# Patient Record
Sex: FEMALE | Race: WHITE | NOT HISPANIC OR LATINO | Employment: UNEMPLOYED | URBAN - METROPOLITAN AREA
[De-identification: names, ages, dates, MRNs, and addresses within clinical notes are randomized per-mention and may not be internally consistent; named-entity substitution may affect disease eponyms.]

---

## 2017-01-25 ENCOUNTER — OFFICE VISIT (OUTPATIENT)
Dept: NEPHROLOGY | Facility: MEDICAL CENTER | Age: 67
End: 2017-01-25
Payer: MEDICARE

## 2017-01-25 ENCOUNTER — HOSPITAL ENCOUNTER (OUTPATIENT)
Dept: LAB | Facility: MEDICAL CENTER | Age: 67
End: 2017-01-25
Attending: INTERNAL MEDICINE
Payer: MEDICARE

## 2017-01-25 VITALS
TEMPERATURE: 98.7 F | BODY MASS INDEX: 36.88 KG/M2 | RESPIRATION RATE: 12 BRPM | DIASTOLIC BLOOD PRESSURE: 78 MMHG | HEART RATE: 97 BPM | HEIGHT: 69 IN | WEIGHT: 249 LBS | SYSTOLIC BLOOD PRESSURE: 130 MMHG

## 2017-01-25 DIAGNOSIS — N18.30 CKD (CHRONIC KIDNEY DISEASE), STAGE III (HCC): ICD-10-CM

## 2017-01-25 DIAGNOSIS — E55.9 VITAMIN D DEFICIENCY DISEASE: ICD-10-CM

## 2017-01-25 DIAGNOSIS — E78.5 HYPERLIPIDEMIA WITH TARGET LDL LESS THAN 100: ICD-10-CM

## 2017-01-25 DIAGNOSIS — I15.1 HYPERTENSION SECONDARY TO OTHER RENAL DISORDERS: ICD-10-CM

## 2017-01-25 LAB
ANION GAP SERPL CALC-SCNC: 11 MMOL/L (ref 0–11.9)
APPEARANCE UR: CLEAR
BACTERIA #/AREA URNS HPF: ABNORMAL /HPF
BILIRUB UR QL STRIP.AUTO: NEGATIVE
BUN SERPL-MCNC: 13 MG/DL (ref 8–22)
CALCIUM SERPL-MCNC: 9.9 MG/DL (ref 8.5–10.5)
CHLORIDE SERPL-SCNC: 104 MMOL/L (ref 96–112)
CO2 SERPL-SCNC: 27 MMOL/L (ref 20–33)
COLOR UR AUTO: YELLOW
CREAT SERPL-MCNC: 1.32 MG/DL (ref 0.5–1.4)
EPITHELIAL CELLS 1715: ABNORMAL /HPF
GLUCOSE SERPL-MCNC: 114 MG/DL (ref 65–99)
GLUCOSE UR STRIP.AUTO-MCNC: NEGATIVE MG/DL
HYALINE CAST   1831: ABNORMAL /LPF
KETONES UR STRIP.AUTO-MCNC: NEGATIVE MG/DL
LEUKOCYTE ESTERASE UR QL STRIP.AUTO: ABNORMAL
MICRO URNS: ABNORMAL
NITRITE UR QL STRIP.AUTO: NEGATIVE
PH UR: 6.5 [PH]
POTASSIUM SERPL-SCNC: 4 MMOL/L (ref 3.6–5.5)
PROT UR QL STRIP: NEGATIVE MG/DL
RBC #/AREA URNS HPF: ABNORMAL /HPF
RBC UR QL AUTO: NEGATIVE
SODIUM SERPL-SCNC: 142 MMOL/L (ref 135–145)
SP GR UR STRIP.AUTO: 1.01
TRANS CELLS URNS QL MICRO: ABNORMAL /HPF
WBC #/AREA URNS HPF: ABNORMAL /HPF

## 2017-01-25 PROCEDURE — 80048 BASIC METABOLIC PNL TOTAL CA: CPT

## 2017-01-25 PROCEDURE — 99204 OFFICE O/P NEW MOD 45 MIN: CPT | Performed by: INTERNAL MEDICINE

## 2017-01-25 PROCEDURE — G8432 DEP SCR NOT DOC, RNG: HCPCS | Performed by: INTERNAL MEDICINE

## 2017-01-25 PROCEDURE — 1101F PT FALLS ASSESS-DOCD LE1/YR: CPT | Performed by: INTERNAL MEDICINE

## 2017-01-25 PROCEDURE — G8419 CALC BMI OUT NRM PARAM NOF/U: HCPCS | Performed by: INTERNAL MEDICINE

## 2017-01-25 PROCEDURE — G8484 FLU IMMUNIZE NO ADMIN: HCPCS | Performed by: INTERNAL MEDICINE

## 2017-01-25 PROCEDURE — 4040F PNEUMOC VAC/ADMIN/RCVD: CPT | Mod: 8P | Performed by: INTERNAL MEDICINE

## 2017-01-25 PROCEDURE — 3014F SCREEN MAMMO DOC REV: CPT | Performed by: INTERNAL MEDICINE

## 2017-01-25 PROCEDURE — 81001 URINALYSIS AUTO W/SCOPE: CPT

## 2017-01-25 PROCEDURE — 1036F TOBACCO NON-USER: CPT | Performed by: INTERNAL MEDICINE

## 2017-01-25 PROCEDURE — 3017F COLORECTAL CA SCREEN DOC REV: CPT | Mod: 1P | Performed by: INTERNAL MEDICINE

## 2017-01-25 PROCEDURE — 36415 COLL VENOUS BLD VENIPUNCTURE: CPT

## 2017-01-25 ASSESSMENT — ENCOUNTER SYMPTOMS
CHILLS: 0
SHORTNESS OF BREATH: 0
DIZZINESS: 0
ORTHOPNEA: 0
ABDOMINAL PAIN: 0
HEMOPTYSIS: 0
FEVER: 0
BACK PAIN: 0
CONSTITUTIONAL NEGATIVE: 1
NECK PAIN: 0
NAUSEA: 0
COUGH: 0
DIARRHEA: 0
MYALGIAS: 0
WEIGHT LOSS: 0
DIAPHORESIS: 0
HEARTBURN: 0
EYES NEGATIVE: 1
WEAKNESS: 0
SENSORY CHANGE: 0
FOCAL WEAKNESS: 0
PALPITATIONS: 0
SPUTUM PRODUCTION: 0
PND: 0
CARDIOVASCULAR NEGATIVE: 1
RESPIRATORY NEGATIVE: 1
WHEEZING: 0
CONSTIPATION: 0
FLANK PAIN: 0
CLAUDICATION: 0
VOMITING: 0

## 2017-01-25 NOTE — PROGRESS NOTES
"Subjective:      Keyur Burt is a 66 y.o. female who presents with New Patient and Chronic Kidney Disease            HPI  Keyur is coming today for initial evaluation  of CKD III  Doing well, no complaints.  No diffictulties to urinate  No dysuria, no hematuria.  HTN: BP well controlled  CKD III: baseline creat level at 1.3's -last one from Sept 2016 at 1.49  Renal US -mild right hydro in 2015 repeated in 2016 - no hydro, (+) for non obstructing kidney stone    Review of Systems   Constitutional: Negative.  Negative for fever, chills, weight loss, malaise/fatigue and diaphoresis.   HENT: Negative.    Eyes: Negative.    Respiratory: Negative.  Negative for cough, hemoptysis, sputum production, shortness of breath and wheezing.    Cardiovascular: Negative.  Negative for chest pain, palpitations, orthopnea, claudication, leg swelling and PND.   Gastrointestinal: Negative for heartburn, nausea, vomiting, abdominal pain, diarrhea and constipation.   Genitourinary: Negative.  Negative for dysuria, urgency, frequency, hematuria and flank pain.   Musculoskeletal: Negative for myalgias, back pain, joint pain and neck pain.   Skin: Negative.  Negative for itching and rash.   Neurological: Negative for dizziness, sensory change, focal weakness and weakness.   All other systems reviewed and are negative.    PMH/FH/SH/allergies and medications reviewed     Objective:     /78 mmHg  Pulse 97  Temp(Src) 37.1 °C (98.7 °F) (Temporal)  Resp 12  Ht 1.753 m (5' 9\")  Wt 112.946 kg (249 lb)  BMI 36.75 kg/m2     Physical Exam   Constitutional: She is oriented to person, place, and time. She appears well-developed and well-nourished. No distress.   HENT:   Head: Normocephalic and atraumatic.   Nose: Nose normal.   Mouth/Throat: Oropharynx is clear and moist.   Eyes: Conjunctivae and EOM are normal. Pupils are equal, round, and reactive to light. No scleral icterus.   Neck: Normal range of motion. Neck supple. "   Cardiovascular: Normal rate, regular rhythm and normal heart sounds.  Exam reveals no gallop and no friction rub.    No murmur heard.  Pulmonary/Chest: Effort normal and breath sounds normal. No respiratory distress. She has no wheezes. She has no rales.   Abdominal: Soft. Bowel sounds are normal. She exhibits no distension.   Musculoskeletal: She exhibits no edema.   Neurological: She is alert and oriented to person, place, and time. No cranial nerve deficit. Coordination normal.   Skin: Skin is warm. No rash noted. No erythema.   Nursing note and vitals reviewed.            Laboratory and US results reviewed: d/w Pt  Lab Results   Component Value Date/Time    CREATININE 1.49* 09/27/2016 11:08 AM    POTASSIUM 4.8 09/27/2016 11:08 AM         Assessment/Plan:     1. CKD (chronic kidney disease), stage III      Creat level slightly worse from baseline -to repeat renal panel today -will f/u results  - BASIC METABOLIC PANEL; Future  - URINALYSIS; Future    2. Hypertension       BP well controlled    3. Hyperlipidemia with target LDL less than 100      4. Vitamin D deficiency disease      To monitor    Recs: low Na diet             Monitor BP             Complete renal; panel today             To drink plenty of water with lemon    F/u in 1 month    Thank you for the consult

## 2017-01-25 NOTE — MR AVS SNAPSHOT
"Keyur Burt   2017 11:30 AM   Office Visit   MRN: [id number]    Department:  [facility]   Dept Phone:  [phone]    Description:  Female : 1950   Provider:  Angelia Arreguin M.D.           Reason for Visit     New Patient           Allergies as of 2017     Allergen Noted Reactions    Iodine 2013       Pcn [Penicillins] 2013       Shellfish Allergy 2013         You were diagnosed with     CKD (chronic kidney disease), stage III   [136666]       Hypertension secondary to other renal disorders   [9198329]       Hyperlipidemia with target LDL less than 100   [806429]       Vitamin D deficiency disease   [497946]         Vital Signs     Blood Pressure Pulse Temperature Respirations Height Weight    130/78 mmHg 97 37.1 °C (98.7 °F) (Temporal) 12 1.753 m (5' 9\") 112.946 kg (249 lb)    Body Mass Index Smoking Status                36.75 kg/m2 Never Smoker           Basic Information     Date Of Birth Sex Race Ethnicity Preferred Language    1950 Female White Non- English      Your appointments     2017  1:20 PM   ONCOLOGY NEW PATIENT 60 MIN with Marcos Hu M.D.   [facility] (--)    [address], [address]  [place] [address] [phone]            2017 11:30 AM   Established Patient with MILO Piña   [facility]    [address]  [place] [address] [phone]           You will be receiving a confirmation call a few days before your appointment from our automated call confirmation system.            Mar 15, 2017 10:30 AM   Follow Up Visit with Angelia Arreguin M.D.   [facility] ([address])    [address] [place] [address], [address]  [place] [address] [phone]           You will be receiving a confirmation call a few days before your appointment from our automated call confirmation system.              Problem List    "          ICD-10-CM Priority Class Noted - Resolved    Fatty liver K76.0   Unknown - Present    Hyperlipidemia with target LDL less than 100 E78.5   Unknown - Present    Hypertension I10   Unknown - Present    Hypothyroidism from resorcinol E03.2   Unknown - Present    Thyroid cancer (CMS-HCC) C73   Unknown - Present    Tension headache G44.209   Unknown - Present    Allergy to pollen J30.1   Unknown - Present    Chronic neck pain M54.2, G89.29   Unknown - Present    Preventative health care Z00.00   3/25/2013 - Present    Impaired fasting glucose R73.01   Unknown - Present    CKD (chronic kidney disease), stage III N18.3   Unknown - Present    Hematuria R31.9   2/20/2015 - Present    Breast cancer (CMS-HCC) C50.919   Unknown - Present    Vitamin D deficiency disease E55.9   1/25/2017 - Present      Health Maintenance        Date Due Completion Dates    IMM DTaP/Tdap/Td Vaccine (1 - Tdap) 7/4/1970 ---    IMM ZOSTER VACCINE 7/4/2011 ---    COLON CANCER SCREENING ANNUAL FIT 1/16/2016 1/16/2015    IMM PNEUMOCOCCAL 65+ (ADULT) LOW/MEDIUM RISK SERIES (1 of 2 - PCV13) 7/4/2016 ---    IMM INFLUENZA (1) 9/1/2016 ---    MAMMOGRAM 8/17/2017 8/17/2016 (Done), 7/1/2015 (Done), 3/4/2014 (Done)    Override on 8/17/2016: Done    Override on 7/1/2015: Done    Override on 3/4/2014: Done    BONE DENSITY 5/20/2021 5/20/2016            Current Immunizations     No immunizations on file.      Below and/or attached are the medications your provider expects you to take. Review all of your home medications and newly ordered medications with your provider and/or pharmacist. Follow medication instructions as directed by your provider and/or pharmacist. Please keep your medication list with you and share with your provider. Update the information when medications are discontinued, doses are changed, or new medications (including over-the-counter products) are added; and carry medication information at all times in the event of emergency  situations     Allergies:  IODINE - (reactions not documented)     PCN - (reactions not documented)     SHELLFISH ALLERGY - (reactions not documented)               Medications  Valid as of: January 25, 2017 - 11:45 AM    Generic Name Brand Name Tablet Size Instructions for use    Anastrozole   Take  by mouth.        Anastrozole (Tab) ARIMIDEX 1 MG Take 1 Tab by mouth every day.        Cholecalciferol (Cap) Vitamin D 2000 UNITS Take  by mouth.        Hydrocodone-Acetaminophen (Tab) NORCO 5-325 MG Take 1 Tab by mouth every 6 hours as needed. Must last 30 days        Levothyroxine Sodium (Tab) SYNTHROID 137 MCG Take 1 Tab by mouth every day.        Lisinopril (Tab) PRINIVIL, ZESTRIL 40 MG Take 1 Tab by mouth every day.        Spironolactone (Tab) ALDACTONE 25 MG Take 1 Tab by mouth every day.        .                 Medicines prescribed today were sent to:     [facility] [address] [place] [address] (N), [place] - [address] [facility]    [address] [facility] (N) [place] [address]    Phone: [phone] Fax: [phone]    Open 24 Hours?: No      Medication refill instructions:       If your prescription bottle indicates you have medication refills left, it is not necessary to call your provider’s office. Please contact your pharmacy and they will refill your medication.    If your prescription bottle indicates you do not have any refills left, you may request refills at any time through one of the following ways: The online [employer] system (except Urgent Care), by calling your provider’s office, or by asking your pharmacy to contact your provider’s office with a refill request. Medication refills are processed only during regular business hours and may not be available until the next business day. Your provider may request additional information or to have a follow-up visit with you prior to refilling your medication.   *Please Note: Medication refills are assigned a new Rx number when refilled electronically. Your pharmacy  may indicate that no refills were authorized even though a new prescription for the same medication is available at the pharmacy. Please request the medicine by name with the pharmacy before contacting your provider for a refill.        Your To Do List     Future Labs/Procedures Complete By Expires    BASIC METABOLIC PANEL  As directed 7/25/2017    URINALYSIS  As directed 7/25/2017         MyChart Status: Patient Declined

## 2017-01-26 ENCOUNTER — TELEPHONE (OUTPATIENT)
Dept: NEPHROLOGY | Facility: MEDICAL CENTER | Age: 67
End: 2017-01-26

## 2017-01-26 NOTE — TELEPHONE ENCOUNTER
PT called wanted to know if you can call her to talk about her labs results she got them done yesterday, labs results are in her chart . Her number is [phone]

## 2017-02-22 ENCOUNTER — OFFICE VISIT (OUTPATIENT)
Dept: HEMATOLOGY ONCOLOGY | Facility: MEDICAL CENTER | Age: 67
End: 2017-02-22
Payer: MEDICARE

## 2017-02-22 VITALS
HEART RATE: 82 BPM | WEIGHT: 248.2 LBS | DIASTOLIC BLOOD PRESSURE: 72 MMHG | SYSTOLIC BLOOD PRESSURE: 118 MMHG | HEIGHT: 69 IN | OXYGEN SATURATION: 95 % | RESPIRATION RATE: 16 BRPM | TEMPERATURE: 98.4 F | BODY MASS INDEX: 36.76 KG/M2

## 2017-02-22 DIAGNOSIS — C50.112 MALIGNANT NEOPLASM OF CENTRAL PORTION OF LEFT FEMALE BREAST (HCC): ICD-10-CM

## 2017-02-22 PROCEDURE — 3014F SCREEN MAMMO DOC REV: CPT | Performed by: INTERNAL MEDICINE

## 2017-02-22 PROCEDURE — G8484 FLU IMMUNIZE NO ADMIN: HCPCS | Performed by: INTERNAL MEDICINE

## 2017-02-22 PROCEDURE — 4040F PNEUMOC VAC/ADMIN/RCVD: CPT | Mod: 8P | Performed by: INTERNAL MEDICINE

## 2017-02-22 PROCEDURE — 99204 OFFICE O/P NEW MOD 45 MIN: CPT | Performed by: INTERNAL MEDICINE

## 2017-02-22 PROCEDURE — 1036F TOBACCO NON-USER: CPT | Performed by: INTERNAL MEDICINE

## 2017-02-22 PROCEDURE — G8419 CALC BMI OUT NRM PARAM NOF/U: HCPCS | Performed by: INTERNAL MEDICINE

## 2017-02-22 PROCEDURE — G8432 DEP SCR NOT DOC, RNG: HCPCS | Performed by: INTERNAL MEDICINE

## 2017-02-22 PROCEDURE — 1101F PT FALLS ASSESS-DOCD LE1/YR: CPT | Performed by: INTERNAL MEDICINE

## 2017-02-22 PROCEDURE — 3017F COLORECTAL CA SCREEN DOC REV: CPT | Performed by: INTERNAL MEDICINE

## 2017-02-22 ASSESSMENT — PAIN SCALES - GENERAL: PAINLEVEL: NO PAIN

## 2017-02-22 NOTE — PROGRESS NOTES
Consult Note: Oncology    Date of consultation: 2/22/2017 1:14 PM    Referring provider: Abby Marc     Reason for consultation: To establish care for a history of breast carcinoma.  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 As you know she  is a 66 y.o. year old postmenopausal woman who was diagnosed with stage II a HER-2/geoffrey positive breast carcinoma, which was found in her screening mammogram back in June 2015. She underwent a wire localization lumpectomy and sentinel lymph node biopsy on 9/15/2015 at [place] to [place]. According to available records, the pathology showed on 0.3 cm, ER positive, UT negative, HER-2/geoffrey positive (by FISH )invasive carcinoma with one out of 3 lymph nodes positive. She was seen by Dr. Camarena and was started on adjuvant TCH chemotherapy. After the first dose, she developed severe adverse effects, and they declined further chemotherapy. She was started on anastrozole and Herceptin was continued for a total of one year. Some delay in rehabilitation for radiation, which was completed in March 2016.    She has developed weight gain and leg swelling. She had multiple echocardiograms done, all of which were reportedly normal. She wanted to switch providers and establish care here. She has been taking Arimidex, which has been given by her PCP. She has been getting mammograms every 6 months. She denies acute complaints except for chronic low back pain    Past Medical History:    Past Medical History   Diagnosis Date   • Chronic neck pain    • Allergic rhinitis    • Tension headache    • Thyroid cancer (CMS-HCC)      treated with total thyroidectomy   • Hypertension    • Hyperlipidemia LDL goal < 100    • Fatty liver      elevated lft's   • Hypothyroidism    • Impaired fasting glucose    • CKD (chronic kidney  disease), stage III    • Hematuria    • Breast cancer (CMS-HCC)        Past surgical history:    Past Surgical History   Procedure Laterality Date   • Abdominal hysterectomy total       uterus only DUB AFTER CHILDBIRTH   • Tonsillectomy and adenoidectomy     • Cholecystectomy     • Rotator cuff repair       right shoulder x 2   • Gastric banding laparoscopic       done 2x   • Hiatal hernia repair         Allergies:  Iodine; Pcn; and Shellfish allergy    Medications:    Current Outpatient Prescriptions   Medication Sig Dispense Refill   • anastrozole (ARIMIDEX) 1 MG Tab Take 1 Tab by mouth every day. 90 Tab 0   • spironolactone (ALDACTONE) 25 MG Tab Take 1 Tab by mouth every day. 90 Tab 0   • hydrocodone-acetaminophen (NORCO) 5-325 MG Tab per tablet Take 1 Tab by mouth every 6 hours as needed. Must last 30 days 65 Tab 0   • ANASTROZOLE PO Take  by mouth.     • lisinopril (PRINIVIL, ZESTRIL) 40 MG tablet Take 1 Tab by mouth every day. 90 Tab 2   • levothyroxine (SYNTHROID) 137 MCG Tab Take 1 Tab by mouth every day. 90 Tab 2   • Cholecalciferol (VITAMIN D) 2000 UNITS Cap Take  by mouth.       No current facility-administered medications for this visit.       Social History:     Social History     Social History   • Marital Status:      Spouse Name: N/A   • Number of Children: N/A   • Years of Education: N/A     Occupational History   • Not on file.     Social History Main Topics   • Smoking status: Never Smoker    • Smokeless tobacco: Never Used   • Alcohol Use: No   • Drug Use: No   • Sexual Activity: Not on file     Other Topics Concern   • Not on file     Social History Narrative       Family History:     Family History   Problem Relation Age of Onset   • Heart Disease Mother    • Cancer Mother    • Diabetes Mother    • Heart Disease Father    • Stroke Father        Review of Systems:  All other review of systems are negative except what was mentioned above in the HPI.    Constitutional: No fever, chills,  "weight loss ,malaise/fatigue.  . She had significant weight gain over the past year  HEENT: No new auditory or visual complaints. No sore throat and neck pain.     Respiratory:No new cough, sputum production, shortness of breath and wheezing.    Cardiovascular: No new chest pain, palpitations, orthopnea and leg swelling.    Gastrointestinal: No heartburn, nausea, vomiting ,abdominal pain, hematochezia or melena     Genitourinary: Negative for dysuria, hematuria    Musculoskeletal: No new arthralgias or myalgias . Chronic back pain  Skin: Negative for rash and itching.    Neurological: Negative for focal weakness or headaches.    Endo/Heme/Allergies: No abnormal bleed/bruise.    Psychiatric/Behavioral: No new depression/anxiety.    Physical Exam:  Vitals:   /72 mmHg  Pulse 82  Temp(Src) 36.9 °C (98.4 °F)  Resp 16  Ht 1.753 m (5' 9.02\")  Wt 112.583 kg (248 lb 3.2 oz)  BMI 36.64 kg/m2  SpO2 95%  Breastfeeding? No    General: Not in acute distress, alert and oriented x 3  HEENT: No pallor, icterus. Oropharynx clear.   Neck: Supple, no palpable masses.  Lymph nodes: No palpable cervical, supraclavicular, axillary or inguinal lymphadenopathy.    CVS: regular rate and rhythm, no rubs or gallops  RESP: Clear to auscultate bilaterally, no wheezing or crackles.   ABD: Soft, non tender, non distended, positive bowel sounds, no palpable organomegaly  EXT: No edema or cyanosis  CNS: Alert and oriented x3, No focal deficits.  Skin- No rash  Breast her left breast with well-healed lumpectomy scar at 12:00 position. She also had some old scar from prior lumpectomies. No axillary adenopathy. Right breast exam unremarkable      Labs:   No results for input(s): RBC, HEMOGLOBIN, HEMATOCRIT, PLATELETCT, PROTHROMBTM, APTT, INR, IRON, FERRITIN, TOTIRONBC in the last 72 hours.  Lab Results   Component Value Date/Time    SODIUM 142 01/25/2017 12:16 PM    POTASSIUM 4.0 01/25/2017 12:16 PM    CHLORIDE 104 01/25/2017 12:16 PM    " CO2 27 01/25/2017 12:16 PM    GLUCOSE 114* 01/25/2017 12:16 PM    BUN 13 01/25/2017 12:16 PM    CREATININE 1.32 01/25/2017 12:16 PM    BUN-CREATININE RATIO 9* 09/27/2016 11:08 AM        Assessment and Plan:  Stage II A left breast, ER positive, CT negative, HER-2/geoffrey positive invasive ductal carcinoma, grade 3  -She received only one dose of adjuvant TCH chemotherapy followed by one year. Herceptin. She is status post adjuvant radiation with some delay and has been on Arimidex. Recent mammograms did not reveal any evidence of relapse. She had problems with fluid overload and obesity for which she is following up with primary care provider. She had a DEXA scan done which was reportedly normal. I will try to access the report of this. I reviewed her prior medical records. I informed her that she does have a higher risk of relapse given the high-grade carcinoma with lymph node involvement. Even though she did not get the planned chemotherapy. Hopefully Herceptin and Arimidex will reduce the risk of relapse. She has enough Arimidex for 2 months and will call us when she needs refills. I informed her that she is at risk of both local relapse and distant relapse and to monitor for any new clinical symptoms. She will return to the clinic in 6 months.    She agreed and verbalized her agreement and understanding with the current plan.  I answered all questions and concerns she has at this time.              Thank you for allowing me to participate in her care.    Please note that this dictation was created using voice recognition software. I have made every reasonable attempt to correct obvious errors, but I expect that there are errors of grammar and possibly content that I did not discover before finalizing the note.      SIGNATURES:  Marcos Hu    CC:  MILO Piña Janice K, A.P.N.

## 2017-02-22 NOTE — MR AVS SNAPSHOT
"Keyur Burt   2017 1:20 PM   Office Visit   MRN: [id number]    Department:  [facility]   Dept Phone:  [phone]    Description:  Female : 1950   Provider:  Marcos Hu M.D.           Reason for Visit     New Patient History of breast cancer. Ref: Abby Marc APRN      Allergies as of 2017     Allergen Noted Reactions    Iodine 2013       Pcn [Penicillins] 2013       Shellfish Allergy 2013         Vital Signs     Blood Pressure Pulse Temperature Respirations Height Weight    118/72 mmHg 82 36.9 °C (98.4 °F) 16 1.753 m (5' 9.02\") 112.583 kg (248 lb 3.2 oz)    Body Mass Index Oxygen Saturation Breastfeeding? Smoking Status          36.64 kg/m2 95% No Never Smoker         Basic Information     Date Of Birth Sex Race Ethnicity Preferred Language    1950 Female White Non- English      Your appointments     2017 11:30 AM   Established Patient with MILO Piña   [facility])    [address]  [place] [address] [phone]           You will be receiving a confirmation call a few days before your appointment from our automated call confirmation system.            Mar 15, 2017 10:30 AM   Follow Up Visit with Angelia Arreguin M.D.   [facility] ([address] [place] [address])    [address], [address]  [place] [address] [phone]           You will be receiving a confirmation call a few days before your appointment from our automated call confirmation system.              Problem List              ICD-10-CM Priority Class Noted - Resolved    Fatty liver K76.0   Unknown - Present    Hyperlipidemia with target LDL less than 100 E78.5   Unknown - Present    Hypertension I10   Unknown - Present    Hypothyroidism from resorcinol E03.2   Unknown - Present    Thyroid cancer (CMS-HCC) C73   Unknown - Present    Tension headache G44.209   Unknown - Present   " Allergy to pollen J30.1   Unknown - Present    Chronic neck pain M54.2, G89.29   Unknown - Present    Preventative health care Z00.00   3/25/2013 - Present    Impaired fasting glucose R73.01   Unknown - Present    CKD (chronic kidney disease), stage III N18.3   Unknown - Present    Hematuria R31.9   2/20/2015 - Present    Breast cancer (CMS-HCC) C50.919   Unknown - Present    Vitamin D deficiency disease E55.9   1/25/2017 - Present      Health Maintenance        Date Due Completion Dates    IMM DTaP/Tdap/Td Vaccine (1 - Tdap) 7/4/1970 ---    IMM ZOSTER VACCINE 7/4/2011 ---    COLON CANCER SCREENING ANNUAL FIT 1/16/2016 1/16/2015    IMM PNEUMOCOCCAL 65+ (ADULT) LOW/MEDIUM RISK SERIES (1 of 2 - PCV13) 7/4/2016 ---    IMM INFLUENZA (1) 9/1/2016 ---    MAMMOGRAM 8/17/2017 8/17/2016 (Done), 7/1/2015 (Done), 3/4/2014 (Done)    Override on 8/17/2016: Done    Override on 7/1/2015: Done    Override on 3/4/2014: Done    BONE DENSITY 5/20/2021 5/20/2016            Current Immunizations     No immunizations on file.      Below and/or attached are the medications your provider expects you to take. Review all of your home medications and newly ordered medications with your provider and/or pharmacist. Follow medication instructions as directed by your provider and/or pharmacist. Please keep your medication list with you and share with your provider. Update the information when medications are discontinued, doses are changed, or new medications (including over-the-counter products) are added; and carry medication information at all times in the event of emergency situations     Allergies:  IODINE - (reactions not documented)     PCN - (reactions not documented)     SHELLFISH ALLERGY - (reactions not documented)               Medications  Valid as of: February 22, 2017 -  2:10 PM    Generic Name Brand Name Tablet Size Instructions for use    Anastrozole   Take  by mouth.        Anastrozole (Tab) ARIMIDEX 1 MG Take 1 Tab by mouth  every day.        Cholecalciferol (Cap) Vitamin D 2000 UNITS Take  by mouth.        Hydrocodone-Acetaminophen (Tab) NORCO 5-325 MG Take 1 Tab by mouth every 6 hours as needed. Must last 30 days        Levothyroxine Sodium (Tab) SYNTHROID 137 MCG Take 1 Tab by mouth every day.        Lisinopril (Tab) PRINIVIL, ZESTRIL 40 MG Take 1 Tab by mouth every day.        Spironolactone (Tab) ALDACTONE 25 MG Take 1 Tab by mouth every day.        .                 Medicines prescribed today were sent to:     [facility] (N), [place] - [address] [facility]    [address] [facility] (N) [place] [address]    Phone: [phone] Fax: [phone]    Open 24 Hours?: No      Medication refill instructions:       If your prescription bottle indicates you have medication refills left, it is not necessary to call your provider’s office. Please contact your pharmacy and they will refill your medication.    If your prescription bottle indicates you do not have any refills left, you may request refills at any time through one of the following ways: The online [employer] system (except Urgent Care), by calling your provider’s office, or by asking your pharmacy to contact your provider’s office with a refill request. Medication refills are processed only during regular business hours and may not be available until the next business day. Your provider may request additional information or to have a follow-up visit with you prior to refilling your medication.   *Please Note: Medication refills are assigned a new Rx number when refilled electronically. Your pharmacy may indicate that no refills were authorized even though a new prescription for the same medication is available at the pharmacy. Please request the medicine by name with the pharmacy before contacting your provider for a refill.           MyChart Status: Patient Declined

## 2017-02-24 ENCOUNTER — TELEPHONE (OUTPATIENT)
Dept: HEMATOLOGY ONCOLOGY | Facility: MEDICAL CENTER | Age: 67
End: 2017-02-24

## 2017-02-24 NOTE — TELEPHONE ENCOUNTER
Nutrition Services: New Consult  Consult received for patient. 66 year old female with history of breast cancer s/p one dose chemotherapy + XRT and Herceptin. I called patient this afternoon to discuss nutrition and try to set up appointment to meet in person however patient states that at this time she is very busy and would prefer if we contacted her at the end of March.  She reports she is relocating and is very busy with doctors appointment at this time.   We will attempt to contact patient in a month to set-up appointment at that time.

## 2017-02-28 ENCOUNTER — APPOINTMENT (OUTPATIENT)
Dept: MEDICAL GROUP | Facility: MEDICAL CENTER | Age: 67
End: 2017-02-28
Payer: MEDICARE

## 2017-03-06 ENCOUNTER — TELEPHONE (OUTPATIENT)
Dept: HEMATOLOGY ONCOLOGY | Facility: MEDICAL CENTER | Age: 67
End: 2017-03-06

## 2017-03-06 NOTE — TELEPHONE ENCOUNTER
Nutrition Services:    Pt returned RD phone call to schedule an in person consultation on the 15th at 11:30 - RD to meet with pt then.

## 2017-03-11 LAB
ALBUMIN SERPL-MCNC: 5.1 G/DL (ref 3.6–4.8)
ALBUMIN/CREAT UR: <9.7 MG/G CREAT (ref 0–30)
ALBUMIN/GLOB SERPL: 2.1 {RATIO} (ref 1.1–2.5)
ALP SERPL-CCNC: 80 IU/L (ref 39–117)
ALT SERPL-CCNC: 33 IU/L (ref 0–32)
AST SERPL-CCNC: 40 IU/L (ref 0–40)
BILIRUB SERPL-MCNC: 0.6 MG/DL (ref 0–1.2)
BUN SERPL-MCNC: 17 MG/DL (ref 8–27)
BUN/CREAT SERPL: 13 (ref 11–26)
CALCIUM SERPL-MCNC: 10.2 MG/DL (ref 8.7–10.3)
CHLORIDE SERPL-SCNC: 101 MMOL/L (ref 96–106)
CHOLEST SERPL-MCNC: 311 MG/DL (ref 100–199)
CO2 SERPL-SCNC: 22 MMOL/L (ref 18–29)
COMMENT 011824: ABNORMAL
CREAT SERPL-MCNC: 1.33 MG/DL (ref 0.57–1)
CREAT UR-MCNC: 30.9 MG/DL
GLOBULIN SER CALC-MCNC: 2.4 G/DL (ref 1.5–4.5)
GLUCOSE SERPL-MCNC: 112 MG/DL (ref 65–99)
HDLC SERPL-MCNC: 59 MG/DL
LDLC SERPL CALC-MCNC: 211 MG/DL (ref 0–99)
MICROALBUMIN UR-MCNC: <3 UG/ML
POTASSIUM SERPL-SCNC: 4.8 MMOL/L (ref 3.5–5.2)
PROT SERPL-MCNC: 7.5 G/DL (ref 6–8.5)
SODIUM SERPL-SCNC: 141 MMOL/L (ref 134–144)
TRIGL SERPL-MCNC: 205 MG/DL (ref 0–149)
VLDLC SERPL CALC-MCNC: 41 MG/DL (ref 5–40)

## 2017-03-15 ENCOUNTER — TELEPHONE (OUTPATIENT)
Dept: HEMATOLOGY ONCOLOGY | Facility: MEDICAL CENTER | Age: 67
End: 2017-03-15

## 2017-03-15 ENCOUNTER — OFFICE VISIT (OUTPATIENT)
Dept: NEPHROLOGY | Facility: MEDICAL CENTER | Age: 67
End: 2017-03-15
Payer: MEDICARE

## 2017-03-15 VITALS
DIASTOLIC BLOOD PRESSURE: 80 MMHG | HEIGHT: 69 IN | WEIGHT: 250 LBS | HEART RATE: 71 BPM | SYSTOLIC BLOOD PRESSURE: 128 MMHG | RESPIRATION RATE: 14 BRPM | BODY MASS INDEX: 37.03 KG/M2 | TEMPERATURE: 98.4 F

## 2017-03-15 DIAGNOSIS — N18.30 CKD (CHRONIC KIDNEY DISEASE), STAGE III (HCC): ICD-10-CM

## 2017-03-15 DIAGNOSIS — E55.9 VITAMIN D DEFICIENCY DISEASE: ICD-10-CM

## 2017-03-15 DIAGNOSIS — I15.1 HYPERTENSION SECONDARY TO OTHER RENAL DISORDERS: ICD-10-CM

## 2017-03-15 PROCEDURE — 3014F SCREEN MAMMO DOC REV: CPT | Performed by: INTERNAL MEDICINE

## 2017-03-15 PROCEDURE — 1101F PT FALLS ASSESS-DOCD LE1/YR: CPT | Performed by: INTERNAL MEDICINE

## 2017-03-15 PROCEDURE — G8419 CALC BMI OUT NRM PARAM NOF/U: HCPCS | Performed by: INTERNAL MEDICINE

## 2017-03-15 PROCEDURE — G8432 DEP SCR NOT DOC, RNG: HCPCS | Performed by: INTERNAL MEDICINE

## 2017-03-15 PROCEDURE — 4040F PNEUMOC VAC/ADMIN/RCVD: CPT | Mod: 8P | Performed by: INTERNAL MEDICINE

## 2017-03-15 PROCEDURE — 3017F COLORECTAL CA SCREEN DOC REV: CPT | Performed by: INTERNAL MEDICINE

## 2017-03-15 PROCEDURE — G8484 FLU IMMUNIZE NO ADMIN: HCPCS | Performed by: INTERNAL MEDICINE

## 2017-03-15 PROCEDURE — 99213 OFFICE O/P EST LOW 20 MIN: CPT | Performed by: INTERNAL MEDICINE

## 2017-03-15 PROCEDURE — 1036F TOBACCO NON-USER: CPT | Performed by: INTERNAL MEDICINE

## 2017-03-15 NOTE — PROGRESS NOTES
"Subjective:      Keyur Burt is a 66 y.o. female who presents with Follow-Up and Chronic Kidney Disease            HPI  Keyur is coming today for f/u  of CKD III  Doing well, no complaints.  No diffictulties to urinate  No dysuria, no hematuria.  HTN: BP well controlled  CKD III: baseline creat level at 1.3's -last one from Sept 2016 at 1.49 -improved to 1.3  Renal US -mild right hydro in 2015 repeated in 2016 - no hydro, (+) for non obstructing kidney stone      Review of Systems   Constitutional: Negative.  Negative for fever, chills, weight loss, malaise/fatigue and diaphoresis.   HENT: Negative.    Eyes: Negative.    Respiratory: Negative.  Negative for cough, hemoptysis, sputum production, shortness of breath and wheezing.    Cardiovascular: Negative.  Negative for chest pain, palpitations, orthopnea, claudication, leg swelling and PND.   Gastrointestinal: Negative for heartburn, nausea, vomiting, abdominal pain, diarrhea and constipation.   Genitourinary: Negative.  Negative for dysuria, urgency, frequency, hematuria and flank pain.   Musculoskeletal: Negative for myalgias, back pain, joint pain and neck pain.   Skin: Negative.  Negative for itching and rash.   Neurological: Negative for dizziness, sensory change, focal weakness and weakness.   All other systems reviewed and are negative.    PMH/FH/SH/allergies and medications reviewed     Objective:     /80 mmHg  Pulse 71  Temp(Src) 36.9 °C (98.4 °F) (Temporal)  Resp 14  Ht 1.753 m (5' 9\")  Wt 113.399 kg (250 lb)  BMI 36.90 kg/m2     Physical Exam   Constitutional: She is oriented to person, place, and time. She appears well-developed and well-nourished. No distress.   HENT:   Head: Normocephalic and atraumatic.   Nose: Nose normal.   Mouth/Throat: Oropharynx is clear and moist.   Eyes: Conjunctivae and EOM are normal. Pupils are equal, round, and reactive to light. No scleral icterus.   Neck: Normal range of motion. Neck supple. "   Cardiovascular: Normal rate, regular rhythm and normal heart sounds.  Exam reveals no gallop and no friction rub.    No murmur heard.  Pulmonary/Chest: Effort normal and breath sounds normal. No respiratory distress. She has no wheezes. She has no rales.   Abdominal: Soft. Bowel sounds are normal. She exhibits no distension.   Musculoskeletal: She exhibits no edema.   Neurological: She is alert and oriented to person, place, and time. No cranial nerve deficit. Coordination normal.   Skin: Skin is warm. No rash noted. No erythema.   Nursing note and vitals reviewed.            Laboratory and US results reviewed: d/w Pt  Lab Results   Component Value Date/Time    CREATININE 1.33* 03/10/2017 12:02 PM    POTASSIUM 4.8 03/10/2017 12:02 PM         Assessment/Plan:     1. CKD (chronic kidney disease), stage III      Creat level stable at baseline      2. Hypertension       BP well controlled    3. Hyperlipidemia with target LDL less than 100      Scheduled nutrition consult today    4. Vitamin D deficiency disease      Well controlled    Recs: low Na low cholesterol diet             Monitor BP               F/u in 6 months with BMP

## 2017-03-22 ENCOUNTER — OFFICE VISIT (OUTPATIENT)
Dept: MEDICAL GROUP | Facility: MEDICAL CENTER | Age: 67
End: 2017-03-22
Payer: MEDICARE

## 2017-03-22 VITALS
DIASTOLIC BLOOD PRESSURE: 96 MMHG | TEMPERATURE: 97.4 F | BODY MASS INDEX: 36.88 KG/M2 | SYSTOLIC BLOOD PRESSURE: 130 MMHG | HEIGHT: 69 IN | OXYGEN SATURATION: 96 % | WEIGHT: 249 LBS | HEART RATE: 90 BPM

## 2017-03-22 DIAGNOSIS — Z79.891 CHRONIC USE OF OPIATE DRUGS THERAPEUTIC PURPOSES: ICD-10-CM

## 2017-03-22 DIAGNOSIS — M54.2 CHRONIC NECK PAIN: ICD-10-CM

## 2017-03-22 DIAGNOSIS — E78.5 HYPERLIPIDEMIA WITH TARGET LDL LESS THAN 100: ICD-10-CM

## 2017-03-22 DIAGNOSIS — R73.01 IMPAIRED FASTING GLUCOSE: ICD-10-CM

## 2017-03-22 DIAGNOSIS — G89.29 CHRONIC NECK PAIN: ICD-10-CM

## 2017-03-22 DIAGNOSIS — G89.29 CHRONIC MIDLINE LOW BACK PAIN WITHOUT SCIATICA: ICD-10-CM

## 2017-03-22 DIAGNOSIS — E66.9 OBESITY (BMI 30-39.9): ICD-10-CM

## 2017-03-22 DIAGNOSIS — Z12.11 SCREEN FOR COLON CANCER: ICD-10-CM

## 2017-03-22 DIAGNOSIS — M54.50 CHRONIC MIDLINE LOW BACK PAIN WITHOUT SCIATICA: ICD-10-CM

## 2017-03-22 DIAGNOSIS — Z12.31 ENCOUNTER FOR SCREENING MAMMOGRAM FOR MALIGNANT NEOPLASM OF BREAST: ICD-10-CM

## 2017-03-22 DIAGNOSIS — I10 ESSENTIAL HYPERTENSION: ICD-10-CM

## 2017-03-22 PROCEDURE — 1101F PT FALLS ASSESS-DOCD LE1/YR: CPT | Performed by: NURSE PRACTITIONER

## 2017-03-22 PROCEDURE — G8419 CALC BMI OUT NRM PARAM NOF/U: HCPCS | Performed by: NURSE PRACTITIONER

## 2017-03-22 PROCEDURE — 4040F PNEUMOC VAC/ADMIN/RCVD: CPT | Mod: 8P | Performed by: NURSE PRACTITIONER

## 2017-03-22 PROCEDURE — 3014F SCREEN MAMMO DOC REV: CPT | Performed by: NURSE PRACTITIONER

## 2017-03-22 PROCEDURE — 1036F TOBACCO NON-USER: CPT | Performed by: NURSE PRACTITIONER

## 2017-03-22 PROCEDURE — G8432 DEP SCR NOT DOC, RNG: HCPCS | Performed by: NURSE PRACTITIONER

## 2017-03-22 PROCEDURE — 99214 OFFICE O/P EST MOD 30 MIN: CPT | Performed by: NURSE PRACTITIONER

## 2017-03-22 PROCEDURE — G8484 FLU IMMUNIZE NO ADMIN: HCPCS | Performed by: NURSE PRACTITIONER

## 2017-03-22 RX ORDER — HYDROCODONE BITARTRATE AND ACETAMINOPHEN 5; 325 MG/1; MG/1
1 TABLET ORAL EVERY 6 HOURS PRN
Qty: 65 TAB | Refills: 0 | Status: SHIPPED | OUTPATIENT
Start: 2017-03-22 | End: 2017-07-20 | Stop reason: SDUPTHER

## 2017-03-22 RX ORDER — ATORVASTATIN CALCIUM 10 MG/1
10 TABLET, FILM COATED ORAL
Qty: 8 TAB | Refills: 3 | Status: SHIPPED | OUTPATIENT
Start: 2017-03-22 | End: 2017-04-10 | Stop reason: SDUPTHER

## 2017-03-22 RX ORDER — HYDROCODONE BITARTRATE AND ACETAMINOPHEN 5; 325 MG/1; MG/1
1 TABLET ORAL EVERY 6 HOURS PRN
Qty: 65 TAB | Refills: 0 | Status: SHIPPED | OUTPATIENT
Start: 2017-03-22 | End: 2017-03-22 | Stop reason: SDUPTHER

## 2017-03-22 NOTE — PROGRESS NOTES
Subjective:      Keyur Burt is a 66 y.o. female who presents with No chief complaint on file.            HPI  Seen in f/u for HTN.  Her DBP is sl elevated today.  Taking meds approp.  Didn't sleep well last nite d/t crick in her neck.    Went down her shoulder rt to arm.  Worse with change in weather.  Reviewed lab withpt.  Her CMP shows stable renal fx.  Just saw nylk and all is ok.  lft's are back to normal.   GFR is stable at 48.  Alb/cr ratio is wnl.  Glucose was elevated on cmp.  Last a1c in 1/16 was wnl  LP shows too high trg at 205.  Working on improving diet.  Her HDL is ok.  LDL is way too high at 205.  Reviewed risks and benefits of lipitor with pt including but not limited to: myalgia, rhabdo, elevated lft.  She is due fit test, mammo.    She needs her norco refilled. Uses for chronic LBP.  controlled      Patient Active Problem List    Diagnosis Date Noted   • Obesity (BMI 30-39.9) 03/22/2017   • Vitamin D deficiency disease 01/25/2017   • Breast cancer (CMS-HCC)    • Hematuria 02/20/2015   • Impaired fasting glucose    • CKD (chronic kidney disease), stage III    • Preventative health care 03/25/2013   • Fatty liver    • Hyperlipidemia with target LDL less than 100    • Hypertension    • Hypothyroidism from resorcinol    • Thyroid cancer (CMS-[facility])    • Tension headache    • Allergy to pollen    • Chronic neck pain      Current Outpatient Prescriptions   Medication Sig Dispense Refill   • anastrozole (ARIMIDEX) 1 MG Tab Take 1 Tab by mouth every day. 90 Tab 0   • spironolactone (ALDACTONE) 25 MG Tab Take 1 Tab by mouth every day. 90 Tab 0   • hydrocodone-acetaminophen (NORCO) 5-325 MG Tab per tablet Take 1 Tab by mouth every 6 hours as needed. Must last 30 days 65 Tab 0   • Cholecalciferol (VITAMIN D) 2000 UNITS Cap Take  by mouth.     • lisinopril (PRINIVIL, ZESTRIL) 40 MG tablet Take 1 Tab by mouth every day. 90 Tab 2   • levothyroxine (SYNTHROID) 137 MCG Tab Take 1 Tab by mouth every day. 90  "Tab 2     No current facility-administered medications for this visit.     Allergies   Allergen Reactions   • Iodine    • Pcn [Penicillins]    • Shellfish Allergy          ROS  Review of Systems   Constitutional: Negative.  Negative for fever, chills, weight loss, malaise/fatigue and diaphoresis.   HENT: Negative.  Negative for hearing loss, ear pain, nosebleeds, congestion, sore throat, neck pain, tinnitus and ear discharge.    Respiratory: Negative.  Negative for cough, hemoptysis, sputum production, shortness of breath, wheezing and stridor.    Cardiovascular: Negative.  Negative for chest pain, palpitations, orthopnea, claudication, leg swelling and PND.   Gastrointestinal: denies nausea, vomiting, diarrhea, constipation, heartburn, melena or hematochezia.  Genitourinary: Denies dysuria, hematuria, urinary incontinence, frequency or urgency.         Objective:     /96 mmHg  Pulse 90  Temp(Src) 36.3 °C (97.4 °F)  Ht 1.753 m (5' 9\")  Wt 112.946 kg (249 lb)  BMI 36.75 kg/m2  SpO2 96%  Breastfeeding? No     Physical Exam  Physical Exam   Vitals reviewed.  Constitutional: oriented to person, place, and time. appears well-developed and well-nourished. No distress.   Cardiovascular: Normal rate, regular rhythm, normal heart sounds and intact distal pulses.  Exam reveals no gallop and no friction rub.  No murmur heard.  No carotid bruits.   Pulmonary/Chest: Effort normal and breath sounds normal. No stridor. No respiratory distress. no wheezes or rales. exhibits no tenderness.   Musculoskeletal: Normal range of motion. exhibits no edema. thony pedal pulses 2+.  Neurological: alert and oriented to person, place, and time. exhibits normal muscle tone. Coordination normal.   Skin: Skin is warm and dry. no diaphoresis.   Psychiatric: normal mood and affect. behavior is normal.               Assessment/Plan:     1. Essential hypertension      controlled   2. Impaired fasting glucose  HEMOGLOBIN A1C    check a1c " with next lab   3. Hyperlipidemia with target LDL less than 100  atorvastatin (LIPITOR) 10 MG Tab    COMP METABOLIC PANEL    LIPID PROFILE    try lipitor 10 mg 2x/wk.  recheck CMP, LP, a1c in 6 weeks.  f/u for lab review   4. Chronic use of opiate drugs therapeutic purposes      refilled norco x 3 months.  f/u 3 months for refill   5. Obesity (BMI 30-39.9)  Patient identified as having weight management issue.  Appropriate orders and counseling given.   6. Chronic midline low back pain without sciatica  hydrocodone-acetaminophen (NORCO) 5-325 MG Tab per tablet    DISCONTINUED: hydrocodone-acetaminophen (NORCO) 5-325 MG Tab per tablet    DISCONTINUED: hydrocodone-acetaminophen (NORCO) 5-325 MG Tab per tablet   7. Chronic neck pain  hydrocodone-acetaminophen (NORCO) 5-325 MG Tab per tablet    DISCONTINUED: hydrocodone-acetaminophen (NORCO) 5-325 MG Tab per tablet    DISCONTINUED: hydrocodone-acetaminophen (NORCO) 5-325 MG Tab per tablet    refill pain meds.   f/u 3 months for med refill and lab review   8. Encounter for screening mammogram for malignant neoplasm of breast  MA-SCREEN MAMMO W/CAD-BILAT   9. Screen for colon cancer  OCCULT BLOOD FECES IMMUNOASSAY

## 2017-03-22 NOTE — MR AVS SNAPSHOT
"Keyur Burt   3/22/2017 1:00 PM   Office Visit   MRN: [id number]    Department:  [facility]   Dept Phone:  [phone]    Description:  Female : 1950   Provider:  MILO Piña           Allergies as of 3/22/2017     Allergen Noted Reactions    Iodine 2013       Pcn [Penicillins] 2013       Shellfish Allergy 2013         You were diagnosed with     Essential hypertension   [1089224]   controlled    Impaired fasting glucose   [790.21.ICD-9-CM]   check a1c with next lab    Hyperlipidemia with target LDL less than 100   [021279]   try lipitor 10 mg 2x/wk.  recheck CMP, LP, a1c in 6 weeks.  f/u for lab review    Chronic use of opiate drugs therapeutic purposes   [3703321]   refilled norco x 3 months.  f/u 3 months for refill    Obesity (BMI 30-39.9)   [888222]       Chronic midline low back pain without sciatica   [8922187]       Chronic neck pain   [331878]   refill pain meds.   f/u 3 months for med refill and lab review    Encounter for screening mammogram for malignant neoplasm of breast   [468114]       Screen for colon cancer   [092397]         Vital Signs     Blood Pressure Pulse Temperature Height Weight Body Mass Index    130/96 mmHg 90 36.3 °C (97.4 °F) 1.753 m (5' 9\") 112.946 kg (249 lb) 36.75 kg/m2    Oxygen Saturation Breastfeeding? Smoking Status             96% No Never Smoker          Basic Information     Date Of Birth Sex Race Ethnicity Preferred Language    1950 Female White Non- English      Your appointments     Aug 28, 2017  1:00 PM   ONCOLOGY EST PATIENT 30 MIN with Marcos Hu M.D.   [facility] (--)    [address] [place] [address], [address]  [facility] [address] [phone]              Problem List              ICD-10-CM Priority Class Noted - Resolved    Fatty liver K76.0   Unknown - Present    Hyperlipidemia with target LDL less than 100 E78.5   Unknown - Present    Hypertension I10   Unknown - Present    Hypothyroidism " from resorcinol E03.2   Unknown - Present    Thyroid cancer (CMS-HCC) C73   Unknown - Present    Tension headache G44.209   Unknown - Present    Allergy to pollen J30.1   Unknown - Present    Chronic neck pain M54.2, G89.29   Unknown - Present    Preventative health care Z00.00   3/25/2013 - Present    Impaired fasting glucose R73.01   Unknown - Present    CKD (chronic kidney disease), stage III N18.3   Unknown - Present    Hematuria R31.9   2/20/2015 - Present    Breast cancer (CMS-HCC) C50.919   Unknown - Present    Vitamin D deficiency disease E55.9   1/25/2017 - Present    Obesity (BMI 30-39.9) E66.9   3/22/2017 - Present      Health Maintenance        Date Due Completion Dates    IMM DTaP/Tdap/Td Vaccine (1 - Tdap) 7/4/1970 ---    IMM ZOSTER VACCINE 7/4/2011 ---    COLON CANCER SCREENING ANNUAL FIT 1/16/2016 1/16/2015    IMM PNEUMOCOCCAL 65+ (ADULT) LOW/MEDIUM RISK SERIES (1 of 2 - PCV13) 7/4/2016 ---    IMM INFLUENZA (1) 9/1/2016 ---    MAMMOGRAM 8/17/2017 8/17/2016 (Done), 7/1/2015 (Done), 3/4/2014 (Done)    Override on 8/17/2016: Done    Override on 7/1/2015: Done    Override on 3/4/2014: Done    BONE DENSITY 5/20/2021 5/20/2016            Current Immunizations     No immunizations on file.      Below and/or attached are the medications your provider expects you to take. Review all of your home medications and newly ordered medications with your provider and/or pharmacist. Follow medication instructions as directed by your provider and/or pharmacist. Please keep your medication list with you and share with your provider. Update the information when medications are discontinued, doses are changed, or new medications (including over-the-counter products) are added; and carry medication information at all times in the event of emergency situations     Allergies:  IODINE - (reactions not documented)     PCN - (reactions not documented)     SHELLFISH ALLERGY - (reactions not documented)               Medications   Valid as of: March 22, 2017 -  1:46 PM    Generic Name Brand Name Tablet Size Instructions for use    Anastrozole (Tab) ARIMIDEX 1 MG Take 1 Tab by mouth every day.        Atorvastatin Calcium (Tab) LIPITOR 10 MG Take 1 Tab by mouth every 72 hours.        Cholecalciferol (Cap) Vitamin D 2000 UNITS Take  by mouth.        Hydrocodone-Acetaminophen (Tab) NORCO 5-325 MG Take 1 Tab by mouth every 6 hours as needed. Must last 30 days        Levothyroxine Sodium (Tab) SYNTHROID 137 MCG Take 1 Tab by mouth every day.        Lisinopril (Tab) PRINIVIL, ZESTRIL 40 MG Take 1 Tab by mouth every day.        Spironolactone (Tab) ALDACTONE 25 MG Take 1 Tab by mouth every day.        .                 Medicines prescribed today were sent to:     [address] ([address]), [place] - [address] [facility]    [address] [facility] ([place]) [place] [address]    Phone: [phone] Fax: [phone]    Open 24 Hours?: No      Medication refill instructions:       If your prescription bottle indicates you have medication refills left, it is not necessary to call your provider’s office. Please contact your pharmacy and they will refill your medication.    If your prescription bottle indicates you do not have any refills left, you may request refills at any time through one of the following ways: The online [employer] system (except Urgent Care), by calling your provider’s office, or by asking your pharmacy to contact your provider’s office with a refill request. Medication refills are processed only during regular business hours and may not be available until the next business day. Your provider may request additional information or to have a follow-up visit with you prior to refilling your medication.   *Please Note: Medication refills are assigned a new Rx number when refilled electronically. Your pharmacy may indicate that no refills were authorized even though a new prescription for the same medication is available at the pharmacy.  Please request the medicine by name with the pharmacy before contacting your provider for a refill.        Your To Do List     Future Labs/Procedures Complete By Expires    COMP METABOLIC PANEL  As directed 3/23/2018    HEMOGLOBIN A1C  As directed 3/23/2018    LIPID PROFILE  As directed 3/23/2018    MA-SCREEN MAMMO W/CAD-BILAT  As directed 4/23/2018    OCCULT BLOOD FECES IMMUNOASSAY  As directed 3/23/2018         MyChart Status: Patient Declined

## 2017-04-10 DIAGNOSIS — E78.5 HYPERLIPIDEMIA WITH TARGET LDL LESS THAN 100: ICD-10-CM

## 2017-04-10 RX ORDER — SPIRONOLACTONE 25 MG/1
25 TABLET ORAL DAILY
Qty: 90 TAB | Refills: 1 | Status: SHIPPED | OUTPATIENT
Start: 2017-04-10 | End: 2017-07-25 | Stop reason: SDUPTHER

## 2017-04-10 RX ORDER — ATORVASTATIN CALCIUM 10 MG/1
10 TABLET, FILM COATED ORAL
Qty: 8 TAB | Refills: 3 | Status: SHIPPED | OUTPATIENT
Start: 2017-04-10 | End: 2017-07-24 | Stop reason: SDUPTHER

## 2017-04-11 ENCOUNTER — TELEPHONE (OUTPATIENT)
Dept: HEMATOLOGY ONCOLOGY | Facility: MEDICAL CENTER | Age: 67
End: 2017-04-11

## 2017-04-12 NOTE — TELEPHONE ENCOUNTER
Nutrition Follow-Up:  RD called patient to review any nutritionally pertinent questions from our initial interview on 3/15/17.  She states that she is doing well with voluntary diet and exercise changes; started Lipitor for high cholesterol.  She is very optimistic that cholesterol and triglycerides are trending back to normal range.  No additional dietary needs at this time, she states.  RD provided contact information prn.  No new weight in chart since initial assessment.  Please re-consult RD prn.

## 2017-04-14 ENCOUNTER — HOSPITAL ENCOUNTER (OUTPATIENT)
Facility: MEDICAL CENTER | Age: 67
End: 2017-04-14
Attending: NURSE PRACTITIONER
Payer: MEDICARE

## 2017-04-18 DIAGNOSIS — Z12.11 SCREEN FOR COLON CANCER: ICD-10-CM

## 2017-04-19 DIAGNOSIS — N63.0 BREAST NODULE: ICD-10-CM

## 2017-04-19 NOTE — PROGRESS NOTES
Tc too pt.  Exp abn mammo in area of previous breast cancer last year.  Will do dx mammo and us.  Will mail her the orders.      Isidra:  Please mail orders to pt

## 2017-04-24 ENCOUNTER — OFFICE VISIT (OUTPATIENT)
Dept: MEDICAL GROUP | Facility: MEDICAL CENTER | Age: 67
End: 2017-04-24
Payer: MEDICARE

## 2017-04-24 VITALS
HEART RATE: 94 BPM | TEMPERATURE: 97.5 F | WEIGHT: 246 LBS | BODY MASS INDEX: 36.43 KG/M2 | OXYGEN SATURATION: 96 % | DIASTOLIC BLOOD PRESSURE: 80 MMHG | HEIGHT: 69 IN | SYSTOLIC BLOOD PRESSURE: 122 MMHG

## 2017-04-24 DIAGNOSIS — E78.5 HYPERLIPIDEMIA WITH TARGET LDL LESS THAN 100: ICD-10-CM

## 2017-04-24 DIAGNOSIS — N18.30 CKD (CHRONIC KIDNEY DISEASE) STAGE 3, GFR 30-59 ML/MIN (HCC): ICD-10-CM

## 2017-04-24 PROCEDURE — G8432 DEP SCR NOT DOC, RNG: HCPCS | Performed by: NURSE PRACTITIONER

## 2017-04-24 PROCEDURE — 1036F TOBACCO NON-USER: CPT | Performed by: NURSE PRACTITIONER

## 2017-04-24 PROCEDURE — 3014F SCREEN MAMMO DOC REV: CPT | Performed by: NURSE PRACTITIONER

## 2017-04-24 PROCEDURE — 4040F PNEUMOC VAC/ADMIN/RCVD: CPT | Mod: 8P | Performed by: NURSE PRACTITIONER

## 2017-04-24 PROCEDURE — 1101F PT FALLS ASSESS-DOCD LE1/YR: CPT | Performed by: NURSE PRACTITIONER

## 2017-04-24 PROCEDURE — G8419 CALC BMI OUT NRM PARAM NOF/U: HCPCS | Performed by: NURSE PRACTITIONER

## 2017-04-24 PROCEDURE — 99214 OFFICE O/P EST MOD 30 MIN: CPT | Performed by: NURSE PRACTITIONER

## 2017-04-24 RX ORDER — ATORVASTATIN CALCIUM 20 MG/1
20 TABLET, FILM COATED ORAL
Qty: 45 TAB | Refills: 0 | Status: SHIPPED | OUTPATIENT
Start: 2017-04-24 | End: 2017-10-24 | Stop reason: SDUPTHER

## 2017-04-24 NOTE — MR AVS SNAPSHOT
"Keyur Burt   2017 1:30 PM   Office Visit   MRN: [id number]    Department:  [facility]   Dept Phone:  [phone]    Description:  Female : 1950   Provider:  MILO Piña           Allergies as of 2017     Allergen Noted Reactions    Iodine 2013       Pcn [Penicillins] 2013       Shellfish Allergy 2013         You were diagnosed with     Hyperlipidemia with target LDL less than 100   [840524]   inc lipitor to 20 mg qod. r echeck lab in 2 months.  f/u for review    CKD (chronic kidney disease) stage 3, GFR 30-59 ml/min   [525989]   drink more water.  recheck CMP 2 months.  f/u for review      Vital Signs     Blood Pressure Pulse Temperature Height Weight Body Mass Index    122/80 mmHg 94 36.4 °C (97.5 °F) 1.753 m (5' 9\") 111.585 kg (246 lb) 36.31 kg/m2    Oxygen Saturation Breastfeeding? Smoking Status             96% No Never Smoker          Basic Information     Date Of Birth Sex Race Ethnicity Preferred Language    1950 Female White Non- English      Your appointments     Aug 28, 2017  1:00 PM   ONCOLOGY EST PATIENT 30 MIN with Marcos Hu M.D.   [facility] (--)    [address] [place] [address], [address]  [facility] [id number] [phone]              Problem List              ICD-10-CM Priority Class Noted - Resolved    Fatty liver K76.0   Unknown - Present    Hyperlipidemia with target LDL less than 100 E78.5   Unknown - Present    Hypertension I10   Unknown - Present    Hypothyroidism from resorcinol E03.2   Unknown - Present    Thyroid cancer (CMS-HCC) C73   Unknown - Present    Tension headache G44.209   Unknown - Present    Allergy to pollen J30.1   Unknown - Present    Chronic neck pain M54.2, G89.29   Unknown - Present    Preventative health care Z00.00   3/25/2013 - Present    Impaired fasting glucose R73.01   Unknown - Present    CKD (chronic kidney disease), stage III N18.3   Unknown - Present    Hematuria R31.9   " 2/20/2015 - Present    Breast cancer (CMS-[facility]) C50.919   Unknown - Present    Vitamin D deficiency disease E55.9   1/25/2017 - Present    Obesity (BMI 30-39.9) E66.9   3/22/2017 - Present      Health Maintenance        Date Due Completion Dates    IMM DTaP/Tdap/Td Vaccine (1 - Tdap) 7/4/1970 ---    IMM ZOSTER VACCINE 7/4/2011 ---    COLON CANCER SCREENING ANNUAL FIT 1/16/2016 1/16/2015    IMM PNEUMOCOCCAL 65+ (ADULT) LOW/MEDIUM RISK SERIES (1 of 2 - PCV13) 7/4/2016 ---    MAMMOGRAM 4/18/2018 4/18/2017    BONE DENSITY 5/20/2021 5/20/2016            Current Immunizations     No immunizations on file.      Below and/or attached are the medications your provider expects you to take. Review all of your home medications and newly ordered medications with your provider and/or pharmacist. Follow medication instructions as directed by your provider and/or pharmacist. Please keep your medication list with you and share with your provider. Update the information when medications are discontinued, doses are changed, or new medications (including over-the-counter products) are added; and carry medication information at all times in the event of emergency situations     Allergies:  IODINE - (reactions not documented)     PCN - (reactions not documented)     SHELLFISH ALLERGY - (reactions not documented)               Medications  Valid as of: April 24, 2017 -  2:23 PM    Generic Name Brand Name Tablet Size Instructions for use    Anastrozole (Tab) ARIMIDEX 1 MG Take 1 Tab by mouth every day.        Atorvastatin Calcium (Tab) LIPITOR 10 MG Take 1 Tab by mouth every 72 hours.        Atorvastatin Calcium (Tab) LIPITOR 20 MG Take 1 Tab by mouth every 48 hours.        Cholecalciferol (Cap) Vitamin D 2000 UNITS Take  by mouth.        Hydrocodone-Acetaminophen (Tab) NORCO 5-325 MG Take 1 Tab by mouth every 6 hours as needed. Must last 30 days        Levothyroxine Sodium (Tab) SYNTHROID 137 MCG Take 1 Tab by mouth every day.         Lisinopril (Tab) PRINIVIL, ZESTRIL 40 MG Take 1 Tab by mouth every day.        Spironolactone (Tab) ALDACTONE 25 MG Take 1 Tab by mouth every day.        .                 Medicines prescribed today were sent to:     [facility] [address] [facility] ([place]), [place] - [address]    [address] [facility] ([place]) [place] [address]    Phone: [phone] Fax: [phone]    Open 24 Hours?: No      Medication refill instructions:       If your prescription bottle indicates you have medication refills left, it is not necessary to call your provider’s office. Please contact your pharmacy and they will refill your medication.    If your prescription bottle indicates you do not have any refills left, you may request refills at any time through one of the following ways: The online [employer] system (except Urgent Care), by calling your provider’s office, or by asking your pharmacy to contact your provider’s office with a refill request. Medication refills are processed only during regular business hours and may not be available until the next business day. Your provider may request additional information or to have a follow-up visit with you prior to refilling your medication.   *Please Note: Medication refills are assigned a new Rx number when refilled electronically. Your pharmacy may indicate that no refills were authorized even though a new prescription for the same medication is available at the pharmacy. Please request the medicine by name with the pharmacy before contacting your provider for a refill.        Your To Do List     Future Labs/Procedures Complete By Expires    COMP METABOLIC PANEL  6/24/2017 4/25/2018    LIPID PROFILE  6/24/2017 4/25/2018         [employer] Status: Patient Declined

## 2017-04-24 NOTE — PROGRESS NOTES
Subjective:      Keyur Burt is a 66 y.o. female who presents with No chief complaint on file.            HPI  Seen in f/u for abn mammo.  She has gotten numerous calls to redo theadditional films. Her brother is ill.  She is going to do f/u when he is improved.  She has reallly been improving on her diet.  Not able to exercise d/t weather.  Reviewed lab withpt.  Her CMP shows cr is still up and GFR is down to 42.  Not drinking much water.  Alb/crr atio is wnl.  LP shows trg are still up but sl better. HDL is at goal. LDL is worse. Up from 168 to 211.  She is doing ok on lipitor w/o se taking 2x/wk.      Patient Active Problem List    Diagnosis Date Noted   • Obesity (BMI 30-39.9) 03/22/2017   • Vitamin D deficiency disease 01/25/2017   • Breast cancer (CMS-HCC)    • Hematuria 02/20/2015   • Impaired fasting glucose    • CKD (chronic kidney disease), stage III    • Preventative health care 03/25/2013   • Fatty liver    • Hyperlipidemia with target LDL less than 100    • Hypertension    • Hypothyroidism from resorcinol    • Thyroid cancer (CMS-HCC)    • Tension headache    • Allergy to pollen    • Chronic neck pain      Current Outpatient Prescriptions   Medication Sig Dispense Refill   • atorvastatin (LIPITOR) 10 MG Tab Take 1 Tab by mouth every 72 hours. 8 Tab 3   • spironolactone (ALDACTONE) 25 MG Tab Take 1 Tab by mouth every day. 90 Tab 1   • hydrocodone-acetaminophen (NORCO) 5-325 MG Tab per tablet Take 1 Tab by mouth every 6 hours as needed. Must last 30 days 65 Tab 0   • anastrozole (ARIMIDEX) 1 MG Tab Take 1 Tab by mouth every day. 90 Tab 0   • Cholecalciferol (VITAMIN D) 2000 UNITS Cap Take  by mouth.     • lisinopril (PRINIVIL, ZESTRIL) 40 MG tablet Take 1 Tab by mouth every day. 90 Tab 2   • levothyroxine (SYNTHROID) 137 MCG Tab Take 1 Tab by mouth every day. 90 Tab 2     No current facility-administered medications for this visit.     Allergies   Allergen Reactions   • Iodine    • Pcn  "[Penicillins]    • Shellfish Allergy        ROS  Review of Systems   Constitutional: Negative.  Negative for fever, chills, weight loss, malaise/fatigue and diaphoresis.   HENT: Negative.  Negative for hearing loss, ear pain, nosebleeds, congestion, sore throat, neck pain, tinnitus and ear discharge.    Respiratory: Negative.  Negative for cough, hemoptysis, sputum production, shortness of breath, wheezing and stridor.    Cardiovascular: Negative.  Negative for chest pain, palpitations, orthopnea, claudication, leg swelling and PND.   Gastrointestinal: denies nausea, vomiting, diarrhea, constipation, heartburn, melena or hematochezia.  Genitourinary: Denies dysuria, hematuria, urinary incontinence, frequency or urgency.           Objective:     /80 mmHg  Pulse 94  Temp(Src) 36.4 °C (97.5 °F)  Ht 1.753 m (5' 9\")  Wt 111.585 kg (246 lb)  BMI 36.31 kg/m2  SpO2 96%  Breastfeeding? No     Physical Exam      Physical Exam   Vitals reviewed.  Constitutional: oriented to person, place, and time. appears well-developed and well-nourished. No distress.   Cardiovascular: Normal rate, regular rhythm, normal heart sounds and intact distal pulses.  Exam reveals no gallop and no friction rub.  No murmur heard.  No carotid bruits.   Pulmonary/Chest: Effort normal and breath sounds normal. No stridor. No respiratory distress. no wheezes or rales. exhibits no tenderness.   Musculoskeletal: Normal range of motion. exhibits no edema. thony pedal pulses 2+.  Neurological: alert and oriented to person, place, and time. exhibits normal muscle tone. Coordination normal.   Skin: Skin is warm and dry. no diaphoresis.   Psychiatric: normal mood and affect. behavior is normal.            Assessment/Plan:     1. Hyperlipidemia with target LDL less than 100  atorvastatin (LIPITOR) 20 MG Tab    COMP METABOLIC PANEL    LIPID PROFILE    inc lipitor to 20 mg qod. r echeck lab in 2 months.  f/u for review   2. CKD (chronic kidney disease) " stage 3, GFR 30-59 ml/min      drink more water.  recheck CMP 2 months.  f/u for review

## 2017-05-01 RX ORDER — ANASTROZOLE 1 MG/1
1 TABLET ORAL DAILY
Qty: 90 TAB | Refills: 0 | Status: CANCELLED | OUTPATIENT
Start: 2017-05-01

## 2017-05-01 NOTE — TELEPHONE ENCOUNTER
Was the patient seen in the last year in this department? Yes     Does patient have an active prescription for medications requested? Yes     Received Request Via: Patient     Patient would like a 90 day supply

## 2017-05-05 DIAGNOSIS — C50.919 MALIGNANT NEOPLASM OF FEMALE BREAST, UNSPECIFIED LATERALITY, UNSPECIFIED SITE OF BREAST: ICD-10-CM

## 2017-05-05 RX ORDER — ANASTROZOLE 1 MG/1
1 TABLET ORAL DAILY
Qty: 90 TAB | Refills: 0 | Status: SHIPPED | OUTPATIENT
Start: 2017-05-05 | End: 2017-08-03 | Stop reason: SDUPTHER

## 2017-05-05 NOTE — TELEPHONE ENCOUNTER
Patient called stating she called the pharmacy to check if her medication Arimidex was ready for , and they stated that they had to fax back the prescription to get approved by the doctor.  Called [facility] in [place] and got told that the RN Hannah Vences e-prescribed the mediation and that's why they had to fax back the prescription to the get approval from the doctor.  We called in the mediation and called back Keyur and to let her know her mediation has been called in.

## 2017-06-20 ENCOUNTER — TELEPHONE (OUTPATIENT)
Dept: MEDICAL GROUP | Facility: MEDICAL CENTER | Age: 67
End: 2017-06-20

## 2017-06-20 DIAGNOSIS — N63.0 BREAST NODULE: ICD-10-CM

## 2017-06-20 NOTE — TELEPHONE ENCOUNTER
1. Caller Name: [facility]                      Call Back Number: [phone] fax: [phone]    2. Message: Received message that an order for a left diagnostic mammogram is needed with ultrasound if indicated. Please fax.     3. Patient approves office to leave a detailed voicemail/MyChart message: N\A

## 2017-06-21 ENCOUNTER — TELEPHONE (OUTPATIENT)
Dept: MEDICAL GROUP | Facility: MEDICAL CENTER | Age: 67
End: 2017-06-21

## 2017-06-21 NOTE — TELEPHONE ENCOUNTER
I am not sure if the patient was notified by paz yesterday.  please notify paz's patient that her mammogram done at [facility] does show a suspicious lesion on the left, and ultrasound-guided biopsy is recommended.  That order was faxed yesterday by paz    See result note

## 2017-06-28 ENCOUNTER — TELEPHONE (OUTPATIENT)
Dept: HEMATOLOGY ONCOLOGY | Facility: MEDICAL CENTER | Age: 67
End: 2017-06-28

## 2017-06-28 NOTE — TELEPHONE ENCOUNTER
Patient called stating that she is getting Surgery on the 18th og July and would like to see  sooner then August. I have her rescheduled for the 20th of July at 1620 there was no sooner appointments.

## 2017-06-30 ENCOUNTER — PATIENT OUTREACH (OUTPATIENT)
Dept: HEALTH INFORMATION MANAGEMENT | Facility: OTHER | Age: 67
End: 2017-06-30

## 2017-06-30 NOTE — PROGRESS NOTES
Attempt #:1    WebIZ Checked & Epic Updated: yes  HealthConnect Verified: no  Verify PCP: yes    Communication Preference Obtained: yes     Review Care Team: yes    Annual Wellness Visit Scheduling  1. Scheduling Status:Not Scheduled. Patient states they are not interested        Care Gap Scheduling (Attempt to Schedule EACH Overdue Care Gap!)     Health Maintenance Due   Topic Date Due   • Annual Wellness Visit  DECLINED   • COLON CANCER SCREENING ANNUAL FIT  DECLINED         MyChart Activation: declined  2Peer (Qlipso)hart Rusty: no  Virtual Visits: no  Opt In to Text Messages: no

## 2017-07-08 DIAGNOSIS — E03.2 HYPOTHYROIDISM FROM RESORCINOL: ICD-10-CM

## 2017-07-08 DIAGNOSIS — T49.4X5A HYPOTHYROIDISM FROM RESORCINOL: ICD-10-CM

## 2017-07-08 NOTE — Clinical Note
July 10, 2017        Keyur Burt  [id number]  [place] [address]        Dear Keyur:    We have received a request from your pharmacy to refill your prescription(s). After careful review of your chart, we have noted you are due for labs and a follow up appointment.  We request you call our office at [phone] at your earliest convenience and make an appointment. I have included a fasting lab order for you.    However, when patients are not followed closely by their physician, potential medication complications may go unadressed. We look forward to scheduling an appointment for you, so that we may provide you with the safest and most complete medical care.        If you have any questions or concerns, please don't hesitate to call.        Sincerely,        RUI Piña.    Electronically Signed

## 2017-07-10 RX ORDER — LISINOPRIL 40 MG/1
TABLET ORAL
Qty: 30 TAB | Refills: 0 | Status: ON HOLD | OUTPATIENT
Start: 2017-07-10 | End: 2017-08-15

## 2017-07-10 RX ORDER — LEVOTHYROXINE SODIUM 137 UG/1
TABLET ORAL
Qty: 30 TAB | Refills: 0 | Status: SHIPPED | OUTPATIENT
Start: 2017-07-10 | End: 2017-07-22 | Stop reason: SDUPTHER

## 2017-07-19 ENCOUNTER — TELEPHONE (OUTPATIENT)
Dept: MEDICAL GROUP | Facility: MEDICAL CENTER | Age: 67
End: 2017-07-19

## 2017-07-19 LAB
ALBUMIN SERPL-MCNC: 4.7 G/DL (ref 3.6–4.8)
ALBUMIN/GLOB SERPL: 2.1 {RATIO} (ref 1.2–2.2)
ALP SERPL-CCNC: 79 IU/L (ref 39–117)
ALT SERPL-CCNC: 26 IU/L (ref 0–32)
AST SERPL-CCNC: 29 IU/L (ref 0–40)
BILIRUB SERPL-MCNC: 0.6 MG/DL (ref 0–1.2)
BUN SERPL-MCNC: 16 MG/DL (ref 8–27)
BUN/CREAT SERPL: 12 (ref 12–28)
CALCIUM SERPL-MCNC: 9.7 MG/DL (ref 8.7–10.3)
CHLORIDE SERPL-SCNC: 101 MMOL/L (ref 96–106)
CHOLEST SERPL-MCNC: 176 MG/DL (ref 100–199)
CO2 SERPL-SCNC: 20 MMOL/L (ref 18–29)
COMMENT 011824: ABNORMAL
CREAT SERPL-MCNC: 1.29 MG/DL (ref 0.57–1)
GLOBULIN SER CALC-MCNC: 2.2 G/DL (ref 1.5–4.5)
GLUCOSE SERPL-MCNC: 117 MG/DL (ref 65–99)
HDLC SERPL-MCNC: 58 MG/DL
LDLC SERPL CALC-MCNC: 78 MG/DL (ref 0–99)
POTASSIUM SERPL-SCNC: 4.5 MMOL/L (ref 3.5–5.2)
PROT SERPL-MCNC: 6.9 G/DL (ref 6–8.5)
SODIUM SERPL-SCNC: 138 MMOL/L (ref 134–144)
TRIGL SERPL-MCNC: 199 MG/DL (ref 0–149)
VLDLC SERPL CALC-MCNC: 40 MG/DL (ref 5–40)

## 2017-07-19 NOTE — TELEPHONE ENCOUNTER
Please let pt know that the CMP shows that her kidney function is about the same.  Is she seeing neph still?  She was referred.  Glucose is still elevated.    LP shows HDL and LDL is wnl.  trg are too high but sl better than last time.  Dec complex carbs.

## 2017-07-20 ENCOUNTER — OFFICE VISIT (OUTPATIENT)
Dept: MEDICAL GROUP | Facility: MEDICAL CENTER | Age: 67
End: 2017-07-20
Payer: MEDICARE

## 2017-07-20 ENCOUNTER — OFFICE VISIT (OUTPATIENT)
Dept: HEMATOLOGY ONCOLOGY | Facility: MEDICAL CENTER | Age: 67
End: 2017-07-20
Payer: MEDICARE

## 2017-07-20 ENCOUNTER — HOSPITAL ENCOUNTER (OUTPATIENT)
Dept: LAB | Facility: MEDICAL CENTER | Age: 67
End: 2017-07-20
Attending: NURSE PRACTITIONER
Payer: MEDICARE

## 2017-07-20 VITALS
RESPIRATION RATE: 16 BRPM | BODY MASS INDEX: 34.84 KG/M2 | HEART RATE: 79 BPM | DIASTOLIC BLOOD PRESSURE: 72 MMHG | TEMPERATURE: 98.8 F | OXYGEN SATURATION: 96 % | WEIGHT: 235.23 LBS | SYSTOLIC BLOOD PRESSURE: 124 MMHG | HEIGHT: 69 IN

## 2017-07-20 VITALS
HEART RATE: 70 BPM | DIASTOLIC BLOOD PRESSURE: 70 MMHG | TEMPERATURE: 98.3 F | HEIGHT: 69 IN | BODY MASS INDEX: 34.96 KG/M2 | WEIGHT: 236 LBS | OXYGEN SATURATION: 96 % | SYSTOLIC BLOOD PRESSURE: 122 MMHG

## 2017-07-20 DIAGNOSIS — E03.2 HYPOTHYROIDISM FROM RESORCINOL: ICD-10-CM

## 2017-07-20 DIAGNOSIS — C50.919 MALIGNANT NEOPLASM OF FEMALE BREAST, UNSPECIFIED LATERALITY, UNSPECIFIED SITE OF BREAST: ICD-10-CM

## 2017-07-20 DIAGNOSIS — T49.4X5A HYPOTHYROIDISM FROM RESORCINOL: ICD-10-CM

## 2017-07-20 DIAGNOSIS — Z79.891 CHRONIC USE OF OPIATE FOR THERAPEUTIC PURPOSE: ICD-10-CM

## 2017-07-20 DIAGNOSIS — E78.5 HYPERLIPIDEMIA WITH TARGET LDL LESS THAN 100: ICD-10-CM

## 2017-07-20 DIAGNOSIS — M54.50 CHRONIC BILATERAL LOW BACK PAIN WITHOUT SCIATICA: ICD-10-CM

## 2017-07-20 DIAGNOSIS — G89.29 CHRONIC BILATERAL LOW BACK PAIN WITHOUT SCIATICA: ICD-10-CM

## 2017-07-20 DIAGNOSIS — G89.29 CHRONIC NECK PAIN: ICD-10-CM

## 2017-07-20 DIAGNOSIS — N18.30 CKD (CHRONIC KIDNEY DISEASE), STAGE III (HCC): ICD-10-CM

## 2017-07-20 DIAGNOSIS — R73.01 IMPAIRED FASTING GLUCOSE: ICD-10-CM

## 2017-07-20 DIAGNOSIS — M54.2 CHRONIC NECK PAIN: ICD-10-CM

## 2017-07-20 LAB
ADDITIONAL TESTS REQ 001315: NORMAL
T4 FREE SERPL-MCNC: 1.17 NG/DL (ref 0.53–1.43)
TSH SERPL DL<=0.005 MIU/L-ACNC: 0.6 UIU/ML (ref 0.3–3.7)
VERBAL ORDER SEE BELOW:   978113: NORMAL

## 2017-07-20 PROCEDURE — 99214 OFFICE O/P EST MOD 30 MIN: CPT | Performed by: FAMILY MEDICINE

## 2017-07-20 PROCEDURE — 36415 COLL VENOUS BLD VENIPUNCTURE: CPT

## 2017-07-20 PROCEDURE — 99215 OFFICE O/P EST HI 40 MIN: CPT | Performed by: INTERNAL MEDICINE

## 2017-07-20 PROCEDURE — 84439 ASSAY OF FREE THYROXINE: CPT

## 2017-07-20 PROCEDURE — 84443 ASSAY THYROID STIM HORMONE: CPT

## 2017-07-20 RX ORDER — HYDROCODONE BITARTRATE AND ACETAMINOPHEN 5; 325 MG/1; MG/1
1 TABLET ORAL EVERY 6 HOURS PRN
Qty: 65 TAB | Refills: 0 | Status: ON HOLD | OUTPATIENT
Start: 2017-07-20 | End: 2017-08-15

## 2017-07-20 RX ORDER — HYDROCODONE BITARTRATE AND ACETAMINOPHEN 5; 325 MG/1; MG/1
1 TABLET ORAL EVERY 6 HOURS PRN
Qty: 65 TAB | Refills: 0 | Status: SHIPPED | OUTPATIENT
Start: 2017-07-20 | End: 2017-12-14 | Stop reason: SDUPTHER

## 2017-07-20 ASSESSMENT — PAIN SCALES - GENERAL: PAINLEVEL: 7=MODERATE-SEVERE PAIN

## 2017-07-20 NOTE — MR AVS SNAPSHOT
"        Keyur Burt   2017 4:20 PM   Office Visit   MRN: [id number]    Department:  [facility]   Dept Phone:  [phone]    Description:  Female : 1950   Provider:  Marcos Hu M.D.           Reason for Visit     Follow-Up 6mo      Allergies as of 2017     Allergen Noted Reactions    Iodine 2013       Pcn [Penicillins] 2013       Shellfish Allergy 2013         You were diagnosed with     Malignant neoplasm of female breast, unspecified laterality, unspecified site of breast (CMS-HCC)   [6160952]         Vital Signs     Blood Pressure Pulse Temperature Respirations Height Weight    124/72 mmHg 79 37.1 °C (98.8 °F) 16 1.753 m (5' 9.02\") 106.7 kg (235 lb 3.7 oz)    Body Mass Index Oxygen Saturation Breastfeeding? Smoking Status          34.72 kg/m2 96% No Never Smoker         Basic Information     Date Of Birth Sex Race Ethnicity Preferred Language    1950 Female White Non- English      Your appointments     2017 12:00 PM   Nuclear Medicine Injection with [facility] NM INJ   [facility] - [facility]    [address]  [place] [address]   [phone]            2017  2:30 PM   CT BODY WITH with [facility] CT 2   [facility] [place])    [address]  [place] [address]   [phone]           Some exams require specific prep instructions that would have been given to you at time of scheduling. If you have any additional questions about the prep instructions, please call Imaging Scheduling at [phone] and press #2.            2017  3:00 PM   NM BONE SCAN WHOLE BODY with [facility] NM ECAM   [facility]    [address]  [place] [address]   [phone]            Aug 17, 2017  3:40 PM   ONCOLOGY EST PATIENT 30 MIN with Marcos Hu M.D.   [facility] (--)    [address], [address]  [place] [address]   "   [phone]              Problem List              ICD-10-CM Priority Class Noted - Resolved    Fatty liver K76.0   Unknown - Present    Hyperlipidemia with target LDL less than 100 E78.5   Unknown - Present    Hypertension I10   Unknown - Present    Hypothyroidism from resorcinol E03.2   Unknown - Present    Thyroid cancer (CMS-HCC) C73   Unknown - Present    Tension headache G44.209   Unknown - Present    Allergy to pollen J30.1   Unknown - Present    Chronic neck pain M54.2, G89.29   Unknown - Present    Preventative health care Z00.00   3/25/2013 - Present    Impaired fasting glucose R73.01   Unknown - Present    CKD (chronic kidney disease), stage III N18.3   Unknown - Present    Hematuria R31.9   2/20/2015 - Present    Breast cancer (CMS-HCC) C50.919   Unknown - Present    Vitamin D deficiency disease E55.9   1/25/2017 - Present    Obesity (BMI 30-39.9) E66.9   3/22/2017 - Present    Chronic use of opiate for therapeutic purpose Z79.891   7/20/2017 - Present    Chronic bilateral low back pain without sciatica M54.5, G89.29   7/20/2017 - Present      Health Maintenance        Date Due Completion Dates    COLON CANCER SCREENING ANNUAL FIT 1/16/2016 1/16/2015    IMM PNEUMOCOCCAL 65+ (ADULT) LOW/MEDIUM RISK SERIES (1 of 2 - PCV13) 1/30/2018 (Originally 7/4/2016) ---    IMM DTaP/Tdap/Td Vaccine (1 - Tdap) 1/30/2018 (Originally 7/4/1970) ---    IMM ZOSTER VACCINE 1/30/2018 (Originally 7/4/2011) ---    IMM INFLUENZA (1) 9/1/2017 ---    MAMMOGRAM 6/21/2018 6/21/2017, 4/18/2017    BONE DENSITY 5/20/2021 5/20/2016            Current Immunizations     No immunizations on file.      Below and/or attached are the medications your provider expects you to take. Review all of your home medications and newly ordered medications with your provider and/or pharmacist. Follow medication instructions as directed by your provider and/or pharmacist. Please keep your medication list with you and share with your provider. Update the  information when medications are discontinued, doses are changed, or new medications (including over-the-counter products) are added; and carry medication information at all times in the event of emergency situations     Allergies:  IODINE - (reactions not documented)     PCN - (reactions not documented)     SHELLFISH ALLERGY - (reactions not documented)               Medications  Valid as of: July 20, 2017 -  5:37 PM    Generic Name Brand Name Tablet Size Instructions for use    Anastrozole (Tab) ARIMIDEX 1 MG Take 1 Tab by mouth every day.        Atorvastatin Calcium (Tab) LIPITOR 10 MG Take 1 Tab by mouth every 72 hours.        Atorvastatin Calcium (Tab) LIPITOR 20 MG Take 1 Tab by mouth every 48 hours.        Cholecalciferol (Cap) Vitamin D 2000 UNITS Take  by mouth.        Hydrocodone-Acetaminophen (Tab) NORCO 5-325 MG Take 1 Tab by mouth every 6 hours as needed. Must last 30 days        Hydrocodone-Acetaminophen (Tab) NORCO 5-325 MG Take 1 Tab by mouth every 6 hours as needed.        Hydrocodone-Acetaminophen (Tab) NORCO 5-325 MG Take 1 Tab by mouth every 6 hours as needed.        Levothyroxine Sodium (Tab) SYNTHROID 137 MCG Take 1 Tab by mouth every day.        Levothyroxine Sodium (Tab) SYNTHROID 137 MCG TAKE ONE TABLET BY MOUTH ONCE DAILY        Lisinopril (Tab) PRINIVIL, ZESTRIL 40 MG Take 1 Tab by mouth every day.        Lisinopril (Tab) PRINIVIL, ZESTRIL 40 MG TAKE ONE TABLET BY MOUTH ONCE DAILY        Spironolactone (Tab) ALDACTONE 25 MG Take 1 Tab by mouth every day.        .                 Medicines prescribed today were sent to:     [address]), [address]) [place] [address]    Phone: [phone] Fax: [phone]    Open 24 Hours?: No      Medication refill instructions:       If your prescription bottle indicates you have medication refills left, it is not necessary to call your provider’s office. Please contact your pharmacy  and they will refill your medication.    If your prescription bottle indicates you do not have any refills left, you may request refills at any time through one of the following ways: The online [employer] system (except Urgent Care), by calling your provider’s office, or by asking your pharmacy to contact your provider’s office with a refill request. Medication refills are processed only during regular business hours and may not be available until the next business day. Your provider may request additional information or to have a follow-up visit with you prior to refilling your medication.   *Please Note: Medication refills are assigned a new Rx number when refilled electronically. Your pharmacy may indicate that no refills were authorized even though a new prescription for the same medication is available at the pharmacy. Please request the medicine by name with the pharmacy before contacting your provider for a refill.        Your To Do List     Future Labs/Procedures Complete By Expires    CT-CHEST (THORAX) WITH  As directed 7/20/2018    NM-BONE SCAN WHOLE BODY  As directed 7/20/2018      Referral     A referral request has been sent to our patient care coordination department. Please allow 3-5 business days for us to process this request and contact you either by phone or mail. If you do not hear from us by the 5th business day, please call us at [phone].           [employer] Status: Patient Declined

## 2017-07-20 NOTE — MR AVS SNAPSHOT
"        Keyur Burt   2017 1:00 PM   Office Visit   MRN: [id number]    Department:  [facility]   Dept Phone:  [phone]    Description:  Female : 1950   Provider:  Rose Burt M.D.           Reason for Visit     Results lab work    Medication Refill Norco      Allergies as of 2017     Allergen Noted Reactions    Iodine 2013       Pcn [Penicillins] 2013       Shellfish Allergy 2013         You were diagnosed with     Hyperlipidemia with target LDL less than 100   [873034]       CKD (chronic kidney disease), stage III   [997852]       Chronic neck pain   [543895]       Chronic use of opiate for therapeutic purpose   [0337745]       Malignant neoplasm of female breast, unspecified laterality, unspecified site of breast (CMS-HCC)   [6384221]       Impaired fasting glucose   [790.21.ICD-9-CM]       Chronic bilateral low back pain without sciatica   [4381166]       Chronic midline low back pain without sciatica   [4007211]       Chronic neck pain   [544144]   refill pain meds.   f/u 3 months for med refill and lab review      Vital Signs     Blood Pressure Pulse Temperature Height Weight Body Mass Index    122/70 mmHg 70 36.8 °C (98.3 °F) 1.753 m (5' 9.02\") 107.049 kg (236 lb) 34.84 kg/m2    Oxygen Saturation Smoking Status                96% Never Smoker           Basic Information     Date Of Birth Sex Race Ethnicity Preferred Language    1950 Female White Non- English      Your appointments     2017  4:20 PM   ONCOLOGY EST PATIENT 30 MIN with Marcos Hu M.D.   [facility] (--)    [address] [place] [address], [address]  [facility] [address] [phone]              Problem List              ICD-10-CM Priority Class Noted - Resolved    Fatty liver K76.0   Unknown - Present    Hyperlipidemia with target LDL less than 100 E78.5   Unknown - Present    Hypertension I10   Unknown - Present    Hypothyroidism from resorcinol E03.2   Unknown - " Present    Thyroid cancer (CMS-HCC) C73   Unknown - Present    Tension headache G44.209   Unknown - Present    Allergy to pollen J30.1   Unknown - Present    Chronic neck pain M54.2, G89.29   Unknown - Present    Preventative health care Z00.00   3/25/2013 - Present    Impaired fasting glucose R73.01   Unknown - Present    CKD (chronic kidney disease), stage III N18.3   Unknown - Present    Hematuria R31.9   2/20/2015 - Present    Breast cancer (CMS-HCC) C50.919   Unknown - Present    Vitamin D deficiency disease E55.9   1/25/2017 - Present    Obesity (BMI 30-39.9) E66.9   3/22/2017 - Present    Chronic use of opiate for therapeutic purpose Z79.891   7/20/2017 - Present    Chronic bilateral low back pain without sciatica M54.5, G89.29   7/20/2017 - Present      Health Maintenance        Date Due Completion Dates    COLON CANCER SCREENING ANNUAL FIT 1/16/2016 1/16/2015    IMM PNEUMOCOCCAL 65+ (ADULT) LOW/MEDIUM RISK SERIES (1 of 2 - PCV13) 1/30/2018 (Originally 7/4/2016) ---    IMM DTaP/Tdap/Td Vaccine (1 - Tdap) 1/30/2018 (Originally 7/4/1970) ---    IMM ZOSTER VACCINE 1/30/2018 (Originally 7/4/2011) ---    IMM INFLUENZA (1) 9/1/2017 ---    MAMMOGRAM 6/21/2018 6/21/2017, 4/18/2017    BONE DENSITY 5/20/2021 5/20/2016            Current Immunizations     No immunizations on file.      Below and/or attached are the medications your provider expects you to take. Review all of your home medications and newly ordered medications with your provider and/or pharmacist. Follow medication instructions as directed by your provider and/or pharmacist. Please keep your medication list with you and share with your provider. Update the information when medications are discontinued, doses are changed, or new medications (including over-the-counter products) are added; and carry medication information at all times in the event of emergency situations     Allergies:  IODINE - (reactions not documented)     PCN - (reactions not  documented)     SHELLFISH ALLERGY - (reactions not documented)               Medications  Valid as of: July 20, 2017 -  2:41 PM    Generic Name Brand Name Tablet Size Instructions for use    Anastrozole (Tab) ARIMIDEX 1 MG Take 1 Tab by mouth every day.        Atorvastatin Calcium (Tab) LIPITOR 10 MG Take 1 Tab by mouth every 72 hours.        Atorvastatin Calcium (Tab) LIPITOR 20 MG Take 1 Tab by mouth every 48 hours.        Cholecalciferol (Cap) Vitamin D 2000 UNITS Take  by mouth.        Hydrocodone-Acetaminophen (Tab) NORCO 5-325 MG Take 1 Tab by mouth every 6 hours as needed. Must last 30 days        Hydrocodone-Acetaminophen (Tab) NORCO 5-325 MG Take 1 Tab by mouth every 6 hours as needed.        Hydrocodone-Acetaminophen (Tab) NORCO 5-325 MG Take 1 Tab by mouth every 6 hours as needed.        Levothyroxine Sodium (Tab) SYNTHROID 137 MCG Take 1 Tab by mouth every day.        Levothyroxine Sodium (Tab) SYNTHROID 137 MCG TAKE ONE TABLET BY MOUTH ONCE DAILY        Lisinopril (Tab) PRINIVIL, ZESTRIL 40 MG Take 1 Tab by mouth every day.        Lisinopril (Tab) PRINIVIL, ZESTRIL 40 MG TAKE ONE TABLET BY MOUTH ONCE DAILY        Spironolactone (Tab) ALDACTONE 25 MG Take 1 Tab by mouth every day.        .                 Medicines prescribed today were sent to:     [address]), [place] [address]) [place] [address]    Phone: [phone] Fax: [phone]    Open 24 Hours?: No      Medication refill instructions:       If your prescription bottle indicates you have medication refills left, it is not necessary to call your provider’s office. Please contact your pharmacy and they will refill your medication.    If your prescription bottle indicates you do not have any refills left, you may request refills at any time through one of the following ways: The online "[employer] system (except Urgent Care), by calling your provider’s office, or by asking your pharmacy  to contact your provider’s office with a refill request. Medication refills are processed only during regular business hours and may not be available until the next business day. Your provider may request additional information or to have a follow-up visit with you prior to refilling your medication.   *Please Note: Medication refills are assigned a new Rx number when refilled electronically. Your pharmacy may indicate that no refills were authorized even though a new prescription for the same medication is available at the pharmacy. Please request the medicine by name with the pharmacy before contacting your provider for a refill.           MyChart Status: Patient Declined

## 2017-07-21 ENCOUNTER — TELEPHONE (OUTPATIENT)
Dept: MEDICAL GROUP | Facility: MEDICAL CENTER | Age: 67
End: 2017-07-21

## 2017-07-21 LAB
T4 FREE SERPL-MCNC: 1.79 NG/DL (ref 0.82–1.77)
TSH SERPL DL<=0.005 MIU/L-ACNC: 1.01 UIU/ML (ref 0.45–4.5)
WRITTEN AUTHORIZATION   977900: NORMAL

## 2017-07-21 NOTE — PROGRESS NOTES
Date of visit: 7/20/2017  5:04 PM      Chief Complaint- Stage II A left breast, ER positive, RI negative, HER-2/geoffrey positive invasive ductal carcinoma, grade 3    History of presenting illness: Keyur Burt  is a 67 y.o. year old postmenopausal female who established oncology care with me in February 2017.    According to available information shewas diagnosed with stage II a HER-2/geoffrey positive breast carcinoma, which was found in her screening mammogram back in June 2015. She underwent  wire localization lumpectomy and sentinel lymph node biopsy on 9/15/2015 at [facility].     According to available records, the pathology showed on 0.3 cm, ER positive, RI negative, HER-2/geoffrey positive (by FISH )invasive carcinoma with one out of 3 lymph nodes positive. She was seen by Dr. Camarena and was started on adjuvant TCH chemotherapy. After the first dose, she developed severe adverse effects, and she declined further chemotherapy. She was started on anastrozole and Herceptin was continued for a total of one year. According to the patient, she was not referred for radiation initially due to unclear reason and finally was referred for breast radiation, which was completed in March 2016.    She had lot of complaints including weight gain, leg swelling, fatigue, etc. She informs that she has been compliant with Arimidex. Multiple echocardiograms have shown a preserved EF, according to her.    Her screening mammogram from April 2017 showed left breast asymmetry. Follow-up mammogram and ultrasound from June 2017 showed 1×1×0.9 cm hypoechoic mass at 11:00, 4 cm from the nipple. No additional mass or lymphadenopathy.  Ultrasound-guided biopsy showed grade 3 invasive ductal carcinoma ER/RI negative, HER-2/geoffrey equivocal(by IHC and FISH - CAP17 ratio 1.8 , Her2 count 4.6)    I was not notified about the above studies results until her clinic appointment today. She feels at her baseline and denies any acute complaints     Past  Medical History:      Past Medical History   Diagnosis Date   • Chronic neck pain    • Allergic rhinitis    • Tension headache    • Thyroid cancer (CMS-HCC)      treated with total thyroidectomy   • Hypertension    • Hyperlipidemia LDL goal < 100    • Fatty liver      elevated lft's   • Hypothyroidism    • Impaired fasting glucose    • CKD (chronic kidney disease), stage III    • Hematuria    • Breast cancer (CMS-HCC)        Past surgical history:       Past Surgical History   Procedure Laterality Date   • Abdominal hysterectomy total       uterus only DUB AFTER CHILDBIRTH   • Tonsillectomy and adenoidectomy     • Cholecystectomy     • Rotator cuff repair       right shoulder x 2   • Gastric banding laparoscopic       done 2x   • Hiatal hernia repair         Allergies:       Iodine; Pcn; and Shellfish allergy    Medications:         Current Outpatient Prescriptions   Medication Sig Dispense Refill   • hydrocodone-acetaminophen (NORCO) 5-325 MG Tab per tablet Take 1 Tab by mouth every 6 hours as needed. Must last 30 days 65 Tab 0   • anastrozole (ARIMIDEX) 1 MG Tab Take 1 Tab by mouth every day. 90 Tab 0   • atorvastatin (LIPITOR) 20 MG Tab Take 1 Tab by mouth every 48 hours. 45 Tab 0   • atorvastatin (LIPITOR) 10 MG Tab Take 1 Tab by mouth every 72 hours. 8 Tab 3   • spironolactone (ALDACTONE) 25 MG Tab Take 1 Tab by mouth every day. 90 Tab 1   • Cholecalciferol (VITAMIN D) 2000 UNITS Cap Take  by mouth.     • lisinopril (PRINIVIL, ZESTRIL) 40 MG tablet Take 1 Tab by mouth every day. 90 Tab 2   • levothyroxine (SYNTHROID) 137 MCG Tab Take 1 Tab by mouth every day. 90 Tab 2   • hydrocodone-acetaminophen (NORCO) 5-325 MG Tab per tablet Take 1 Tab by mouth every 6 hours as needed. 65 Tab 0   • hydrocodone-acetaminophen (NORCO) 5-325 MG Tab per tablet Take 1 Tab by mouth every 6 hours as needed. 65 Tab 0   • levothyroxine (SYNTHROID) 137 MCG Tab TAKE ONE TABLET BY MOUTH ONCE DAILY 30 Tab 0   • lisinopril (PRINIVIL,  "ZESTRIL) 40 MG tablet TAKE ONE TABLET BY MOUTH ONCE DAILY 30 Tab 0     No current facility-administered medications for this visit.         Social History:     Social History     Social History   • Marital Status:      Spouse Name: N/A   • Number of Children: N/A   • Years of Education: N/A     Occupational History   • Not on file.     Social History Main Topics   • Smoking status: Never Smoker    • Smokeless tobacco: Never Used   • Alcohol Use: No   • Drug Use: No   • Sexual Activity: Not on file     Other Topics Concern   • Not on file     Social History Narrative       Family History:      Family History   Problem Relation Age of Onset   • Heart Disease Mother    • Cancer Mother    • Diabetes Mother    • Heart Disease Father    • Stroke Father        Review of Systems:  All other review of systems are negative except what was mentioned above in the HPI.    Constitutional: No fever, chills, weight loss ,malaise/fatigue.  . She had significant weight gain over the past year  HEENT: No new auditory or visual complaints. No sore throat and neck pain.     Respiratory:No new cough, sputum production, shortness of breath and wheezing.     Cardiovascular: No new chest pain, palpitations, orthopnea and leg swelling.    Gastrointestinal: No heartburn, nausea, vomiting ,abdominal pain, hematochezia or melena     Genitourinary: Negative for dysuria, hematuria    Musculoskeletal: No new arthralgias or myalgias . Chronic back pain  Skin: Negative for rash and itching.    Neurological: Negative for focal weakness or headaches.    Endo/Heme/Allergies: No abnormal bleed/bruise.    Psychiatric/Behavioral: No new depression/anxiety.  Physical Exam:  Vitals: /72 mmHg  Pulse 79  Temp(Src) 37.1 °C (98.8 °F)  Resp 16  Ht 1.753 m (5' 9.02\")  Wt 106.7 kg (235 lb 3.7 oz)  BMI 34.72 kg/m2  SpO2 96%  Breastfeeding? No    General: Not in acute distress, alert and oriented x 3  HEENT: No pallor, icterus. Oropharynx " clear.    Neck: Supple, no palpable masses.  Lymph nodes: No palpable cervical, supraclavicular, axillary or inguinal lymphadenopathy.     CVS: regular rate and rhythm, no rubs or gallops  RESP: Clear to auscultate bilaterally, no wheezing or crackles.    ABD: Soft, non tender, non distended, positive bowel sounds, no palpable organomegaly  EXT: No edema or cyanosis  CNS: Alert and oriented x3, No focal deficits.  Skin- No rash  Breast her left breast with well-healed lumpectomy scar at 12:00 position. She also had some old scar from prior lumpectomies. No axillary adenopathy. Right breast exam unremarkable      Labs:   Orders Only on 07/18/2017   Component Date Value Ref Range Status   • Glucose 07/18/2017 117* 65 - 99 mg/dL Final   • Bun 07/18/2017 16  8 - 27 mg/dL Final   • Creatinine 07/18/2017 1.29* 0.57 - 1.00 mg/dL Final   • GFR If Non  07/18/2017 43* >59 mL/min/1.73 Final   • GFR If  07/18/2017 50* >59 mL/min/1.73 Final   • Bun-Creatinine Ratio 07/18/2017 12  12 - 28 Final   • Sodium 07/18/2017 138  134 - 144 mmol/L Final   • Potassium 07/18/2017 4.5  3.5 - 5.2 mmol/L Final   • Chloride 07/18/2017 101  96 - 106 mmol/L Final   • Co2 07/18/2017 20  18 - 29 mmol/L Final   • Calcium 07/18/2017 9.7  8.7 - 10.3 mg/dL Final   • Total Protein 07/18/2017 6.9  6.0 - 8.5 g/dL Final   • Albumin 07/18/2017 4.7  3.6 - 4.8 g/dL Final   • Globulin 07/18/2017 2.2  1.5 - 4.5 g/dL Final   • A-G Ratio 07/18/2017 2.1  1.2 - 2.2 Final   • Total Bilirubin 07/18/2017 0.6  0.0 - 1.2 mg/dL Final   • Alkaline Phosphatase 07/18/2017 79  39 - 117 IU/L Final   • AST(SGOT) 07/18/2017 29  0 - 40 IU/L Final   • ALT(SGPT) 07/18/2017 26  0 - 32 IU/L Final   • Cholesterol,Tot 07/18/2017 176  100 - 199 mg/dL Final   • Triglycerides 07/18/2017 199* 0 - 149 mg/dL Final   • HDL 07/18/2017 58  >39 mg/dL Final   • VLDL Cholesterol Calc 07/18/2017 40  5 - 40 mg/dL Final   • LDL 07/18/2017 78  0 - 99 mg/dL Final   •  Comment: 07/18/2017 CANCELED   Final    Result canceled by the ancillary   • Verbal Order See below: 07/18/2017 Comment:   Final    Comment: Please provide requested information and fax to [phone].  The [place] Code of Federal Regulations requires a written and  signed request be forwarded to a laboratory following a verbal order  of a laboratory test.  Please assist us to meet this requirement and  to complete our records.  Date:______________________________  ICD-9/10 Diagnosis Code(s):___________________________________________  Physician or Authorized Designee:_____________________________________  Please Print  Physician or Authorized Designee Signature:  ______________________________________________________________________  Your Signature Confirms Your Order Of The Test(s) Listed     • Additional Tests Requested 07/18/2017 Comment:   Final    Comment: Test(s) added per LU ASH at account 07-  Logged by Anika Thorpe  Test# [id number] TSH+Free T4  Diagnosis Codes Provided  E03.2      E78.5               Assessment and Plan:  Stage II A left breast, ER positive, [place] negative, HER-2/geoffrey positive invasive ductal carcinoma, grade 3,  now with in-breast relapse with a ER/[place] negative, HER-2/geoffrey equivocal grade 3 invasive ductal carcinoma , 2 years out of her initial diagnosis.    She had suboptimal adjuvant chemotherapy initially where she received only one dose of adjuvant TCH chemotherapy. She managed to finish one year course of Herceptin and has been on Arimidex since then. According to the patient, she was not referred for breast irradiation initially and finally received at around 8 months or so after her surgery.     Current molecular markers appears to be different with estrogen and progesterone receptor negative and HER-2/geoffrey equivocal malignancy. Informed her that she will need surgery. Given the current presentation and dense breast and history of prior breast radiation, she  will be a candidate for mastectomy. She would prefer bilateral mastectomy without reconstruction, which I think is reasonable. I will refer her to Dr. Kam.    Also discussed the option of genetic testing. An Cathy extended BRCA panel was done today. She reports having history of hysterectomy, however, is unclear whether she had oophorectomy.     Given the current presentation, I will discuss with her about getting some adjuvant chemotherapy. She did manage to finish one year course of Herceptin with some difficulty and I will hold off on further HER-2 based therapy. However, she is very reluctant to consider any chemotherapy. We will discuss all these when she returns to the clinic after surgery. Instructed her to continue Arimidex.    Complex patient requiring complex decision making. Time spent for 35 minutes, 70% of the time spent reviewing her outside records and discussing with her about further management options.    She agreed and verbalized her agreement and understanding with the current plan.  I answered all questions and concerns she has at this time         Please note that this dictation was created using voice recognition software. I have made every reasonable attempt to correct obvious errors, but I expect that there are errors of grammar and possibly content that I did not discover before finalizing the note.      SIGNATURES:  Marcos Hu    CC:  SILVA Piña.P.N.  No ref. provider found

## 2017-07-21 NOTE — TELEPHONE ENCOUNTER
----- Message from MILO Piña sent at 7/20/2017  6:05 PM PDT -----  Please print this out for me.  I can then sign.  The ICD 10 codes are:  N18.3, K76.0, I10, R78.89, E78.1

## 2017-07-23 ENCOUNTER — TELEPHONE (OUTPATIENT)
Dept: MEDICAL GROUP | Facility: MEDICAL CENTER | Age: 67
End: 2017-07-23

## 2017-07-23 RX ORDER — LEVOTHYROXINE SODIUM 137 UG/1
TABLET ORAL
Qty: 90 TAB | Refills: 0 | Status: ON HOLD | OUTPATIENT
Start: 2017-07-23 | End: 2017-08-15

## 2017-07-23 RX ORDER — LISINOPRIL 40 MG/1
TABLET ORAL
Qty: 90 TAB | Refills: 0 | Status: ON HOLD | OUTPATIENT
Start: 2017-07-23 | End: 2017-08-15

## 2017-07-23 NOTE — ASSESSMENT & PLAN NOTE
Dyslipidemia Chronic condition. Current treatments: diet/exercise/medicines. She is taking medicine as directed. Current side effects: none.

## 2017-07-23 NOTE — ASSESSMENT & PLAN NOTE
Patient is also here to go over her recent labs.  She has known CKD 3 that has been stable.  She avoids NSAIDs.

## 2017-07-23 NOTE — ASSESSMENT & PLAN NOTE
Chronic pain recheck:   Patient normally sees Abby Marc.  Last dose of controlled substance: this am  Chronic pain treated with  Norco 5-325 mgtaken  2-3 times a day    She  reports that she does not drink alcohol.  She  reports that she does not use illicit drugs.    Consequences of Chronic Opiate therapy:  (5 A's)  Analgesia: Compared to no treatment or prior treatment, pain is currently improved  Activity: improved  Adverse Events: She denies constipation, dry mouth, itchy skin, nausea and sedation  Aberrant Behaviors: She reports she is taking medication as prescribed and is not veering from agreed treatment regimen. There have been no inappropriate refills or lost/stolen meds reported.   Affect/Mood: Pain is not impacting patient's mood.  Patient denies depression/anxiety.    Nonnarcotic treatments that are being used: none.   Treatment goals discussed.    Opioid Risk Score: No Value exists for the : RNW#180    Interpretation of Opioid Risk Score   Score 0-3 = Low risk of abuse. Do UDS at least once per year.  Score 4-7 = Moderate risk of abuse. Do UDS 1-4 times per year.  Score 8+ = High risk of abuse. Refer to specialist.    Last order of CONTROLLED SUBSTANCE TREATMENT AGREEMENT was found on 7/12/2016 from Office Visit on 7/12/2016     UDS Summary                URINE DRUG SCREEN Overdue 7/7/2017      Done 7/12/2016 PAIN MANAGEMENT PANEL, SCRN W/ RFLX TO QNT        Most recent UDS reviewed today and is consistent with prescribed medications.    I have reviewed the medical records, the Prescription Monitoring Program and I have determined that controlled substance treatment is medically indicated.

## 2017-07-23 NOTE — ASSESSMENT & PLAN NOTE
Patient has impaired fasting glucose levels.  She has been trying to watch her diet.  She denies polyuria, polyphagia or polydipsia.

## 2017-07-23 NOTE — PROGRESS NOTES
Subjective:     Chief Complaint   Patient presents with   • Results     lab work   • Medication Refill     [place]       Keyur Burt is a 67 y.o. female here today for evaluation and management of:    Chronic use of opiate for therapeutic purpose  Chronic pain recheck:   Patient normally sees Abby Marc.  Last dose of controlled substance: this am  Chronic pain treated with  Norco 5-325 mgtaken  2-3 times a day    She  reports that she does not drink alcohol.  She  reports that she does not use illicit drugs.    Consequences of Chronic Opiate therapy:  (5 A's)  Analgesia: Compared to no treatment or prior treatment, pain is currently improved  Activity: improved  Adverse Events: She denies constipation, dry mouth, itchy skin, nausea and sedation  Aberrant Behaviors: She reports she is taking medication as prescribed and is not veering from agreed treatment regimen. There have been no inappropriate refills or lost/stolen meds reported.   Affect/Mood: Pain is not impacting patient's mood.  Patient denies depression/anxiety.    Nonnarcotic treatments that are being used: none.   Treatment goals discussed.    Opioid Risk Score: No Value exists for the : RNW#180    Interpretation of Opioid Risk Score   Score 0-3 = Low risk of abuse. Do UDS at least once per year.  Score 4-7 = Moderate risk of abuse. Do UDS 1-4 times per year.  Score 8+ = High risk of abuse. Refer to specialist.    Last order of CONTROLLED SUBSTANCE TREATMENT AGREEMENT was found on 7/12/2016 from Office Visit on 7/12/2016     UDS Summary                URINE DRUG SCREEN Overdue 7/7/2017      Done 7/12/2016 PAIN MANAGEMENT PANEL, SCRN W/ RFLX TO QNT        Most recent UDS reviewed today and is consistent with prescribed medications.    I have reviewed the medical records, the Prescription Monitoring Program and I have determined that controlled substance treatment is medically indicated.          CKD (chronic kidney disease), stage III  Patient  is also here to go over her recent labs.  She has known CKD 3 that has been stable.  She avoids NSAIDs.    Hyperlipidemia with target LDL less than 100  Dyslipidemia Chronic condition. Current treatments: diet/exercise/medicines. She is taking medicine as directed. Current side effects: none.       Impaired fasting glucose  Patient has impaired fasting glucose levels.  She has been trying to watch her diet.  She denies polyuria, polyphagia or polydipsia.       Allergies   Allergen Reactions   • Iodine    • Pcn [Penicillins]    • Shellfish Allergy        Current medicines (including changes today)  Current Outpatient Prescriptions   Medication Sig Dispense Refill   • hydrocodone-acetaminophen (NORCO) 5-325 MG Tab per tablet Take 1 Tab by mouth every 6 hours as needed. Must last 30 days 65 Tab 0   • hydrocodone-acetaminophen (NORCO) 5-325 MG Tab per tablet Take 1 Tab by mouth every 6 hours as needed. 65 Tab 0   • hydrocodone-acetaminophen (NORCO) 5-325 MG Tab per tablet Take 1 Tab by mouth every 6 hours as needed. 65 Tab 0   • anastrozole (ARIMIDEX) 1 MG Tab Take 1 Tab by mouth every day. 90 Tab 0   • atorvastatin (LIPITOR) 20 MG Tab Take 1 Tab by mouth every 48 hours. 45 Tab 0   • spironolactone (ALDACTONE) 25 MG Tab Take 1 Tab by mouth every day. 90 Tab 1   • Cholecalciferol (VITAMIN D) 2000 UNITS Cap Take  by mouth.     • lisinopril (PRINIVIL, ZESTRIL) 40 MG tablet Take 1 Tab by mouth every day. 90 Tab 2   • levothyroxine (SYNTHROID) 137 MCG Tab Take 1 Tab by mouth every day. 90 Tab 2   • levothyroxine (SYNTHROID) 137 MCG Tab TAKE ONE TABLET BY MOUTH ONCE DAILY 30 Tab 0   • lisinopril (PRINIVIL, ZESTRIL) 40 MG tablet TAKE ONE TABLET BY MOUTH ONCE DAILY 30 Tab 0   • atorvastatin (LIPITOR) 10 MG Tab Take 1 Tab by mouth every 72 hours. 8 Tab 3     No current facility-administered medications for this visit.       She  has a past medical history of Chronic neck pain; Allergic rhinitis; Tension headache; Thyroid cancer  "(CMS-HCC); Hypertension; Hyperlipidemia LDL goal < 100; Fatty liver; Hypothyroidism; Impaired fasting glucose; CKD (chronic kidney disease), stage III; Hematuria; and Breast cancer (CMS-HCC).    Patient Active Problem List    Diagnosis Date Noted   • Chronic use of opiate for therapeutic purpose 07/20/2017   • Chronic bilateral low back pain without sciatica 07/20/2017   • Obesity (BMI 30-39.9) 03/22/2017   • Vitamin D deficiency disease 01/25/2017   • Breast cancer (CMS-HCC)    • Hematuria 02/20/2015   • Impaired fasting glucose    • CKD (chronic kidney disease), stage III    • Preventative health care 03/25/2013   • Fatty liver    • Hyperlipidemia with target LDL less than 100    • Hypertension    • Hypothyroidism from resorcinol    • Thyroid cancer (CMS-HCC)    • Tension headache    • Allergy to pollen    • Chronic neck pain        ROS   No fever or chills.  No nausea or vomiting.  No chest pain or palpitations.  No cough or SOB.  No pain with urination or hematuria.  No black or bloody stools.       Objective:     Blood pressure 122/70, pulse 70, temperature 36.8 °C (98.3 °F), height 1.753 m (5' 9.02\"), weight 107.049 kg (236 lb), SpO2 96 %. Body mass index is 34.84 kg/(m^2).   Physical Exam:  Well developed, well nourished.  Alert, oriented in no acute distress.  Eye contact is good, speech goal directed, affect calm  Eyes: conjunctiva non-injected, sclera non-icteric.  Neck Supple.  No adenopathy or masses in the neck or supraclavicular regions. No thyromegaly   No visible deformity. No spinal tenderness to palpation. ROM intact. Spurling's test negative.   Lungs: clear to auscultation bilaterally with good excursion. No wheezes or rhonchi  CV: regular rate and rhythm. No murmur        Assessment and Plan:   The following treatment plan was discussed    1. Hyperlipidemia with target LDL less than 100  Stable.  Continue atorvastatin 20 mg daily    2. CKD (chronic kidney disease), stage III  Stable.  Avoid " NSAIDS    3. Chronic neck pain  Follow up with Abby Marc in 3 months  - hydrocodone-acetaminophen (NORCO) 5-325 MG Tab per tablet; Take 1 Tab by mouth every 6 hours as needed. Must last 30 days  Dispense: 65 Tab; Refill: 0  - hydrocodone-acetaminophen (NORCO) 5-325 MG Tab per tablet; Take 1 Tab by mouth every 6 hours as needed.  Dispense: 65 Tab; Refill: 0  - hydrocodone-acetaminophen (NORCO) 5-325 MG Tab per tablet; Take 1 Tab by mouth every 6 hours as needed.  Dispense: 65 Tab; Refill: 0    4. Chronic use of opiate for therapeutic purpose  Follow up with Abby Marc in 3 months  - hydrocodone-acetaminophen (NORCO) 5-325 MG Tab per tablet; Take 1 Tab by mouth every 6 hours as needed. Must last 30 days  Dispense: 65 Tab; Refill: 0  - hydrocodone-acetaminophen (NORCO) 5-325 MG Tab per tablet; Take 1 Tab by mouth every 6 hours as needed.  Dispense: 65 Tab; Refill: 0  - hydrocodone-acetaminophen (NORCO) 5-325 MG Tab per tablet; Take 1 Tab by mouth every 6 hours as needed.  Dispense: 65 Tab; Refill: 0    5. Malignant neoplasm of female breast, unspecified laterality, unspecified site of breast (CMS-HCC)  Follow up with oncology as scheduled    6. Impaired fasting glucose  Dietary counseling done    7. Chronic midline low back pain without sciatica  Follow up with Abby Marc in 3 months  - hydrocodone-acetaminophen (NORCO) 5-325 MG Tab per tablet; Take 1 Tab by mouth every 6 hours as needed. Must last 30 days  Dispense: 65 Tab; Refill: 0  - hydrocodone-acetaminophen (NORCO) 5-325 MG Tab per tablet; Take 1 Tab by mouth every 6 hours as needed.  Dispense: 65 Tab; Refill: 0  - hydrocodone-acetaminophen (NORCO) 5-325 MG Tab per tablet; Take 1 Tab by mouth every 6 hours as needed.  Dispense: 65 Tab; Refill: 0    ; Refill: 0    Any change or worsening of signs or symptoms, patient encouraged to follow-up or report to the emergency room for further evaluation. Patient understands and agrees.    Followup: Return in about 3  months (around 10/20/2017).

## 2017-07-24 DIAGNOSIS — E78.5 HYPERLIPIDEMIA WITH TARGET LDL LESS THAN 100: ICD-10-CM

## 2017-07-24 RX ORDER — ATORVASTATIN CALCIUM 10 MG/1
10 TABLET, FILM COATED ORAL
Qty: 8 TAB | Refills: 3 | Status: ON HOLD | OUTPATIENT
Start: 2017-07-24 | End: 2017-08-15

## 2017-07-25 RX ORDER — SPIRONOLACTONE 25 MG/1
25 TABLET ORAL DAILY
Qty: 90 TAB | Refills: 1 | Status: SHIPPED | OUTPATIENT
Start: 2017-07-25 | End: 2017-12-14 | Stop reason: SDUPTHER

## 2017-07-31 ENCOUNTER — HOSPITAL ENCOUNTER (OUTPATIENT)
Dept: RADIOLOGY | Facility: MEDICAL CENTER | Age: 67
End: 2017-07-31
Attending: INTERNAL MEDICINE
Payer: MEDICARE

## 2017-07-31 DIAGNOSIS — C50.919 MALIGNANT NEOPLASM OF FEMALE BREAST, UNSPECIFIED LATERALITY, UNSPECIFIED SITE OF BREAST: ICD-10-CM

## 2017-07-31 PROCEDURE — A9500 TC99M SESTAMIBI: HCPCS

## 2017-07-31 PROCEDURE — 71250 CT THORAX DX C-: CPT

## 2017-08-01 ENCOUNTER — TELEPHONE (OUTPATIENT)
Dept: HEMATOLOGY ONCOLOGY | Facility: MEDICAL CENTER | Age: 67
End: 2017-08-01

## 2017-08-01 NOTE — TELEPHONE ENCOUNTER
"I spoke with Chiquis at [facility] in regards to the charges to the patient. I was informed the 800$ was an estimate. The patient is not charged at this time. It is recommended that she fill out an \"EPIC\" for to evaluate the patient's income and see if she qualifies for financial assistance.     Per Chiquis at [employer] [phone]  "

## 2017-08-01 NOTE — TELEPHONE ENCOUNTER
Attempted to call patient with results of bone scan and about charges from [employer]. Requested call back to RN at [phone].

## 2017-08-01 NOTE — TELEPHONE ENCOUNTER
Pt called in stating that [email or web address] is charging her 800.00 for the spit test that she did. She would like a call back because she wants to know why they are charging this amount of money.

## 2017-08-02 ENCOUNTER — TELEPHONE (OUTPATIENT)
Dept: HEMATOLOGY ONCOLOGY | Facility: MEDICAL CENTER | Age: 67
End: 2017-08-02

## 2017-08-02 NOTE — TELEPHONE ENCOUNTER
Left message to inform patient about the [facility] application for financial assistance and to inform her she has not been charged by [employer] but was given a quote.

## 2017-08-02 NOTE — TELEPHONE ENCOUNTER
Per Dr. Hu notified pt that her bone scan showed no evidence for bony metastatic disease. Patient was very relieved to hear the results.

## 2017-08-03 DIAGNOSIS — C50.812 MALIGNANT NEOPLASM OF OVERLAPPING SITES OF LEFT FEMALE BREAST (HCC): ICD-10-CM

## 2017-08-03 DIAGNOSIS — C50.919 MALIGNANT NEOPLASM OF FEMALE BREAST, UNSPECIFIED LATERALITY, UNSPECIFIED SITE OF BREAST: ICD-10-CM

## 2017-08-03 RX ORDER — ANASTROZOLE 1 MG/1
1 TABLET ORAL DAILY
Qty: 90 TAB | Refills: 3 | Status: SHIPPED | OUTPATIENT
Start: 2017-08-03 | End: 2018-08-03 | Stop reason: SDUPTHER

## 2017-08-11 ENCOUNTER — HOSPITAL ENCOUNTER (OUTPATIENT)
Dept: RADIOLOGY | Facility: MEDICAL CENTER | Age: 67
End: 2017-08-11

## 2017-08-15 ENCOUNTER — HOSPITAL ENCOUNTER (OUTPATIENT)
Dept: RADIOLOGY | Facility: MEDICAL CENTER | Age: 67
End: 2017-08-15
Attending: SURGERY
Payer: MEDICARE

## 2017-08-15 ENCOUNTER — HOSPITAL ENCOUNTER (OUTPATIENT)
Facility: MEDICAL CENTER | Age: 67
End: 2017-08-15
Attending: SURGERY | Admitting: SURGERY
Payer: MEDICARE

## 2017-08-15 VITALS
HEART RATE: 96 BPM | BODY MASS INDEX: 33.71 KG/M2 | OXYGEN SATURATION: 99 % | WEIGHT: 228.4 LBS | TEMPERATURE: 97.1 F | RESPIRATION RATE: 20 BRPM

## 2017-08-15 DIAGNOSIS — C50.912 MALIGNANT NEOPLASM OF LEFT FEMALE BREAST, UNSPECIFIED SITE OF BREAST: ICD-10-CM

## 2017-08-15 PROBLEM — C50.919 RECURRENT BREAST CANCER (HCC): Status: ACTIVE | Noted: 2017-08-15

## 2017-08-15 LAB
BASOPHILS # BLD AUTO: 0.7 % (ref 0–1.8)
BASOPHILS # BLD: 0.04 K/UL (ref 0–0.12)
EOSINOPHIL # BLD AUTO: 0.12 K/UL (ref 0–0.51)
EOSINOPHIL NFR BLD: 2.1 % (ref 0–6.9)
ERYTHROCYTE [DISTWIDTH] IN BLOOD BY AUTOMATED COUNT: 44.9 FL (ref 35.9–50)
HCT VFR BLD AUTO: 41.6 % (ref 37–47)
HGB BLD-MCNC: 14.3 G/DL (ref 12–16)
IMM GRANULOCYTES # BLD AUTO: 0.03 K/UL (ref 0–0.11)
IMM GRANULOCYTES NFR BLD AUTO: 0.5 % (ref 0–0.9)
LYMPHOCYTES # BLD AUTO: 1.4 K/UL (ref 1–4.8)
LYMPHOCYTES NFR BLD: 24.8 % (ref 22–41)
MCH RBC QN AUTO: 32.8 PG (ref 27–33)
MCHC RBC AUTO-ENTMCNC: 34.4 G/DL (ref 33.6–35)
MCV RBC AUTO: 95.4 FL (ref 81.4–97.8)
MONOCYTES # BLD AUTO: 0.45 K/UL (ref 0–0.85)
MONOCYTES NFR BLD AUTO: 8 % (ref 0–13.4)
NEUTROPHILS # BLD AUTO: 3.61 K/UL (ref 2–7.15)
NEUTROPHILS NFR BLD: 63.9 % (ref 44–72)
NRBC # BLD AUTO: 0 K/UL
NRBC BLD AUTO-RTO: 0 /100 WBC
PLATELET # BLD AUTO: 258 K/UL (ref 164–446)
PMV BLD AUTO: 9.1 FL (ref 9–12.9)
RBC # BLD AUTO: 4.36 M/UL (ref 4.2–5.4)
WBC # BLD AUTO: 5.7 K/UL (ref 4.8–10.8)

## 2017-08-15 PROCEDURE — 160048 HCHG OR STATISTICAL LEVEL 1-5: Performed by: SURGERY

## 2017-08-15 PROCEDURE — 160035 HCHG PACU - 1ST 60 MINS PHASE I: Performed by: SURGERY

## 2017-08-15 PROCEDURE — 88360 TUMOR IMMUNOHISTOCHEM/MANUAL: CPT

## 2017-08-15 PROCEDURE — 501445 HCHG STAPLER, SKIN DISP: Performed by: SURGERY

## 2017-08-15 PROCEDURE — 160022 HCHG BLOCK: Performed by: SURGERY

## 2017-08-15 PROCEDURE — 160029 HCHG SURGERY MINUTES - 1ST 30 MINS LEVEL 4: Performed by: SURGERY

## 2017-08-15 PROCEDURE — 502594 HCHG SCISSOR HANDLE, HARMONIC ACE: Performed by: SURGERY

## 2017-08-15 PROCEDURE — A6402 STERILE GAUZE <= 16 SQ IN: HCPCS | Performed by: SURGERY

## 2017-08-15 PROCEDURE — 500054 HCHG BANDAGE, ELASTIC 6: Performed by: SURGERY

## 2017-08-15 PROCEDURE — 700101 HCHG RX REV CODE 250

## 2017-08-15 PROCEDURE — 160041 HCHG SURGERY MINUTES - EA ADDL 1 MIN LEVEL 4: Performed by: SURGERY

## 2017-08-15 PROCEDURE — 160002 HCHG RECOVERY MINUTES (STAT): Performed by: SURGERY

## 2017-08-15 PROCEDURE — 700111 HCHG RX REV CODE 636 W/ 250 OVERRIDE (IP)

## 2017-08-15 PROCEDURE — A6403 STERILE GAUZE>16 <= 48 SQ IN: HCPCS | Performed by: SURGERY

## 2017-08-15 PROCEDURE — 700102 HCHG RX REV CODE 250 W/ 637 OVERRIDE(OP)

## 2017-08-15 PROCEDURE — 500389 HCHG DRAIN, RESERVOIR SUCT JP 100CC: Performed by: SURGERY

## 2017-08-15 PROCEDURE — 160009 HCHG ANES TIME/MIN: Performed by: SURGERY

## 2017-08-15 PROCEDURE — 85025 COMPLETE CBC W/AUTO DIFF WBC: CPT

## 2017-08-15 PROCEDURE — 160036 HCHG PACU - EA ADDL 30 MINS PHASE I: Performed by: SURGERY

## 2017-08-15 PROCEDURE — 500122 HCHG BOVIE, BLADE: Performed by: SURGERY

## 2017-08-15 PROCEDURE — 88309 TISSUE EXAM BY PATHOLOGIST: CPT

## 2017-08-15 PROCEDURE — 88307 TISSUE EXAM BY PATHOLOGIST: CPT

## 2017-08-15 PROCEDURE — 500700 HCHG HEMOCLIP, SMALL (RED): Performed by: SURGERY

## 2017-08-15 PROCEDURE — 500368 HCHG DRAIN, 7MM FLAT-FLUTED: Performed by: SURGERY

## 2017-08-15 PROCEDURE — A9270 NON-COVERED ITEM OR SERVICE: HCPCS

## 2017-08-15 PROCEDURE — 38792 RA TRACER ID OF SENTINL NODE: CPT | Mod: LT

## 2017-08-15 PROCEDURE — 500449 HCHG DRESSING, TELFA 8X10: Performed by: SURGERY

## 2017-08-15 PROCEDURE — 501838 HCHG SUTURE GENERAL: Performed by: SURGERY

## 2017-08-15 RX ORDER — ISOSULFAN BLUE 50 MG/5ML
INJECTION, SOLUTION SUBCUTANEOUS
Status: DISCONTINUED | OUTPATIENT
Start: 2017-08-15 | End: 2017-08-15 | Stop reason: HOSPADM

## 2017-08-15 RX ORDER — ACETAMINOPHEN 500 MG
TABLET ORAL
Status: COMPLETED
Start: 2017-08-15 | End: 2017-08-15

## 2017-08-15 RX ORDER — SCOLOPAMINE TRANSDERMAL SYSTEM 1 MG/1
PATCH, EXTENDED RELEASE TRANSDERMAL
Status: COMPLETED
Start: 2017-08-15 | End: 2017-08-15

## 2017-08-15 RX ORDER — OXYCODONE HCL 5 MG/5 ML
SOLUTION, ORAL ORAL
Status: COMPLETED
Start: 2017-08-15 | End: 2017-08-15

## 2017-08-15 RX ORDER — SODIUM CHLORIDE, SODIUM LACTATE, POTASSIUM CHLORIDE, CALCIUM CHLORIDE 600; 310; 30; 20 MG/100ML; MG/100ML; MG/100ML; MG/100ML
1000 INJECTION, SOLUTION INTRAVENOUS
Status: COMPLETED | OUTPATIENT
Start: 2017-08-15 | End: 2017-08-15

## 2017-08-15 RX ORDER — ISOSULFAN BLUE 50 MG/5ML
INJECTION, SOLUTION SUBCUTANEOUS
Status: DISCONTINUED
Start: 2017-08-15 | End: 2017-08-15 | Stop reason: HOSPADM

## 2017-08-15 RX ORDER — MIDAZOLAM HYDROCHLORIDE 1 MG/ML
INJECTION INTRAMUSCULAR; INTRAVENOUS
Status: DISCONTINUED
Start: 2017-08-15 | End: 2017-08-15 | Stop reason: HOSPADM

## 2017-08-15 RX ORDER — GABAPENTIN 300 MG/1
CAPSULE ORAL
Status: COMPLETED
Start: 2017-08-15 | End: 2017-08-15

## 2017-08-15 RX ORDER — OXYCODONE HCL 20 MG/1
TABLET, FILM COATED, EXTENDED RELEASE ORAL
Status: COMPLETED
Start: 2017-08-15 | End: 2017-08-15

## 2017-08-15 RX ORDER — KETAMINE HYDROCHLORIDE 50 MG/ML
INJECTION, SOLUTION INTRAMUSCULAR; INTRAVENOUS
Status: DISCONTINUED
Start: 2017-08-15 | End: 2017-08-15 | Stop reason: HOSPADM

## 2017-08-15 RX ORDER — MAGNESIUM SULFATE HEPTAHYDRATE 40 MG/ML
INJECTION, SOLUTION INTRAVENOUS
Status: DISCONTINUED
Start: 2017-08-15 | End: 2017-08-15 | Stop reason: HOSPADM

## 2017-08-15 RX ADMIN — GABAPENTIN 300 MG: 300 CAPSULE ORAL at 12:45

## 2017-08-15 RX ADMIN — SCOPALAMINE 1 PATCH: 1 PATCH, EXTENDED RELEASE TRANSDERMAL at 12:45

## 2017-08-15 RX ADMIN — ACETAMINOPHEN 1000 MG: 500 TABLET, FILM COATED ORAL at 12:45

## 2017-08-15 RX ADMIN — OXYCODONE HYDROCHLORIDE 10 MG: 5 SOLUTION ORAL at 17:50

## 2017-08-15 RX ADMIN — SODIUM CHLORIDE, SODIUM LACTATE, POTASSIUM CHLORIDE, CALCIUM CHLORIDE 1000 ML: 600; 310; 30; 20 INJECTION, SOLUTION INTRAVENOUS at 08:31

## 2017-08-15 RX ADMIN — FENTANYL CITRATE 25 MCG: 50 INJECTION, SOLUTION INTRAMUSCULAR; INTRAVENOUS at 18:02

## 2017-08-15 RX ADMIN — FENTANYL CITRATE 25 MCG: 50 INJECTION, SOLUTION INTRAMUSCULAR; INTRAVENOUS at 17:50

## 2017-08-15 RX ADMIN — OXYCODONE HYDROCHLORIDE 20 MG: 20 TABLET, FILM COATED, EXTENDED RELEASE ORAL at 12:45

## 2017-08-15 RX ADMIN — FENTANYL CITRATE 25 MCG: 50 INJECTION, SOLUTION INTRAMUSCULAR; INTRAVENOUS at 17:55

## 2017-08-15 ASSESSMENT — PAIN SCALES - GENERAL
PAINLEVEL_OUTOF10: 0
PAINLEVEL_OUTOF10: 0
PAINLEVEL_OUTOF10: 4
PAINLEVEL_OUTOF10: 0
PAINLEVEL_OUTOF10: 4
PAINLEVEL_OUTOF10: 5
PAINLEVEL_OUTOF10: 0
PAINLEVEL_OUTOF10: 4
PAINLEVEL_OUTOF10: 0
PAINLEVEL_OUTOF10: 0
PAINLEVEL_OUTOF10: 9

## 2017-08-15 NOTE — IP AVS SNAPSHOT
Home Care Instructions                                                                                                                Name:Keyur Burt  Medical Record Number:[id number]  CSN: [id number]    YOB: 1950   Age: 67 y.o.  Sex: female  HT:  WT: 103.6 kg (228 lb 6.3 oz)          Admit Date: 8/15/2017     Discharge Date:   Today's Date: 8/15/2017  Attending Doctor:  Debora Kam M.D.                  Allergies:  Iodine; Pcn; Shellfish allergy; and Sulfa drugs                Discharge Instructions         ACTIVITY: Rest and take it easy for the first 24 hours.  A responsible adult is recommended to remain with you during that time.  It is normal to feel sleepy.  We encourage you to not do anything that requires balance, judgment or coordination.    MILD FLU-LIKE SYMPTOMS ARE NORMAL. YOU MAY EXPERIENCE GENERALIZED MUSCLE ACHES, THROAT IRRITATION, HEADACHE AND/OR SOME NAUSEA.    FOR 24 HOURS DO NOT:  Drive, operate machinery or run household appliances.  Drink beer or alcoholic beverages.   Make important decisions or sign legal documents.    SPECIAL INSTRUCTIONS: *SEE DR. KAM'S POST OPERATIVE INSTRUCTION SHEET, WALK AROUND REGULARLY, ELEVATED LEFT ARM WHEN RESTING, NO LIFTING OVER 5 LBS**    DIET: To avoid nausea, slowly advance diet as tolerated, avoiding spicy or greasy foods for the first day.  Add more substantial food to your diet according to your physician's instructions.  Babies can be fed formula or breast milk as soon as they are hungry.  INCREASE FLUIDS AND FIBER TO AVOID CONSTIPATION.    SURGICAL DRESSING/BATHING: *LEAVE DRESSING ON, CLEAN, AND DRY, NO SHOWERS UNTIL DRAINS REMOVED, RESHMA DRAIN CARE AS DIRECTED**    FOLLOW-UP APPOINTMENT:  A follow-up appointment should be arranged with your doctor in ; call to schedule.    You should CALL YOUR PHYSICIAN if you develop:  Fever greater than 101 degrees F.  Pain not relieved by medication, or persistent nausea or vomiting.  Excessive  bleeding (blood soaking through dressing) or unexpected drainage from the wound.  Extreme redness or swelling around the incision site, drainage of pus or foul smelling drainage.  Inability to urinate or empty your bladder within 8 hours.  Problems with breathing or chest pain.    You should call 911 if you develop problems with breathing or chest pain.  If you are unable to contact your doctor or surgical center, you should go to the nearest emergency room or urgent care center.  Physician's telephone #: *[phone]**    If any questions arise, call your doctor.  If your doctor is not available, please feel free to call the Surgical Center at [phone].  The Center is open Monday through Friday from 7AM to 7PM.  You can also call the [email or web address] HOTLINE open 24 hours/day, 7 days/week and speak to a nurse at [phone], or toll free at [phone].    A registered nurse may call you a few days after your surgery to see how you are doing after your procedure.    MEDICATIONS: Resume taking daily medication.  Take prescribed pain medication with food.  If no medication is prescribed, you may take non-aspirin pain medication if needed.  PAIN MEDICATION CAN BE VERY CONSTIPATING.  Take a stool softener or laxative such as senokot, pericolace, or milk of magnesia if needed.    Prescription given for *ZOFRAN**.  Last pain medication given at **5:50PM, NEXT DUE AT 9:50PM*.    If your physician has prescribed pain medication that includes Acetaminophen (Tylenol), do not take additional Acetaminophen (Tylenol) while taking the prescribed medication.    Depression / Suicide Risk    As you are discharged from this [facility] facility, it is important to learn how to keep safe from harming yourself.    Recognize the warning signs:  · Abrupt changes in personality, positive or negative- including increase in energy   · Giving away possessions  · Change in eating patterns- significant weight changes-  positive or  negative  · Change in sleeping patterns- unable to sleep or sleeping all the time   · Unwillingness or inability to communicate  · Depression  · Unusual sadness, discouragement and loneliness  · Talk of wanting to die  · Neglect of personal appearance   · Rebelliousness- reckless behavior  · Withdrawal from people/activities they love  · Confusion- inability to concentrate     If you or a loved one observes any of these behaviors or has concerns about self-harm, here's what you can do:  · Talk about it- your feelings and reasons for harming yourself  · Remove any means that you might use to hurt yourself (examples: pills, rope, extension cords, firearm)  · Get professional help from the community (Mental Health, Substance Abuse, psychological counseling)  · Do not be alone:Call your Safe Contact- someone whom you trust who will be there for you.  · Call your local CRISIS HOTLINE 093-7490 or 968-758-9828  · Call your local Children's Mobile Crisis Response Team [place] (323) 414-2330 or www.[employer]  · Call the toll free National Suicide Prevention Hotlines   · National Suicide Prevention Lifeline 053-900-XQYP (6558)  · National Hope Line Network 800-SUICIDE (154-2899)       Medication List      CONTINUE taking these medications        Instructions    Morning Afternoon Evening Bedtime    anastrozole 1 MG Tabs   Commonly known as:  ARIMIDEX        Take 1 Tab by mouth every day.   Dose:  1 mg                        atorvastatin 20 MG Tabs   Commonly known as:  LIPITOR        Take 1 Tab by mouth every 48 hours.   Dose:  20 mg                        hydrocodone-acetaminophen 5-325 MG Tabs per tablet   Commonly known as:  NORCO        Doctor's comments:  May refill 8/19/17   Take 1 Tab by mouth every 6 hours as needed. Must last 30 days   Dose:  1 Tab                        levothyroxine 137 MCG Tabs   Commonly known as:  SYNTHROID        Take 1 Tab by mouth every day.   Dose:  137 mcg                         lisinopril 40 MG tablet   Commonly known as:  PRINIVIL, ZESTRIL        Take 1 Tab by mouth every day.   Dose:  40 mg                        spironolactone 25 MG Tabs   Commonly known as:  ALDACTONE        Take 1 Tab by mouth every day.   Dose:  25 mg                        Vitamin D 2000 UNITS Caps        Take  by mouth.                                Medication Information     Above and/or attached are the medications your physician expects you to take upon discharge. Review all of your home medications and newly ordered medications with your doctor and/or pharmacist. Follow medication instructions as directed by your doctor and/or pharmacist. Please keep your medication list with you and share with your physician. Update the information when medications are discontinued, doses are changed, or new medications (including over-the-counter products) are added; and carry medication information at all times in the event of emergency situations.        Resources     Quit Smoking / Tobacco Use:    I understand the use of any tobacco products increases my chance of suffering from future heart disease or stroke and could cause other illnesses which may shorten my life. Quitting the use of tobacco products is the single most important thing I can do to improve my health. For further information on smoking / tobacco cessation call a Toll Free Quit Line at 1-828.650.4680 (*National Cancer [place]) or 1-483.650.3431 (American Lung Association) or you can access the web based program at www.lungusa.org.    [place] Tobacco Users Help Line:  (235) 250-9076       Toll Free: 1-917.982.6208  Quit Tobacco Program [facility] (221)134-4443    Crisis Hotline:    [place] Crisis Hotline:  4-254-QQXWWGZ or 1-790.842.9117    [place] Crisis Hotline:    1-106.498.4306 or 525-597-7370    Discharge Survey:   Thank you for choosing [facility]. We hope we did everything we could to make your hospital stay a pleasant one.  You may be receiving a survey and we would appreciate your time and participation in answering the questions. Your input is very valuable to us in our efforts to improve our service to our patients and their families.            Signatures     My signature on this form indicates that:    1. I acknowledge receipt and understanding of these Home Care Instruction.  2. My questions regarding this information have been answered to my satisfaction.  3. I have formulated a plan with my discharge nurse to obtain my prescribed medications for home.    __________________________________      __________________________________                   Patient Signature                                 Guardian/Responsible Adult Signature      __________________________________                 __________       ________                       Nurse Signature                                               Date                 Time

## 2017-08-15 NOTE — OP REPORT
Pre op diagnosis:  Malignant neoplasm left breast, unspecified site  Post op diagnosis:  Same  Procedure:     1.  Bilateral total mastectomies with Y-plasty  2.  Left sentinel lymph node mapping with level 1 and 2 axillary lymphadenectomy    Surgeon:  Debora Kam MD  Anesthesiologist:  Iqra Lou MD    Anesthesia:  General  Pre op meds:  Ancef  ASA 2    Indications:  This is a 67 year old woman with recurrent left breast cancer.  We have discussed her options and she strongly desires bilateral total mastectomy without reconstruction.  We discussed sentinel node mapping, but that her previous axillary surgery may preclude accurate identification.     Findings:  No sentinel node identified despite radiotracer and blue dye with saline push.  Long thoracic and thoracodorsal nerve bundles identified and spared.      Procedure:  The patient underwent radiotracer injection of the left breast and then was taken to the operating room and anesthetized in the supine position.  Under sterile conditions I injected 5cc of isosulfan blue dye in the left subareolar space.  She was then prepped with Chloraprep, then draped in sterile fashion.  Time out was confirmed.  I started with the right side and an ellipse was excised including the NAC and skin flaps were raised in the usual manner, following the anterior mammary fascial plane.  This was taken to the sternum, the clavicle, the latissimus dorsi, and the inframammary fold.  The pectoralis is exposed.  Marking sutures were placed in the usual manner.  The wound was irrigated and hemostasis was achieved.  A 7Fr flat fluted drain was inserted and secured with a nylon.  3-0 vicryls were used to close the deep dermal layer and 4-0 monocryl was used to close the skin.  The redundant skin laterally and medially were excised and a Y-plasty was created laterally.      I then turned my attention to the left side and a similar, though slightly superiorly placed, ellipse was created to  include her previous biopsy scars.  The same procedure was performed in the same manner as the right side.      I then turned my attention to the left axilla.  The gamma probe was unable to identify the sentinel node, and no blue node was identified either.  I elected to proceed with the level 1 and 2 lymphedectomy and the usual landmarks were identified including the axillary vein, the thoracodorsal and long thoracic nerve bundles.  The Harmonic focus scalpel was used to excise the axillary contents.  There was excellent hemostasis.  I then irrigated and placed two 7Fr flat fluted drains which were secured with nylons.  The closure was performed in the same manner as the right.  Sterile dressings were applied and I was informed all counts were correct.    CC: MD Abby Gaitan, ANP

## 2017-08-15 NOTE — OR NURSING
Dr. Kam H&P states patient has latex sensitivity/allergy however not stated in preop or during interview in preop. Caught after case had started. Informed staff and anesthesia. Gloves changed to latex free. Anesthesia and Dr. Kam will monitor throughout case.

## 2017-08-15 NOTE — OR NURSING
"1629: Rec'd pt from OR with Dr. Hernandez, lma in place, vss, no distress noted, breathing is even and unlabored, chest is wrapped in large gauze like dressing, cdi, 3 vanessa's present, (1 to right chest, 2 to left chest), all draining sanguinous fluid,   1640: airway removed, hob elevated, bilateral arms are pink and warm and cms brisk,   1650: no c/o pain at this time, pt back to sleep, left arm elevated on pillows  1710: pt very drowsy, no c/o pain,  1725: pt awaken, given sips of water, tolerating well, no c/o pain,   1735: pt's friend brought to bedside, pt remains very drowsy,   1750: pt starting to c/o pain in right shoulder from previous rotator cuff surgery, repositioned on pillows and pt re-adjusted, pt refused pain meds at first, pt states pain is 9/10, I encouraged her to take some to help decrease the discomfort, pt medicated with iv fent and oral pain meds,   1755: another dos of iv fent given, states \"I think they broke it\". Will notify Dr. Kam about this,   1802: more iv fent given, pain remains 8/10, pt tearful, pt states she does have pain in her right shoulder daily however not to this degree, I told her it most likely was from her positioned in surgery for so long,  1815: pain starting to ease, pt starting to wake up a bit, pain is 5/10 at this time,   1840: d/c instructions discussed with pt and pt's friend, vanessa drain instructions given, pt's pain better at this time,   1900: pt ready to get up and get dressed, iv d/c'd, pt placed in recliner, became a little nauseated, queasease given,  1918: nausea subsiding, pt taken out in wheelchair.  "

## 2017-08-15 NOTE — IP AVS SNAPSHOT
8/15/2017    Keyur Alison Burt  [address]  [address]  [facility] [address]    Dear Keyur:    [facility] wants to ensure your discharge home is safe and you or your loved ones have had all of your questions answered regarding your care after you leave the hospital.    Below is a list of resources and contact information should you have any questions regarding your hospital stay, follow-up instructions, or active medical symptoms.    Questions or Concerns Regarding… Contact   Medical Questions Related to Your Discharge  (7 days a week, 8am-5pm) Contact a Nurse Care Coordinator   [phone]   Medical Questions Not Related to Your Discharge  (24 hours a day / 7 days a week)  Contact the Nurse Health Line   [phone]    Medications or Discharge Instructions Refer to your discharge packet   or contact your [facility] Primary Care Provider   [phone]   Follow-up Appointment(s) Schedule your appointment via [employer]   or contact Scheduling [phone]   Billing Review your statement via [employer]  or contact Billing [phone]   Medical Records Review your records via [employer]   or contact Medical Records [phone]     You may receive a telephone call within two days of discharge. This call is to make certain you understand your discharge instructions and have the opportunity to have any questions answered. You can also easily access your medical information, test results and upcoming appointments via the [employer] free online health management tool. You can learn more and sign up at [email or web address]. For assistance setting up your [employer] account, please call [phone].    Once again, we want to ensure your discharge home is safe and that you have a clear understanding of any next steps in your care. If you have any questions or concerns, please do not hesitate to contact us, we are here for you. Thank you for choosing [facility] for your healthcare needs.    Sincerely,    Your [facility] Healthcare Team

## 2017-08-16 NOTE — DISCHARGE INSTRUCTIONS
ACTIVITY: Rest and take it easy for the first 24 hours.  A responsible adult is recommended to remain with you during that time.  It is normal to feel sleepy.  We encourage you to not do anything that requires balance, judgment or coordination.    MILD FLU-LIKE SYMPTOMS ARE NORMAL. YOU MAY EXPERIENCE GENERALIZED MUSCLE ACHES, THROAT IRRITATION, HEADACHE AND/OR SOME NAUSEA.    FOR 24 HOURS DO NOT:  Drive, operate machinery or run household appliances.  Drink beer or alcoholic beverages.   Make important decisions or sign legal documents.    SPECIAL INSTRUCTIONS: *SEE DR. RIVERA'S POST OPERATIVE INSTRUCTION SHEET, WALK AROUND REGULARLY, ELEVATED LEFT ARM WHEN RESTING, NO LIFTING OVER 5 LBS**    DIET: To avoid nausea, slowly advance diet as tolerated, avoiding spicy or greasy foods for the first day.  Add more substantial food to your diet according to your physician's instructions.  Babies can be fed formula or breast milk as soon as they are hungry.  INCREASE FLUIDS AND FIBER TO AVOID CONSTIPATION.    SURGICAL DRESSING/BATHING: *LEAVE DRESSING ON, CLEAN, AND DRY, NO SHOWERS UNTIL DRAINS REMOVED, RESHMA DRAIN CARE AS DIRECTED**    FOLLOW-UP APPOINTMENT:  A follow-up appointment should be arranged with your doctor in ; call to schedule.    You should CALL YOUR PHYSICIAN if you develop:  Fever greater than 101 degrees F.  Pain not relieved by medication, or persistent nausea or vomiting.  Excessive bleeding (blood soaking through dressing) or unexpected drainage from the wound.  Extreme redness or swelling around the incision site, drainage of pus or foul smelling drainage.  Inability to urinate or empty your bladder within 8 hours.  Problems with breathing or chest pain.    You should call 911 if you develop problems with breathing or chest pain.  If you are unable to contact your doctor or surgical center, you should go to the nearest emergency room or urgent care center.  Physician's telephone #: *[phone]**    If any  questions arise, call your doctor.  If your doctor is not available, please feel free to call the Surgical Center at [phone].  The Center is open Monday through Friday from 7AM to 7PM.  You can also call the HEALTH HOTLINE open 24 hours/day, 7 days/week and speak to a nurse at [phone], or toll free at [phone].    A registered nurse may call you a few days after your surgery to see how you are doing after your procedure.    MEDICATIONS: Resume taking daily medication.  Take prescribed pain medication with food.  If no medication is prescribed, you may take non-aspirin pain medication if needed.  PAIN MEDICATION CAN BE VERY CONSTIPATING.  Take a stool softener or laxative such as senokot, pericolace, or milk of magnesia if needed.    Prescription given for *ZOFRAN**.  Last pain medication given at **5:50PM, NEXT DUE AT 9:50PM*.    If your physician has prescribed pain medication that includes Acetaminophen (Tylenol), do not take additional Acetaminophen (Tylenol) while taking the prescribed medication.    Depression / Suicide Risk    As you are discharged from this [facility] facility, it is important to learn how to keep safe from harming yourself.    Recognize the warning signs:  · Abrupt changes in personality, positive or negative- including increase in energy   · Giving away possessions  · Change in eating patterns- significant weight changes-  positive or negative  · Change in sleeping patterns- unable to sleep or sleeping all the time   · Unwillingness or inability to communicate  · Depression  · Unusual sadness, discouragement and loneliness  · Talk of wanting to die  · Neglect of personal appearance   · Rebelliousness- reckless behavior  · Withdrawal from people/activities they love  · Confusion- inability to concentrate     If you or a loved one observes any of these behaviors or has concerns about self-harm, here's what you can do:  · Talk about it- your feelings and reasons for  harming yourself  · Remove any means that you might use to hurt yourself (examples: pills, rope, extension cords, firearm)  · Get professional help from the community (Mental Health, Substance Abuse, psychological counseling)  · Do not be alone:Call your Safe Contact- someone whom you trust who will be there for you.  · Call your local CRISIS HOTLINE 270-5417 or 950-969-1243  · Call your local Children's Mobile Crisis Response Team [place] (118) 501-0516 or www.[employer]  · Call the toll free National Suicide Prevention Hotlines   · National Suicide Prevention Lifeline 207-880-AIMS (2041)  · National Hope Line Network 800-SUICIDE (234-7404)

## 2017-08-21 ENCOUNTER — OFFICE VISIT (OUTPATIENT)
Dept: HEMATOLOGY ONCOLOGY | Facility: MEDICAL CENTER | Age: 67
End: 2017-08-21
Payer: MEDICARE

## 2017-08-21 VITALS
TEMPERATURE: 97.9 F | SYSTOLIC BLOOD PRESSURE: 142 MMHG | BODY MASS INDEX: 33.55 KG/M2 | OXYGEN SATURATION: 92 % | RESPIRATION RATE: 14 BRPM | DIASTOLIC BLOOD PRESSURE: 84 MMHG | WEIGHT: 227.29 LBS | HEART RATE: 80 BPM

## 2017-08-21 DIAGNOSIS — C50.912 RECURRENT BREAST CANCER, LEFT (HCC): ICD-10-CM

## 2017-08-21 PROCEDURE — 99214 OFFICE O/P EST MOD 30 MIN: CPT | Performed by: INTERNAL MEDICINE

## 2017-08-21 ASSESSMENT — PAIN SCALES - GENERAL: PAINLEVEL: 6=MODERATE PAIN

## 2017-08-21 NOTE — PROGRESS NOTES
Date of visit: 8/21/2017  2:31 PM      Chief Complaint- follow-up of breast cancer  1. Stage II A left breast, ER positive, MI negative, HER-2/geoffrey positive invasive ductal carcinoma, grade 3  ( 2015)  2. Stage I (T1c,N0) ER/MI negative, HER-2/geoffrey equivocal  carcinoma of the left breast      History of presenting illness: Keyur Burt  is a 67 y.o. year old female who recently underwent bilateral mastectomy for recurrent breast carcinoma.    She initially received her care at [place] to [facility] and established care here in February 2017.  According to available information shewas diagnosed with stage II a HER-2/geoffrey positive breast carcinoma, which was found in her screening mammogram back in June 2015. She underwent  wire localization lumpectomy and sentinel lymph node biopsy on 9/15/2015 at [facility].     According to available records, the pathology showed on 0.3 cm, ER positive, MI negative, HER-2/geoffrey positive (by FISH )invasive carcinoma with one out of 3 lymph nodes positive. She was seen by Dr. Camarena and was started on adjuvant [facility] chemotherapy. After the first dose, she developed severe adverse effects, and she declined further chemotherapy. She was started on anastrozole and Herceptin was continued for a total of one year. According to the patient, she was not referred for radiation initially due to unclear reason and finally was referred for breast radiation, which was completed in March 2016.    Screening mammogram from April 2017 showed breast asymmetry. Follow-up ultrasound and biopsy was consistent with grade 3 invasive ductal carcinoma ER/MI negative, HER-2/geoffrey equivocal(by IHC and FISH - CAP17 ratio 1.8 , Her2 count 4.6).    She recently underwent bilateral mastectomy which showed a 1.9 cm grade 3 invasive ductal carcinoma. Axillary dissection showed 10 benign lymph nodes. Repeat HER-2/geoffrey has been requested and is pending .  She is recovering from the surgery. She has some associated pain.      Past Medical History:      Past Medical History   Diagnosis Date   • Chronic neck pain    • Allergic rhinitis    • Tension headache    • Thyroid cancer (CMS-HCC)      treated with total thyroidectomy   • Hypertension    • Hyperlipidemia LDL goal < 100    • Fatty liver      elevated lft's   • Hypothyroidism    • Impaired fasting glucose    • CKD (chronic kidney disease), stage III    • Hematuria    • Breast cancer (CMS-HCC)    • Cancer (CMS-HCC)      breast       Past surgical history:       Past Surgical History   Procedure Laterality Date   • Abdominal hysterectomy total       uterus only DUB AFTER CHILDBIRTH   • Tonsillectomy and adenoidectomy     • Rotator cuff repair       right shoulder x 2   • Gastric banding laparoscopic       done 2x   • Lumpectomy Left 2015   • Cholecystectomy     • Hiatal hernia repair     • Rotator cuff repair  2002   • Mastectomy Bilateral 8/15/2017     Procedure: MASTECTOMY-TOTAL ;  Surgeon: Debora Kam M.D.;  Location: [facility];  Service:    • Axillary node dissection  8/15/2017     Procedure: AXILLARY LYMPH NODE DISSECTION-  ;  Surgeon: Debora Kam M.D.;  Location: [facility];  Service:        Allergies:       Iodine; Pcn; Shellfish allergy; and Sulfa drugs    Medications:         Current Outpatient Prescriptions   Medication Sig Dispense Refill   • anastrozole (ARIMIDEX) 1 MG Tab Take 1 Tab by mouth every day. 90 Tab 3   • spironolactone (ALDACTONE) 25 MG Tab Take 1 Tab by mouth every day. 90 Tab 1   • hydrocodone-acetaminophen (NORCO) 5-325 MG Tab per tablet Take 1 Tab by mouth every 6 hours as needed. Must last 30 days 65 Tab 0   • atorvastatin (LIPITOR) 20 MG Tab Take 1 Tab by mouth every 48 hours. 45 Tab 0   • Cholecalciferol (VITAMIN D) 2000 UNITS Cap Take  by mouth.     • lisinopril (PRINIVIL, ZESTRIL) 40 MG tablet Take 1 Tab by mouth every day. 90 Tab 2   • levothyroxine (SYNTHROID) 137 MCG Tab Take 1 Tab by mouth every day.  90 Tab 2     No current facility-administered medications for this visit.         Social History:     Social History     Social History   • Marital Status:      Spouse Name: N/A   • Number of Children: N/A   • Years of Education: N/A     Occupational History   • Not on file.     Social History Main Topics   • Smoking status: Never Smoker    • Smokeless tobacco: Never Used   • Alcohol Use: No   • Drug Use: No   • Sexual Activity: Not on file     Other Topics Concern   • Not on file     Social History Narrative       Family History:      Family History   Problem Relation Age of Onset   • Heart Disease Mother    • Cancer Mother    • Diabetes Mother    • Heart Disease Father    • Stroke Father        Review of Systems:  All other review of systems are negative except what was mentioned above in the HPI.    Constitutional: Negative for fever, chills, weight loss. Positive for chronic mild malaise/fatigue.    HEENT: No new auditory or visual complaints. No sore throat and neck pain.     Respiratory: Negative for cough, sputum production, shortness of breath and wheezing.    Cardiovascular: Negative for chest pain, palpitations, orthopnea and leg swelling.    Gastrointestinal: Negative for heartburn, nausea, vomiting and abdominal pain.    Genitourinary: Negative for dysuria, hematuria    Musculoskeletal: No new arthralgias or myalgias . Chronic arthralgias and myalgias, stable  Skin: Negative for rash and itching.    Neurological: Negative for focal weakness and headaches.    Endo/Heme/Allergies: No abnormal bleed/bruise.    Psychiatric/Behavioral: No new depression/anxiety.    Physical Exam:  Vitals: /84 mmHg  Pulse 80  Temp(Src) 36.6 °C (97.9 °F)  Resp 14  Wt 103.1 kg (227 lb 4.7 oz)  SpO2 92%  Breastfeeding? No    General: Not in acute distress, alert and oriented x 3  HEENT: No pallor, icterus. Oropharynx clear.   Neck: Supple, no palpable masses.  Lymph nodes: No palpable cervical,  supraclavicular, axillary or inguinal lymphadenopathy.    CVS: regular rate and rhythm, no rubs or gallops  RESP: Clear to auscultate bilaterally, no wheezing or crackles.   ABD: Soft, non tender, non distended, positive bowel sounds, no palpable organomegaly  EXT: No edema or cyanosis  CNS: Alert and oriented x3, No focal deficits.  Skin- No rash  Breast- status post bilateral mastectomy. She has associated hematoma. She has a drain in the left side.    Labs:   Admission on 08/15/2017, Discharged on 08/15/2017   Component Date Value Ref Range Status   • WBC 08/15/2017 5.7  4.8 - 10.8 K/uL Final   • RBC 08/15/2017 4.36  4.20 - 5.40 M/uL Final   • Hemoglobin 08/15/2017 14.3  12.0 - 16.0 g/dL Final   • Hematocrit 08/15/2017 41.6  37.0 - 47.0 % Final   • MCV 08/15/2017 95.4  81.4 - 97.8 fL Final   • MCH 08/15/2017 32.8  27.0 - 33.0 pg Final   • MCHC 08/15/2017 34.4  33.6 - 35.0 g/dL Final   • RDW 08/15/2017 44.9  35.9 - 50.0 fL Final   • Platelet Count 08/15/2017 258  164 - 446 K/uL Final   • MPV 08/15/2017 9.1  9.0 - 12.9 fL Final   • Neutrophils-Polys 08/15/2017 63.90  44.00 - 72.00 % Final   • Lymphocytes 08/15/2017 24.80  22.00 - 41.00 % Final   • Monocytes 08/15/2017 8.00  0.00 - 13.40 % Final   • Eosinophils 08/15/2017 2.10  0.00 - 6.90 % Final   • Basophils 08/15/2017 0.70  0.00 - 1.80 % Final   • Immature Granulocytes 08/15/2017 0.50  0.00 - 0.90 % Final   • Nucleated RBC 08/15/2017 0.00   Final   • Neutrophils (Absolute) 08/15/2017 3.61  2.00 - 7.15 K/uL Final    Includes immature neutrophils, if present.   • Lymphs (Absolute) 08/15/2017 1.40  1.00 - 4.80 K/uL Final   • Monos (Absolute) 08/15/2017 0.45  0.00 - 0.85 K/uL Final   • Eos (Absolute) 08/15/2017 0.12  0.00 - 0.51 K/uL Final   • Baso (Absolute) 08/15/2017 0.04  0.00 - 0.12 K/uL Final   • Immature Granulocytes (abs) 08/15/2017 0.03  0.00 - 0.11 K/uL Final   • NRBC (Absolute) 08/15/2017 0.00   Final             Assessment and Plan:  Stage II A left  breast, ER positive, MD negative, HER-2/geoffrey positive invasive ductal carcinoma, grade 3,  now with in-breast relapse with a ER/MD negative, HER-2/geoffrey equivocal grade 3 invasive ductal carcinoma , 2 years out of her initial diagnosis.- She is status post bilateral mastectomy. Pathology is consistent with T1 cN0, grade 3 invasive ductal carcinoma. HER-2/geoffrey was equivocal in the biopsy specimen and has been requested from the surgical specimen.    She had suboptimal adjuvant chemotherapy initially where she received only one dose of adjuvant TCH chemotherapy. She managed to finish one year course of Herceptin and has been on Arimidex since then. According to the patient, she was not referred for breast irradiation initially and finally received at around 8 months or so after her surgery. Informed her that it is unclear whether she had relapse due to suboptimal treatment before.    An [employer] extended BRCA panel  was sent and the results are pending.    Informed her that her molecular markers are different than her initial malignancy. If she has triple negative phenotype. She is high risk of relapse locally and systematically. Informed her that the standard of care will be to consider a dose dense adjuvant chemotherapy. However, she is adamant about trying any chemotherapy. She understands the implications of testicular relapse in which case malignancy will be incurable. She explicitly informed me that she made an informed decision not to try any more chemotherapy. She will continue Arimidex, which shows tolerating reasonably well.     She will return to the clinic in 3 months.Complex patient requiring complex decision making. .        She agreed and verbalized her agreement and understanding with the current plan.  I answered all questions and concerns she has at this time         Please note that this dictation was created using voice recognition software. I have made every reasonable attempt to correct obvious errors,  but I expect that there are errors of grammar and possibly content that I did not discover before finalizing the note.      SIGNATURES:  Marcos Hu    CC:  CHRISTINA PiñaPLanceN.  No ref. provider found

## 2017-08-21 NOTE — MR AVS SNAPSHOT
Keyur Burt   2017 2:00 PM   Office Visit   MRN: [id number]    Department:  [facility]   Dept Phone:  [phone]    Description:  Female : 1950   Provider:  Marcos Hu M.D.           Reason for Visit     Follow-Up after surgery       Allergies as of 2017     Allergen Noted Reactions    Iodine 2013       Pcn [Penicillins] 2013       Shellfish Allergy 2013       Sulfa Drugs 08/15/2017         Vital Signs     Blood Pressure Pulse Temperature Respirations Weight Oxygen Saturation    142/84 mmHg 80 36.6 °C (97.9 °F) 14 103.1 kg (227 lb 4.7 oz) 92%    Breastfeeding? Smoking Status                No Never Smoker           Basic Information     Date Of Birth Sex Race Ethnicity Preferred Language    1950 Female White Non- English      Your appointments     2017  9:40 AM   ONCOLOGY EST PATIENT 30 MIN with Marcos Hu M.D.   [facility] (--)    [address], [address]  [facility] [id number] [phone]              Problem List              ICD-10-CM Priority Class Noted - Resolved    Fatty liver K76.0   Unknown - Present    Hyperlipidemia with target LDL less than 100 E78.5   Unknown - Present    Hypertension I10   Unknown - Present    Hypothyroidism from resorcinol E03.2   Unknown - Present    Thyroid cancer (CMS-HCC) C73   Unknown - Present    Tension headache G44.209   Unknown - Present    Allergy to pollen J30.1   Unknown - Present    Chronic neck pain M54.2, G89.29   Unknown - Present    Preventative health care Z00.00   3/25/2013 - Present    Impaired fasting glucose R73.01   Unknown - Present    CKD (chronic kidney disease), stage III N18.3   Unknown - Present    Hematuria R31.9   2015 - Present    Breast cancer (CMS-HCC) C50.919   Unknown - Present    Vitamin D deficiency disease E55.9   2017 - Present    Obesity (BMI 30-39.9) E66.9   3/22/2017 - Present    Chronic use of opiate for therapeutic purpose  Z79.891   7/20/2017 - Present    Chronic bilateral low back pain without sciatica M54.5, G89.29   7/20/2017 - Present    Recurrent breast cancer (CMS-HCC) C50.919   8/15/2017 - Present      Health Maintenance        Date Due Completion Dates    COLON CANCER SCREENING ANNUAL FIT 1/16/2016 1/16/2015    IMM INFLUENZA (1) 9/1/2017 ---    IMM PNEUMOCOCCAL 65+ (ADULT) LOW/MEDIUM RISK SERIES (1 of 2 - PCV13) 1/30/2018 (Originally 7/4/2016) ---    IMM DTaP/Tdap/Td Vaccine (1 - Tdap) 1/30/2018 (Originally 7/4/1970) ---    IMM ZOSTER VACCINE 1/30/2018 (Originally 7/4/2011) ---    MAMMOGRAM 6/21/2018 6/21/2017, 4/18/2017    BONE DENSITY 5/20/2021 5/20/2016            Current Immunizations     No immunizations on file.      Below and/or attached are the medications your provider expects you to take. Review all of your home medications and newly ordered medications with your provider and/or pharmacist. Follow medication instructions as directed by your provider and/or pharmacist. Please keep your medication list with you and share with your provider. Update the information when medications are discontinued, doses are changed, or new medications (including over-the-counter products) are added; and carry medication information at all times in the event of emergency situations     Allergies:  IODINE - (reactions not documented)     PCN - (reactions not documented)     SHELLFISH ALLERGY - (reactions not documented)     SULFA DRUGS - (reactions not documented)               Medications  Valid as of: August 21, 2017 -  2:56 PM    Generic Name Brand Name Tablet Size Instructions for use    Anastrozole (Tab) ARIMIDEX 1 MG Take 1 Tab by mouth every day.        Atorvastatin Calcium (Tab) LIPITOR 20 MG Take 1 Tab by mouth every 48 hours.        Cholecalciferol (Cap) Vitamin D 2000 units Take  by mouth.        Hydrocodone-Acetaminophen (Tab) NORCO 5-325 MG Take 1 Tab by mouth every 6 hours as needed. Must last 30 days        Levothyroxine  Sodium (Tab) SYNTHROID 137 MCG Take 1 Tab by mouth every day.        Lisinopril (Tab) PRINIVIL, ZESTRIL 40 MG Take 1 Tab by mouth every day.        Spironolactone (Tab) ALDACTONE 25 MG Take 1 Tab by mouth every day.        .                 Medicines prescribed today were sent to:     [facility] [address] ([place]), [place] - [address]    [address] [facility] ([place]) [place] [address]    Phone: [phone] Fax: [phone]    Open 24 Hours?: No      Medication refill instructions:       If your prescription bottle indicates you have medication refills left, it is not necessary to call your provider’s office. Please contact your pharmacy and they will refill your medication.    If your prescription bottle indicates you do not have any refills left, you may request refills at any time through one of the following ways: The online [facility] system (except Urgent Care), by calling your provider’s office, or by asking your pharmacy to contact your provider’s office with a refill request. Medication refills are processed only during regular business hours and may not be available until the next business day. Your provider may request additional information or to have a follow-up visit with you prior to refilling your medication.   *Please Note: Medication refills are assigned a new Rx number when refilled electronically. Your pharmacy may indicate that no refills were authorized even though a new prescription for the same medication is available at the pharmacy. Please request the medicine by name with the pharmacy before contacting your provider for a refill.           MyChart Status: Patient Declined

## 2017-12-14 ENCOUNTER — OFFICE VISIT (OUTPATIENT)
Dept: MEDICAL GROUP | Facility: MEDICAL CENTER | Age: 67
End: 2017-12-14
Payer: MEDICARE

## 2017-12-14 VITALS
WEIGHT: 225 LBS | DIASTOLIC BLOOD PRESSURE: 78 MMHG | BODY MASS INDEX: 33.33 KG/M2 | HEIGHT: 69 IN | TEMPERATURE: 97.4 F | OXYGEN SATURATION: 100 % | SYSTOLIC BLOOD PRESSURE: 118 MMHG | HEART RATE: 65 BPM

## 2017-12-14 DIAGNOSIS — C50.311 BILATERAL MALIGNANT NEOPLASM OF LOWER INNER QUADRANT OF BREAST IN FEMALE, UNSPECIFIED ESTROGEN RECEPTOR STATUS (HCC): ICD-10-CM

## 2017-12-14 DIAGNOSIS — Z79.891 CHRONIC USE OF OPIATE FOR THERAPEUTIC PURPOSE: ICD-10-CM

## 2017-12-14 DIAGNOSIS — G89.29 CHRONIC NECK PAIN: ICD-10-CM

## 2017-12-14 DIAGNOSIS — G89.29 CHRONIC BILATERAL LOW BACK PAIN WITHOUT SCIATICA: ICD-10-CM

## 2017-12-14 DIAGNOSIS — Z79.891 CHRONIC USE OF OPIATE DRUGS THERAPEUTIC PURPOSES: ICD-10-CM

## 2017-12-14 DIAGNOSIS — C50.312 BILATERAL MALIGNANT NEOPLASM OF LOWER INNER QUADRANT OF BREAST IN FEMALE, UNSPECIFIED ESTROGEN RECEPTOR STATUS (HCC): ICD-10-CM

## 2017-12-14 DIAGNOSIS — M54.50 CHRONIC BILATERAL LOW BACK PAIN WITHOUT SCIATICA: ICD-10-CM

## 2017-12-14 DIAGNOSIS — N18.30 STAGE 3 CHRONIC KIDNEY DISEASE (HCC): ICD-10-CM

## 2017-12-14 DIAGNOSIS — M54.2 CHRONIC NECK PAIN: ICD-10-CM

## 2017-12-14 PROCEDURE — 99214 OFFICE O/P EST MOD 30 MIN: CPT | Performed by: NURSE PRACTITIONER

## 2017-12-14 RX ORDER — SPIRONOLACTONE 25 MG/1
25 TABLET ORAL DAILY
Qty: 90 TAB | Refills: 1 | Status: SHIPPED | OUTPATIENT
Start: 2017-12-14 | End: 2018-06-19 | Stop reason: SDUPTHER

## 2017-12-14 RX ORDER — HYDROCODONE BITARTRATE AND ACETAMINOPHEN 5; 325 MG/1; MG/1
1 TABLET ORAL EVERY 6 HOURS PRN
Qty: 65 TAB | Refills: 0 | Status: SHIPPED | OUTPATIENT
Start: 2018-01-14 | End: 2017-12-14 | Stop reason: SDUPTHER

## 2017-12-14 RX ORDER — HYDROCODONE BITARTRATE AND ACETAMINOPHEN 5; 325 MG/1; MG/1
1 TABLET ORAL EVERY 6 HOURS PRN
Qty: 65 TAB | Refills: 0 | Status: SHIPPED | OUTPATIENT
Start: 2018-02-14 | End: 2018-03-17

## 2017-12-14 RX ORDER — HYDROCODONE BITARTRATE AND ACETAMINOPHEN 5; 325 MG/1; MG/1
1 TABLET ORAL EVERY 6 HOURS PRN
Qty: 65 TAB | Refills: 0 | Status: SHIPPED | OUTPATIENT
Start: 2017-12-14 | End: 2017-12-14 | Stop reason: SDUPTHER

## 2017-12-14 ASSESSMENT — PATIENT HEALTH QUESTIONNAIRE - PHQ9: CLINICAL INTERPRETATION OF PHQ2 SCORE: 0

## 2017-12-14 NOTE — PROGRESS NOTES
Subjective:     Keyur Burt is a 67 y.o. female who presents with   Chief Complaint   Patient presents with   • Medication Refill   .    HPI:   Seen in f/u for pain med refill.  She is feeling well.  Has recovered from surgeryies for breast cancer.  Does not intend to do reconstruction.    She is due for pain meds refill.  Uses for chronic back pain.  Uses up to 2/day.  Stable on meds.   Her last CMP was 7/17. She is followed by [facility] for her CKD.      Patient Active Problem List    Diagnosis Date Noted   • Chronic use of opiate drugs therapeutic purposes 12/14/2017   • Recurrent breast cancer (CMS-HCC) 08/15/2017   • Chronic use of opiate for therapeutic purpose 07/20/2017   • Chronic bilateral low back pain without sciatica 07/20/2017   • Obesity (BMI 30-39.9) 03/22/2017   • Vitamin D deficiency disease 01/25/2017   • Breast cancer (CMS-HCC)    • Hematuria 02/20/2015   • Impaired fasting glucose    • CKD (chronic kidney disease), stage III    • Preventative health care 03/25/2013   • Fatty liver    • Hyperlipidemia with target LDL less than 100    • Hypertension    • Hypothyroidism from resorcinol    • Thyroid cancer (CMS-HCC)    • Tension headache    • Allergy to pollen    • Chronic neck pain        Current medicines (including changes today)  Current Outpatient Prescriptions   Medication Sig Dispense Refill   • [START ON 2/14/2018] hydrocodone-acetaminophen (NORCO) 5-325 MG Tab per tablet Take 1 Tab by mouth every 6 hours as needed (dx:  z79.891, M54.5, M54.2) for up to 31 days. Must last 30 days 65 Tab 0   • anastrozole (ARIMIDEX) 1 MG Tab Take 1 Tab by mouth every day. 90 Tab 3   • spironolactone (ALDACTONE) 25 MG Tab Take 1 Tab by mouth every day. 90 Tab 1   • Cholecalciferol (VITAMIN D) 2000 UNITS Cap Take  by mouth.     • lisinopril (PRINIVIL, ZESTRIL) 40 MG tablet Take 1 Tab by mouth every day. 90 Tab 2   • levothyroxine (SYNTHROID) 137 MCG Tab Take 1 Tab by mouth every day. 90 Tab 2   •  "atorvastatin (LIPITOR) 20 MG Tab TAKE ONE TABLET BY MOUTH EVERY 48 HOURS 45 Tab 1   • lisinopril (PRINIVIL, ZESTRIL) 40 MG tablet TAKE ONE TABLET BY MOUTH ONCE DAILY 90 Tab 1   • levothyroxine (SYNTHROID) 137 MCG Tab TAKE ONE TABLET BY MOUTH ONCE DAILY 90 Tab 1     No current facility-administered medications for this visit.        Allergies   Allergen Reactions   • Iodine    • Pcn [Penicillins]    • Shellfish Allergy    • Sulfa Drugs        ROS  Constitutional: Negative. Negative for fever, chills, weight loss, malaise/fatigue and diaphoresis.   HENT: Negative. Negative for hearing loss, ear pain, nosebleeds, congestion, sore throat, neck pain, tinnitus and ear discharge.   Respiratory: Negative. Negative for cough, hemoptysis, sputum production, shortness of breath, wheezing and stridor.   Cardiovascular: Negative. Negative for chest pain, palpitations, orthopnea, claudication, leg swelling and PND.        Objective:     Blood pressure 118/78, pulse 65, temperature 36.3 °C (97.4 °F), height 1.753 m (5' 9\"), weight 102.1 kg (225 lb), SpO2 100 %. Body mass index is 33.23 kg/m².    Physical Exam:  Vitals reviewed.  Constitutional: Oriented to person, place, and time. appears well-developed and well-nourished. No distress.   Cardiovascular: Normal rate, regular rhythm, normal heart sounds and intact distal pulses. Exam reveals no gallop and no friction rub. No murmur heard. No carotid bruits.   Pulmonary/Chest: Effort normal and breath sounds normal. No stridor. No respiratory distress. no wheezes or rales. exhibits no tenderness.   Musculoskeletal: Normal range of motion. exhibits no edema. thony pedal pulses 2+.  Neurological: Alert and oriented to person, place, and time. exhibits normal muscle tone.  Skin: Skin is warm and dry. No diaphoresis.   Psychiatric: Normal mood and affect. Behavior is normal.      Assessment and Plan:     The following treatment plan was discussed:    1. Chronic use of opiate drugs " therapeutic purposes  CONTROLLED SUBSTANCE TREATMENT AGREEMENT    [facility] PAIN MANAGEMENT SCREEN    hydrocodone-acetaminophen (NORCO) 5-325 MG Tab per tablet    DISCONTINUED: hydrocodone-acetaminophen (NORCO) 5-325 MG Tab per tablet    DISCONTINUED: hydrocodone-acetaminophen (NORCO) 5-325 MG Tab per tablet    refilled pain meds.  UDS and contract updated today.  fu 3 months for pain med refill. n ot due for lab til 7/17.     2. Chronic bilateral low back pain without sciatica  CONTROLLED SUBSTANCE TREATMENT AGREEMENT    [facility] PAIN MANAGEMENT SCREEN    hydrocodone-acetaminophen (NORCO) 5-325 MG Tab per tablet    DISCONTINUED: hydrocodone-acetaminophen (NORCO) 5-325 MG Tab per tablet    DISCONTINUED: hydrocodone-acetaminophen (NORCO) 5-325 MG Tab per tablet   3. Chronic neck pain  CONTROLLED SUBSTANCE TREATMENT AGREEMENT    [facility] PAIN MANAGEMENT SCREEN    hydrocodone-acetaminophen (NORCO) 5-325 MG Tab per tablet    DISCONTINUED: hydrocodone-acetaminophen (NORCO) 5-325 MG Tab per tablet    DISCONTINUED: hydrocodone-acetaminophen (NORCO) 5-325 MG Tab per tablet   4. Chronic use of opiate for therapeutic purpose  CONTROLLED SUBSTANCE TREATMENT AGREEMENT    [facility] PAIN MANAGEMENT SCREEN    hydrocodone-acetaminophen (NORCO) 5-325 MG Tab per tablet    DISCONTINUED: hydrocodone-acetaminophen (NORCO) 5-325 MG Tab per tablet    DISCONTINUED: hydrocodone-acetaminophen (NORCO) 5-325 MG Tab per tablet   5. Bilateral malignant neoplasm of lower inner quadrant of breast in female, unspecified estrogen receptor status (CMS-HCC)      continue f/u with oncology.     6. Stage 3 chronic kidney disease      will continue f/u with [facility].  she is due repeat lab for me in 7/18.          Followup: Return in about 3 months (around 3/14/2018).

## 2018-04-18 RX ORDER — LEVOTHYROXINE SODIUM 137 UG/1
TABLET ORAL
Qty: 90 TAB | Refills: 0 | Status: SHIPPED | OUTPATIENT
Start: 2018-04-18 | End: 2018-06-19

## 2018-04-18 RX ORDER — LISINOPRIL 40 MG/1
TABLET ORAL
Qty: 90 TAB | Refills: 0 | Status: SHIPPED | OUTPATIENT
Start: 2018-04-18 | End: 2018-06-19

## 2018-04-19 NOTE — PROGRESS NOTES
SCP DECLINE LIST  Outcome: Left Message    Please transfer to Patient Outreach Team at [phone] when patient returns call.      Attempt # 1

## 2018-05-29 NOTE — PROGRESS NOTES
Outcome: Left Message to schedule awv    Please transfer to Patient Outreach Team at [phone] when patient returns call.    WebIZ Checked & Epic Updated:  Yes/ not found in webiz    [facility] Verified: yes    Attempt # 1  scp outreach project

## 2018-10-18 ENCOUNTER — PATIENT OUTREACH (OUTPATIENT)
Dept: HEALTH INFORMATION MANAGEMENT | Facility: OTHER | Age: 68
End: 2018-10-18

## 2018-10-18 NOTE — PROGRESS NOTES
Outcome: Left Message    Please transfer to Patient Outreach Team at [phone] when patient returns call.    Web[employer] Checked & Epic Updated:  yes    [facility] Verified: yes    Attempt # 1

## 2018-11-07 NOTE — PROGRESS NOTES
Outcome: Left Message    Please transfer to Patient Outreach Team at [phone] when patient returns call.      Attempt # 3

## 2018-11-26 ENCOUNTER — PATIENT OUTREACH (OUTPATIENT)
Dept: HEALTH INFORMATION MANAGEMENT | Facility: OTHER | Age: 68
End: 2018-11-26

## 2018-11-26 NOTE — PROGRESS NOTES
Outcome:no answer no VM    Please transfer to Patient Outreach Team at [phone] when patient returns call.    Web[employer] Checked & Epic Updated:  yes    [facility] Verified: yes    Attempt # 1

## 2018-12-14 NOTE — PROGRESS NOTES
Outcome: no answer no VM    Please transfer to Patient Outreach Team at [phone] when patient returns call.    Attempt # 2

## 2019-02-20 PROBLEM — E55.9 VITAMIN D DEFICIENCY DISEASE: Status: RESOLVED | Noted: 2017-01-25 | Resolved: 2019-02-20

## 2019-02-20 PROBLEM — Z79.891 CHRONIC USE OF OPIATE DRUGS THERAPEUTIC PURPOSES: Status: RESOLVED | Noted: 2017-12-14 | Resolved: 2019-02-20

## 2019-03-26 PROBLEM — G44.329 CHRONIC POST-TRAUMATIC HEADACHE: Status: ACTIVE | Noted: 2019-03-26

## 2019-03-26 PROBLEM — R27.0 ATAXIA: Status: ACTIVE | Noted: 2019-03-26

## 2019-03-26 PROBLEM — H93.13 TINNITUS OF BOTH EARS: Status: ACTIVE | Noted: 2019-03-26

## 2019-03-26 PROBLEM — Z87.820 HISTORY OF CONCUSSION: Status: ACTIVE | Noted: 2019-03-26

## 2020-02-11 ENCOUNTER — TELEPHONE (OUTPATIENT)
Dept: MEDICAL GROUP | Facility: MEDICAL CENTER | Age: 70
End: 2020-02-11

## 2020-02-26 ENCOUNTER — OFFICE VISIT (OUTPATIENT)
Dept: MEDICAL GROUP | Facility: MEDICAL CENTER | Age: 70
End: 2020-02-26
Payer: MEDICARE

## 2020-02-26 VITALS
HEART RATE: 82 BPM | SYSTOLIC BLOOD PRESSURE: 130 MMHG | HEIGHT: 69 IN | OXYGEN SATURATION: 96 % | WEIGHT: 240 LBS | DIASTOLIC BLOOD PRESSURE: 70 MMHG | TEMPERATURE: 98 F | BODY MASS INDEX: 35.55 KG/M2

## 2020-02-26 DIAGNOSIS — S09.90XA CLOSED HEAD INJURY, INITIAL ENCOUNTER: ICD-10-CM

## 2020-02-26 DIAGNOSIS — E66.9 OBESITY (BMI 35.0-39.9 WITHOUT COMORBIDITY): ICD-10-CM

## 2020-02-26 DIAGNOSIS — I15.2 HYPERTENSION DUE TO ENDOCRINE DISORDER: ICD-10-CM

## 2020-02-26 DIAGNOSIS — N18.30 CKD (CHRONIC KIDNEY DISEASE), STAGE III: ICD-10-CM

## 2020-02-26 DIAGNOSIS — M54.50 CHRONIC BILATERAL LOW BACK PAIN WITHOUT SCIATICA: ICD-10-CM

## 2020-02-26 DIAGNOSIS — T49.4X5A HYPOTHYROIDISM FROM RESORCINOL: ICD-10-CM

## 2020-02-26 DIAGNOSIS — E03.2 HYPOTHYROIDISM FROM RESORCINOL: ICD-10-CM

## 2020-02-26 DIAGNOSIS — G89.29 CHRONIC BILATERAL LOW BACK PAIN WITHOUT SCIATICA: ICD-10-CM

## 2020-02-26 DIAGNOSIS — C50.919 RECURRENT MALIGNANT NEOPLASM OF BREAST, UNSPECIFIED LATERALITY (HCC): ICD-10-CM

## 2020-02-26 DIAGNOSIS — E78.5 HYPERLIPIDEMIA WITH TARGET LDL LESS THAN 100: ICD-10-CM

## 2020-02-26 DIAGNOSIS — R73.01 IMPAIRED FASTING GLUCOSE: ICD-10-CM

## 2020-02-26 PROBLEM — S63.219A SUBLUXATION OF MCP JOINT: Status: ACTIVE | Noted: 2020-02-26

## 2020-02-26 PROCEDURE — 8041 PR SCP AHA: Performed by: NURSE PRACTITIONER

## 2020-02-26 PROCEDURE — 99214 OFFICE O/P EST MOD 30 MIN: CPT | Performed by: NURSE PRACTITIONER

## 2020-02-26 RX ORDER — CYCLOBENZAPRINE HCL 5 MG
5-10 TABLET ORAL 3 TIMES DAILY PRN
Qty: 100 TAB | Refills: 0 | Status: SHIPPED | OUTPATIENT
Start: 2020-02-26 | End: 2020-09-03 | Stop reason: SDUPTHER

## 2020-02-26 RX ORDER — HYDROCODONE BITARTRATE AND ACETAMINOPHEN 5; 325 MG/1; MG/1
1-2 TABLET ORAL EVERY 6 HOURS PRN
Qty: 65 TAB | Refills: 0 | Status: SHIPPED | OUTPATIENT
Start: 2020-04-26 | End: 2020-05-26

## 2020-02-26 RX ORDER — HYDROCODONE BITARTRATE AND ACETAMINOPHEN 5; 325 MG/1; MG/1
1-2 TABLET ORAL EVERY 6 HOURS PRN
Qty: 65 TAB | Refills: 0 | Status: SHIPPED | OUTPATIENT
Start: 2020-02-26 | End: 2020-02-26 | Stop reason: SDUPTHER

## 2020-02-26 RX ORDER — SPIRONOLACTONE 25 MG/1
25 TABLET ORAL DAILY
Qty: 100 TAB | Refills: 0 | Status: SHIPPED | OUTPATIENT
Start: 2020-02-26 | End: 2020-06-22 | Stop reason: SDUPTHER

## 2020-02-26 RX ORDER — LISINOPRIL 40 MG/1
TABLET ORAL
Qty: 100 TAB | Refills: 1 | Status: SHIPPED | OUTPATIENT
Start: 2020-02-26 | End: 2020-09-03 | Stop reason: SDUPTHER

## 2020-02-26 RX ORDER — LEVOTHYROXINE SODIUM 137 UG/1
137 TABLET ORAL DAILY
Qty: 100 TAB | Refills: 1 | Status: SHIPPED | OUTPATIENT
Start: 2020-02-26 | End: 2020-08-24 | Stop reason: SDUPTHER

## 2020-02-26 RX ORDER — HYDROCODONE BITARTRATE AND ACETAMINOPHEN 5; 325 MG/1; MG/1
1-2 TABLET ORAL EVERY 6 HOURS PRN
Qty: 65 TAB | Refills: 0 | Status: SHIPPED | OUTPATIENT
Start: 2020-03-26 | End: 2020-02-26 | Stop reason: SDUPTHER

## 2020-02-26 RX ORDER — ANASTROZOLE 1 MG/1
TABLET ORAL
Qty: 100 TAB | Refills: 3 | Status: SHIPPED | OUTPATIENT
Start: 2020-02-26 | End: 2020-09-03 | Stop reason: SDUPTHER

## 2020-02-26 ASSESSMENT — PATIENT HEALTH QUESTIONNAIRE - PHQ9: CLINICAL INTERPRETATION OF PHQ2 SCORE: 0
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
## 2020-02-26 NOTE — ASSESSMENT & PLAN NOTE
She has a hxof breast cancer recurrent.  She is currently free of reoccurrence.  She is stable on arimidex.  Needs refill of med.

## 2020-03-05 ENCOUNTER — TELEPHONE (OUTPATIENT)
Dept: MEDICAL GROUP | Facility: MEDICAL CENTER | Age: 70
End: 2020-03-05

## 2020-03-05 NOTE — LETTER
March 12, 2020        Keyur Burt  [address]  [place] [address]        Dear Keyur:    Abby Marc's office has tried contacting you. At your earliest convenience please contact our office at [phone].      If you have any questions or concerns, please don't hesitate to call.        Sincerely,        RUI Ferris.    Electronically Signed

## 2020-03-06 NOTE — TELEPHONE ENCOUNTER
Please let pt know that the UDS shows + for THC.  She cannot get narcotics and use THC when i am prescribing the narcotic.  Please call pharmacy and cancel all remaining refills for norco.  If she choses for me to continue to refill med she willneed to test neg for THC before any further refills will be given.

## 2020-05-06 ENCOUNTER — PATIENT OUTREACH (OUTPATIENT)
Dept: HEALTH INFORMATION MANAGEMENT | Facility: OTHER | Age: 70
End: 2020-05-06

## 2020-05-06 NOTE — PROGRESS NOTES
Called member to introduce myself as her SCP. The member stated that it was not a good time to talk and asked that I call back another time. I advised that I would. She had no questions or concerns at this time. If the member calls back, please transfer to [phone]    Attempt # 1

## 2020-05-14 NOTE — PROGRESS NOTES
Outcome: Left Message to call back so I can complete my SCP  introduction    Please transfer to Patient Outreach Team at [phone] when patient returns call.    [facility] Verified: yes    Attempt # 2

## 2020-05-15 NOTE — PROGRESS NOTES
Called member to welcome her to SCP and introduce myself as her SCP. The member stated that she is not interested and would like to be placed on our DNC list. I advised that I would do so. She had no other questions or concerns at this time. If the member calls back please transfer to [phone] or [phone]

## 2020-06-22 DIAGNOSIS — I15.2 HYPERTENSION DUE TO ENDOCRINE DISORDER: ICD-10-CM

## 2020-06-22 RX ORDER — SPIRONOLACTONE 25 MG/1
25 TABLET ORAL DAILY
Qty: 100 TAB | Refills: 0 | Status: SHIPPED | OUTPATIENT
Start: 2020-06-22 | End: 2020-06-26 | Stop reason: SDUPTHER

## 2020-06-22 NOTE — LETTER
June 23, 2020        Keyur Burt  [address]  [place] [address]        Dear Keyur:    We have received a request from your pharmacy to refill your prescription(s). After careful review of your chart, we have noted you are due for labs and a follow up appointment.  We request you call our office at [phone] at your earliest convenience and make an appointment. I have included a fasting lab order for you.    However, when patients are not followed closely by their physician, potential medication complications may go unadressed. We look forward to scheduling an appointment for you, so that we may provide you with the safest and most complete medical care.        If you have any questions or concerns, please don't hesitate to call.        Sincerely,        RUI Ferris.    Electronically Signed

## 2020-06-26 DIAGNOSIS — I15.2 HYPERTENSION DUE TO ENDOCRINE DISORDER: ICD-10-CM

## 2020-06-26 RX ORDER — SPIRONOLACTONE 25 MG/1
25 TABLET ORAL DAILY
Qty: 100 TAB | Refills: 0 | Status: SHIPPED | OUTPATIENT
Start: 2020-06-26 | End: 2020-09-03 | Stop reason: SDUPTHER

## 2020-06-26 NOTE — TELEPHONE ENCOUNTER
Received request via: Patient- pt requesting call back    Was the patient seen in the last year in this department? Yes    Does the patient have an active prescription (recently filled or refills available) for medication(s) requested? Yes. Was sent to wrong pharmacy

## 2020-08-24 DIAGNOSIS — T49.4X5A HYPOTHYROIDISM FROM RESORCINOL: ICD-10-CM

## 2020-08-24 DIAGNOSIS — E03.2 HYPOTHYROIDISM FROM RESORCINOL: ICD-10-CM

## 2020-08-25 RX ORDER — LEVOTHYROXINE SODIUM 137 UG/1
137 TABLET ORAL DAILY
Qty: 15 TAB | Refills: 0 | Status: SHIPPED | OUTPATIENT
Start: 2020-08-25 | End: 2020-09-03 | Stop reason: SDUPTHER

## 2020-08-26 ENCOUNTER — NON-PROVIDER VISIT (OUTPATIENT)
Dept: OCCUPATIONAL MEDICINE | Facility: CLINIC | Age: 70
End: 2020-08-26

## 2020-08-26 ENCOUNTER — HOSPITAL ENCOUNTER (OUTPATIENT)
Facility: MEDICAL CENTER | Age: 70
End: 2020-08-26
Attending: PREVENTIVE MEDICINE
Payer: COMMERCIAL

## 2020-08-26 ENCOUNTER — TELEPHONE (OUTPATIENT)
Dept: OCCUPATIONAL MEDICINE | Facility: CLINIC | Age: 70
End: 2020-08-26

## 2020-08-26 ENCOUNTER — HOSPITAL ENCOUNTER (OUTPATIENT)
Dept: LAB | Facility: MEDICAL CENTER | Age: 70
End: 2020-08-26
Attending: NURSE PRACTITIONER
Payer: MEDICARE

## 2020-08-26 DIAGNOSIS — I15.2 HYPERTENSION DUE TO ENDOCRINE DISORDER: ICD-10-CM

## 2020-08-26 DIAGNOSIS — E78.5 HYPERLIPIDEMIA WITH TARGET LDL LESS THAN 100: ICD-10-CM

## 2020-08-26 DIAGNOSIS — E03.2 HYPOTHYROIDISM FROM RESORCINOL: ICD-10-CM

## 2020-08-26 DIAGNOSIS — R73.01 IMPAIRED FASTING GLUCOSE: ICD-10-CM

## 2020-08-26 DIAGNOSIS — Z77.21 EXPOSURE TO BLOOD OR BODY FLUID: ICD-10-CM

## 2020-08-26 DIAGNOSIS — T49.4X5A HYPOTHYROIDISM FROM RESORCINOL: ICD-10-CM

## 2020-08-26 LAB
ALBUMIN SERPL BCP-MCNC: 4.6 G/DL (ref 3.2–4.9)
ALBUMIN/GLOB SERPL: 1.4 G/DL
ALP SERPL-CCNC: 85 U/L (ref 30–99)
ALT SERPL-CCNC: 44 U/L (ref 2–50)
ANION GAP SERPL CALC-SCNC: 14 MMOL/L (ref 7–16)
AST SERPL-CCNC: 47 U/L (ref 12–45)
BILIRUB SERPL-MCNC: 0.7 MG/DL (ref 0.1–1.5)
BUN SERPL-MCNC: 15 MG/DL (ref 8–22)
CALCIUM SERPL-MCNC: 9.9 MG/DL (ref 8.4–10.2)
CHLORIDE SERPL-SCNC: 103 MMOL/L (ref 96–112)
CHOLEST SERPL-MCNC: 184 MG/DL (ref 100–199)
CO2 SERPL-SCNC: 24 MMOL/L (ref 20–33)
CREAT SERPL-MCNC: 1.31 MG/DL (ref 0.5–1.4)
CREAT UR-MCNC: 45.83 MG/DL
EST. AVERAGE GLUCOSE BLD GHB EST-MCNC: 120 MG/DL
FASTING STATUS PATIENT QL REPORTED: NORMAL
GLOBULIN SER CALC-MCNC: 3.2 G/DL (ref 1.9–3.5)
GLUCOSE SERPL-MCNC: 135 MG/DL (ref 65–99)
HBA1C MFR BLD: 5.8 % (ref 0–5.6)
HDLC SERPL-MCNC: 66 MG/DL
LDLC SERPL CALC-MCNC: 80 MG/DL
MICROALBUMIN UR-MCNC: <1.2 MG/DL
MICROALBUMIN/CREAT UR: NORMAL MG/G (ref 0–30)
POTASSIUM SERPL-SCNC: 4.1 MMOL/L (ref 3.6–5.5)
PROT SERPL-MCNC: 7.8 G/DL (ref 6–8.2)
SODIUM SERPL-SCNC: 141 MMOL/L (ref 135–145)
T4 FREE SERPL-MCNC: 1.48 NG/DL (ref 0.93–1.7)
TRIGL SERPL-MCNC: 192 MG/DL (ref 0–149)
TSH SERPL DL<=0.005 MIU/L-ACNC: 3.86 UIU/ML (ref 0.38–5.33)

## 2020-08-26 PROCEDURE — 84439 ASSAY OF FREE THYROXINE: CPT

## 2020-08-26 PROCEDURE — 80061 LIPID PANEL: CPT

## 2020-08-26 PROCEDURE — 83036 HEMOGLOBIN GLYCOSYLATED A1C: CPT

## 2020-08-26 PROCEDURE — 84443 ASSAY THYROID STIM HORMONE: CPT

## 2020-08-26 PROCEDURE — 82043 UR ALBUMIN QUANTITATIVE: CPT

## 2020-08-26 PROCEDURE — 80053 COMPREHEN METABOLIC PANEL: CPT

## 2020-08-26 PROCEDURE — 36415 COLL VENOUS BLD VENIPUNCTURE: CPT

## 2020-08-26 PROCEDURE — 82570 ASSAY OF URINE CREATININE: CPT

## 2020-08-27 ENCOUNTER — TELEPHONE (OUTPATIENT)
Dept: OCCUPATIONAL MEDICINE | Facility: CLINIC | Age: 70
End: 2020-08-27

## 2020-08-27 LAB
EXPOSED MRN EXMRN: NORMAL
HBV SURFACE AG SER QL: NORMAL
HCV AB SER QL: NORMAL
HIV 1+2 AB+HIV1 P24 AG SERPL QL IA: NORMAL

## 2020-09-03 ENCOUNTER — OFFICE VISIT (OUTPATIENT)
Dept: MEDICAL GROUP | Facility: MEDICAL CENTER | Age: 70
End: 2020-09-03
Payer: MEDICARE

## 2020-09-03 VITALS
BODY MASS INDEX: 38.21 KG/M2 | SYSTOLIC BLOOD PRESSURE: 120 MMHG | HEART RATE: 93 BPM | TEMPERATURE: 97.2 F | WEIGHT: 258 LBS | HEIGHT: 69 IN | DIASTOLIC BLOOD PRESSURE: 80 MMHG | OXYGEN SATURATION: 98 %

## 2020-09-03 DIAGNOSIS — M54.50 CHRONIC BILATERAL LOW BACK PAIN WITHOUT SCIATICA: ICD-10-CM

## 2020-09-03 DIAGNOSIS — R73.01 IFG (IMPAIRED FASTING GLUCOSE): ICD-10-CM

## 2020-09-03 DIAGNOSIS — C50.919 RECURRENT MALIGNANT NEOPLASM OF BREAST, UNSPECIFIED LATERALITY (HCC): ICD-10-CM

## 2020-09-03 DIAGNOSIS — E78.5 HYPERLIPIDEMIA WITH TARGET LDL LESS THAN 100: ICD-10-CM

## 2020-09-03 DIAGNOSIS — E03.2 HYPOTHYROIDISM FROM RESORCINOL: ICD-10-CM

## 2020-09-03 DIAGNOSIS — T49.4X5A HYPOTHYROIDISM FROM RESORCINOL: ICD-10-CM

## 2020-09-03 DIAGNOSIS — I15.2 HYPERTENSION DUE TO ENDOCRINE DISORDER: ICD-10-CM

## 2020-09-03 DIAGNOSIS — G89.29 CHRONIC BILATERAL LOW BACK PAIN WITHOUT SCIATICA: ICD-10-CM

## 2020-09-03 PROCEDURE — 99214 OFFICE O/P EST MOD 30 MIN: CPT | Performed by: NURSE PRACTITIONER

## 2020-09-03 RX ORDER — LEVOTHYROXINE SODIUM 137 UG/1
137 TABLET ORAL DAILY
Qty: 90 TAB | Refills: 3 | Status: SHIPPED | OUTPATIENT
Start: 2020-09-03 | End: 2021-08-16 | Stop reason: SDUPTHER

## 2020-09-03 RX ORDER — ATORVASTATIN CALCIUM 20 MG/1
TABLET, FILM COATED ORAL
Qty: 135 TAB | Refills: 1 | Status: SHIPPED | OUTPATIENT
Start: 2020-09-03 | End: 2021-09-10 | Stop reason: SDUPTHER

## 2020-09-03 RX ORDER — ANASTROZOLE 1 MG/1
TABLET ORAL
Qty: 100 TAB | Refills: 3 | Status: SHIPPED | OUTPATIENT
Start: 2020-09-03 | End: 2021-09-10 | Stop reason: SDUPTHER

## 2020-09-03 RX ORDER — CYCLOBENZAPRINE HCL 5 MG
5-10 TABLET ORAL 3 TIMES DAILY PRN
Qty: 100 TAB | Refills: 1 | Status: SHIPPED | OUTPATIENT
Start: 2020-09-03

## 2020-09-03 RX ORDER — ONDANSETRON 4 MG/1
4 TABLET, ORALLY DISINTEGRATING ORAL EVERY 6 HOURS PRN
Qty: 10 TAB | Refills: 3 | Status: SHIPPED | OUTPATIENT
Start: 2020-09-03 | End: 2022-01-11

## 2020-09-03 RX ORDER — SPIRONOLACTONE 25 MG/1
25 TABLET ORAL DAILY
Qty: 100 TAB | Refills: 1 | Status: SHIPPED | OUTPATIENT
Start: 2020-09-03 | End: 2020-09-22

## 2020-09-03 RX ORDER — ONDANSETRON 4 MG/1
4 TABLET, ORALLY DISINTEGRATING ORAL EVERY 6 HOURS PRN
COMMUNITY
End: 2020-09-03 | Stop reason: SDUPTHER

## 2020-09-03 RX ORDER — LISINOPRIL 40 MG/1
TABLET ORAL
Qty: 100 TAB | Refills: 1 | Status: SHIPPED | OUTPATIENT
Start: 2020-09-03 | End: 2021-05-22

## 2020-09-03 ASSESSMENT — FIBROSIS 4 INDEX: FIB4 SCORE: 2

## 2020-09-03 NOTE — PROGRESS NOTES
Subjective:     Keyur Burt is a 70 y.o. female who presents with hyperlipidemia.    HPI:   Seen in f/u for hyperlipidemia.  She has been laid off of 2 jobs.  She is just at home now and getting bored.  Not eating well.  Not doing any exercise.    Reviewed lab with pt.  Her GFR is down from 41 to 40.  Has been in the high 30's to 40s for several years.  She does not drink enough water.  LP sows good HDL.  It is up from 54 last time to 66.  LDL is down from 119 to 80.  Goal is <100.  trg are up from 165 to 192.  She is on lipitor with good control.  Taking med approp.  Needs med refilled.  Alb/cr ratio, TSH, T4 is wnl.  She is stable on synthroid.  Taking meds approp.  a1c is up from 5.7 to 5.8.  Not on healthy diet.  Not on meds.    CMP is wnl except glucose elevated at 135 and mildly elevated SGOT at 47. Doesn't drink etoh.  She is still on arimidex for her breast cancer tx'd wiht thony mastectomies.  She needs med refilled.  Not following with oncology any longer.   She continues to have chronic LBP.  She needs refill on her flexeril that she uses for pain control.   She is on lisinopril and aldactone for her bp .  Bp is stable and well controlled on meds.  Needs refill on meds.      Patient Active Problem List    Diagnosis Date Noted   • Obesity (BMI 35.0-39.9 without comorbidity) ([facility]) 02/26/2020   • Chronic post-traumatic headache 03/26/2019   • History of concussion 03/26/2019   • Tinnitus of both ears 03/26/2019   • Ataxia 03/26/2019   • Recurrent breast cancer ([facility]) 08/15/2017   • Chronic use of opiate for therapeutic purpose 07/20/2017   • Chronic bilateral low back pain without sciatica 07/20/2017   • Hematuria 02/20/2015   • Impaired fasting glucose    • CKD (chronic kidney disease), stage III ([facility])    • Fatty liver    • Hyperlipidemia with target LDL less than 100    • Hypertension    • Hypothyroidism from resorcinol    • Thyroid cancer ([facility])    • Tension headache    • Allergy to pollen    •  "Chronic neck pain        Current medicines (including changes today)  Current Outpatient Medications   Medication Sig Dispense Refill   • cyclobenzaprine (FLEXERIL) 5 MG tablet Take 1-2 Tabs by mouth 3 times a day as needed. 100 Tab 1   • ondansetron (ZOFRAN ODT) 4 MG TABLET DISPERSIBLE Take 1 Tab by mouth every 6 hours as needed for Nausea. 10 Tab 3   • spironolactone (ALDACTONE) 25 MG Tab Take 1 Tab by mouth every day. 100 Tab 1   • levothyroxine (SYNTHROID) 137 MCG Tab Take 1 Tab by mouth every day. 90 Tab 3   • lisinopril (PRINIVIL) 40 MG tablet TAKE 1 TABLET BY MOUTH ONCE DAILY 100 Tab 1   • atorvastatin (LIPITOR) 20 MG Tab TAKE ONE TABLET BY MOUTH EVERY 48 HOURS 135 Tab 1   • anastrozole (ARIMIDEX) 1 MG Tab TAKE ONE TABLET BY MOUTH ONE TIME DAILY 100 Tab 3   • Cholecalciferol (VITAMIN D) 2000 UNITS Cap Take  by mouth.       No current facility-administered medications for this visit.        Allergies   Allergen Reactions   • Shellfish Allergy Anaphylaxis   • Iodine      Burning,Swelling   • Pcn [Penicillins]      SOB, Hives    • Sulfa Drugs      Swelling       ROS  Constitutional: Negative. Negative for fever, chills, weight loss, malaise/fatigue and diaphoresis.   HENT: Negative. Negative for hearing loss, ear pain, nosebleeds, congestion, sore throat, neck pain, tinnitus and ear discharge.   Respiratory: Negative. Negative for cough, hemoptysis, sputum production, shortness of breath, wheezing and stridor.   Cardiovascular: Negative. Negative for chest pain, palpitations, orthopnea, claudication, leg swelling and PND.   Gastrointestinal: Denies nausea, vomiting, diarrhea, constipation, heartburn, melena or hematochezia.  Genitourinary: Denies dysuria, hematuria, urinary incontinence, frequency or urgency.        Objective:     /80 (BP Location: Right arm, Patient Position: Sitting)   Pulse 93   Temp 36.2 °C (97.2 °F) (Temporal)   Ht 1.753 m (5' 9\")   Wt 117 kg (258 lb)   SpO2 98%  Body mass index " is 38.1 kg/m².    Physical Exam:  Vitals reviewed.  Constitutional: Oriented to person, place, and time. appears well-developed and well-nourished. No distress.   Cardiovascular: Normal rate, regular rhythm, normal heart sounds and intact distal pulses. Exam reveals no gallop and no friction rub. No murmur heard. No carotid bruits.   Pulmonary/Chest: Effort normal and breath sounds normal. No stridor. No respiratory distress. no wheezes or rales. exhibits no tenderness.   Musculoskeletal: Normal range of motion. exhibits no edema. thony pedal pulses 2+.  Lymphadenopathy: No cervical or supraclavicular adenopathy.   Neurological: Alert and oriented to person, place, and time. exhibits normal muscle tone.  Skin: Skin is warm and dry. No diaphoresis.   Psychiatric: Normal mood and affect. Behavior is normal.      Assessment and Plan:     The following treatment plan was discussed:    1. IFG (impaired fasting glucose)      a1c ok.  not on meds.  enc pt to improve low carb diet and increase exercise.  plan recheck lab 6 mo.  call for lab lsip   2. Chronic bilateral low back pain without sciatica  cyclobenzaprine (FLEXERIL) 5 MG tablet    stable at this time on flexeril.  med refilled.   3. Hypertension due to endocrine disorder  spironolactone (ALDACTONE) 25 MG Tab    lisinopril (PRINIVIL) 40 MG tablet    refill all meds.  bp stable and controlled.  do lab and f/u for review 6 mo   4. Hyperlipidemia with target LDL less than 100  atorvastatin (LIPITOR) 20 MG Tab    stable on lipitor.  LP well controlled.  no s/e to meds.     5. Hypothyroidism from resorcinol  levothyroxine (SYNTHROID) 137 MCG Tab    controlled on meds   6. Recurrent malignant neoplasm of breast, unspecified laterality (HCC)  anastrozole (ARIMIDEX) 1 MG Tab    has had mastectomys.  on arimidex.  med refilled         Followup: Return in about 6 months (around 3/3/2021).

## 2020-12-19 ENCOUNTER — PATIENT OUTREACH (OUTPATIENT)
Dept: HEALTH INFORMATION MANAGEMENT | Facility: OTHER | Age: 70
End: 2020-12-19

## 2020-12-19 NOTE — PROGRESS NOTES
Called patient to schedule overdue mammogram.     Outcome:   Spoke to patient. Informed me she had a BL Total Mastectomy in 2017. Asked her to disregard this phone call.   Verified demographics. Adding to DNC list.    Attempt #1  -aep

## 2021-05-22 DIAGNOSIS — C73 MALIGNANT NEOPLASM OF THYROID GLAND (HCC): ICD-10-CM

## 2021-05-22 DIAGNOSIS — T49.4X5A HYPOTHYROIDISM FROM RESORCINOL: ICD-10-CM

## 2021-05-22 DIAGNOSIS — R73.01 IFG (IMPAIRED FASTING GLUCOSE): ICD-10-CM

## 2021-05-22 DIAGNOSIS — K76.0 FATTY LIVER: ICD-10-CM

## 2021-05-22 DIAGNOSIS — I15.2 HYPERTENSION DUE TO ENDOCRINE DISORDER: ICD-10-CM

## 2021-05-22 DIAGNOSIS — N18.30 STAGE 3 CHRONIC KIDNEY DISEASE, UNSPECIFIED WHETHER STAGE 3A OR 3B CKD: ICD-10-CM

## 2021-05-22 DIAGNOSIS — R73.01 IMPAIRED FASTING GLUCOSE: ICD-10-CM

## 2021-05-22 DIAGNOSIS — E78.5 HYPERLIPIDEMIA WITH TARGET LDL LESS THAN 100: ICD-10-CM

## 2021-05-22 DIAGNOSIS — E03.2 HYPOTHYROIDISM FROM RESORCINOL: ICD-10-CM

## 2021-05-22 RX ORDER — LISINOPRIL 40 MG/1
TABLET ORAL
Qty: 30 TABLET | Refills: 0 | Status: SHIPPED | OUTPATIENT
Start: 2021-05-22 | End: 2021-06-14 | Stop reason: SDUPTHER

## 2021-05-22 NOTE — LETTER
May 24, 2021        Keyur Burt  [address]  [place] [address]        Dear Keyur:    We have received a request from your pharmacy to refill your prescription(s). After careful review of your chart, we have noted you are due for labs and a follow up appointment.  We request you call our office at [phone] at your earliest convenience and make an appointment. I have included a fasting lab order for you.    However, when patients are not followed closely by their physician, potential medication complications may go unadressed. We look forward to scheduling an appointment for you, so that we may provide you with the safest and most complete medical care.        If you have any questions or concerns, please don't hesitate to call.        Sincerely,        RUI Ferris.    Electronically Signed

## 2021-05-24 ENCOUNTER — TELEPHONE (OUTPATIENT)
Dept: SCHEDULING | Facility: IMAGING CENTER | Age: 71
End: 2021-05-24

## 2021-05-26 ENCOUNTER — TELEPHONE (OUTPATIENT)
Dept: MEDICAL GROUP | Facility: PHYSICIAN GROUP | Age: 71
End: 2021-05-26

## 2021-05-26 NOTE — PROGRESS NOTES
Future Appointments       Provider Department Center    5/27/2021 10:20 AM Aidee Carpenter D.O. [facility] [place]           NEW PATIENT VISIT PRE-VISIT PLANNING    1.  EpicCare Patient is checked in Patient Demographics? YES    2.  Immunizations were updated in Epic using WebIZ?: Epic matches WebIZ       •  Web Iz Recommendations: PNEUMOVAX (PPSV23), TDAP and SHINGRIX (Shingles)    3.  Patient is due for the following Health Maintenance Topics:   Health Maintenance Due   Topic Date Due   • HEPATITIS C SCREENING  Never done   • COVID-19 Vaccine (1) Never done   • IMM DTaP/Tdap/Td Vaccine (1 - Tdap) Never done   • IMM ZOSTER VACCINES (1 of 2) Never done   • COLON CANCER SCREENING ANNUAL FIT  01/16/2016   • IMM PNEUMOCOCCAL VACCINE: 65+ Years (1 of 1 - PPSV23) Never done   • URINE DRUG SCREEN  02/20/2021   • BONE DENSITY  05/20/2021     4.  Reviewed/Updated the following with patient:       •   Preferred Pharmacy? YES       •   Preferred Lab? YES       •   Medications? YES. Was Abstract Encounter opened and chart updated? YES       •   Social History? NO       •   Family History? NO    5.  Updated Care Team?       •   DME Company (gait device, O2, CPAP, etc.) N\A       •   Other Specialists (eye doctor, derm, GYN, cardiology, endo, etc): N\A    6. The following was sent to pt.:       A)  New Patient Packet & Health Questionnaire  mailed / emailed to patient?  no    7.  Patient was informed to arrive 15 min prior to their scheduled appointment and bring in their medication bottles? YES    8.Other medical issues to discuss with the provider during the visit: N/A

## 2021-05-26 NOTE — TELEPHONE ENCOUNTER
NEW PATIENT VISIT PRE-VISIT PLANNING    1.  E[employer] Patient is checked in Patient Demographics?Yes    2.  Immunizations were updated in Epic using Reconcile Outside Information activity? Yes         3.  Is this appointment scheduled as a Hospital Follow-Up? No    4.  Patient is due for the following Health Maintenance Topics:   Health Maintenance Due   Topic Date Due   • HEPATITIS C SCREENING  Never done   • COVID-19 Vaccine (1) Never done   • IMM DTaP/Tdap/Td Vaccine (1 - Tdap) Never done   • IMM ZOSTER VACCINES (1 of 2) Never done   • COLON CANCER SCREENING ANNUAL FIT  01/16/2016   • IMM PNEUMOCOCCAL VACCINE: 65+ Years (1 of 1 - PPSV23) Never done   • URINE DRUG SCREEN  02/20/2021   • BONE DENSITY  05/20/2021     5.  Reviewed/Updated the following with patient:       •   Preferred Pharmacy? Yes       •   Preferred Lab? Yes       •   Preferred Communication? Yes       •   Allergies? Yes       •   Medications? YES. Was Abstract Encounter opened and chart updated? YES       •   Social History? No       •   Family History (document living status of immediate family members and if + hx of  cancer, diabetes, hypertension, hyperlipidemia, heart attack, stroke) No    6.  Updated Care Team?       •   DME Company (gait device, O2, CPAP, etc.) N\A       •   Other Specialists (eye doctor, derm, GYN, cardiology, endo, etc): NO    7.  AHA (Puls8) form printed for Provider? Email sent to [facility] requesting form

## 2021-05-27 ENCOUNTER — OFFICE VISIT (OUTPATIENT)
Dept: MEDICAL GROUP | Facility: PHYSICIAN GROUP | Age: 71
End: 2021-05-27
Payer: MEDICARE

## 2021-05-27 VITALS
HEIGHT: 67 IN | DIASTOLIC BLOOD PRESSURE: 70 MMHG | OXYGEN SATURATION: 94 % | SYSTOLIC BLOOD PRESSURE: 110 MMHG | WEIGHT: 246.7 LBS | TEMPERATURE: 97.6 F | HEART RATE: 119 BPM | RESPIRATION RATE: 16 BRPM | BODY MASS INDEX: 38.72 KG/M2

## 2021-05-27 DIAGNOSIS — C50.912 RECURRENT MALIGNANT NEOPLASM OF LEFT BREAST (HCC): ICD-10-CM

## 2021-05-27 DIAGNOSIS — T49.4X5A HYPOTHYROIDISM FROM RESORCINOL: ICD-10-CM

## 2021-05-27 DIAGNOSIS — E78.2 MIXED HYPERLIPIDEMIA: ICD-10-CM

## 2021-05-27 DIAGNOSIS — Z11.59 ENCOUNTER FOR HEPATITIS C SCREENING TEST FOR LOW RISK PATIENT: ICD-10-CM

## 2021-05-27 DIAGNOSIS — N18.32 STAGE 3B CHRONIC KIDNEY DISEASE: ICD-10-CM

## 2021-05-27 DIAGNOSIS — E78.5 HYPERLIPIDEMIA WITH TARGET LDL LESS THAN 100: ICD-10-CM

## 2021-05-27 DIAGNOSIS — R00.0 TACHYCARDIA: ICD-10-CM

## 2021-05-27 DIAGNOSIS — Z78.0 MENOPAUSE: ICD-10-CM

## 2021-05-27 DIAGNOSIS — I10 ESSENTIAL HYPERTENSION: ICD-10-CM

## 2021-05-27 DIAGNOSIS — R73.03 PREDIABETES: ICD-10-CM

## 2021-05-27 DIAGNOSIS — E53.8 B12 DEFICIENCY: ICD-10-CM

## 2021-05-27 DIAGNOSIS — E55.9 VITAMIN D DEFICIENCY: ICD-10-CM

## 2021-05-27 DIAGNOSIS — I70.0 AORTIC ATHEROSCLEROSIS (HCC): ICD-10-CM

## 2021-05-27 DIAGNOSIS — E03.2 HYPOTHYROIDISM FROM RESORCINOL: ICD-10-CM

## 2021-05-27 DIAGNOSIS — Z85.850 HISTORY OF THYROID CANCER: ICD-10-CM

## 2021-05-27 PROBLEM — Z79.891 CHRONIC USE OF OPIATE FOR THERAPEUTIC PURPOSE: Status: RESOLVED | Noted: 2017-07-20 | Resolved: 2021-05-27

## 2021-05-27 PROCEDURE — 99214 OFFICE O/P EST MOD 30 MIN: CPT | Performed by: INTERNAL MEDICINE

## 2021-05-27 RX ORDER — UBIDECARENONE 75 MG
100 CAPSULE ORAL DAILY
COMMUNITY

## 2021-05-27 ASSESSMENT — ANXIETY QUESTIONNAIRES
GAD7 TOTAL SCORE: 1
2. NOT BEING ABLE TO STOP OR CONTROL WORRYING: NOT AT ALL
7. FEELING AFRAID AS IF SOMETHING AWFUL MIGHT HAPPEN: NOT AT ALL
6. BECOMING EASILY ANNOYED OR IRRITABLE: NOT AT ALL
1. FEELING NERVOUS, ANXIOUS, OR ON EDGE: SEVERAL DAYS
3. WORRYING TOO MUCH ABOUT DIFFERENT THINGS: NOT AT ALL
5. BEING SO RESTLESS THAT IT IS HARD TO SIT STILL: NOT AT ALL
IF YOU CHECKED OFF ANY PROBLEMS ON THIS QUESTIONNAIRE, HOW DIFFICULT HAVE THESE PROBLEMS MADE IT FOR YOU TO DO YOUR WORK, TAKE CARE OF THINGS AT HOME, OR GET ALONG WITH OTHER PEOPLE: SOMEWHAT DIFFICULT
4. TROUBLE RELAXING: NOT AT ALL

## 2021-05-27 ASSESSMENT — PATIENT HEALTH QUESTIONNAIRE - PHQ9
CLINICAL INTERPRETATION OF PHQ2 SCORE: 1
SUM OF ALL RESPONSES TO PHQ QUESTIONS 1-9: 3
5. POOR APPETITE OR OVEREATING: 1 - SEVERAL DAYS

## 2021-05-27 NOTE — PROGRESS NOTES
Subjective:     CC:  Establish care    HISTORY OF THE PRESENT ILLNESS: Keyur Burt is a 70 y.o. female here today to establish primary medical care and discuss the below stated chronic medical conditions. Keyur is unaccompanied for today's visit.     Problem   Tachycardia    She is tachycardic in clinic upon her first encounter.  Her heart rate is ranging from 104-125 when she is ambulating.  She is admittedly nervous and not feeling well with associated joint pain.  She has had some palpitations and wonders if the fast heart rate is why she is not feeling well.  EKG shows sinus tach at 100, borderline LAD, abnormal Rwave progression with late transition, and borderline repolarization abnormality. She reports cardiac testing at the time of her treatment for breast cancer.     Aortic Atherosclerosis (Hcc)    CT Chest (7/2017): Atherosclerotic vascular calcifications are identified in the aorta    Noted incidentally on imaging.  She is on atorvastatin.     Recurrent Breast Cancer (Hcc)    1. Stage II A left breast, ER positive, NM negative, HER-2/geoffrey positive invasive ductal carcinoma, grade 3  ( 2015)  2. Stage I (T1c,N0) ER/NM negative, HER-2/geoffrey equivocal  carcinoma of the left breast    Keyur was diagnosed with stage II a HER-2/geoffrey positive breast carcinoma, which was found in her screening mammogram back in June 2015. She underwent  wire localization lumpectomy and sentinel lymph node biopsy on 9/15/2015 at [facility].      According to available records, the pathology showed on 0.3 cm, ER positive, NM negative, HER-2/geoffrey positive (by FISH) invasive carcinoma with one out of 3 lymph nodes positive. She was seen by Dr. Camarena and was started on adjuvant [facility] chemotherapy. After the first dose, she developed severe adverse effects, and she declined further chemotherapy. She was started on anastrozole and Herceptin was continued for a total of one year. According to the patient, she was not referred for  radiation initially due to unclear reason and finally was referred for breast radiation, which was completed in March 2016.     Screening mammogram from April 2017 showed breast asymmetry. Follow-up ultrasound and biopsy was consistent with grade 3 invasive ductal carcinoma ER/OH negative, HER-2/geoffrey equivocal(by IHC and FISH - CAP17 ratio 1.8 , Her2 count 4.6).     She underwent bilateral mastectomy which showed a 1.9 cm grade 3 invasive ductal carcinoma. Axillary dissection showed 10 benign lymph nodes    She has been taking anastrozole 1 mg daily since 2017, unclear when the optimal stop date would be.  She does not have any recent bone density testing.     Prediabetes       Ref. Range 8/26/2020 11:15   Glycohemoglobin Latest Ref Range: 0.0 - 5.6 % 5.8 (H)   Estim. Avg Glu Latest Units: mg/dL 120     She has history of prediabetes with highest A1c at 6.3 in 2013.  She is not taking any glucose lowering medications at this time.     CKD (chronic kidney disease), stage III (HCC)       Ref. Range 8/26/2020 11:15   Bun Latest Ref Range: 8 - 22 mg/dL 15   Creatinine Latest Ref Range: 0.50 - 1.40 mg/dL 1.31   GFR If Non  Latest Ref Range: >60 mL/min/1.73 m 2 40 (A)       Present since at least 2013, down to the mid 30's. She has a history of breast cancer with chemotherapy.  Not see any secondary work-up.  She reports history of urinary tract infections but no known pyelonephritis.  She is using anti-inflammatories intermittently over the years.  She also reports a history of hematuria after taking a statin years ago.     Mixed Hyperlipidemia       Ref. Range 8/26/2020 11:15   Cholesterol,Tot Latest Ref Range: 100 - 199 mg/dL 184   Triglycerides Latest Ref Range: 0 - 149 mg/dL 192 (H)   HDL Latest Ref Range: >=40 mg/dL 66   LDL Latest Ref Range: <100 mg/dL 80       He has history of elevated cholesterol and has been taking statins for a number of years.  She did have negative reaction in the past causing  hematuria.  Current atorvastatin does not seem to be causing any issues.    Current regimen: Atorvastatin 20 mg daily     Hypertension    She has history of hypertension started on medications in 2016.  She is currently taking lisinopril and spironolactone.  She has tachycardia and palpitations, no known chest pain.    Blood pressure in clinic ranging 110-120/70-80    Current regimen: Lisinopril 40 mg daily and spironolactone 25 mg daily     Hypothyroidism From Resorcinol       Ref. Range 8/26/2020 11:15   TSH Latest Ref Range: 0.380 - 5.330 uIU/mL 3.860   Free T-4 Latest Ref Range: 0.93 - 1.70 ng/dL 1.48       He reports history of thyroid cancer treated with thyroidectomy in 2006.  Unclear what subtype.  She has been on replacement since that time.  Reports her levothyroxine dosage has been stable.  She reports increased tremulousness, palpitations, and tachycardia worsening over the recent months.    Current regimen: Levothyroxine 137 mcg daily           History of Thyroid Cancer       Ref. Range 8/26/2020 11:15   TSH Latest Ref Range: 0.380 - 5.330 uIU/mL 3.860   Free T-4 Latest Ref Range: 0.93 - 1.70 ng/dL 1.48       He reports history of thyroid cancer treated with thyroidectomy in 2006.  Unclear what subtype.  She has been on replacement since that time.  Reports her levothyroxine dosage has been stable.    Current regimen: Levothyroxine 137 mcg daily             Current Medications:  Current Outpatient Medications Ordered in Epic   Medication Sig Dispense Refill   • cyanocobalamin (VITAMIN B-12) 100 MCG Tab Take 100 mcg by mouth every day.     • lisinopril (PRINIVIL) 40 MG tablet Take 1 tablet by mouth once daily 30 tablet 0   • spironolactone (ALDACTONE) 25 MG Tab Take 1 tablet by mouth once daily 100 Tab 1   • cyclobenzaprine (FLEXERIL) 5 MG tablet Take 1-2 Tabs by mouth 3 times a day as needed. 100 Tab 1   • ondansetron (ZOFRAN ODT) 4 MG TABLET DISPERSIBLE Take 1 Tab by mouth every 6 hours as needed for  "Nausea. 10 Tab 3   • levothyroxine (SYNTHROID) 137 MCG Tab Take 1 Tab by mouth every day. 90 Tab 3   • atorvastatin (LIPITOR) 20 MG Tab TAKE ONE TABLET BY MOUTH EVERY 48 HOURS 135 Tab 1   • anastrozole (ARIMIDEX) 1 MG Tab TAKE ONE TABLET BY MOUTH ONE TIME DAILY 100 Tab 3   • Cholecalciferol (VITAMIN D) 2000 UNITS Cap Take  by mouth.       No current Epic-ordered facility-administered medications on file.       PMH, PSH, Social History, Medications, Allergies, FMH updated and reviewed as documented:    Objective:   Physical Exam:    Vitals: /70 (BP Location: Right arm, Patient Position: Sitting, BP Cuff Size: Adult)   Pulse (!) 119   Temp 36.4 °C (97.6 °F) (Temporal)   Resp 16   Ht 1.689 m (5' 6.5\")   Wt 112 kg (246 lb 11.2 oz)   SpO2 94%    BMI: Body mass index is 39.22 kg/m².  Physical Exam  Constitutional:       Comments: Mildly anxious, improved during encounter   HENT:      Right Ear: Tympanic membrane normal. There is no impacted cerumen.      Left Ear: Tympanic membrane normal. There is no impacted cerumen.   Eyes:      General: No scleral icterus.     Conjunctiva/sclera: Conjunctivae normal.      Pupils: Pupils are equal, round, and reactive to light.   Cardiovascular:      Rate and Rhythm: Regular rhythm. Tachycardia present.      Pulses: Normal pulses.   Pulmonary:      Effort: Pulmonary effort is normal. No respiratory distress.      Breath sounds: Normal breath sounds. No wheezing.   Musculoskeletal:         General: Tenderness present.      Comments: Bilateral lower extremity lymphedema   Skin:     General: Skin is warm and dry.      Findings: No rash.   Psychiatric:         Behavior: Behavior normal.         Thought Content: Thought content normal.         Judgment: Judgment normal.      Comments: Frustrated recounting medical care complications.          Assessment & Plan:   Keyur is a 70 y.o. female with the following:  Problem List Items Addressed This Visit     Mixed hyperlipidemia     " Chronic ongoing problem.  Will update lipid panel and adjust her statin as indicated.  To meantime for him to continue atorvastatin 20 mg daily.  Due to her feeling unwell and history of statin reaction in the past we will check CK level.         Hypertension    Relevant Orders    CBC WITH DIFFERENTIAL    Comp Metabolic Panel    Hypothyroidism from resorcinol     Chronic and ongoing problem.  We need updated lab levels as she is complaining of some symptoms that would be consistent with overtreated thyroid function.  She will get her labs drawn tomorrow and follow-up with me next week to make dose adjustments if indicated.         Relevant Orders    CBC WITH DIFFERENTIAL    Comp Metabolic Panel    TSH WITH REFLEX TO FT4    History of thyroid cancer     Previous problem, no signs of recurrence.  She was treated with total thyroidectomy and is on levothyroxine.         Prediabetes     Chronic and ongoing problem.  Will update A1c to ensure stability.  Medication and start pharmacotherapy at this time.         Relevant Orders    HEMOGLOBIN A1C    CKD (chronic kidney disease), stage III (HCC)     Chronic and ongoing issue.  Unclear if the etiology is ever been evaluated.  She is on some nephrotoxic medications including spironolactone and lisinopril.  Will perform secondary valuation and check urinalysis, urine microalbumin, SPEP, and parathyroid hormone as well as updating kidney function for additional information.         Relevant Orders    CBC WITH DIFFERENTIAL    Comp Metabolic Panel    PTH INTACT (PTH ONLY)    SPEP W/REFLEX TO WESLY, A, G, M    URINALYSIS,CULTURE IF INDICATED    MICROALBUMIN CREAT RATIO URINE    CREATINE KINASE    Recurrent breast cancer (HCC)     Previous problem, she had a complicated course with breast cancer diagnosis with either suboptimally treated cancer or recurrence of cancer.  She is status post bilateral mastectomy and is on anastrozole.  Unclear what the optimal timing would be for the  anastrozole especially as the recurrent breast cancer was triple negative.  She does have chronic pain in her joints.  Will see if she would want to meet with oncology again to determine best course of action regarding her aromatase inhibitor.  She will follow-up next week.  Update bone density.         Tachycardia     New problem, unclear how long it has been present. She reports feeling unwell for months, no recent evaluation or lab work. Will update everything and have her return on Tuesday to adjust medications and perform additional evaluation. ER precautions given and she voiced understanding. May be related to pain, anxiety, underlying arrhythmia, over treatment with levothyroxine, or other.         Relevant Orders    EKG - Clinic Performed    Aortic atherosclerosis (HCC)     New problem.  Noted incidentally on imaging.  Continue atorvastatin 20 mg daily.           Other Visit Diagnoses     Menopause        Relevant Orders    DS-BONE DENSITY STUDY (DEXA)    Encounter for hepatitis C screening test for low risk patient        Relevant Orders    HEP C VIRUS ANTIBODY    B12 deficiency        Relevant Orders    VITAMIN B12    Vitamin D deficiency        Relevant Orders    VITAMIN D,25 HYDROXY             Annual Health Assessment Questions:    1.  Are you currently engaging in any exercise or physical activity? No  Pain  2.  How would you describe your mood or emotional well-being today? scared     3.  Have you had any falls in the last year? No    4.  Have you noticed any problems with your balance or had difficulty walking? Yes  Back pain knee right pain    5.  In the last six months have you experienced any leakage of urine? Yes    6. DPA/Advanced Directive: Patient does not have an Advanced Directive.  A packet and workshop information was given on Advanced Directives.       RTC: Return in about 1 week (around 6/3/2021).    I spent a total of 55 minutes with record review, exam, communication with the patient,  communication with other providers, and documentation of this encounter.    PLEASE NOTE: This dictation was created using voice recognition software. I have made every reasonable attempt to correct obvious errors, but I expect that there are errors of grammar and possibly content that I did not discover before finalizing the note.      Aidee Carpenter, DO  Geriatric and Internal Medicine  [facility]

## 2021-05-28 ENCOUNTER — HOSPITAL ENCOUNTER (OUTPATIENT)
Dept: LAB | Facility: MEDICAL CENTER | Age: 71
End: 2021-05-28
Attending: INTERNAL MEDICINE
Payer: MEDICARE

## 2021-05-28 DIAGNOSIS — E55.9 VITAMIN D DEFICIENCY: ICD-10-CM

## 2021-05-28 DIAGNOSIS — R73.03 PREDIABETES: ICD-10-CM

## 2021-05-28 DIAGNOSIS — E03.2 HYPOTHYROIDISM FROM RESORCINOL: ICD-10-CM

## 2021-05-28 DIAGNOSIS — E53.8 B12 DEFICIENCY: ICD-10-CM

## 2021-05-28 DIAGNOSIS — Z11.59 ENCOUNTER FOR HEPATITIS C SCREENING TEST FOR LOW RISK PATIENT: ICD-10-CM

## 2021-05-28 DIAGNOSIS — I10 ESSENTIAL HYPERTENSION: ICD-10-CM

## 2021-05-28 DIAGNOSIS — E78.5 HYPERLIPIDEMIA WITH TARGET LDL LESS THAN 100: ICD-10-CM

## 2021-05-28 DIAGNOSIS — T49.4X5A HYPOTHYROIDISM FROM RESORCINOL: ICD-10-CM

## 2021-05-28 DIAGNOSIS — N18.32 STAGE 3B CHRONIC KIDNEY DISEASE: ICD-10-CM

## 2021-05-28 LAB
ALBUMIN SERPL BCP-MCNC: 4.8 G/DL (ref 3.2–4.9)
ALBUMIN/GLOB SERPL: 1.6 G/DL
ALP SERPL-CCNC: 94 U/L (ref 30–99)
ALT SERPL-CCNC: 31 U/L (ref 2–50)
ANION GAP SERPL CALC-SCNC: 16 MMOL/L (ref 7–16)
APPEARANCE UR: CLEAR
AST SERPL-CCNC: 37 U/L (ref 12–45)
BASOPHILS # BLD AUTO: 0.7 % (ref 0–1.8)
BASOPHILS # BLD: 0.05 K/UL (ref 0–0.12)
BILIRUB SERPL-MCNC: 0.8 MG/DL (ref 0.1–1.5)
BILIRUB UR QL STRIP.AUTO: NEGATIVE
BUN SERPL-MCNC: 20 MG/DL (ref 8–22)
CALCIUM SERPL-MCNC: 9.7 MG/DL (ref 8.4–10.2)
CHLORIDE SERPL-SCNC: 99 MMOL/L (ref 96–112)
CHOLEST SERPL-MCNC: 176 MG/DL (ref 100–199)
CK SERPL-CCNC: 95 U/L (ref 0–154)
CO2 SERPL-SCNC: 22 MMOL/L (ref 20–33)
COLOR UR: YELLOW
CREAT SERPL-MCNC: 1.51 MG/DL (ref 0.5–1.4)
CREAT UR-MCNC: 275.89 MG/DL
EOSINOPHIL # BLD AUTO: 0.07 K/UL (ref 0–0.51)
EOSINOPHIL NFR BLD: 1 % (ref 0–6.9)
ERYTHROCYTE [DISTWIDTH] IN BLOOD BY AUTOMATED COUNT: 44.3 FL (ref 35.9–50)
EST. AVERAGE GLUCOSE BLD GHB EST-MCNC: 126 MG/DL
GLOBULIN SER CALC-MCNC: 3 G/DL (ref 1.9–3.5)
GLUCOSE SERPL-MCNC: 160 MG/DL (ref 65–99)
GLUCOSE UR STRIP.AUTO-MCNC: NEGATIVE MG/DL
HBA1C MFR BLD: 6 % (ref 4–5.6)
HCT VFR BLD AUTO: 48.6 % (ref 37–47)
HCV AB SER QL: NORMAL
HDLC SERPL-MCNC: 57 MG/DL
HGB BLD-MCNC: 16.1 G/DL (ref 12–16)
IMM GRANULOCYTES # BLD AUTO: 0.03 K/UL (ref 0–0.11)
IMM GRANULOCYTES NFR BLD AUTO: 0.4 % (ref 0–0.9)
KETONES UR STRIP.AUTO-MCNC: NEGATIVE MG/DL
LDLC SERPL CALC-MCNC: 85 MG/DL
LEUKOCYTE ESTERASE UR QL STRIP.AUTO: NEGATIVE
LYMPHOCYTES # BLD AUTO: 1.97 K/UL (ref 1–4.8)
LYMPHOCYTES NFR BLD: 26.8 % (ref 22–41)
MCH RBC QN AUTO: 31.1 PG (ref 27–33)
MCHC RBC AUTO-ENTMCNC: 33.1 G/DL (ref 33.6–35)
MCV RBC AUTO: 94 FL (ref 81.4–97.8)
MICRO URNS: NORMAL
MICROALBUMIN UR-MCNC: <1.2 MG/DL
MICROALBUMIN/CREAT UR: NORMAL MG/G (ref 0–30)
MONOCYTES # BLD AUTO: 0.51 K/UL (ref 0–0.85)
MONOCYTES NFR BLD AUTO: 6.9 % (ref 0–13.4)
NEUTROPHILS # BLD AUTO: 4.71 K/UL (ref 2–7.15)
NEUTROPHILS NFR BLD: 64.2 % (ref 44–72)
NITRITE UR QL STRIP.AUTO: NEGATIVE
NRBC # BLD AUTO: 0 K/UL
NRBC BLD-RTO: 0 /100 WBC
PH UR STRIP.AUTO: 5.5 [PH] (ref 5–8)
PLATELET # BLD AUTO: 300 K/UL (ref 164–446)
PMV BLD AUTO: 9.2 FL (ref 9–12.9)
POTASSIUM SERPL-SCNC: 4.3 MMOL/L (ref 3.6–5.5)
PROT SERPL-MCNC: 7.8 G/DL (ref 6–8.2)
PROT UR QL STRIP: NEGATIVE MG/DL
PTH-INTACT SERPL-MCNC: 42.9 PG/ML (ref 14–72)
RBC # BLD AUTO: 5.17 M/UL (ref 4.2–5.4)
RBC UR QL AUTO: NEGATIVE
SODIUM SERPL-SCNC: 137 MMOL/L (ref 135–145)
SP GR UR STRIP.AUTO: >=1.03
TRIGL SERPL-MCNC: 170 MG/DL (ref 0–149)
TSH SERPL DL<=0.005 MIU/L-ACNC: 3.51 UIU/ML (ref 0.38–5.33)
VIT B12 SERPL-MCNC: 496 PG/ML (ref 211–911)
WBC # BLD AUTO: 7.3 K/UL (ref 4.8–10.8)

## 2021-05-28 PROCEDURE — 86803 HEPATITIS C AB TEST: CPT

## 2021-05-28 PROCEDURE — 36415 COLL VENOUS BLD VENIPUNCTURE: CPT

## 2021-05-28 PROCEDURE — 84443 ASSAY THYROID STIM HORMONE: CPT

## 2021-05-28 PROCEDURE — 82607 VITAMIN B-12: CPT

## 2021-05-28 PROCEDURE — 83036 HEMOGLOBIN GLYCOSYLATED A1C: CPT

## 2021-05-28 PROCEDURE — 84165 PROTEIN E-PHORESIS SERUM: CPT

## 2021-05-28 PROCEDURE — 82550 ASSAY OF CK (CPK): CPT

## 2021-05-28 PROCEDURE — 83970 ASSAY OF PARATHORMONE: CPT

## 2021-05-28 PROCEDURE — 84155 ASSAY OF PROTEIN SERUM: CPT | Mod: XU

## 2021-05-28 PROCEDURE — 82570 ASSAY OF URINE CREATININE: CPT

## 2021-05-28 PROCEDURE — 82043 UR ALBUMIN QUANTITATIVE: CPT

## 2021-05-28 PROCEDURE — 82306 VITAMIN D 25 HYDROXY: CPT

## 2021-05-28 PROCEDURE — 81003 URINALYSIS AUTO W/O SCOPE: CPT

## 2021-05-28 PROCEDURE — 80053 COMPREHEN METABOLIC PANEL: CPT

## 2021-05-28 PROCEDURE — 80061 LIPID PANEL: CPT

## 2021-05-28 PROCEDURE — 85025 COMPLETE CBC W/AUTO DIFF WBC: CPT

## 2021-05-28 NOTE — ASSESSMENT & PLAN NOTE
Chronic and ongoing issue.  Unclear if the etiology is ever been evaluated.  She is on some nephrotoxic medications including spironolactone and lisinopril.  Will perform secondary valuation and check urinalysis, urine microalbumin, SPEP, and parathyroid hormone as well as updating kidney function for additional information.

## 2021-05-28 NOTE — ASSESSMENT & PLAN NOTE
Previous problem, she had a complicated course with breast cancer diagnosis with either suboptimally treated cancer or recurrence of cancer.  She is status post bilateral mastectomy and is on anastrozole.  Unclear what the optimal timing would be for the anastrozole especially as the recurrent breast cancer was triple negative.  She does have chronic pain in her joints.  Will see if she would want to meet with oncology again to determine best course of action regarding her aromatase inhibitor.  She will follow-up next week.  Update bone density.

## 2021-05-28 NOTE — ASSESSMENT & PLAN NOTE
Chronic and ongoing problem.  Will update A1c to ensure stability.  Medication and start pharmacotherapy at this time.

## 2021-05-28 NOTE — ASSESSMENT & PLAN NOTE
New problem, unclear how long it has been present. She reports feeling unwell for months, no recent evaluation or lab work. Will update everything and have her return on Tuesday to adjust medications and perform additional evaluation. ER precautions given and she voiced understanding. May be related to pain, anxiety, underlying arrhythmia, over treatment with levothyroxine, or other.

## 2021-05-28 NOTE — ASSESSMENT & PLAN NOTE
Chronic and ongoing problem.  We need updated lab levels as she is complaining of some symptoms that would be consistent with overtreated thyroid function.  She will get her labs drawn tomorrow and follow-up with me next week to make dose adjustments if indicated.

## 2021-05-28 NOTE — ASSESSMENT & PLAN NOTE
Chronic ongoing problem.  Will update lipid panel and adjust her statin as indicated.  To meantime for him to continue atorvastatin 20 mg daily.  Due to her feeling unwell and history of statin reaction in the past we will check CK level.

## 2021-05-31 LAB — 25(OH)D3 SERPL-MCNC: 37 NG/ML (ref 30–80)

## 2021-06-01 ENCOUNTER — OFFICE VISIT (OUTPATIENT)
Dept: MEDICAL GROUP | Facility: PHYSICIAN GROUP | Age: 71
End: 2021-06-01
Payer: MEDICARE

## 2021-06-01 VITALS
OXYGEN SATURATION: 95 % | WEIGHT: 247.5 LBS | HEART RATE: 116 BPM | BODY MASS INDEX: 38.84 KG/M2 | TEMPERATURE: 95.9 F | DIASTOLIC BLOOD PRESSURE: 66 MMHG | SYSTOLIC BLOOD PRESSURE: 140 MMHG | HEIGHT: 67 IN | RESPIRATION RATE: 12 BRPM

## 2021-06-01 DIAGNOSIS — R07.9 CHEST PAIN, UNSPECIFIED TYPE: ICD-10-CM

## 2021-06-01 DIAGNOSIS — C50.912 RECURRENT MALIGNANT NEOPLASM OF LEFT BREAST (HCC): ICD-10-CM

## 2021-06-01 DIAGNOSIS — R00.2 PALPITATION: ICD-10-CM

## 2021-06-01 DIAGNOSIS — N18.32 STAGE 3B CHRONIC KIDNEY DISEASE: ICD-10-CM

## 2021-06-01 DIAGNOSIS — Z85.850 HISTORY OF THYROID CANCER: ICD-10-CM

## 2021-06-01 PROCEDURE — 99215 OFFICE O/P EST HI 40 MIN: CPT | Performed by: INTERNAL MEDICINE

## 2021-06-01 RX ORDER — METOPROLOL SUCCINATE 25 MG/1
25 TABLET, EXTENDED RELEASE ORAL DAILY
Qty: 100 TABLET | Refills: 1 | Status: SHIPPED | OUTPATIENT
Start: 2021-06-01 | End: 2021-11-29 | Stop reason: SDUPTHER

## 2021-06-01 ASSESSMENT — FIBROSIS 4 INDEX: FIB4 SCORE: 1.53

## 2021-06-01 NOTE — PROGRESS NOTES
Subjective:   Chief Complaint/History of Present Illness:  Keyur Burt is a 70 y.o. female established patient who presents today to discuss medical problems as listed below. Keyur is unaccompanied for today's visit.    Problem   Chest Pain    She reports chest palpitations, pain, and pressure. It does not radiate. No recent evaluation of cardiac status, history of chemotherapy and radiation due to breast cancer and surgery due to thyroid cancer. She reports family history of CAD and HLD in her father and brother. She reports personal history of HLD, improved on pharmacotherapy. She has chronic pain from arthritis issues and is not able to exercise regularly.     Palpitation    She is tachycardic in clinic for our first 2 encounters. Her heart rate is ranging from 104-125 when she is ambulating.  She is admittedly nervous and not feeling well with associated joint pain.  She has had some palpitations and wonders if the fast heart rate is why she is not feeling well.  EKG shows sinus tach at 100, borderline LAD, abnormal Rwave progression with late transition, and borderline repolarization abnormality. She reports cardiac testing at the time of her treatment for breast cancer. Lab testing negative for anemia or hyperthyroidism.     Recurrent Breast Cancer (Hcc)    1. Stage II A left breast, ER positive, HI negative, HER-2/geoffrey positive invasive ductal carcinoma, grade 3  ( 2015)  2. Stage I (T1c,N0) ER/HI negative, HER-2/geoffrey equivocal  carcinoma of the left breast    Keyur was diagnosed with stage II a HER-2/geoffrey positive breast carcinoma, which was found in her screening mammogram back in June 2015. She underwent  wire localization lumpectomy and sentinel lymph node biopsy on 9/15/2015 at [facility].      According to available records, the pathology showed on 0.3 cm, ER positive, HI negative, HER-2/geoffrey positive (by FISH) invasive carcinoma with one out of 3 lymph nodes positive. She was seen by   Nicol and was started on adjuvant [facility] chemotherapy. After the first dose, she developed severe adverse effects, and she declined further chemotherapy. She was started on anastrozole and Herceptin was continued for a total of one year. According to the patient, she was not referred for radiation initially due to unclear reason and finally was referred for breast radiation, which was completed in March 2016.     Screening mammogram from April 2017 showed breast asymmetry. Follow-up ultrasound and biopsy was consistent with grade 3 invasive ductal carcinoma ER/[place] negative, HER-2/geoffrey equivocal(by IHC and FISH - CAP17 ratio 1.8 , Her2 count 4.6).     She underwent bilateral mastectomy which showed a 1.9 cm grade 3 invasive ductal carcinoma. Axillary dissection showed 10 benign lymph nodes    She has been taking anastrozole 1 mg daily since 2017, unclear when the optimal stop date would be.  She does not have any recent bone density testing.     Ckd (Chronic Kidney Disease), Stage III (Hcc)       Ref. Range 5/28/2021 10:28   Bun Latest Ref Range: 8 - 22 mg/dL 20   Creatinine Latest Ref Range: 0.50 - 1.40 mg/dL 1.51 (H)   GFR If Non  Latest Ref Range: >60 mL/min/1.73 m 2 34 (A)     Present since at least 2013, down to the mid 30's. She has a history of breast cancer with chemotherapy.  She reports history of urinary tract infections but no known pyelonephritis.  She is using anti-inflammatories intermittently over the years.  She also reports a history of hematuria after taking a statin years ago.     Urinalysis, CK total, and PTH all reassuring, SPEP still pending.     History of Thyroid Cancer       Ref. Range 8/26/2020 11:15   TSH Latest Ref Range: 0.380 - 5.330 uIU/mL 3.860   Free T-4 Latest Ref Range: 0.93 - 1.70 ng/dL 1.48       He reports history of thyroid cancer treated with thyroidectomy in 2006.  Unclear what subtype.  She has been on replacement since that time.  Reports her levothyroxine dosage  "has been stable.    Current regimen: Levothyroxine 137 mcg daily              Current Medications:  Current Outpatient Medications Ordered in Epic   Medication Sig Dispense Refill   • metoprolol SR (TOPROL XL) 25 MG TABLET SR 24 HR Take 1 tablet by mouth every day. 100 tablet 1   • cyanocobalamin (VITAMIN B-12) 100 MCG Tab Take 100 mcg by mouth every day.     • lisinopril (PRINIVIL) 40 MG tablet Take 1 tablet by mouth once daily 30 tablet 0   • spironolactone (ALDACTONE) 25 MG Tab Take 1 tablet by mouth once daily 100 Tab 1   • cyclobenzaprine (FLEXERIL) 5 MG tablet Take 1-2 Tabs by mouth 3 times a day as needed. 100 Tab 1   • ondansetron (ZOFRAN ODT) 4 MG TABLET DISPERSIBLE Take 1 Tab by mouth every 6 hours as needed for Nausea. 10 Tab 3   • levothyroxine (SYNTHROID) 137 MCG Tab Take 1 Tab by mouth every day. 90 Tab 3   • atorvastatin (LIPITOR) 20 MG Tab TAKE ONE TABLET BY MOUTH EVERY 48 HOURS 135 Tab 1   • anastrozole (ARIMIDEX) 1 MG Tab TAKE ONE TABLET BY MOUTH ONE TIME DAILY 100 Tab 3   • Cholecalciferol (VITAMIN D) 2000 UNITS Cap Take  by mouth.       No current Epic-ordered facility-administered medications on file.          Objective:   Physical Exam:    Vitals: /66 (BP Location: Right arm, Patient Position: Sitting, BP Cuff Size: Large adult)   Pulse (!) 116   Temp (!) 35.5 °C (95.9 °F) (Temporal)   Resp 12   Ht 1.689 m (5' 6.5\")   Wt 112 kg (247 lb 8 oz)   SpO2 95%    BMI: Body mass index is 39.35 kg/m².     Physical Exam  Constitutional:       General: She is not in acute distress.     Appearance: Normal appearance. She is not ill-appearing.   Eyes:      General: No scleral icterus.     Conjunctiva/sclera: Conjunctivae normal.   Cardiovascular:      Rate and Rhythm: Regular rhythm. Tachycardia present.      Heart sounds: No murmur heard.     Pulmonary:      Effort: Pulmonary effort is normal. No respiratory distress.      Breath sounds: Normal breath sounds.   Skin:     General: Skin is warm. "      Findings: No rash.   Neurological:      General: No focal deficit present.   Psychiatric:         Thought Content: Thought content normal.         Judgment: Judgment normal.           Assessment and Plan:   Keyur is a 70 y.o. female with the following:  Problem List Items Addressed This Visit     History of thyroid cancer     Previous problem, stable.  She reports total thyroidectomy.  She is doing well on current dose of levothyroxine, TSH is appropriate, continue at this time.         Relevant Orders    REFERRAL TO HEMATOLOGY ONCOLOGY    CKD (chronic kidney disease), stage III (HCC)     Chronic and ongoing issue. May be related to side effect of medications. Advised her to continue hydrating well and will await final SPEP results. She has had this for 8 years and GFR has remained fairly stable from mid 30's- mid 40's. Will likely need to reduce spironolactone and/or lisinopril once metoprolol is on board. Will first update cardiac imaging before making any big changes.         Recurrent breast cancer (HCC)     Chronic and ongoing problem.  The biggest question is whether the anastrozole is causing her cardiac discomfort and joint pains.  Also unclear for me the best timing to continue anastrozole, I do start counting 10 years from the first or second breast cancer.  I think it be best for her to touch base with oncology to make sure we are managing this most appropriate.  She would favor getting off the anastrozole if it is not truly indicated, she has been taking this since 2015. Bone density scheduled to check for osteoporosis/osteopenia. Will refer her to Dr. Peoples at [facility], she would prefer to see a female; she has had some poor experiences with male physicians regarding her breast cancer.         Relevant Orders    REFERRAL TO HEMATOLOGY ONCOLOGY    Palpitation     New and decompensated problem. Start metoprolol succinate 25 mg daily. Complete 48 hour Holter monitor and stress  echocardiogram for additional evaluation. May need formal cardiology evaluation pending results. Continue lisinopril and spironolactone in the mean time.         Relevant Medications    metoprolol SR (TOPROL XL) 25 MG TABLET SR 24 HR    Other Relevant Orders    HOLTER - Cardiology Performed (48HR)    EC-ECHOCARDIOGRAM REST/STRESS W/O CONT    Chest pain     No decompensated problem.  She is a new patient to me but she remains very tachycardic during our evaluations.  No clear organic cause, she is not anemic, her thyroid functions are normal.  She does have slight dip in her GFR but otherwise labs do not explain an underlying cause.  Will proceed with both chemical stress echocardiogram and Holter monitor for additional evaluation.         Relevant Medications    metoprolol SR (TOPROL XL) 25 MG TABLET SR 24 HR    Other Relevant Orders    HOLTER - Cardiology Performed (48HR)    EC-ECHOCARDIOGRAM REST/STRESS W/O CONT             RTC: Return in about 6 weeks (around 7/13/2021).    I spent a total of 42 minutes with record review, exam, communication with the patient, communication with other providers, and documentation of this encounter.    PLEASE NOTE: This dictation was created using voice recognition software. I have made every reasonable attempt to correct obvious errors, but I expect that there are errors of grammar and possibly content that I did not discover before finalizing the note.      Aidee Carpenter, DO  Geriatric and Internal Medicine  [facility]

## 2021-06-02 ENCOUNTER — TELEPHONE (OUTPATIENT)
Dept: MEDICAL GROUP | Facility: PHYSICIAN GROUP | Age: 71
End: 2021-06-02

## 2021-06-02 DIAGNOSIS — R07.9 CHEST PAIN, UNSPECIFIED TYPE: ICD-10-CM

## 2021-06-02 DIAGNOSIS — R00.2 PALPITATION: ICD-10-CM

## 2021-06-02 LAB
ALBUMIN SERPL ELPH-MCNC: 4.26 G/DL (ref 3.75–5.01)
ALPHA1 GLOB SERPL ELPH-MCNC: 0.3 G/DL (ref 0.19–0.46)
ALPHA2 GLOB SERPL ELPH-MCNC: 0.91 G/DL (ref 0.48–1.05)
B-GLOBULIN SERPL ELPH-MCNC: 0.89 G/DL (ref 0.48–1.1)
GAMMA GLOB SERPL ELPH-MCNC: 0.84 G/DL (ref 0.62–1.51)
INTERPRETATION SERPL IFE-IMP: NORMAL
MONOCLON BAND OBS SERPL: NORMAL
PATHOLOGY STUDY: NORMAL
PROT SERPL-MCNC: 7.2 G/DL (ref 6.3–8.2)

## 2021-06-02 NOTE — ASSESSMENT & PLAN NOTE
Chronic and ongoing problem.  The biggest question is whether the anastrozole is causing her cardiac discomfort and joint pains.  Also unclear for me the best timing to continue anastrozole, I do start counting 10 years from the first or second breast cancer.  I think it be best for her to touch base with oncology to make sure we are managing this most appropriate.  She would favor getting off the anastrozole if it is not truly indicated, she has been taking this since 2015. Bone density scheduled to check for osteoporosis/osteopenia. Will refer her to Dr. Peoples at [facility], she would prefer to see a female; she has had some poor experiences with male physicians regarding her breast cancer.

## 2021-06-02 NOTE — ASSESSMENT & PLAN NOTE
Chronic and ongoing issue. May be related to side effect of medications. Advised her to continue hydrating well and will await final SPEP results. She has had this for 8 years and GFR has remained fairly stable from mid 30's- mid 40's. Will likely need to reduce spironolactone and/or lisinopril once metoprolol is on board. Will first update cardiac imaging before making any big changes.

## 2021-06-02 NOTE — ASSESSMENT & PLAN NOTE
Previous problem, stable.  She reports total thyroidectomy.  She is doing well on current dose of levothyroxine, TSH is appropriate, continue at this time.

## 2021-06-02 NOTE — ASSESSMENT & PLAN NOTE
No decompensated problem.  She is a new patient to me but she remains very tachycardic during our evaluations.  No clear organic cause, she is not anemic, her thyroid functions are normal.  She does have slight dip in her GFR but otherwise labs do not explain an underlying cause.  Will proceed with both chemical stress echocardiogram and Holter monitor for additional evaluation.

## 2021-06-02 NOTE — ASSESSMENT & PLAN NOTE
New and decompensated problem. Start metoprolol succinate 25 mg daily. Complete 48 hour Holter monitor and stress echocardiogram for additional evaluation. May need formal cardiology evaluation pending results. Continue lisinopril and spironolactone in the mean time.

## 2021-06-03 NOTE — TELEPHONE ENCOUNTER
Phone Number Called: [phone] (Scheduling)    Call outcome:  Spoke to     Message:  stated that the correct order must be put in, but pt will keep appt on June 29th @ 3:15 once new order for non-walking chemical stress test is ordered.

## 2021-06-03 NOTE — TELEPHONE ENCOUNTER
Phone Number Called: [phone]    Call outcome: Spoke to     Message: Called to schedule pt's non-walking chemical stress test.  stated to call nuclear in the morning to confirm that it is the correct order, but  put place dong appt date for 6/29/21 @ 3:15.    Will call on 6/3 to confirm order with Nuclear.

## 2021-06-07 ENCOUNTER — TELEPHONE (OUTPATIENT)
Dept: MEDICAL GROUP | Facility: PHYSICIAN GROUP | Age: 71
End: 2021-06-07

## 2021-06-07 ENCOUNTER — NON-PROVIDER VISIT (OUTPATIENT)
Dept: CARDIOLOGY | Facility: MEDICAL CENTER | Age: 71
End: 2021-06-07
Payer: MEDICARE

## 2021-06-07 DIAGNOSIS — I49.1 PAC (PREMATURE ATRIAL CONTRACTION): ICD-10-CM

## 2021-06-07 DIAGNOSIS — R00.2 PALPITATIONS: ICD-10-CM

## 2021-06-07 PROCEDURE — 93224 XTRNL ECG REC UP TO 48 HRS: CPT | Performed by: INTERNAL MEDICINE

## 2021-06-08 ENCOUNTER — TELEPHONE (OUTPATIENT)
Dept: MEDICAL GROUP | Facility: PHYSICIAN GROUP | Age: 71
End: 2021-06-08

## 2021-06-08 NOTE — TELEPHONE ENCOUNTER
Called pt to f/u on whether pt is taking Metoprolol, and if she has been monitoring pulse since starting Metoprolol.     Pt was tachycardic at 6/1/21 OV.     No answer, LVM asking pt to return call with my direct extension.

## 2021-06-08 NOTE — TELEPHONE ENCOUNTER
----- Message from Gena Mar R.N. sent at 6/2/2021  8:57 AM PDT -----  Regarding: Call pt  Follow-up heart rate on Monday.  Started metoprolol succinate, she will likely pick it up and begin in on June 2.  Heart rate is in the low 100s at rest in mid 120s with walking.  Holter monitor x48 hours and stress echocardiogram (chemical) pending.

## 2021-06-09 NOTE — TELEPHONE ENCOUNTER
Glad HR is better but may throw off Zio patch results, could have her hold metoprolol during the last part of her monitoring to see what her rate/rhythm are? Thanks!

## 2021-06-09 NOTE — TELEPHONE ENCOUNTER
Pt called and LVM stating her daily blood pressure readings have been systolically between 136-129 and diastolically between 70-80.     Pulse has hovered around 57.

## 2021-06-10 DIAGNOSIS — R07.9 CHEST PAIN, UNSPECIFIED TYPE: ICD-10-CM

## 2021-06-10 DIAGNOSIS — R00.2 PALPITATION: ICD-10-CM

## 2021-06-10 LAB — EKG IMPRESSION: NORMAL

## 2021-06-11 ENCOUNTER — TELEPHONE (OUTPATIENT)
Dept: MEDICAL GROUP | Facility: PHYSICIAN GROUP | Age: 71
End: 2021-06-11

## 2021-06-11 NOTE — TELEPHONE ENCOUNTER
----- Message from Aidee Carpenter D.O. sent at 6/11/2021 10:26 AM PDT -----  Holter with HR between , but predominantly bradycardia with several episodes of premature beats. May need to cut metoprolol in half. Any symptom updates? Thanks!

## 2021-06-11 NOTE — TELEPHONE ENCOUNTER
"Called pt, she is feeling \"really good for some reason\" and denies any symptoms at all. Vitals (Bp/ pulse) were perfect today.     Pt did want to question whether using/stopping the CBD could have contributed?   "

## 2021-06-14 ENCOUNTER — TELEPHONE (OUTPATIENT)
Dept: MEDICAL GROUP | Facility: PHYSICIAN GROUP | Age: 71
End: 2021-06-14

## 2021-06-14 DIAGNOSIS — I15.2 HYPERTENSION DUE TO ENDOCRINE DISORDER: Primary | ICD-10-CM

## 2021-06-14 NOTE — TELEPHONE ENCOUNTER
"Pt called requesting Rx refills for Lisinopril and Spironolactone.     Pt also concerned that her recent Bp readings have been low. Pt gave me examples of 117/67, 109/70, 111/XX- explained to pt those are normal Bp readings and there is no medical concern in regards to those Bp readings. Pt also expressed fatigue, \"I can sleep all day\". Advised pt I would update Dr. Carpenter.     Received request via: Patient    Was the patient seen in the last year in this department? Yes    Does the patient have an active prescription (recently filled or refills available) for medication(s) requested? No     Last OV 6/1/21.  "

## 2021-06-15 ENCOUNTER — HOSPITAL ENCOUNTER (OUTPATIENT)
Dept: RADIOLOGY | Facility: MEDICAL CENTER | Age: 71
End: 2021-06-15
Attending: INTERNAL MEDICINE
Payer: MEDICARE

## 2021-06-15 DIAGNOSIS — Z78.0 MENOPAUSE: ICD-10-CM

## 2021-06-15 PROCEDURE — 77080 DXA BONE DENSITY AXIAL: CPT

## 2021-06-18 RX ORDER — LISINOPRIL 40 MG/1
40 TABLET ORAL DAILY
Qty: 100 TABLET | Refills: 3 | Status: SHIPPED | OUTPATIENT
Start: 2021-06-18 | End: 2022-04-11 | Stop reason: SDUPTHER

## 2021-06-18 RX ORDER — SPIRONOLACTONE 25 MG/1
TABLET ORAL
Qty: 100 TABLET | Refills: 3 | Status: SHIPPED | OUTPATIENT
Start: 2021-06-18 | End: 2021-12-09 | Stop reason: SDUPTHER

## 2021-06-25 ENCOUNTER — TELEPHONE (OUTPATIENT)
Dept: MEDICAL GROUP | Facility: PHYSICIAN GROUP | Age: 71
End: 2021-06-25

## 2021-06-25 NOTE — TELEPHONE ENCOUNTER
Phone Number Called: [phone] (home)   Keyur Burt      Call outcome: Spoke to patient regarding message below.    Message: Informed pt of results. Pt satisfied.

## 2021-06-25 NOTE — TELEPHONE ENCOUNTER
----- Message from Aidee Carpenter D.O. sent at 6/25/2021  2:14 PM PDT -----  Please notify patient that her bone density results have returned as normal. No osteopenia or osteoporosis in her spine or hip.     Thanks,  KT

## 2021-06-28 NOTE — PROGRESS NOTES
Outcome: Left voicemail    Please transfer to Patient Outreach Team at [phone] when patient returns call.    Attempt # 2         yes

## 2021-06-29 ENCOUNTER — HOSPITAL ENCOUNTER (OUTPATIENT)
Dept: CARDIOLOGY | Facility: MEDICAL CENTER | Age: 71
End: 2021-06-29
Attending: INTERNAL MEDICINE
Payer: MEDICARE

## 2021-06-29 DIAGNOSIS — R00.2 PALPITATION: ICD-10-CM

## 2021-06-29 DIAGNOSIS — R07.9 CHEST PAIN, UNSPECIFIED TYPE: ICD-10-CM

## 2021-06-29 LAB — LV EJECT FRACT  99904: 70

## 2021-06-29 PROCEDURE — 700111 HCHG RX REV CODE 636 W/ 250 OVERRIDE (IP)

## 2021-06-29 PROCEDURE — 93350 STRESS TTE ONLY: CPT | Mod: 26 | Performed by: INTERNAL MEDICINE

## 2021-06-29 PROCEDURE — 93350 STRESS TTE ONLY: CPT

## 2021-06-29 RX ORDER — NITROGLYCERIN 0.4 MG/1
TABLET SUBLINGUAL
Status: DISCONTINUED
Start: 2021-06-29 | End: 2021-06-30 | Stop reason: HOSPADM

## 2021-06-29 RX ORDER — LABETALOL HYDROCHLORIDE 5 MG/ML
INJECTION, SOLUTION INTRAVENOUS
Status: DISCONTINUED
Start: 2021-06-29 | End: 2021-06-30 | Stop reason: HOSPADM

## 2021-06-29 RX ADMIN — DOBUTAMINE HYDROCHLORIDE 5 MCG/KG/MIN: 100 INJECTION INTRAVENOUS at 15:45

## 2021-06-29 NOTE — PROGRESS NOTES
Dobutamine Stress Test    PRE-PROCEDURE:   Clinical History: Completed  Medical Diagnosis: Breast Cancer, palpitations, tachycardia, CKD  Pertinent Previous Physical Findings: None  History of Symptoms: Palpitations  Recent Health: None  Other Habits: None  Family History: Cardiac  NPO Status: 4 hours  Medications Taken Day of Test: Lisinopril, spironolactone, Anastrozole, levothyroxine  Consent: Completed  Time Out: Completed    ASSESSMENT:  Neuro: Within Normal Limits  Pulse Rate: Regular   Resting Blood Pressure: 112/54  Lung Auscultation: Clear to Auscultation, Good Aeration, No Wheezes, Rales or Rhonchi  Palpation and Auscultation of Carotid Arteries: done  Auscultation of Heart Sounds: Within Normal Limits  Palpation and Inspection of Lower Extremities: no LE edema    CONTRAINDICATIONS: none    PROCEDURE PREPARATION:  IV started: # 20 gauge IV started in R Antecubital   Explanation of procedure to patient: done  Crash cart: present  Labetalol, atropine, and nitro at bedside: Present    PRE-PROCEDURE:  HR: 90  BP: 112/54    Target HR: 128    5 mcg at 3 minutes:  HR:92  BP:129/61    10 mcg at 6 minutes:  HR:112  BP: 130/59    20 mcg at 9 minutes:  HR: 129  BP: 102/53    TARGET HR ACHIEVED AT 9min 20mcg dobutamine infusion    STOPPED:  HR: 130  BP: 127/48    Please refer to MAR for medications.     POST PROCEDURE (RECOVERY):  HR: 103  BP: 110/48  IV Discontinued: Completed    TEST TERMINATION: Achieved 85% of Maximum Predicted Heart Rate

## 2021-07-02 ENCOUNTER — TELEPHONE (OUTPATIENT)
Dept: MEDICAL GROUP | Facility: PHYSICIAN GROUP | Age: 71
End: 2021-07-02

## 2021-07-02 NOTE — TELEPHONE ENCOUNTER
----- Message from Aidee Carpenter D.O. sent at 7/2/2021  8:56 AM PDT -----  Please call Keyur and let her know that her stress test results returned as normal, no signs of impaired blood flow to the heart. How has she been feeling lately?    Also-- tell her happy birthday from all of us this weekend!

## 2021-07-02 NOTE — TELEPHONE ENCOUNTER
Phone Number Called: [phone] (home)   Keyur Alison Burt      Call outcome: Spoke to patient regarding message below.    Message: Called pt with results. Pt was happy to hear so she can enjoy her birthday.

## 2021-07-12 ENCOUNTER — OFFICE VISIT (OUTPATIENT)
Dept: MEDICAL GROUP | Facility: PHYSICIAN GROUP | Age: 71
End: 2021-07-12
Payer: MEDICARE

## 2021-07-12 VITALS
TEMPERATURE: 96.7 F | OXYGEN SATURATION: 91 % | DIASTOLIC BLOOD PRESSURE: 60 MMHG | BODY MASS INDEX: 39.54 KG/M2 | SYSTOLIC BLOOD PRESSURE: 118 MMHG | WEIGHT: 251.9 LBS | HEART RATE: 84 BPM | HEIGHT: 67 IN

## 2021-07-12 DIAGNOSIS — R73.03 PREDIABETES: ICD-10-CM

## 2021-07-12 DIAGNOSIS — C50.912 RECURRENT MALIGNANT NEOPLASM OF LEFT BREAST (HCC): ICD-10-CM

## 2021-07-12 DIAGNOSIS — G89.29 CHRONIC NECK PAIN: ICD-10-CM

## 2021-07-12 DIAGNOSIS — N18.32 STAGE 3B CHRONIC KIDNEY DISEASE: ICD-10-CM

## 2021-07-12 DIAGNOSIS — M54.2 CHRONIC NECK PAIN: ICD-10-CM

## 2021-07-12 DIAGNOSIS — G89.29 CHRONIC BILATERAL LOW BACK PAIN WITHOUT SCIATICA: ICD-10-CM

## 2021-07-12 DIAGNOSIS — M54.50 CHRONIC BILATERAL LOW BACK PAIN WITHOUT SCIATICA: ICD-10-CM

## 2021-07-12 PROCEDURE — 99215 OFFICE O/P EST HI 40 MIN: CPT | Performed by: INTERNAL MEDICINE

## 2021-07-12 RX ORDER — HYDROCODONE BITARTRATE AND ACETAMINOPHEN 5; 325 MG/1; MG/1
1 TABLET ORAL 3 TIMES DAILY PRN
Qty: 65 TABLET | Refills: 0 | Status: SHIPPED | OUTPATIENT
Start: 2021-07-12 | End: 2021-08-11

## 2021-07-12 RX ORDER — HYDROCODONE BITARTRATE AND ACETAMINOPHEN 5; 325 MG/1; MG/1
1 TABLET ORAL 3 TIMES DAILY PRN
Qty: 65 TABLET | Refills: 0 | Status: SHIPPED | OUTPATIENT
Start: 2021-08-11 | End: 2021-09-10

## 2021-07-12 RX ORDER — HYDROCODONE BITARTRATE AND ACETAMINOPHEN 5; 325 MG/1; MG/1
1 TABLET ORAL 3 TIMES DAILY PRN
Qty: 65 TABLET | Refills: 0 | Status: SHIPPED | OUTPATIENT
Start: 2021-09-10 | End: 2021-10-10

## 2021-07-12 ASSESSMENT — FIBROSIS 4 INDEX: FIB4 SCORE: 1.55
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
## 2021-07-13 NOTE — ASSESSMENT & PLAN NOTE
"Chronic and ongoing problem, worsening pain.  Will get her back on her pain medicine as is having significant effect on her quality of life and ability to complete ADLs.  She has been using hydrocodone-acetaminophen since 2013 initially dispense 45 tablets for 30 days and then this was increased to 50 tablets for 30 days in 2015 and then 65 tablets per 30 days in 2016 right remained until she was stopped on the medicine in 2020.  She cannot take anti-inflammatories due to advanced chronic kidney disease.  She was previously using a substance with combination CBD/THC and her previous PCP abruptly stopped the hydrocodone due to the positive finding on the urine drug screen without discussing with the patient.  She would rather use the hydrocodone at this point then the CBD/THC and has been abstaining from it over the past several months.    Obtained and reviewed patient utilization report from [facility] pharmacy database on 7/12/2021 1:10 PM  prior to writing prescription for controlled substance II, III or IV per [place] bill . Based on assessment of the report, the prescription is medically necessary.     Patient understands this prescription is a controlled substance which is potentially habit-forming and its use is regulated by the [employer]. We also discussed the new \"black box\" warning regarding the lethal combination of opioids and benzodiazepines. Refills are subject to terms of a controlled substance agreement and patient has an updated one on file. Any refill requires an office visit. Narcotics have may adverse effects and the risks of addiction, accidental overdose and death were emphasized. Provided prescriptions for the next three months.    " Ava Almazan   (STV:[id number]) [address]  at   [facility]  [address], [address], [place]  [phone]   [phone]         OUTPATIENT DAILY NOTE    NAME/AGE/GENDER: Ava Almazan is a 80 y.o. female. DATE: 1/5/2017    Pt called to cancel today due to continuing illness. Will follow-up next visit.     Shaneka Katz

## 2021-07-13 NOTE — ASSESSMENT & PLAN NOTE
"Chronic and ongoing problem, worsening pain.  Will get her back on her pain medicine as is having significant effect on her quality of life and ability to complete ADLs.  She has been using hydrocodone-acetaminophen since 2013 initially dispense 45 tablets for 30 days and then this was increased to 50 tablets for 30 days in 2015 and then 65 tablets per 30 days in 2016 right remained until she was stopped on the medicine in 2020.  She cannot take anti-inflammatories due to advanced chronic kidney disease.  She was previously using a substance with combination CBD/THC and her previous PCP abruptly stopped the hydrocodone due to the positive finding on the urine drug screen without discussing with the patient.  She would rather use the hydrocodone at this point then the CBD/THC and has been abstaining from it over the past several months.    Obtained and reviewed patient utilization report from [facility] pharmacy database on 7/12/2021 1:10 PM  prior to writing prescription for controlled substance II, III or IV per [place] bill . Based on assessment of the report, the prescription is medically necessary.     Patient understands this prescription is a controlled substance which is potentially habit-forming and its use is regulated by the [place]. We also discussed the new \"black box\" warning regarding the lethal combination of opioids and benzodiazepines. Refills are subject to terms of a controlled substance agreement and patient has an updated one on file. Any refill requires an office visit. Narcotics have may adverse effects and the risks of addiction, accidental overdose and death were emphasized. Provided prescriptions for the next three months.  "

## 2021-07-13 NOTE — ASSESSMENT & PLAN NOTE
Previous problem, longstanding treatment history as described above.  She has reestablished with Dr. Espinoza cancer care specialist in my request for some additional clarification on surveillance moving forward and ongoing treatment duration of anastrozole.  Is been determined that she should continue anastrozole 1 mg daily through 2025 to complete 10 years of therapy.  Continue with optimizing bone health with calcium and vitamin D, recent bone density completed in 2021 was reassuring.  Lymphedema evaluation placed for left upper extremity lymphedema.

## 2021-07-13 NOTE — ASSESSMENT & PLAN NOTE
Chronic and ongoing problem.  Continue to follow renal function to ensure stability on current medication regiment.  Avoid nephrotoxic agents as able and encourage good oral hydration.

## 2021-08-15 ENCOUNTER — PATIENT MESSAGE (OUTPATIENT)
Dept: MEDICAL GROUP | Facility: PHYSICIAN GROUP | Age: 71
End: 2021-08-15

## 2021-08-15 DIAGNOSIS — E03.2 HYPOTHYROIDISM FROM RESORCINOL: ICD-10-CM

## 2021-08-15 DIAGNOSIS — T49.4X5A HYPOTHYROIDISM FROM RESORCINOL: ICD-10-CM

## 2021-08-16 RX ORDER — LEVOTHYROXINE SODIUM 137 UG/1
137 TABLET ORAL DAILY
Qty: 100 TABLET | Refills: 3 | Status: SHIPPED | OUTPATIENT
Start: 2021-08-16 | End: 2022-04-11 | Stop reason: SDUPTHER

## 2021-08-16 NOTE — TELEPHONE ENCOUNTER
From: Keyur Burt  To: Physician Aidee Carpenter  Sent: 8/15/2021 9:13 AM PDT  Subject: refill    Requesting thyroid med to be refilled.  [facility], [place]

## 2021-09-09 DIAGNOSIS — E78.5 HYPERLIPIDEMIA WITH TARGET LDL LESS THAN 100: ICD-10-CM

## 2021-09-09 DIAGNOSIS — C50.919 RECURRENT MALIGNANT NEOPLASM OF BREAST, UNSPECIFIED LATERALITY (HCC): ICD-10-CM

## 2021-09-09 RX ORDER — ATORVASTATIN CALCIUM 20 MG/1
TABLET, FILM COATED ORAL
Qty: 135 TABLET | Refills: 1 | OUTPATIENT
Start: 2021-09-09

## 2021-09-09 RX ORDER — ANASTROZOLE 1 MG/1
TABLET ORAL
Qty: 100 TABLET | Refills: 3 | OUTPATIENT
Start: 2021-09-09

## 2021-09-10 DIAGNOSIS — E78.5 HYPERLIPIDEMIA WITH TARGET LDL LESS THAN 100: ICD-10-CM

## 2021-09-10 DIAGNOSIS — C50.919 RECURRENT MALIGNANT NEOPLASM OF BREAST, UNSPECIFIED LATERALITY (HCC): ICD-10-CM

## 2021-09-10 RX ORDER — ANASTROZOLE 1 MG/1
TABLET ORAL
Qty: 100 TABLET | Refills: 3 | Status: SHIPPED | OUTPATIENT
Start: 2021-09-10 | End: 2021-10-14

## 2021-09-10 RX ORDER — ANASTROZOLE 1 MG/1
TABLET ORAL
Qty: 90 TABLET | Refills: 0 | Status: SHIPPED | OUTPATIENT
Start: 2021-09-10 | End: 2022-03-14

## 2021-09-10 RX ORDER — ATORVASTATIN CALCIUM 20 MG/1
TABLET, FILM COATED ORAL
Qty: 135 TABLET | Refills: 1 | Status: SHIPPED | OUTPATIENT
Start: 2021-09-10 | End: 2022-04-11 | Stop reason: SDUPTHER

## 2021-09-10 NOTE — TELEPHONE ENCOUNTER
Received request via: Patient    Was the patient seen in the last year in this department? Yes    Does the patient have an active prescription (recently filled or refills available) for medication(s) requested? No Last filled in 2020

## 2021-09-10 NOTE — TELEPHONE ENCOUNTER
----- Message from Keyur Burt sent at 9/9/2021  6:14 PM PDT -----  Regarding: RX refills   I requested refills for Atorvastatin and Anastrozole.  How do I remove Abby Marc from my chart, continued denials  for refills and not requesting from her.  How do I have these meds refilled by Dr Tracey?

## 2021-10-04 ENCOUNTER — NON-PROVIDER VISIT (OUTPATIENT)
Dept: MEDICAL GROUP | Facility: PHYSICIAN GROUP | Age: 71
End: 2021-10-04
Payer: MEDICARE

## 2021-10-04 ENCOUNTER — HOSPITAL ENCOUNTER (OUTPATIENT)
Facility: MEDICAL CENTER | Age: 71
End: 2021-10-04
Attending: INTERNAL MEDICINE
Payer: MEDICARE

## 2021-10-04 DIAGNOSIS — N18.32 STAGE 3B CHRONIC KIDNEY DISEASE: ICD-10-CM

## 2021-10-04 DIAGNOSIS — R73.03 PREDIABETES: ICD-10-CM

## 2021-10-04 PROCEDURE — 85025 COMPLETE CBC W/AUTO DIFF WBC: CPT

## 2021-10-04 PROCEDURE — 80053 COMPREHEN METABOLIC PANEL: CPT

## 2021-10-04 PROCEDURE — 83036 HEMOGLOBIN GLYCOSYLATED A1C: CPT

## 2021-10-04 PROCEDURE — 99000 SPECIMEN HANDLING OFFICE-LAB: CPT | Performed by: INTERNAL MEDICINE

## 2021-10-04 PROCEDURE — 36415 COLL VENOUS BLD VENIPUNCTURE: CPT | Performed by: INTERNAL MEDICINE

## 2021-10-04 NOTE — PROGRESS NOTES
Patient arrived for blood draw. Patient reports fasting for at least 8-10 hours.   Verified patient's name/date-of-birth and reason for visit before procedure was started. Patient's right antecubital cleansed. Venipuncture attempts X1. Blood draw completed on patient's right AC. Applied pressure afterwards and placed Coban on site of venipuncture with directions for patient to remove within the next hour. Patient tolerated procedure well, no adverse reactions. Patient ambulated safely upon leaving clinic.   Completed labels were placed on blood samples in draw room with patient present. Appropriate blood samples were centrifuged. Blood samples were then placed in locked lab box for  pick-up.     
upper and lower denture

## 2021-10-05 LAB
ALBUMIN SERPL BCP-MCNC: 4.9 G/DL (ref 3.2–4.9)
ALBUMIN/GLOB SERPL: 2 G/DL
ALP SERPL-CCNC: 103 U/L (ref 30–99)
ALT SERPL-CCNC: 33 U/L (ref 2–50)
ANION GAP SERPL CALC-SCNC: 18 MMOL/L (ref 7–16)
AST SERPL-CCNC: 42 U/L (ref 12–45)
BASOPHILS # BLD AUTO: 0.8 % (ref 0–1.8)
BASOPHILS # BLD: 0.06 K/UL (ref 0–0.12)
BILIRUB SERPL-MCNC: 0.6 MG/DL (ref 0.1–1.5)
BUN SERPL-MCNC: 16 MG/DL (ref 8–22)
CALCIUM SERPL-MCNC: 9.9 MG/DL (ref 8.5–10.5)
CHLORIDE SERPL-SCNC: 102 MMOL/L (ref 96–112)
CO2 SERPL-SCNC: 19 MMOL/L (ref 20–33)
CREAT SERPL-MCNC: 1.37 MG/DL (ref 0.5–1.4)
EOSINOPHIL # BLD AUTO: 0.25 K/UL (ref 0–0.51)
EOSINOPHIL NFR BLD: 3.3 % (ref 0–6.9)
ERYTHROCYTE [DISTWIDTH] IN BLOOD BY AUTOMATED COUNT: 49.1 FL (ref 35.9–50)
EST. AVERAGE GLUCOSE BLD GHB EST-MCNC: 143 MG/DL
GLOBULIN SER CALC-MCNC: 2.5 G/DL (ref 1.9–3.5)
GLUCOSE SERPL-MCNC: 190 MG/DL (ref 65–99)
HBA1C MFR BLD: 6.6 % (ref 4–5.6)
HCT VFR BLD AUTO: 49.8 % (ref 37–47)
HGB BLD-MCNC: 16.6 G/DL (ref 12–16)
IMM GRANULOCYTES # BLD AUTO: 0.04 K/UL (ref 0–0.11)
IMM GRANULOCYTES NFR BLD AUTO: 0.5 % (ref 0–0.9)
LYMPHOCYTES # BLD AUTO: 2.74 K/UL (ref 1–4.8)
LYMPHOCYTES NFR BLD: 36.2 % (ref 22–41)
MCH RBC QN AUTO: 32.9 PG (ref 27–33)
MCHC RBC AUTO-ENTMCNC: 33.3 G/DL (ref 33.6–35)
MCV RBC AUTO: 98.6 FL (ref 81.4–97.8)
MONOCYTES # BLD AUTO: 0.48 K/UL (ref 0–0.85)
MONOCYTES NFR BLD AUTO: 6.3 % (ref 0–13.4)
NEUTROPHILS # BLD AUTO: 3.99 K/UL (ref 2–7.15)
NEUTROPHILS NFR BLD: 52.9 % (ref 44–72)
NRBC # BLD AUTO: 0 K/UL
NRBC BLD-RTO: 0 /100 WBC
PLATELET # BLD AUTO: 279 K/UL (ref 164–446)
PMV BLD AUTO: 10.8 FL (ref 9–12.9)
POTASSIUM SERPL-SCNC: 4.7 MMOL/L (ref 3.6–5.5)
PROT SERPL-MCNC: 7.4 G/DL (ref 6–8.2)
RBC # BLD AUTO: 5.05 M/UL (ref 4.2–5.4)
SODIUM SERPL-SCNC: 139 MMOL/L (ref 135–145)
WBC # BLD AUTO: 7.6 K/UL (ref 4.8–10.8)

## 2021-10-13 ENCOUNTER — TELEPHONE (OUTPATIENT)
Dept: MEDICAL GROUP | Facility: PHYSICIAN GROUP | Age: 71
End: 2021-10-13

## 2021-10-13 NOTE — TELEPHONE ENCOUNTER
ESTABLISHED PATIENT PRE-VISIT PLANNING     Patient was NOT contacted to complete PVP.     Note: Patient will not be contacted if there is no indication to call.     1.  Reviewed notes from the last few office visits within the medical group: Yes    2.  If any orders were placed at last visit or intended to be done for this visit (i.e. 6 mos follow-up), do we have Results/Consult Notes?         •  Labs - Labs ordered, completed on 10/4/2021 and results are in chart.  Note: If patient appointment is for lab review and patient did not complete labs, check with provider if OK to reschedule patient until labs completed.       •  Imaging - Imaging was not ordered at last office visit.       •  Referrals - No referrals were ordered at last office visit.    3. Is this appointment scheduled as a Hospital Follow-Up? No    4.  Immunizations were updated in Epic using Reconcile Outside Information activity? Yes    5.  Patient is due for the following Health Maintenance Topics:   Health Maintenance Due   Topic Date Due   • IMM DTaP/Tdap/Td Vaccine (1 - Tdap) Never done   • IMM ZOSTER VACCINES (1 of 2) Never done   • COLORECTAL CANCER SCREENING  01/16/2016   • IMM PNEUMOCOCCAL VACCINE: 65+ Years (1 of 1 - PPSV23) Never done   • IMM INFLUENZA (1) Never done          6.  AHA (Pulse8) form printed for Provider? No, already completed

## 2021-10-14 ENCOUNTER — OFFICE VISIT (OUTPATIENT)
Dept: MEDICAL GROUP | Facility: PHYSICIAN GROUP | Age: 71
End: 2021-10-14
Payer: MEDICARE

## 2021-10-14 VITALS
OXYGEN SATURATION: 93 % | BODY MASS INDEX: 41.12 KG/M2 | SYSTOLIC BLOOD PRESSURE: 124 MMHG | WEIGHT: 262 LBS | TEMPERATURE: 96.8 F | RESPIRATION RATE: 12 BRPM | HEIGHT: 67 IN | DIASTOLIC BLOOD PRESSURE: 62 MMHG | HEART RATE: 84 BPM

## 2021-10-14 DIAGNOSIS — N18.32 STAGE 3B CHRONIC KIDNEY DISEASE: ICD-10-CM

## 2021-10-14 DIAGNOSIS — M54.50 CHRONIC BILATERAL LOW BACK PAIN WITHOUT SCIATICA: ICD-10-CM

## 2021-10-14 DIAGNOSIS — E11.9 TYPE 2 DIABETES MELLITUS WITHOUT COMPLICATION, WITHOUT LONG-TERM CURRENT USE OF INSULIN (HCC): ICD-10-CM

## 2021-10-14 DIAGNOSIS — Z12.11 SCREENING FOR COLORECTAL CANCER: ICD-10-CM

## 2021-10-14 DIAGNOSIS — G89.29 CHRONIC BILATERAL LOW BACK PAIN WITHOUT SCIATICA: ICD-10-CM

## 2021-10-14 DIAGNOSIS — Z12.12 SCREENING FOR COLORECTAL CANCER: ICD-10-CM

## 2021-10-14 PROCEDURE — 99214 OFFICE O/P EST MOD 30 MIN: CPT | Performed by: INTERNAL MEDICINE

## 2021-10-14 RX ORDER — LANCETS 30 GAUGE
EACH MISCELLANEOUS
Qty: 100 EACH | Refills: 0 | Status: SHIPPED | OUTPATIENT
Start: 2021-10-14 | End: 2022-03-14

## 2021-10-14 RX ORDER — HYDROCODONE BITARTRATE AND ACETAMINOPHEN 5; 325 MG/1; MG/1
1 TABLET ORAL EVERY 4 HOURS PRN
COMMUNITY
End: 2021-10-14 | Stop reason: SDUPTHER

## 2021-10-14 RX ORDER — HYDROCODONE BITARTRATE AND ACETAMINOPHEN 5; 325 MG/1; MG/1
1 TABLET ORAL EVERY 4 HOURS PRN
Qty: 65 TABLET | Refills: 0 | Status: SHIPPED | OUTPATIENT
Start: 2021-11-13 | End: 2021-12-13

## 2021-10-14 RX ORDER — HYDROCODONE BITARTRATE AND ACETAMINOPHEN 5; 325 MG/1; MG/1
1 TABLET ORAL EVERY 4 HOURS PRN
Qty: 65 TABLET | Refills: 0 | Status: SHIPPED | OUTPATIENT
Start: 2021-10-14 | End: 2021-11-13

## 2021-10-14 RX ORDER — GLUCOSAMINE HCL/CHONDROITIN SU 500-400 MG
CAPSULE ORAL
Qty: 100 EACH | Refills: 0 | Status: SHIPPED | OUTPATIENT
Start: 2021-10-14 | End: 2022-04-11

## 2021-10-14 RX ORDER — DULAGLUTIDE 0.75 MG/.5ML
1 INJECTION, SOLUTION SUBCUTANEOUS
Qty: 0.5 ML | Refills: 3 | Status: SHIPPED | OUTPATIENT
Start: 2021-10-14 | End: 2021-11-30 | Stop reason: SDUPTHER

## 2021-10-14 RX ORDER — HYDROCODONE BITARTRATE AND ACETAMINOPHEN 5; 325 MG/1; MG/1
1 TABLET ORAL EVERY 4 HOURS PRN
Qty: 65 TABLET | Refills: 0 | Status: SHIPPED | OUTPATIENT
Start: 2021-12-13 | End: 2022-01-11 | Stop reason: SDUPTHER

## 2021-10-14 ASSESSMENT — FIBROSIS 4 INDEX: FIB4 SCORE: 1.83

## 2021-10-14 NOTE — ASSESSMENT & PLAN NOTE
New diagnosis.  We will send in diabetes testing supplies and start her on dulaglutide 0.75 mg weekly.  Will likely be able to reverse this quickly.  She is already on statin and ACE inhibitor. Follow up in 3 months or sooner with any issues.

## 2021-10-14 NOTE — ASSESSMENT & PLAN NOTE
Chronic and ongoing problem, secondary to chemotherapy.  We will continue to monitor periodically to ensure stability.  Blood pressure well controlled at this time.
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
## 2021-10-14 NOTE — ASSESSMENT & PLAN NOTE
"Chronic and ongoing problem.  Improved quality of life and pain control with use of Norco.    Obtained and reviewed patient utilization report from [facility] pharmacy database on 10/14/2021 11:05 AM  prior to writing prescription for controlled substance II, III or IV per [place] bill . Based on assessment of the report, the prescription is medically necessary.     Patient understands this prescription is a controlled substance which is potentially habit-forming and its use is regulated by the [place]. We also discussed the new \"black box\" warning regarding the lethal combination of opioids and benzodiazepines. Refills are subject to terms of a controlled substance agreement and patient has an updated one on file. Most recent UDS is appropriate. Any refill requires an office visit. Narcotics have may adverse effects and the risks of addiction, accidental overdose and death were emphasized. Provided prescriptions for the next three months.  "

## 2021-10-14 NOTE — ASSESSMENT & PLAN NOTE
Chronic and ongoing problem.  New onset type 2 diabetes.  Will trial dulaglutide 0.75 mg weekly to assist both type 2 diabetes and weight loss.

## 2021-10-15 ENCOUNTER — HOSPITAL ENCOUNTER (OUTPATIENT)
Facility: MEDICAL CENTER | Age: 71
End: 2021-10-15
Attending: INTERNAL MEDICINE
Payer: MEDICARE

## 2021-10-15 PROCEDURE — 82274 ASSAY TEST FOR BLOOD FECAL: CPT

## 2021-10-20 DIAGNOSIS — Z12.12 SCREENING FOR COLORECTAL CANCER: ICD-10-CM

## 2021-10-20 DIAGNOSIS — Z12.11 SCREENING FOR COLORECTAL CANCER: ICD-10-CM

## 2021-10-22 LAB — IMM ASSAY OCC BLD FITOB: NEGATIVE

## 2021-11-29 DIAGNOSIS — R07.9 CHEST PAIN, UNSPECIFIED TYPE: ICD-10-CM

## 2021-11-29 DIAGNOSIS — R00.2 PALPITATION: ICD-10-CM

## 2021-11-29 RX ORDER — METOPROLOL SUCCINATE 25 MG/1
25 TABLET, EXTENDED RELEASE ORAL DAILY
Qty: 100 TABLET | Refills: 0 | Status: SHIPPED | OUTPATIENT
Start: 2021-11-29 | End: 2022-04-11 | Stop reason: SDUPTHER

## 2021-11-29 RX ORDER — METOPROLOL SUCCINATE 25 MG/1
25 TABLET, EXTENDED RELEASE ORAL DAILY
Qty: 100 TABLET | Refills: 0 | Status: SHIPPED | OUTPATIENT
Start: 2021-11-29 | End: 2021-11-29 | Stop reason: SDUPTHER

## 2021-11-29 NOTE — TELEPHONE ENCOUNTER
Received request via: Pharmacy    Was the patient seen in the last year in this department? Yes 10/14/21    Does the patient have an active prescription (recently filled or refills available) for medication(s) requested? No

## 2021-11-30 DIAGNOSIS — E11.9 TYPE 2 DIABETES MELLITUS WITHOUT COMPLICATION, WITHOUT LONG-TERM CURRENT USE OF INSULIN (HCC): ICD-10-CM

## 2021-11-30 RX ORDER — DULAGLUTIDE 0.75 MG/.5ML
1 INJECTION, SOLUTION SUBCUTANEOUS
Qty: 0.5 ML | Refills: 0 | Status: SHIPPED | OUTPATIENT
Start: 2021-11-30 | End: 2021-11-30 | Stop reason: SDUPTHER

## 2021-11-30 RX ORDER — DULAGLUTIDE 0.75 MG/.5ML
1 INJECTION, SOLUTION SUBCUTANEOUS
Qty: 0.5 ML | Refills: 0 | Status: SHIPPED | OUTPATIENT
Start: 2021-11-30 | End: 2022-01-31 | Stop reason: SDUPTHER

## 2021-11-30 NOTE — TELEPHONE ENCOUNTER
Requested Prescriptions     Pending Prescriptions Disp Refills   • metoprolol SR (TOPROL XL) 25 MG TABLET SR 24  Tablet 0     Sig: Take 1 Tablet by mouth every day.   Lenora Arenas M.D.

## 2021-11-30 NOTE — TELEPHONE ENCOUNTER
Requested Prescriptions     Pending Prescriptions Disp Refills   • Dulaglutide (TRULICITY) 0.75 MG/0.5ML Solution Pen-injector 0.5 mL 0     Sig: Inject 1 PEN under the skin every 7 days.   Lenora Arenas M.D.

## 2021-12-13 PROBLEM — Z90.13 HX OF BILATERAL MASTECTOMY: Status: ACTIVE | Noted: 2021-12-13

## 2022-01-11 ENCOUNTER — OFFICE VISIT (OUTPATIENT)
Dept: MEDICAL GROUP | Facility: PHYSICIAN GROUP | Age: 72
End: 2022-01-11
Payer: MEDICARE

## 2022-01-11 VITALS
HEIGHT: 67 IN | OXYGEN SATURATION: 95 % | WEIGHT: 246.5 LBS | HEART RATE: 87 BPM | SYSTOLIC BLOOD PRESSURE: 118 MMHG | DIASTOLIC BLOOD PRESSURE: 72 MMHG | BODY MASS INDEX: 38.69 KG/M2 | TEMPERATURE: 96.9 F | RESPIRATION RATE: 16 BRPM

## 2022-01-11 DIAGNOSIS — M54.50 CHRONIC BILATERAL LOW BACK PAIN WITHOUT SCIATICA: ICD-10-CM

## 2022-01-11 DIAGNOSIS — C50.912 RECURRENT MALIGNANT NEOPLASM OF LEFT BREAST (HCC): ICD-10-CM

## 2022-01-11 DIAGNOSIS — G89.29 CHRONIC BILATERAL LOW BACK PAIN WITHOUT SCIATICA: ICD-10-CM

## 2022-01-11 DIAGNOSIS — I70.0 AORTIC ATHEROSCLEROSIS (HCC): ICD-10-CM

## 2022-01-11 DIAGNOSIS — E11.9 TYPE 2 DIABETES MELLITUS WITHOUT COMPLICATION, WITHOUT LONG-TERM CURRENT USE OF INSULIN (HCC): ICD-10-CM

## 2022-01-11 DIAGNOSIS — N18.32 STAGE 3B CHRONIC KIDNEY DISEASE: ICD-10-CM

## 2022-01-11 PROCEDURE — 99214 OFFICE O/P EST MOD 30 MIN: CPT | Performed by: INTERNAL MEDICINE

## 2022-01-11 RX ORDER — BLOOD-GLUCOSE METER
KIT MISCELLANEOUS
COMMUNITY
Start: 2021-10-14 | End: 2022-03-14

## 2022-01-11 RX ORDER — HYDROCODONE BITARTRATE AND ACETAMINOPHEN 5; 325 MG/1; MG/1
1 TABLET ORAL EVERY 4 HOURS PRN
Qty: 70 TABLET | Refills: 0 | Status: SHIPPED | OUTPATIENT
Start: 2022-03-13 | End: 2022-04-11 | Stop reason: SDUPTHER

## 2022-01-11 RX ORDER — HYDROCODONE BITARTRATE AND ACETAMINOPHEN 5; 325 MG/1; MG/1
1 TABLET ORAL EVERY 4 HOURS PRN
Qty: 70 TABLET | Refills: 0 | Status: SHIPPED | OUTPATIENT
Start: 2022-01-12 | End: 2022-02-11

## 2022-01-11 RX ORDER — HYDROCODONE BITARTRATE AND ACETAMINOPHEN 5; 325 MG/1; MG/1
1 TABLET ORAL EVERY 4 HOURS PRN
Qty: 70 TABLET | Refills: 0 | Status: SHIPPED | OUTPATIENT
Start: 2022-02-11 | End: 2022-03-13

## 2022-01-11 ASSESSMENT — FIBROSIS 4 INDEX: FIB4 SCORE: 1.83

## 2022-01-11 ASSESSMENT — PATIENT HEALTH QUESTIONNAIRE - PHQ9: CLINICAL INTERPRETATION OF PHQ2 SCORE: 0

## 2022-01-11 NOTE — ASSESSMENT & PLAN NOTE
"Chronic and ongoing problem, improved quality of life and pain control with use of Norco. No deleterious side effects.     Obtained and reviewed patient utilization report from [place] [facility] pharmacy database on 1/11/2022 10:57 AM  prior to writing prescription for controlled substance II, III or IV per [place] bill . Based on assessment of the report, the prescription is medically necessary.     Patient understands this prescription is a controlled substance which is potentially habit-forming and its use is regulated by the [employer]. We also discussed the new \"black box\" warning regarding the lethal combination of opioids and benzodiazepines. Refills are subject to terms of a controlled substance agreement and patient has an updated one on file. Most recent UDS is appropriate. Any refill requires an office visit. Narcotics have may adverse effects and the risks of addiction, accidental overdose and death were emphasized. Provided prescriptions for the next three months.  "

## 2022-01-11 NOTE — ASSESSMENT & PLAN NOTE
Chronic and stable problem, DM2 continues to be well controlled. Continue dulaglutide 0.75 mg weekly. Appropriately on statin and ACEI. Foot exam normal, refer to ophthalmology as retinal scan could not be completed in clinic. Recheck A1c before next visit.
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
## 2022-01-11 NOTE — ASSESSMENT & PLAN NOTE
Chronic and ongoing problem, continue with periodic lab evaluations and avoid nephrotoxic agents. Recheck GFR before next visit.

## 2022-01-18 ENCOUNTER — PATIENT MESSAGE (OUTPATIENT)
Dept: HEALTH INFORMATION MANAGEMENT | Facility: OTHER | Age: 72
End: 2022-01-18
Payer: MEDICARE

## 2022-03-11 DIAGNOSIS — I15.2 HYPERTENSION DUE TO ENDOCRINE DISORDER: ICD-10-CM

## 2022-03-11 RX ORDER — SPIRONOLACTONE 25 MG/1
TABLET ORAL
Qty: 100 TABLET | Refills: 0 | Status: SHIPPED | OUTPATIENT
Start: 2022-03-11 | End: 2022-04-11 | Stop reason: SDUPTHER

## 2022-03-29 DIAGNOSIS — E11.9 TYPE 2 DIABETES MELLITUS WITHOUT COMPLICATION, WITHOUT LONG-TERM CURRENT USE OF INSULIN (HCC): ICD-10-CM

## 2022-03-29 RX ORDER — DULAGLUTIDE 0.75 MG/.5ML
1 INJECTION, SOLUTION SUBCUTANEOUS
Qty: 0.5 ML | Refills: 0 | Status: SHIPPED | OUTPATIENT
Start: 2022-03-29 | End: 2022-04-11 | Stop reason: SDUPTHER

## 2022-04-04 ENCOUNTER — HOSPITAL ENCOUNTER (OUTPATIENT)
Dept: LAB | Facility: MEDICAL CENTER | Age: 72
End: 2022-04-04
Attending: INTERNAL MEDICINE
Payer: MEDICARE

## 2022-04-04 DIAGNOSIS — E11.9 TYPE 2 DIABETES MELLITUS WITHOUT COMPLICATION, WITHOUT LONG-TERM CURRENT USE OF INSULIN (HCC): ICD-10-CM

## 2022-04-04 LAB
ALBUMIN SERPL BCP-MCNC: 4.8 G/DL (ref 3.2–4.9)
ALBUMIN/GLOB SERPL: 1.8 G/DL
ALP SERPL-CCNC: 91 U/L (ref 30–99)
ALT SERPL-CCNC: 31 U/L (ref 2–50)
ANION GAP SERPL CALC-SCNC: 17 MMOL/L (ref 7–16)
AST SERPL-CCNC: 32 U/L (ref 12–45)
BASOPHILS # BLD AUTO: 0.6 % (ref 0–1.8)
BASOPHILS # BLD: 0.05 K/UL (ref 0–0.12)
BILIRUB SERPL-MCNC: 0.5 MG/DL (ref 0.1–1.5)
BUN SERPL-MCNC: 20 MG/DL (ref 8–22)
CALCIUM SERPL-MCNC: 10.2 MG/DL (ref 8.4–10.2)
CHLORIDE SERPL-SCNC: 100 MMOL/L (ref 96–112)
CO2 SERPL-SCNC: 21 MMOL/L (ref 20–33)
CREAT SERPL-MCNC: 1.2 MG/DL (ref 0.5–1.4)
EOSINOPHIL # BLD AUTO: 0.07 K/UL (ref 0–0.51)
EOSINOPHIL NFR BLD: 0.8 % (ref 0–6.9)
ERYTHROCYTE [DISTWIDTH] IN BLOOD BY AUTOMATED COUNT: 44 FL (ref 35.9–50)
FASTING STATUS PATIENT QL REPORTED: NORMAL
GFR SERPLBLD CREATININE-BSD FMLA CKD-EPI: 48 ML/MIN/1.73 M 2
GLOBULIN SER CALC-MCNC: 2.6 G/DL (ref 1.9–3.5)
GLUCOSE SERPL-MCNC: 108 MG/DL (ref 65–99)
HCT VFR BLD AUTO: 47.7 % (ref 37–47)
HGB BLD-MCNC: 16.1 G/DL (ref 12–16)
IMM GRANULOCYTES # BLD AUTO: 0.03 K/UL (ref 0–0.11)
IMM GRANULOCYTES NFR BLD AUTO: 0.3 % (ref 0–0.9)
LYMPHOCYTES # BLD AUTO: 2.47 K/UL (ref 1–4.8)
LYMPHOCYTES NFR BLD: 28.4 % (ref 22–41)
MCH RBC QN AUTO: 31.7 PG (ref 27–33)
MCHC RBC AUTO-ENTMCNC: 33.8 G/DL (ref 33.6–35)
MCV RBC AUTO: 93.9 FL (ref 81.4–97.8)
MONOCYTES # BLD AUTO: 0.58 K/UL (ref 0–0.85)
MONOCYTES NFR BLD AUTO: 6.7 % (ref 0–13.4)
NEUTROPHILS # BLD AUTO: 5.51 K/UL (ref 2–7.15)
NEUTROPHILS NFR BLD: 63.2 % (ref 44–72)
NRBC # BLD AUTO: 0 K/UL
NRBC BLD-RTO: 0 /100 WBC
PLATELET # BLD AUTO: 289 K/UL (ref 164–446)
PMV BLD AUTO: 9.3 FL (ref 9–12.9)
POTASSIUM SERPL-SCNC: 4.3 MMOL/L (ref 3.6–5.5)
PROT SERPL-MCNC: 7.4 G/DL (ref 6–8.2)
RBC # BLD AUTO: 5.08 M/UL (ref 4.2–5.4)
SODIUM SERPL-SCNC: 138 MMOL/L (ref 135–145)
WBC # BLD AUTO: 8.7 K/UL (ref 4.8–10.8)

## 2022-04-04 PROCEDURE — 80053 COMPREHEN METABOLIC PANEL: CPT

## 2022-04-04 PROCEDURE — 85025 COMPLETE CBC W/AUTO DIFF WBC: CPT

## 2022-04-04 PROCEDURE — 36415 COLL VENOUS BLD VENIPUNCTURE: CPT

## 2022-04-04 PROCEDURE — 83036 HEMOGLOBIN GLYCOSYLATED A1C: CPT

## 2022-04-05 LAB
EST. AVERAGE GLUCOSE BLD GHB EST-MCNC: 117 MG/DL
HBA1C MFR BLD: 5.7 % (ref 4–5.6)

## 2022-04-11 ENCOUNTER — OFFICE VISIT (OUTPATIENT)
Dept: MEDICAL GROUP | Facility: PHYSICIAN GROUP | Age: 72
End: 2022-04-11
Payer: MEDICARE

## 2022-04-11 VITALS
HEIGHT: 67 IN | RESPIRATION RATE: 12 BRPM | TEMPERATURE: 97.8 F | BODY MASS INDEX: 36.73 KG/M2 | HEART RATE: 77 BPM | SYSTOLIC BLOOD PRESSURE: 132 MMHG | WEIGHT: 234 LBS | OXYGEN SATURATION: 95 % | DIASTOLIC BLOOD PRESSURE: 68 MMHG

## 2022-04-11 DIAGNOSIS — T49.4X5A HYPOTHYROIDISM FROM RESORCINOL: ICD-10-CM

## 2022-04-11 DIAGNOSIS — E78.5 HYPERLIPIDEMIA WITH TARGET LDL LESS THAN 100: ICD-10-CM

## 2022-04-11 DIAGNOSIS — E03.2 HYPOTHYROIDISM FROM RESORCINOL: ICD-10-CM

## 2022-04-11 DIAGNOSIS — I15.2 HYPERTENSION DUE TO ENDOCRINE DISORDER: ICD-10-CM

## 2022-04-11 DIAGNOSIS — E11.9 TYPE 2 DIABETES MELLITUS WITHOUT COMPLICATION, WITHOUT LONG-TERM CURRENT USE OF INSULIN (HCC): ICD-10-CM

## 2022-04-11 DIAGNOSIS — N18.31 STAGE 3A CHRONIC KIDNEY DISEASE: ICD-10-CM

## 2022-04-11 DIAGNOSIS — G89.29 CHRONIC BILATERAL LOW BACK PAIN WITHOUT SCIATICA: ICD-10-CM

## 2022-04-11 DIAGNOSIS — M54.50 CHRONIC BILATERAL LOW BACK PAIN WITHOUT SCIATICA: ICD-10-CM

## 2022-04-11 PROCEDURE — 99214 OFFICE O/P EST MOD 30 MIN: CPT | Performed by: INTERNAL MEDICINE

## 2022-04-11 RX ORDER — HYDROCODONE BITARTRATE AND ACETAMINOPHEN 5; 325 MG/1; MG/1
1 TABLET ORAL EVERY 4 HOURS PRN
Qty: 70 TABLET | Refills: 0 | Status: SHIPPED | OUTPATIENT
Start: 2022-05-13 | End: 2022-06-12

## 2022-04-11 RX ORDER — SPIRONOLACTONE 25 MG/1
TABLET ORAL
Qty: 100 TABLET | Refills: 3 | Status: SHIPPED | OUTPATIENT
Start: 2022-04-11 | End: 2022-09-26

## 2022-04-11 RX ORDER — METOPROLOL SUCCINATE 25 MG/1
25 TABLET, EXTENDED RELEASE ORAL DAILY
Qty: 100 TABLET | Refills: 3 | Status: SHIPPED | OUTPATIENT
Start: 2022-04-11 | End: 2023-03-15 | Stop reason: SDUPTHER

## 2022-04-11 RX ORDER — ATORVASTATIN CALCIUM 20 MG/1
TABLET, FILM COATED ORAL
Qty: 150 TABLET | Refills: 3 | Status: SHIPPED | OUTPATIENT
Start: 2022-04-11 | End: 2023-03-15 | Stop reason: SDUPTHER

## 2022-04-11 RX ORDER — LEVOTHYROXINE SODIUM 137 UG/1
137 TABLET ORAL DAILY
Qty: 100 TABLET | Refills: 3 | Status: SHIPPED | OUTPATIENT
Start: 2022-04-11 | End: 2022-09-18 | Stop reason: SDUPTHER

## 2022-04-11 RX ORDER — DULAGLUTIDE 0.75 MG/.5ML
3 INJECTION, SOLUTION SUBCUTANEOUS
Qty: 1.5 ML | Refills: 3 | Status: SHIPPED | OUTPATIENT
Start: 2022-04-11 | End: 2022-05-09 | Stop reason: SDUPTHER

## 2022-04-11 RX ORDER — HYDROCODONE BITARTRATE AND ACETAMINOPHEN 5; 325 MG/1; MG/1
1 TABLET ORAL EVERY 4 HOURS PRN
Qty: 70 TABLET | Refills: 0 | Status: SHIPPED | OUTPATIENT
Start: 2022-04-13 | End: 2022-05-13

## 2022-04-11 RX ORDER — LISINOPRIL 40 MG/1
40 TABLET ORAL DAILY
Qty: 100 TABLET | Refills: 3 | Status: SHIPPED | OUTPATIENT
Start: 2022-04-11 | End: 2023-03-13

## 2022-04-11 RX ORDER — HYDROCODONE BITARTRATE AND ACETAMINOPHEN 5; 325 MG/1; MG/1
1 TABLET ORAL EVERY 4 HOURS PRN
Qty: 70 TABLET | Refills: 0 | Status: SHIPPED | OUTPATIENT
Start: 2022-06-12 | End: 2022-06-30 | Stop reason: SDUPTHER

## 2022-04-11 ASSESSMENT — FIBROSIS 4 INDEX: FIB4 SCORE: 1.39

## 2022-04-11 NOTE — ASSESSMENT & PLAN NOTE
Chronic and improved problem.  GFR up to 48.  Continue good oral hydration and current medication regiment.
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
## 2022-04-11 NOTE — ASSESSMENT & PLAN NOTE
Chronic and ongoing problem.  Blood pressure well controlled on current regimen.  Lab work is stable.  Appropriate continue lisinopril, metoprolol succinate, and spironolactone.

## 2022-04-11 NOTE — ASSESSMENT & PLAN NOTE
"Chronic and ongoing problem, improved quality of life and pain control with use of Norco. No negative side effects.    Obtained and reviewed patient utilization report from [facility] pharmacy database on 4/11/2022 4:10 PM  prior to writing prescription for controlled substance II, III or IV per [place] bill . Based on assessment of the report, the prescription is medically necessary.     Patient understands this prescription is a controlled substance which is potentially habit-forming and its use is regulated by the [place]. We also discussed the new \"black box\" warning regarding the lethal combination of opioids and benzodiazepines. Refills are subject to terms of a controlled substance agreement and patient has an updated one on file. Most recent UDS is appropriate. Any refill requires an office visit. Narcotics have may adverse effects and the risks of addiction, accidental overdose and death were emphasized. Provided prescriptions for the next three months.  "

## 2022-04-19 ENCOUNTER — TELEPHONE (OUTPATIENT)
Dept: HEALTH INFORMATION MANAGEMENT | Facility: OTHER | Age: 72
End: 2022-04-19

## 2022-04-19 DIAGNOSIS — C50.919 RECURRENT MALIGNANT NEOPLASM OF BREAST, UNSPECIFIED LATERALITY (HCC): ICD-10-CM

## 2022-04-19 RX ORDER — ANASTROZOLE 1 MG/1
1 TABLET ORAL DAILY
Qty: 100 TABLET | Refills: 3 | Status: SHIPPED | OUTPATIENT
Start: 2022-04-19 | End: 2023-03-13

## 2022-05-26 ENCOUNTER — TELEPHONE (OUTPATIENT)
Dept: MEDICAL GROUP | Facility: PHYSICIAN GROUP | Age: 72
End: 2022-05-26
Payer: MEDICARE

## 2022-05-26 NOTE — TELEPHONE ENCOUNTER
1. Caller Name: Keyur Burt                          Call Back Number: [phone] (home)         How would the patient prefer to be contacted with a response: Phone call OK to leave a detailed message    Called and spoke with patient.  She will come tomorrow  Friday May 27th to  Trulicity

## 2022-06-30 ENCOUNTER — OFFICE VISIT (OUTPATIENT)
Dept: MEDICAL GROUP | Facility: PHYSICIAN GROUP | Age: 72
End: 2022-06-30
Payer: MEDICARE

## 2022-06-30 VITALS
DIASTOLIC BLOOD PRESSURE: 60 MMHG | SYSTOLIC BLOOD PRESSURE: 106 MMHG | RESPIRATION RATE: 12 BRPM | HEART RATE: 99 BPM | OXYGEN SATURATION: 93 % | TEMPERATURE: 96.5 F | HEIGHT: 67 IN | WEIGHT: 228.8 LBS | BODY MASS INDEX: 35.91 KG/M2

## 2022-06-30 DIAGNOSIS — E78.2 MIXED HYPERLIPIDEMIA: ICD-10-CM

## 2022-06-30 DIAGNOSIS — I15.1 HYPERTENSION SECONDARY TO OTHER RENAL DISORDERS: ICD-10-CM

## 2022-06-30 DIAGNOSIS — G89.29 CHRONIC BILATERAL LOW BACK PAIN WITHOUT SCIATICA: ICD-10-CM

## 2022-06-30 DIAGNOSIS — M54.50 CHRONIC BILATERAL LOW BACK PAIN WITHOUT SCIATICA: ICD-10-CM

## 2022-06-30 DIAGNOSIS — E11.9 TYPE 2 DIABETES MELLITUS WITHOUT COMPLICATION, WITHOUT LONG-TERM CURRENT USE OF INSULIN (HCC): ICD-10-CM

## 2022-06-30 DIAGNOSIS — E03.2 HYPOTHYROIDISM FROM RESORCINOL: ICD-10-CM

## 2022-06-30 DIAGNOSIS — T49.4X5A HYPOTHYROIDISM FROM RESORCINOL: ICD-10-CM

## 2022-06-30 PROCEDURE — 99214 OFFICE O/P EST MOD 30 MIN: CPT | Performed by: INTERNAL MEDICINE

## 2022-06-30 RX ORDER — HYDROCODONE BITARTRATE AND ACETAMINOPHEN 5; 325 MG/1; MG/1
1 TABLET ORAL EVERY 8 HOURS PRN
Qty: 70 TABLET | Refills: 0 | Status: SHIPPED | OUTPATIENT
Start: 2022-07-13 | End: 2022-08-12

## 2022-06-30 RX ORDER — HYDROCODONE BITARTRATE AND ACETAMINOPHEN 5; 325 MG/1; MG/1
1 TABLET ORAL EVERY 8 HOURS PRN
Qty: 70 TABLET | Refills: 0 | Status: SHIPPED | OUTPATIENT
Start: 2022-08-12 | End: 2022-09-11

## 2022-06-30 RX ORDER — DULAGLUTIDE 0.75 MG/.5ML
1 INJECTION, SOLUTION SUBCUTANEOUS
Qty: 1.5 ML | Refills: 3
Start: 2022-06-30 | End: 2024-03-14 | Stop reason: SDUPTHER

## 2022-06-30 RX ORDER — HYDROCODONE BITARTRATE AND ACETAMINOPHEN 5; 325 MG/1; MG/1
1 TABLET ORAL EVERY 8 HOURS PRN
Qty: 70 TABLET | Refills: 0 | Status: SHIPPED | OUTPATIENT
Start: 2022-09-11 | End: 2022-09-26 | Stop reason: SDUPTHER

## 2022-06-30 ASSESSMENT — FIBROSIS 4 INDEX: FIB4 SCORE: 1.39

## 2022-06-30 NOTE — ASSESSMENT & PLAN NOTE
Chronic and improved problem.  She has had significant weight loss since we have started Trulicity.  Her blood pressure today is running on the low end.  Discussed that we can reduce her lisinopril from 40 mg to 20 mg as well as she has a blood pressure cuff so she can keep an eye on it at home periodically.  At this time she like to continue her current regimen of metoprolol succinate, lisinopril, and spironolactone.  We will update electrolytes and kidney function before next visit.

## 2022-06-30 NOTE — ASSESSMENT & PLAN NOTE
Chronic and improved problem.  Down to the prediabetic range with low-dose Trulicity.  She is on a patient assistance program and the medicine is naltrexone for her to .  Continue Trulicity 0.75 mg weekly on Saturdays.  She is due to update her lab work and urine microalbumin.  She is appropriately on ACE inhibitor and statin.  Update A1c before next visit.

## 2022-06-30 NOTE — ASSESSMENT & PLAN NOTE
Chronic and stable problem.  Continue atorvastatin 20 mg daily and update lipid panel before next visit.

## 2022-06-30 NOTE — ASSESSMENT & PLAN NOTE
"Chronic and ongoing problem, she continues to live independently with a good quality of life due to use of the Norco for pain control.  She cannot use anti-inflammatories due to her advanced kidney disease.  She is not experiencing any negative sequelae from this medicine.    Obtained and reviewed patient utilization report from [facility] pharmacy database on 6/30/2022 4:56 PM  prior to writing prescription for controlled substance II, III or IV per [place] bill . Based on assessment of the report, the prescription is medically necessary.     Patient understands this prescription is a controlled substance which is potentially habit-forming and its use is regulated by the [employer]. We also discussed the new \"black box\" warning regarding the lethal combination of opioids and benzodiazepines. Refills are subject to terms of a controlled substance agreement and patient has an updated one on file. Most recent UDS is appropriate. Any refill requires an office visit. Narcotics have may adverse effects and the risks of addiction, accidental overdose and death were emphasized. Provided prescriptions for the next three months.  "
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
## 2022-06-30 NOTE — ASSESSMENT & PLAN NOTE
Chronic and ongoing problem, update thyroid levels to ensure ongoing stability.  Continue levothyroxine 137 mcg daily in the meantime.

## 2022-08-11 NOTE — ASSESSMENT & PLAN NOTE
CALL  OFFICE TO SCHEDULE ONE WEEK POST OP FOLLOW UP APPOINTMENT([phone])   Previous problem, no signs of recurrence.  She was treated with total thyroidectomy and is on levothyroxine.

## 2022-08-12 ENCOUNTER — NON-PROVIDER VISIT (OUTPATIENT)
Dept: MEDICAL GROUP | Facility: PHYSICIAN GROUP | Age: 72
End: 2022-08-12
Payer: MEDICARE

## 2022-09-14 ENCOUNTER — HOSPITAL ENCOUNTER (OUTPATIENT)
Dept: LAB | Facility: MEDICAL CENTER | Age: 72
End: 2022-09-14
Attending: INTERNAL MEDICINE
Payer: MEDICARE

## 2022-09-14 DIAGNOSIS — T49.4X5A HYPOTHYROIDISM FROM RESORCINOL: ICD-10-CM

## 2022-09-14 DIAGNOSIS — E03.2 HYPOTHYROIDISM FROM RESORCINOL: ICD-10-CM

## 2022-09-14 DIAGNOSIS — E11.9 TYPE 2 DIABETES MELLITUS WITHOUT COMPLICATION, WITHOUT LONG-TERM CURRENT USE OF INSULIN (HCC): ICD-10-CM

## 2022-09-14 DIAGNOSIS — E78.2 MIXED HYPERLIPIDEMIA: ICD-10-CM

## 2022-09-14 LAB
25(OH)D3 SERPL-MCNC: 73 NG/ML (ref 30–100)
ALBUMIN SERPL BCP-MCNC: 4.7 G/DL (ref 3.2–4.9)
ALBUMIN/GLOB SERPL: 1.7 G/DL
ALP SERPL-CCNC: 70 U/L (ref 30–99)
ALT SERPL-CCNC: 37 U/L (ref 2–50)
ANION GAP SERPL CALC-SCNC: 12 MMOL/L (ref 7–16)
AST SERPL-CCNC: 31 U/L (ref 12–45)
BASOPHILS # BLD AUTO: 0.8 % (ref 0–1.8)
BASOPHILS # BLD: 0.05 K/UL (ref 0–0.12)
BILIRUB SERPL-MCNC: 0.6 MG/DL (ref 0.1–1.5)
BUN SERPL-MCNC: 24 MG/DL (ref 8–22)
CALCIUM SERPL-MCNC: 10.4 MG/DL (ref 8.4–10.2)
CHLORIDE SERPL-SCNC: 104 MMOL/L (ref 96–112)
CHOLEST SERPL-MCNC: 175 MG/DL (ref 100–199)
CO2 SERPL-SCNC: 23 MMOL/L (ref 20–33)
CREAT SERPL-MCNC: 1.4 MG/DL (ref 0.5–1.4)
CREAT UR-MCNC: 51.5 MG/DL
EOSINOPHIL # BLD AUTO: 0.18 K/UL (ref 0–0.51)
EOSINOPHIL NFR BLD: 2.8 % (ref 0–6.9)
ERYTHROCYTE [DISTWIDTH] IN BLOOD BY AUTOMATED COUNT: 43.9 FL (ref 35.9–50)
EST. AVERAGE GLUCOSE BLD GHB EST-MCNC: 108 MG/DL
FASTING STATUS PATIENT QL REPORTED: NORMAL
GFR SERPLBLD CREATININE-BSD FMLA CKD-EPI: 40 ML/MIN/1.73 M 2
GLOBULIN SER CALC-MCNC: 2.8 G/DL (ref 1.9–3.5)
GLUCOSE SERPL-MCNC: 127 MG/DL (ref 65–99)
HBA1C MFR BLD: 5.4 % (ref 4–5.6)
HCT VFR BLD AUTO: 47.3 % (ref 37–47)
HDLC SERPL-MCNC: 63 MG/DL
HGB BLD-MCNC: 15.9 G/DL (ref 12–16)
IMM GRANULOCYTES # BLD AUTO: 0.02 K/UL (ref 0–0.11)
IMM GRANULOCYTES NFR BLD AUTO: 0.3 % (ref 0–0.9)
LDLC SERPL CALC-MCNC: 75 MG/DL
LYMPHOCYTES # BLD AUTO: 1.72 K/UL (ref 1–4.8)
LYMPHOCYTES NFR BLD: 26.8 % (ref 22–41)
MCH RBC QN AUTO: 31.9 PG (ref 27–33)
MCHC RBC AUTO-ENTMCNC: 33.6 G/DL (ref 33.6–35)
MCV RBC AUTO: 94.8 FL (ref 81.4–97.8)
MICROALBUMIN UR-MCNC: <1.2 MG/DL
MICROALBUMIN/CREAT UR: NORMAL MG/G (ref 0–30)
MONOCYTES # BLD AUTO: 0.47 K/UL (ref 0–0.85)
MONOCYTES NFR BLD AUTO: 7.3 % (ref 0–13.4)
NEUTROPHILS # BLD AUTO: 3.97 K/UL (ref 2–7.15)
NEUTROPHILS NFR BLD: 62 % (ref 44–72)
NRBC # BLD AUTO: 0 K/UL
NRBC BLD-RTO: 0 /100 WBC
PLATELET # BLD AUTO: 268 K/UL (ref 164–446)
PMV BLD AUTO: 9.5 FL (ref 9–12.9)
POTASSIUM SERPL-SCNC: 4.5 MMOL/L (ref 3.6–5.5)
PROT SERPL-MCNC: 7.5 G/DL (ref 6–8.2)
RBC # BLD AUTO: 4.99 M/UL (ref 4.2–5.4)
SODIUM SERPL-SCNC: 139 MMOL/L (ref 135–145)
T4 FREE SERPL-MCNC: 2.03 NG/DL (ref 0.93–1.7)
TRIGL SERPL-MCNC: 185 MG/DL (ref 0–149)
TSH SERPL DL<=0.005 MIU/L-ACNC: 0.24 UIU/ML (ref 0.38–5.33)
VIT B12 SERPL-MCNC: 335 PG/ML (ref 211–911)
WBC # BLD AUTO: 6.4 K/UL (ref 4.8–10.8)

## 2022-09-14 PROCEDURE — 82570 ASSAY OF URINE CREATININE: CPT

## 2022-09-14 PROCEDURE — 85025 COMPLETE CBC W/AUTO DIFF WBC: CPT

## 2022-09-14 PROCEDURE — 84443 ASSAY THYROID STIM HORMONE: CPT

## 2022-09-14 PROCEDURE — 36415 COLL VENOUS BLD VENIPUNCTURE: CPT

## 2022-09-14 PROCEDURE — 83036 HEMOGLOBIN GLYCOSYLATED A1C: CPT

## 2022-09-14 PROCEDURE — 80053 COMPREHEN METABOLIC PANEL: CPT

## 2022-09-14 PROCEDURE — 84439 ASSAY OF FREE THYROXINE: CPT

## 2022-09-14 PROCEDURE — 80061 LIPID PANEL: CPT

## 2022-09-14 PROCEDURE — 82306 VITAMIN D 25 HYDROXY: CPT

## 2022-09-14 PROCEDURE — 82607 VITAMIN B-12: CPT

## 2022-09-14 PROCEDURE — 82043 UR ALBUMIN QUANTITATIVE: CPT

## 2022-09-18 DIAGNOSIS — T49.4X5A HYPOTHYROIDISM FROM RESORCINOL: ICD-10-CM

## 2022-09-18 DIAGNOSIS — E03.2 HYPOTHYROIDISM FROM RESORCINOL: ICD-10-CM

## 2022-09-18 RX ORDER — LEVOTHYROXINE SODIUM 0.12 MG/1
125 TABLET ORAL DAILY
Qty: 100 TABLET | Refills: 3 | Status: SHIPPED | OUTPATIENT
Start: 2022-09-18 | End: 2023-03-15 | Stop reason: SDUPTHER

## 2022-09-26 ENCOUNTER — OFFICE VISIT (OUTPATIENT)
Dept: MEDICAL GROUP | Facility: PHYSICIAN GROUP | Age: 72
End: 2022-09-26
Payer: MEDICARE

## 2022-09-26 VITALS
RESPIRATION RATE: 12 BRPM | HEART RATE: 99 BPM | SYSTOLIC BLOOD PRESSURE: 104 MMHG | BODY MASS INDEX: 35.17 KG/M2 | OXYGEN SATURATION: 95 % | DIASTOLIC BLOOD PRESSURE: 66 MMHG | WEIGHT: 224.1 LBS | HEIGHT: 67 IN | TEMPERATURE: 96.7 F

## 2022-09-26 DIAGNOSIS — M54.50 CHRONIC BILATERAL LOW BACK PAIN WITHOUT SCIATICA: ICD-10-CM

## 2022-09-26 DIAGNOSIS — E03.2 HYPOTHYROIDISM FROM RESORCINOL: ICD-10-CM

## 2022-09-26 DIAGNOSIS — Z12.11 SCREEN FOR COLON CANCER: ICD-10-CM

## 2022-09-26 DIAGNOSIS — Z85.850 HISTORY OF THYROID CANCER: ICD-10-CM

## 2022-09-26 DIAGNOSIS — T49.4X5A HYPOTHYROIDISM FROM RESORCINOL: ICD-10-CM

## 2022-09-26 DIAGNOSIS — E11.9 TYPE 2 DIABETES MELLITUS WITHOUT COMPLICATION, WITHOUT LONG-TERM CURRENT USE OF INSULIN (HCC): ICD-10-CM

## 2022-09-26 DIAGNOSIS — N18.32 STAGE 3B CHRONIC KIDNEY DISEASE: ICD-10-CM

## 2022-09-26 DIAGNOSIS — E53.8 B12 DEFICIENCY: ICD-10-CM

## 2022-09-26 DIAGNOSIS — E78.2 MIXED HYPERLIPIDEMIA: ICD-10-CM

## 2022-09-26 DIAGNOSIS — R11.0 NAUSEA: ICD-10-CM

## 2022-09-26 DIAGNOSIS — G89.29 CHRONIC BILATERAL LOW BACK PAIN WITHOUT SCIATICA: ICD-10-CM

## 2022-09-26 DIAGNOSIS — I15.1 HYPERTENSION SECONDARY TO OTHER RENAL DISORDERS: ICD-10-CM

## 2022-09-26 PROCEDURE — 99215 OFFICE O/P EST HI 40 MIN: CPT | Performed by: INTERNAL MEDICINE

## 2022-09-26 RX ORDER — HYDROCODONE BITARTRATE AND ACETAMINOPHEN 5; 325 MG/1; MG/1
1 TABLET ORAL EVERY 8 HOURS PRN
Qty: 70 TABLET | Refills: 0 | Status: SHIPPED | OUTPATIENT
Start: 2022-12-12 | End: 2022-12-29 | Stop reason: SDUPTHER

## 2022-09-26 RX ORDER — ONDANSETRON 4 MG/1
4 TABLET, ORALLY DISINTEGRATING ORAL EVERY 6 HOURS PRN
COMMUNITY

## 2022-09-26 RX ORDER — ONDANSETRON 4 MG/1
4 TABLET, ORALLY DISINTEGRATING ORAL EVERY 6 HOURS PRN
Qty: 20 TABLET | Refills: 1 | Status: SHIPPED | OUTPATIENT
Start: 2022-09-26 | End: 2023-03-15

## 2022-09-26 RX ORDER — HYDROCODONE BITARTRATE AND ACETAMINOPHEN 5; 325 MG/1; MG/1
1 TABLET ORAL EVERY 8 HOURS PRN
Qty: 70 TABLET | Refills: 0 | Status: SHIPPED | OUTPATIENT
Start: 2022-10-13 | End: 2022-11-12

## 2022-09-26 RX ORDER — CYANOCOBALAMIN 1000 UG/ML
1000 INJECTION, SOLUTION INTRAMUSCULAR; SUBCUTANEOUS ONCE
Status: COMPLETED | OUTPATIENT
Start: 2022-09-26 | End: 2022-09-26

## 2022-09-26 RX ORDER — HYDROCODONE BITARTRATE AND ACETAMINOPHEN 5; 325 MG/1; MG/1
1 TABLET ORAL EVERY 8 HOURS PRN
Qty: 70 TABLET | Refills: 0 | Status: SHIPPED | OUTPATIENT
Start: 2022-11-12 | End: 2022-12-12

## 2022-09-26 RX ADMIN — CYANOCOBALAMIN 1000 MCG: 1000 INJECTION, SOLUTION INTRAMUSCULAR; SUBCUTANEOUS at 17:16

## 2022-09-26 ASSESSMENT — FIBROSIS 4 INDEX: FIB4 SCORE: 1.35

## 2022-09-27 NOTE — ASSESSMENT & PLAN NOTE
Chronic and ongoing problem, requires medication adjustment. Stop spironolactone. Continue lisinopril 40 mg and metoprolol 25 mg daily, follow BP closely and continue reducing medicines due to recent weight loss with low running readings in clinic. Update thyroid and kidney function next month.

## 2022-09-27 NOTE — ASSESSMENT & PLAN NOTE
New problem, administer B12 1000 mcg injection in clinic today and recheck lab work in about a month to make sure she is absorbing this vitamin properly.

## 2022-09-27 NOTE — ASSESSMENT & PLAN NOTE
Chronic and decompensated problem.  Recommend we recheck the thyroid labs after 4 weeks due to the significant abnormality noted most recently.  We will have her check again in 3 to 4 weeks and can further reduce her levothyroxine if indicated.  She reports she is status post total thyroidectomy, she has lost about 40 pounds since discharge Trulicity and may simply require a lower dose.

## 2022-09-27 NOTE — ASSESSMENT & PLAN NOTE
Previous problem, currently both her type 2 diabetes as well as even prediabetes are resolved on low-dose dulaglutide 0.75 mg weekly.  We will recheck her A1c 1 more time in 3 months and if she remains at goal then we can talk about stopping the Trulicity.

## 2022-09-27 NOTE — ASSESSMENT & PLAN NOTE
Chronic ongoing problem, GFR is currently at 40, she ranges from high 30s to high 40s.  Recheck SPEP due to associated hypercalcemia with reduction in GFR most recently.  Stop spironolactone, allow blood pressure to run a little bit higher as I feel she is being overtreated to to recent significant weight loss.  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  Neck steps following results of above, she will plan to get this done around mid October 2022.

## 2022-09-27 NOTE — ASSESSMENT & PLAN NOTE
New problem, mild severity.  We will renew her Zofran 4 mg sublingual to take as needed for nausea.

## 2022-09-27 NOTE — ASSESSMENT & PLAN NOTE
Previous problem, requires follow-up, recheck thyroid labs next month following dose reduction, may need additional evaluation.  She reports history of total thyroidectomy due to thyroid cancer in 2006.

## 2022-09-27 NOTE — ASSESSMENT & PLAN NOTE
"Chronic and ongoing problem.  She continues to be functional with her ADLs and live independently with a good quality of life due to use of the hydrocodone-acetaminophen for pain control.  She has CKD stage IIIb and is not a candidate for anti-inflammatories.  She is not experiencing any constipation, oversedation, or negative sequelae from this prescription.    Obtained and reviewed patient utilization report from [facility] pharmacy database on 9/26/2022 5:31 PM  prior to writing prescription for controlled substance II, III or IV per [place] bill . Based on assessment of the report, the prescription is medically necessary.     Patient understands this prescription is a controlled substance which is potentially habit-forming and its use is regulated by the [employer]. We also discussed the new \"black box\" warning regarding the lethal combination of opioids and benzodiazepines. Refills are subject to terms of a controlled substance agreement and patient has an updated one on file. Most recent UDS is appropriate. Any refill requires an office visit. Narcotics have may adverse effects and the risks of addiction, accidental overdose and death were emphasized. Provided prescriptions for the next three months.  "
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
## 2022-10-25 ENCOUNTER — HOSPITAL ENCOUNTER (OUTPATIENT)
Dept: LAB | Facility: MEDICAL CENTER | Age: 72
End: 2022-10-25
Attending: INTERNAL MEDICINE
Payer: MEDICARE

## 2022-10-25 DIAGNOSIS — E78.2 MIXED HYPERLIPIDEMIA: ICD-10-CM

## 2022-10-25 DIAGNOSIS — N18.32 STAGE 3B CHRONIC KIDNEY DISEASE: ICD-10-CM

## 2022-10-25 DIAGNOSIS — T49.4X5A HYPOTHYROIDISM FROM RESORCINOL: ICD-10-CM

## 2022-10-25 DIAGNOSIS — E03.2 HYPOTHYROIDISM FROM RESORCINOL: ICD-10-CM

## 2022-10-25 DIAGNOSIS — E53.8 B12 DEFICIENCY: ICD-10-CM

## 2022-10-25 DIAGNOSIS — I15.1 HYPERTENSION SECONDARY TO OTHER RENAL DISORDERS: ICD-10-CM

## 2022-10-25 DIAGNOSIS — E11.9 TYPE 2 DIABETES MELLITUS WITHOUT COMPLICATION, WITHOUT LONG-TERM CURRENT USE OF INSULIN (HCC): ICD-10-CM

## 2022-10-25 DIAGNOSIS — Z85.850 HISTORY OF THYROID CANCER: ICD-10-CM

## 2022-10-25 LAB
ALBUMIN SERPL BCP-MCNC: 4.4 G/DL (ref 3.2–4.9)
ALBUMIN/GLOB SERPL: 1.5 G/DL
ALP SERPL-CCNC: 75 U/L (ref 30–99)
ALT SERPL-CCNC: 38 U/L (ref 2–50)
ANION GAP SERPL CALC-SCNC: 14 MMOL/L (ref 7–16)
AST SERPL-CCNC: 35 U/L (ref 12–45)
BILIRUB SERPL-MCNC: 0.4 MG/DL (ref 0.1–1.5)
BUN SERPL-MCNC: 18 MG/DL (ref 8–22)
CALCIUM SERPL-MCNC: 9.7 MG/DL (ref 8.5–10.5)
CHLORIDE SERPL-SCNC: 103 MMOL/L (ref 96–112)
CO2 SERPL-SCNC: 22 MMOL/L (ref 20–33)
CREAT SERPL-MCNC: 1.15 MG/DL (ref 0.5–1.4)
GFR SERPLBLD CREATININE-BSD FMLA CKD-EPI: 51 ML/MIN/1.73 M 2
GLOBULIN SER CALC-MCNC: 2.9 G/DL (ref 1.9–3.5)
GLUCOSE SERPL-MCNC: 106 MG/DL (ref 65–99)
POTASSIUM SERPL-SCNC: 4.3 MMOL/L (ref 3.6–5.5)
PROT SERPL-MCNC: 7.3 G/DL (ref 6–8.2)
SODIUM SERPL-SCNC: 139 MMOL/L (ref 135–145)

## 2022-10-25 PROCEDURE — 80053 COMPREHEN METABOLIC PANEL: CPT

## 2022-10-25 PROCEDURE — 84439 ASSAY OF FREE THYROXINE: CPT

## 2022-10-25 PROCEDURE — 82607 VITAMIN B-12: CPT

## 2022-10-25 PROCEDURE — 83970 ASSAY OF PARATHORMONE: CPT

## 2022-10-25 PROCEDURE — 36415 COLL VENOUS BLD VENIPUNCTURE: CPT

## 2022-10-25 PROCEDURE — 84443 ASSAY THYROID STIM HORMONE: CPT

## 2022-10-26 LAB
PTH-INTACT SERPL-MCNC: 35.2 PG/ML (ref 14–72)
T4 FREE SERPL-MCNC: 1.56 NG/DL (ref 0.93–1.7)
TSH SERPL DL<=0.005 MIU/L-ACNC: 0.51 UIU/ML (ref 0.38–5.33)
VIT B12 SERPL-MCNC: 481 PG/ML (ref 211–911)

## 2022-12-21 ENCOUNTER — OFFICE VISIT (OUTPATIENT)
Dept: MEDICAL GROUP | Facility: PHYSICIAN GROUP | Age: 72
End: 2022-12-21
Payer: MEDICARE

## 2022-12-21 VITALS
TEMPERATURE: 98.9 F | RESPIRATION RATE: 12 BRPM | DIASTOLIC BLOOD PRESSURE: 60 MMHG | WEIGHT: 227.9 LBS | HEART RATE: 95 BPM | HEIGHT: 67 IN | OXYGEN SATURATION: 95 % | SYSTOLIC BLOOD PRESSURE: 110 MMHG | BODY MASS INDEX: 35.77 KG/M2

## 2022-12-21 DIAGNOSIS — E03.2 HYPOTHYROIDISM FROM RESORCINOL: ICD-10-CM

## 2022-12-21 DIAGNOSIS — G89.29 CHRONIC BILATERAL LOW BACK PAIN WITHOUT SCIATICA: ICD-10-CM

## 2022-12-21 DIAGNOSIS — E11.9 TYPE 2 DIABETES MELLITUS WITHOUT COMPLICATION, WITHOUT LONG-TERM CURRENT USE OF INSULIN (HCC): ICD-10-CM

## 2022-12-21 DIAGNOSIS — T49.4X5A HYPOTHYROIDISM FROM RESORCINOL: ICD-10-CM

## 2022-12-21 DIAGNOSIS — M54.50 CHRONIC BILATERAL LOW BACK PAIN WITHOUT SCIATICA: ICD-10-CM

## 2022-12-21 DIAGNOSIS — N18.32 STAGE 3B CHRONIC KIDNEY DISEASE: ICD-10-CM

## 2022-12-21 DIAGNOSIS — E66.01 SEVERE OBESITY (HCC): ICD-10-CM

## 2022-12-21 PROCEDURE — 99214 OFFICE O/P EST MOD 30 MIN: CPT | Performed by: INTERNAL MEDICINE

## 2022-12-21 RX ORDER — LEVOTHYROXINE SODIUM 137 UG/1
137 TABLET ORAL
COMMUNITY
Start: 2022-11-04 | End: 2022-12-21

## 2022-12-21 ASSESSMENT — FIBROSIS 4 INDEX: FIB4 SCORE: 1.5

## 2022-12-22 NOTE — ASSESSMENT & PLAN NOTE
"Chronic and ongoing problem, continues to have pain in the low back pain which is chronic. Severe arthritis on prior imaging. Continue current regimen with hydrocodone-acetaminophen 5-325 mg twice daily with a third dose available for break through pain about 10 days per month. No negative side effects from medicine, she continues to find good relief.    Obtained and reviewed patient utilization report from [facility] pharmacy database on 12/21/2022 5:35 PM  prior to writing prescription for controlled substance II, III or IV per [place] bill . Based on assessment of the report, the prescription is medically necessary.     Patient understands this prescription is a controlled substance which is potentially habit-forming and its use is regulated by the [employer]. We also discussed the new \"black box\" warning regarding the lethal combination of opioids and benzodiazepines. Refills are subject to terms of a controlled substance agreement and patient has an updated one on file. Most recent UDS is appropriate. Any refill requires an office visit. Narcotics have may adverse effects and the risks of addiction, accidental overdose and death were emphasized. Provided prescriptions for the next three months.  "

## 2022-12-22 NOTE — ASSESSMENT & PLAN NOTE
Chronic and improved problem, fortunately recheck of her blood work shows hypercalcemia is resolved and improvement of GFR up to the low 50s.  Continue good oral hydration and limit nephrotoxic agents as able.

## 2022-12-22 NOTE — ASSESSMENT & PLAN NOTE
Chronic and ongoing problem, diabetes very well controlled on current regimen, continue dulaglutide 0.75 mg weekly. Foot exam updated in clinic, normal.
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
## 2022-12-22 NOTE — ASSESSMENT & PLAN NOTE
Chronic and improved problem.  Signs and symptoms of hypothyroidism better since we reduce the dose and her lab work shows improvement as well.  Continue current regiment of levothyroxine 125 mcg daily.

## 2022-12-22 NOTE — ASSESSMENT & PLAN NOTE
Chronic and improving problem, she has done a fantastic job of reducing her weight, continue dulaglutide 0.75 mg weekly.  Blood pressure, blood sugar, cholesterol are very well controlled.

## 2022-12-29 RX ORDER — HYDROCODONE BITARTRATE AND ACETAMINOPHEN 5; 325 MG/1; MG/1
1 TABLET ORAL EVERY 8 HOURS PRN
Qty: 70 TABLET | Refills: 0 | Status: SHIPPED | OUTPATIENT
Start: 2023-01-12 | End: 2023-02-11

## 2022-12-29 RX ORDER — HYDROCODONE BITARTRATE AND ACETAMINOPHEN 5; 325 MG/1; MG/1
1 TABLET ORAL EVERY 8 HOURS PRN
Qty: 70 TABLET | Refills: 0 | Status: SHIPPED | OUTPATIENT
Start: 2023-02-11 | End: 2023-03-13

## 2022-12-29 RX ORDER — HYDROCODONE BITARTRATE AND ACETAMINOPHEN 5; 325 MG/1; MG/1
1 TABLET ORAL EVERY 8 HOURS PRN
Qty: 70 TABLET | Refills: 0 | Status: SHIPPED | OUTPATIENT
Start: 2023-03-13 | End: 2023-04-12

## 2023-02-28 PROBLEM — Z59.89 ON ECONOMIC ASSISTANCE PROGRAM: Status: ACTIVE | Noted: 2023-02-28

## 2023-03-13 DIAGNOSIS — I15.2 HYPERTENSION DUE TO ENDOCRINE DISORDER: ICD-10-CM

## 2023-03-13 DIAGNOSIS — C50.919 RECURRENT MALIGNANT NEOPLASM OF BREAST, UNSPECIFIED LATERALITY (HCC): ICD-10-CM

## 2023-03-13 RX ORDER — LISINOPRIL 40 MG/1
40 TABLET ORAL DAILY
Qty: 100 TABLET | Refills: 3 | Status: SHIPPED | OUTPATIENT
Start: 2023-03-13 | End: 2023-03-15 | Stop reason: SDUPTHER

## 2023-03-13 RX ORDER — ANASTROZOLE 1 MG/1
1 TABLET ORAL DAILY
Qty: 100 TABLET | Refills: 3 | Status: SHIPPED | OUTPATIENT
Start: 2023-03-13 | End: 2023-03-15 | Stop reason: SDUPTHER

## 2023-03-15 ENCOUNTER — OFFICE VISIT (OUTPATIENT)
Dept: MEDICAL GROUP | Facility: PHYSICIAN GROUP | Age: 73
End: 2023-03-15
Payer: MEDICARE

## 2023-03-15 VITALS
TEMPERATURE: 97 F | WEIGHT: 229 LBS | SYSTOLIC BLOOD PRESSURE: 118 MMHG | HEIGHT: 67 IN | HEART RATE: 93 BPM | BODY MASS INDEX: 35.94 KG/M2 | RESPIRATION RATE: 12 BRPM | OXYGEN SATURATION: 95 % | DIASTOLIC BLOOD PRESSURE: 64 MMHG

## 2023-03-15 DIAGNOSIS — E11.9 TYPE 2 DIABETES MELLITUS WITHOUT COMPLICATION, WITHOUT LONG-TERM CURRENT USE OF INSULIN (HCC): ICD-10-CM

## 2023-03-15 DIAGNOSIS — E03.2 HYPOTHYROIDISM FROM RESORCINOL: ICD-10-CM

## 2023-03-15 DIAGNOSIS — T49.4X5A HYPOTHYROIDISM FROM RESORCINOL: ICD-10-CM

## 2023-03-15 DIAGNOSIS — G44.209 TENSION HEADACHE: ICD-10-CM

## 2023-03-15 DIAGNOSIS — N18.32 STAGE 3B CHRONIC KIDNEY DISEASE: ICD-10-CM

## 2023-03-15 DIAGNOSIS — I15.2 HYPERTENSION DUE TO ENDOCRINE DISORDER: ICD-10-CM

## 2023-03-15 DIAGNOSIS — I70.0 AORTIC ATHEROSCLEROSIS (HCC): ICD-10-CM

## 2023-03-15 DIAGNOSIS — E66.01 SEVERE OBESITY (HCC): ICD-10-CM

## 2023-03-15 DIAGNOSIS — C50.919 RECURRENT MALIGNANT NEOPLASM OF BREAST, UNSPECIFIED LATERALITY (HCC): ICD-10-CM

## 2023-03-15 DIAGNOSIS — M54.50 CHRONIC BILATERAL LOW BACK PAIN WITHOUT SCIATICA: ICD-10-CM

## 2023-03-15 DIAGNOSIS — G89.29 CHRONIC BILATERAL LOW BACK PAIN WITHOUT SCIATICA: ICD-10-CM

## 2023-03-15 DIAGNOSIS — F11.20 UNCOMPLICATED OPIOID DEPENDENCE (HCC): ICD-10-CM

## 2023-03-15 LAB
HBA1C MFR BLD: 5.6 % (ref ?–5.8)
POCT INT CON NEG: NEGATIVE
POCT INT CON POS: POSITIVE

## 2023-03-15 PROCEDURE — 99214 OFFICE O/P EST MOD 30 MIN: CPT | Performed by: INTERNAL MEDICINE

## 2023-03-15 PROCEDURE — 83036 HEMOGLOBIN GLYCOSYLATED A1C: CPT | Performed by: INTERNAL MEDICINE

## 2023-03-15 RX ORDER — LISINOPRIL 40 MG/1
40 TABLET ORAL DAILY
Qty: 100 TABLET | Refills: 3 | Status: SHIPPED | OUTPATIENT
Start: 2023-03-15 | End: 2024-03-14 | Stop reason: SDUPTHER

## 2023-03-15 RX ORDER — HYDROCODONE BITARTRATE AND ACETAMINOPHEN 10; 325 MG/1; MG/1
0.5 TABLET ORAL EVERY 8 HOURS PRN
Qty: 35 TABLET | Refills: 0 | Status: SHIPPED | OUTPATIENT
Start: 2023-03-15 | End: 2023-04-14

## 2023-03-15 RX ORDER — ANASTROZOLE 1 MG/1
1 TABLET ORAL DAILY
Qty: 100 TABLET | Refills: 3 | Status: SHIPPED | OUTPATIENT
Start: 2023-03-15 | End: 2024-03-14 | Stop reason: SDUPTHER

## 2023-03-15 RX ORDER — ATORVASTATIN CALCIUM 20 MG/1
TABLET, FILM COATED ORAL
Qty: 150 TABLET | Refills: 3 | Status: SHIPPED | OUTPATIENT
Start: 2023-03-15 | End: 2023-05-10 | Stop reason: SDUPTHER

## 2023-03-15 RX ORDER — LEVOTHYROXINE SODIUM 0.12 MG/1
125 TABLET ORAL DAILY
Qty: 100 TABLET | Refills: 3 | Status: SHIPPED | OUTPATIENT
Start: 2023-03-15 | End: 2024-03-14 | Stop reason: SDUPTHER

## 2023-03-15 RX ORDER — METOPROLOL SUCCINATE 25 MG/1
25 TABLET, EXTENDED RELEASE ORAL DAILY
Qty: 100 TABLET | Refills: 3 | Status: SHIPPED | OUTPATIENT
Start: 2023-03-15 | End: 2024-03-14 | Stop reason: SDUPTHER

## 2023-03-15 RX ORDER — FLUTICASONE PROPIONATE 50 MCG
2 SPRAY, SUSPENSION (ML) NASAL 2 TIMES DAILY
Qty: 18.2 ML | Refills: 3 | Status: SHIPPED | OUTPATIENT
Start: 2023-03-15 | End: 2023-07-24

## 2023-03-15 ASSESSMENT — FIBROSIS 4 INDEX: FIB4 SCORE: 1.5

## 2023-03-15 ASSESSMENT — PATIENT HEALTH QUESTIONNAIRE - PHQ9: CLINICAL INTERPRETATION OF PHQ2 SCORE: 0
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
## 2023-03-15 NOTE — ASSESSMENT & PLAN NOTE
Chronic ongoing problem, continue with current medical regimen, center chronic low back pain for additional details.  No negative sequelae from her pain medication.  We have to adjust her prescription this month from 5-325 mg of hydrocodone-acetaminophen to  mg hydrocodone-acetaminophen due to drug shortage.

## 2023-03-15 NOTE — ASSESSMENT & PLAN NOTE
Chronic improved problem, she is doing well on dulaglutide 0.75 mg weekly, she has lost weight and sustained that weight loss.

## 2023-03-15 NOTE — ASSESSMENT & PLAN NOTE
Chronic improved problem, doing well on the Trulicity however it is hard to get it regularly, continue dulaglutide 0.75 mg weekly.  Retinal exam due in April, she sees Dr. Greenwood but may consider switching.  Diabetic foot exam is up-to-date.  Will order urine microalbumin.  She is appropriately on statin and ACE inhibitor.

## 2023-03-15 NOTE — ASSESSMENT & PLAN NOTE
Chronic ongoing problem, recheck thyroid levels to ensure stability, continue levothyroxine 125 mcg daily in the meantime.

## 2023-03-15 NOTE — ASSESSMENT & PLAN NOTE
Chronic ongoing problem.  Blood pressures well controlled on current regimen, continue lisinopril 40 mg daily and metoprolol succinate 25 mg daily.  She has lost weight on the Trulicity and sustain that weight loss.
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
## 2023-03-15 NOTE — ASSESSMENT & PLAN NOTE
Chronic ongoing problem, GFR improved on most recent evaluation, continue limiting nephrotoxic agents as able, continue good control blood pressure and blood sugar.

## 2023-04-10 ENCOUNTER — TELEPHONE (OUTPATIENT)
Dept: HEALTH INFORMATION MANAGEMENT | Facility: OTHER | Age: 73
End: 2023-04-10
Payer: MEDICARE

## 2023-04-19 DIAGNOSIS — F11.20 UNCOMPLICATED OPIOID DEPENDENCE (HCC): ICD-10-CM

## 2023-04-19 DIAGNOSIS — M54.50 CHRONIC BILATERAL LOW BACK PAIN WITHOUT SCIATICA: ICD-10-CM

## 2023-04-19 DIAGNOSIS — G89.29 CHRONIC BILATERAL LOW BACK PAIN WITHOUT SCIATICA: ICD-10-CM

## 2023-04-19 RX ORDER — HYDROCODONE BITARTRATE AND ACETAMINOPHEN 10; 325 MG/1; MG/1
0.5 TABLET ORAL EVERY 8 HOURS PRN
Qty: 35 TABLET | Refills: 0 | Status: SHIPPED | OUTPATIENT
Start: 2023-04-19 | End: 2023-05-19

## 2023-04-19 RX ORDER — HYDROCODONE BITARTRATE AND ACETAMINOPHEN 10; 325 MG/1; MG/1
0.5 TABLET ORAL EVERY 8 HOURS PRN
Qty: 35 TABLET | Refills: 0 | Status: SHIPPED | OUTPATIENT
Start: 2023-05-19 | End: 2023-06-15 | Stop reason: SDUPTHER

## 2023-06-07 ENCOUNTER — HOSPITAL ENCOUNTER (OUTPATIENT)
Dept: LAB | Facility: MEDICAL CENTER | Age: 73
End: 2023-06-07
Attending: INTERNAL MEDICINE
Payer: MEDICARE

## 2023-06-07 DIAGNOSIS — T49.4X5A HYPOTHYROIDISM FROM RESORCINOL: ICD-10-CM

## 2023-06-07 DIAGNOSIS — E03.2 HYPOTHYROIDISM FROM RESORCINOL: ICD-10-CM

## 2023-06-07 DIAGNOSIS — E11.9 TYPE 2 DIABETES MELLITUS WITHOUT COMPLICATION, WITHOUT LONG-TERM CURRENT USE OF INSULIN (HCC): ICD-10-CM

## 2023-06-07 LAB
ALBUMIN SERPL BCP-MCNC: 4.5 G/DL (ref 3.2–4.9)
ALBUMIN/GLOB SERPL: 1.7 G/DL
ALP SERPL-CCNC: 69 U/L (ref 30–99)
ALT SERPL-CCNC: 31 U/L (ref 2–50)
ANION GAP SERPL CALC-SCNC: 15 MMOL/L (ref 7–16)
AST SERPL-CCNC: 34 U/L (ref 12–45)
BASOPHILS # BLD AUTO: 0.9 % (ref 0–1.8)
BASOPHILS # BLD: 0.05 K/UL (ref 0–0.12)
BILIRUB SERPL-MCNC: 0.5 MG/DL (ref 0.1–1.5)
BUN SERPL-MCNC: 15 MG/DL (ref 8–22)
CALCIUM ALBUM COR SERPL-MCNC: 9.3 MG/DL (ref 8.5–10.5)
CALCIUM SERPL-MCNC: 9.7 MG/DL (ref 8.5–10.5)
CHLORIDE SERPL-SCNC: 106 MMOL/L (ref 96–112)
CHOLEST SERPL-MCNC: 167 MG/DL (ref 100–199)
CO2 SERPL-SCNC: 23 MMOL/L (ref 20–33)
CREAT SERPL-MCNC: 1.23 MG/DL (ref 0.5–1.4)
CREAT UR-MCNC: 70.18 MG/DL
EOSINOPHIL # BLD AUTO: 0.17 K/UL (ref 0–0.51)
EOSINOPHIL NFR BLD: 3.1 % (ref 0–6.9)
ERYTHROCYTE [DISTWIDTH] IN BLOOD BY AUTOMATED COUNT: 44.1 FL (ref 35.9–50)
GFR SERPLBLD CREATININE-BSD FMLA CKD-EPI: 47 ML/MIN/1.73 M 2
GLOBULIN SER CALC-MCNC: 2.6 G/DL (ref 1.9–3.5)
GLUCOSE SERPL-MCNC: 112 MG/DL (ref 65–99)
HCT VFR BLD AUTO: 47.2 % (ref 37–47)
HDLC SERPL-MCNC: 58 MG/DL
HGB BLD-MCNC: 15.9 G/DL (ref 12–16)
IMM GRANULOCYTES # BLD AUTO: 0.01 K/UL (ref 0–0.11)
IMM GRANULOCYTES NFR BLD AUTO: 0.2 % (ref 0–0.9)
LDLC SERPL CALC-MCNC: 79 MG/DL
LYMPHOCYTES # BLD AUTO: 1.75 K/UL (ref 1–4.8)
LYMPHOCYTES NFR BLD: 32.2 % (ref 22–41)
MCH RBC QN AUTO: 31.8 PG (ref 27–33)
MCHC RBC AUTO-ENTMCNC: 33.7 G/DL (ref 32.2–35.5)
MCV RBC AUTO: 94.4 FL (ref 81.4–97.8)
MICROALBUMIN UR-MCNC: <1.2 MG/DL
MICROALBUMIN/CREAT UR: NORMAL MG/G (ref 0–30)
MONOCYTES # BLD AUTO: 0.33 K/UL (ref 0–0.85)
MONOCYTES NFR BLD AUTO: 6.1 % (ref 0–13.4)
NEUTROPHILS # BLD AUTO: 3.12 K/UL (ref 1.82–7.42)
NEUTROPHILS NFR BLD: 57.5 % (ref 44–72)
NRBC # BLD AUTO: 0 K/UL
NRBC BLD-RTO: 0 /100 WBC (ref 0–0.2)
PLATELET # BLD AUTO: 238 K/UL (ref 164–446)
PMV BLD AUTO: 10 FL (ref 9–12.9)
POTASSIUM SERPL-SCNC: 3.7 MMOL/L (ref 3.6–5.5)
PROT SERPL-MCNC: 7.1 G/DL (ref 6–8.2)
RBC # BLD AUTO: 5 M/UL (ref 4.2–5.4)
SODIUM SERPL-SCNC: 144 MMOL/L (ref 135–145)
TRIGL SERPL-MCNC: 151 MG/DL (ref 0–149)
TSH SERPL DL<=0.005 MIU/L-ACNC: 3.86 UIU/ML (ref 0.38–5.33)
VIT B12 SERPL-MCNC: 626 PG/ML (ref 211–911)
WBC # BLD AUTO: 5.4 K/UL (ref 4.8–10.8)

## 2023-06-07 PROCEDURE — 85025 COMPLETE CBC W/AUTO DIFF WBC: CPT

## 2023-06-07 PROCEDURE — 82570 ASSAY OF URINE CREATININE: CPT

## 2023-06-07 PROCEDURE — 84443 ASSAY THYROID STIM HORMONE: CPT

## 2023-06-07 PROCEDURE — 82043 UR ALBUMIN QUANTITATIVE: CPT

## 2023-06-07 PROCEDURE — 80061 LIPID PANEL: CPT

## 2023-06-07 PROCEDURE — 82607 VITAMIN B-12: CPT

## 2023-06-07 PROCEDURE — 36415 COLL VENOUS BLD VENIPUNCTURE: CPT

## 2023-06-07 PROCEDURE — 80053 COMPREHEN METABOLIC PANEL: CPT

## 2023-06-15 ENCOUNTER — OFFICE VISIT (OUTPATIENT)
Dept: MEDICAL GROUP | Facility: PHYSICIAN GROUP | Age: 73
End: 2023-06-15
Payer: MEDICARE

## 2023-06-15 VITALS
OXYGEN SATURATION: 98 % | SYSTOLIC BLOOD PRESSURE: 120 MMHG | WEIGHT: 232.2 LBS | HEART RATE: 83 BPM | RESPIRATION RATE: 16 BRPM | BODY MASS INDEX: 36.92 KG/M2 | DIASTOLIC BLOOD PRESSURE: 62 MMHG | TEMPERATURE: 102.9 F

## 2023-06-15 DIAGNOSIS — G89.29 CHRONIC BILATERAL LOW BACK PAIN WITHOUT SCIATICA: ICD-10-CM

## 2023-06-15 DIAGNOSIS — F11.20 UNCOMPLICATED OPIOID DEPENDENCE (HCC): ICD-10-CM

## 2023-06-15 DIAGNOSIS — M54.50 CHRONIC BILATERAL LOW BACK PAIN WITHOUT SCIATICA: ICD-10-CM

## 2023-06-15 DIAGNOSIS — E11.9 TYPE 2 DIABETES MELLITUS WITHOUT COMPLICATION, WITHOUT LONG-TERM CURRENT USE OF INSULIN (HCC): ICD-10-CM

## 2023-06-15 DIAGNOSIS — N18.32 STAGE 3B CHRONIC KIDNEY DISEASE: ICD-10-CM

## 2023-06-15 PROCEDURE — 3078F DIAST BP <80 MM HG: CPT | Performed by: INTERNAL MEDICINE

## 2023-06-15 PROCEDURE — 99214 OFFICE O/P EST MOD 30 MIN: CPT | Performed by: INTERNAL MEDICINE

## 2023-06-15 PROCEDURE — 3074F SYST BP LT 130 MM HG: CPT | Performed by: INTERNAL MEDICINE

## 2023-06-15 RX ORDER — HYDROCODONE BITARTRATE AND ACETAMINOPHEN 10; 325 MG/1; MG/1
0.5 TABLET ORAL EVERY 8 HOURS PRN
Qty: 35 TABLET | Refills: 0 | Status: SHIPPED | OUTPATIENT
Start: 2023-08-17 | End: 2023-09-12 | Stop reason: SDUPTHER

## 2023-06-15 RX ORDER — HYDROCODONE BITARTRATE AND ACETAMINOPHEN 10; 325 MG/1; MG/1
0.5 TABLET ORAL EVERY 8 HOURS PRN
Qty: 35 TABLET | Refills: 0 | Status: SHIPPED | OUTPATIENT
Start: 2023-06-18 | End: 2023-07-18

## 2023-06-15 RX ORDER — HYDROCODONE BITARTRATE AND ACETAMINOPHEN 10; 325 MG/1; MG/1
0.5 TABLET ORAL EVERY 8 HOURS PRN
Qty: 35 TABLET | Refills: 0 | Status: SHIPPED | OUTPATIENT
Start: 2023-07-18 | End: 2023-08-17

## 2023-06-15 ASSESSMENT — FIBROSIS 4 INDEX: FIB4 SCORE: 1.82
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
## 2023-06-15 NOTE — ASSESSMENT & PLAN NOTE
Chronic ongoing problem.  Stage III CKD is very stable.  Continue with current medical approach with limiting nephrotoxic agents and continue good oral hydration.  Blood pressure and blood sugar well controlled.  Update kidney function every 3 to 4 months to ensure ongoing stability.

## 2023-06-15 NOTE — ASSESSMENT & PLAN NOTE
Chronic ongoing problem.  A1c extremely well controlled, currently out of prediabetes.  Continue dulaglutide 0.75 mg weekly, no nausea or constipation noted.
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
## 2023-07-23 DIAGNOSIS — G44.209 TENSION HEADACHE: ICD-10-CM

## 2023-07-24 RX ORDER — FLUTICASONE PROPIONATE 50 MCG
SPRAY, SUSPENSION (ML) NASAL
Qty: 48 ML | Refills: 3 | Status: SHIPPED | OUTPATIENT
Start: 2023-07-24 | End: 2024-03-14 | Stop reason: SDUPTHER

## 2023-09-12 ENCOUNTER — OFFICE VISIT (OUTPATIENT)
Dept: MEDICAL GROUP | Facility: PHYSICIAN GROUP | Age: 73
End: 2023-09-12
Payer: MEDICARE

## 2023-09-12 VITALS
HEIGHT: 67 IN | DIASTOLIC BLOOD PRESSURE: 66 MMHG | WEIGHT: 238.2 LBS | TEMPERATURE: 97.1 F | BODY MASS INDEX: 37.39 KG/M2 | OXYGEN SATURATION: 91 % | HEART RATE: 57 BPM | SYSTOLIC BLOOD PRESSURE: 122 MMHG | RESPIRATION RATE: 12 BRPM

## 2023-09-12 DIAGNOSIS — E11.9 TYPE 2 DIABETES MELLITUS WITHOUT COMPLICATION, WITHOUT LONG-TERM CURRENT USE OF INSULIN (HCC): ICD-10-CM

## 2023-09-12 DIAGNOSIS — F11.20 UNCOMPLICATED OPIOID DEPENDENCE (HCC): ICD-10-CM

## 2023-09-12 DIAGNOSIS — M54.50 CHRONIC BILATERAL LOW BACK PAIN WITHOUT SCIATICA: ICD-10-CM

## 2023-09-12 DIAGNOSIS — G89.29 CHRONIC BILATERAL LOW BACK PAIN WITHOUT SCIATICA: ICD-10-CM

## 2023-09-12 DIAGNOSIS — N18.32 STAGE 3B CHRONIC KIDNEY DISEASE: ICD-10-CM

## 2023-09-12 LAB
HBA1C MFR BLD: 5.3 % (ref ?–5.8)
POCT INT CON NEG: NEGATIVE
POCT INT CON POS: POSITIVE

## 2023-09-12 PROCEDURE — 3078F DIAST BP <80 MM HG: CPT | Performed by: INTERNAL MEDICINE

## 2023-09-12 PROCEDURE — 83036 HEMOGLOBIN GLYCOSYLATED A1C: CPT | Performed by: INTERNAL MEDICINE

## 2023-09-12 PROCEDURE — 3074F SYST BP LT 130 MM HG: CPT | Performed by: INTERNAL MEDICINE

## 2023-09-12 PROCEDURE — 99214 OFFICE O/P EST MOD 30 MIN: CPT | Performed by: INTERNAL MEDICINE

## 2023-09-12 RX ORDER — HYDROCODONE BITARTRATE AND ACETAMINOPHEN 10; 325 MG/1; MG/1
0.5 TABLET ORAL EVERY 8 HOURS PRN
Qty: 40 TABLET | Refills: 0 | Status: SHIPPED | OUTPATIENT
Start: 2023-09-18 | End: 2023-09-19 | Stop reason: SDUPTHER

## 2023-09-12 RX ORDER — HYDROCODONE BITARTRATE AND ACETAMINOPHEN 10; 325 MG/1; MG/1
0.5 TABLET ORAL EVERY 8 HOURS PRN
Qty: 40 TABLET | Refills: 0 | Status: SHIPPED | OUTPATIENT
Start: 2023-10-18 | End: 2023-11-17

## 2023-09-12 RX ORDER — HYDROCODONE BITARTRATE AND ACETAMINOPHEN 10; 325 MG/1; MG/1
0.5 TABLET ORAL EVERY 8 HOURS PRN
Qty: 40 TABLET | Refills: 0 | Status: SHIPPED | OUTPATIENT
Start: 2023-11-17 | End: 2023-11-28

## 2023-09-12 ASSESSMENT — FIBROSIS 4 INDEX: FIB4 SCORE: 1.85

## 2023-09-12 NOTE — ASSESSMENT & PLAN NOTE
"Chronic ongoing problem.  Continue with hydrocodone-acetaminophen  mg 1 tablet daily with extra 0.5 tablet as needed 10 days/month.  No deleterious side effects.  Continue with muscle relaxant as needed, no NSAIDs due to chronic kidney disease.    Obtained and reviewed patient utilization report from [facility] pharmacy database on 9/12/2023 12:10 PM  prior to writing prescription for controlled substance II, III or IV per [place] bill . Based on assessment of the report, the prescription is medically necessary.     Patient understands this prescription is a controlled substance which is potentially habit-forming and its use is regulated by the [employer]. We also discussed the new \"black box\" warning regarding the lethal combination of opioids and benzodiazepines. Refills are subject to terms of a controlled substance agreement and patient has an updated one on file. Most recent UDS is appropriate. Any refill requires an office visit. Narcotics have may adverse effects and the risks of addiction, accidental overdose and death were emphasized. Provided prescriptions for the next three months.    "

## 2023-09-12 NOTE — ASSESSMENT & PLAN NOTE
Stable problem, type 2 diabetes and blood pressure are both very well controlled, continue good oral hydration and limit nephrotoxic agents as able.  NSAIDs for chronic pain.  Continue with periodic assessment of GFR to ensure stability at least twice per year.

## 2023-09-12 NOTE — ASSESSMENT & PLAN NOTE
Chronic ongoing problem, continue hydrocodone-acetaminophen 5-325 mg twice daily with additional third 5-325 mg tablet for breakthrough pain 10 days/month.   side effects.
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
## 2023-09-12 NOTE — ASSESSMENT & PLAN NOTE
Stable problem.  Diabetes continues to be extremely well controlled on monotherapy with dulaglutide 0.75 mg weekly, continue at this time.  She is overdue for retinal eye exam, will place referral.

## 2023-09-19 DIAGNOSIS — F11.20 UNCOMPLICATED OPIOID DEPENDENCE (HCC): ICD-10-CM

## 2023-09-19 DIAGNOSIS — M54.50 CHRONIC BILATERAL LOW BACK PAIN WITHOUT SCIATICA: ICD-10-CM

## 2023-09-19 DIAGNOSIS — G89.29 CHRONIC BILATERAL LOW BACK PAIN WITHOUT SCIATICA: ICD-10-CM

## 2023-09-19 RX ORDER — HYDROCODONE BITARTRATE AND ACETAMINOPHEN 10; 325 MG/1; MG/1
0.5 TABLET ORAL EVERY 8 HOURS PRN
Qty: 40 TABLET | Refills: 0 | Status: SHIPPED | OUTPATIENT
Start: 2023-09-19 | End: 2023-10-18 | Stop reason: SDUPTHER

## 2023-11-28 DIAGNOSIS — F11.20 UNCOMPLICATED OPIOID DEPENDENCE (HCC): ICD-10-CM

## 2023-11-28 DIAGNOSIS — M54.50 CHRONIC BILATERAL LOW BACK PAIN WITHOUT SCIATICA: ICD-10-CM

## 2023-11-28 DIAGNOSIS — G89.29 CHRONIC BILATERAL LOW BACK PAIN WITHOUT SCIATICA: ICD-10-CM

## 2023-11-28 RX ORDER — HYDROCODONE BITARTRATE AND ACETAMINOPHEN 10; 325 MG/1; MG/1
0.5 TABLET ORAL EVERY 8 HOURS PRN
Qty: 40 TABLET | Refills: 0 | OUTPATIENT
Start: 2023-11-28 | End: 2023-12-28

## 2023-11-28 RX ORDER — HYDROCODONE BITARTRATE AND ACETAMINOPHEN 5; 325 MG/1; MG/1
1 TABLET ORAL EVERY 8 HOURS PRN
Qty: 70 TABLET | Refills: 0 | Status: SHIPPED | OUTPATIENT
Start: 2023-11-30 | End: 2023-12-30

## 2023-11-28 NOTE — TELEPHONE ENCOUNTER
Received request via: Patient    Was the patient seen in the last year in this department? Yes    Does the patient have an active prescription (recently filled or refills available) for medication(s) requested? No    Does the patient have retirement Plus and need 100 day supply (blood pressure, diabetes and cholesterol meds only)? Medication is not for cholesterol, blood pressure or diabetes      Patient requests RX to [facility] [place]   [facility] is out of her RX

## 2023-11-29 ENCOUNTER — HOSPITAL ENCOUNTER (OUTPATIENT)
Dept: LAB | Facility: MEDICAL CENTER | Age: 73
End: 2023-11-29
Attending: INTERNAL MEDICINE
Payer: MEDICARE

## 2023-11-29 DIAGNOSIS — N18.32 STAGE 3B CHRONIC KIDNEY DISEASE: ICD-10-CM

## 2023-11-29 DIAGNOSIS — E11.9 TYPE 2 DIABETES MELLITUS WITHOUT COMPLICATION, WITHOUT LONG-TERM CURRENT USE OF INSULIN (HCC): ICD-10-CM

## 2023-11-29 LAB
BASOPHILS # BLD AUTO: 1 % (ref 0–1.8)
BASOPHILS # BLD: 0.07 K/UL (ref 0–0.12)
EOSINOPHIL # BLD AUTO: 0.16 K/UL (ref 0–0.51)
EOSINOPHIL NFR BLD: 2.3 % (ref 0–6.9)
ERYTHROCYTE [DISTWIDTH] IN BLOOD BY AUTOMATED COUNT: 45.5 FL (ref 35.9–50)
HCT VFR BLD AUTO: 47.9 % (ref 37–47)
HGB BLD-MCNC: 16.5 G/DL (ref 12–16)
IMM GRANULOCYTES # BLD AUTO: 0.02 K/UL (ref 0–0.11)
IMM GRANULOCYTES NFR BLD AUTO: 0.3 % (ref 0–0.9)
LYMPHOCYTES # BLD AUTO: 2.31 K/UL (ref 1–4.8)
LYMPHOCYTES NFR BLD: 33.9 % (ref 22–41)
MCH RBC QN AUTO: 32.8 PG (ref 27–33)
MCHC RBC AUTO-ENTMCNC: 34.4 G/DL (ref 32.2–35.5)
MCV RBC AUTO: 95.2 FL (ref 81.4–97.8)
MONOCYTES # BLD AUTO: 0.35 K/UL (ref 0–0.85)
MONOCYTES NFR BLD AUTO: 5.1 % (ref 0–13.4)
NEUTROPHILS # BLD AUTO: 3.91 K/UL (ref 1.82–7.42)
NEUTROPHILS NFR BLD: 57.4 % (ref 44–72)
NRBC # BLD AUTO: 0 K/UL
NRBC BLD-RTO: 0 /100 WBC (ref 0–0.2)
PLATELET # BLD AUTO: 219 K/UL (ref 164–446)
PMV BLD AUTO: 9.8 FL (ref 9–12.9)
RBC # BLD AUTO: 5.03 M/UL (ref 4.2–5.4)
WBC # BLD AUTO: 6.8 K/UL (ref 4.8–10.8)

## 2023-11-29 PROCEDURE — 85025 COMPLETE CBC W/AUTO DIFF WBC: CPT

## 2023-11-29 PROCEDURE — 36415 COLL VENOUS BLD VENIPUNCTURE: CPT

## 2023-11-29 PROCEDURE — 80053 COMPREHEN METABOLIC PANEL: CPT

## 2023-11-30 LAB
ALBUMIN SERPL BCP-MCNC: 4.6 G/DL (ref 3.2–4.9)
ALBUMIN/GLOB SERPL: 1.6 G/DL
ALP SERPL-CCNC: 81 U/L (ref 30–99)
ALT SERPL-CCNC: 32 U/L (ref 2–50)
ANION GAP SERPL CALC-SCNC: 14 MMOL/L (ref 7–16)
AST SERPL-CCNC: 32 U/L (ref 12–45)
BILIRUB SERPL-MCNC: 0.5 MG/DL (ref 0.1–1.5)
BUN SERPL-MCNC: 16 MG/DL (ref 8–22)
CALCIUM ALBUM COR SERPL-MCNC: 9.3 MG/DL (ref 8.5–10.5)
CALCIUM SERPL-MCNC: 9.8 MG/DL (ref 8.5–10.5)
CHLORIDE SERPL-SCNC: 102 MMOL/L (ref 96–112)
CO2 SERPL-SCNC: 23 MMOL/L (ref 20–33)
CREAT SERPL-MCNC: 1.37 MG/DL (ref 0.5–1.4)
GFR SERPLBLD CREATININE-BSD FMLA CKD-EPI: 41 ML/MIN/1.73 M 2
GLOBULIN SER CALC-MCNC: 2.9 G/DL (ref 1.9–3.5)
GLUCOSE SERPL-MCNC: 148 MG/DL (ref 65–99)
POTASSIUM SERPL-SCNC: 4 MMOL/L (ref 3.6–5.5)
PROT SERPL-MCNC: 7.5 G/DL (ref 6–8.2)
SODIUM SERPL-SCNC: 139 MMOL/L (ref 135–145)

## 2023-12-07 ENCOUNTER — APPOINTMENT (OUTPATIENT)
Dept: MEDICAL GROUP | Facility: PHYSICIAN GROUP | Age: 73
End: 2023-12-07
Payer: MEDICARE

## 2023-12-13 ENCOUNTER — OFFICE VISIT (OUTPATIENT)
Dept: MEDICAL GROUP | Facility: PHYSICIAN GROUP | Age: 73
End: 2023-12-13
Payer: MEDICARE

## 2023-12-13 VITALS
BODY MASS INDEX: 37.7 KG/M2 | SYSTOLIC BLOOD PRESSURE: 132 MMHG | HEART RATE: 96 BPM | OXYGEN SATURATION: 98 % | RESPIRATION RATE: 12 BRPM | TEMPERATURE: 97.5 F | WEIGHT: 240.2 LBS | HEIGHT: 67 IN | DIASTOLIC BLOOD PRESSURE: 72 MMHG

## 2023-12-13 DIAGNOSIS — N18.32 STAGE 3B CHRONIC KIDNEY DISEASE: ICD-10-CM

## 2023-12-13 DIAGNOSIS — F11.20 UNCOMPLICATED OPIOID DEPENDENCE (HCC): ICD-10-CM

## 2023-12-13 DIAGNOSIS — G89.29 CHRONIC BILATERAL LOW BACK PAIN WITHOUT SCIATICA: ICD-10-CM

## 2023-12-13 DIAGNOSIS — E11.9 TYPE 2 DIABETES MELLITUS WITHOUT COMPLICATION, WITHOUT LONG-TERM CURRENT USE OF INSULIN (HCC): ICD-10-CM

## 2023-12-13 DIAGNOSIS — M54.50 CHRONIC BILATERAL LOW BACK PAIN WITHOUT SCIATICA: ICD-10-CM

## 2023-12-13 PROCEDURE — 3075F SYST BP GE 130 - 139MM HG: CPT | Performed by: INTERNAL MEDICINE

## 2023-12-13 PROCEDURE — 99214 OFFICE O/P EST MOD 30 MIN: CPT | Performed by: INTERNAL MEDICINE

## 2023-12-13 PROCEDURE — 3078F DIAST BP <80 MM HG: CPT | Performed by: INTERNAL MEDICINE

## 2023-12-13 RX ORDER — HYDROCODONE BITARTRATE AND ACETAMINOPHEN 7.5; 325 MG/1; MG/1
1 TABLET ORAL EVERY 8 HOURS PRN
Qty: 70 TABLET | Refills: 0 | Status: SHIPPED | OUTPATIENT
Start: 2024-01-29 | End: 2024-03-03 | Stop reason: SDUPTHER

## 2023-12-13 RX ORDER — HYDROCODONE BITARTRATE AND ACETAMINOPHEN 7.5; 325 MG/1; MG/1
1 TABLET ORAL EVERY 8 HOURS PRN
Qty: 70 TABLET | Refills: 0 | Status: SHIPPED | OUTPATIENT
Start: 2024-02-28 | End: 2024-03-14

## 2023-12-13 RX ORDER — HYDROCODONE BITARTRATE AND ACETAMINOPHEN 7.5; 325 MG/1; MG/1
1 TABLET ORAL EVERY 8 HOURS PRN
Qty: 21 TABLET | Refills: 0 | Status: SHIPPED | OUTPATIENT
Start: 2023-12-13 | End: 2023-12-20

## 2023-12-13 RX ORDER — HYDROCODONE BITARTRATE AND ACETAMINOPHEN 7.5; 325 MG/1; MG/1
1 TABLET ORAL EVERY 8 HOURS PRN
Qty: 70 TABLET | Refills: 0 | Status: SHIPPED | OUTPATIENT
Start: 2023-12-30 | End: 2024-01-29

## 2023-12-13 ASSESSMENT — FIBROSIS 4 INDEX: FIB4 SCORE: 1.86
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
## 2023-12-13 NOTE — ASSESSMENT & PLAN NOTE
Chronic ongoing problem, has been present for the past 10 years, GFR as low as the mid 30s, likely related to anti-inflammatories, history UTIs, chemotherapy for breast cancer, and statin induced nephropathy.  Continue periodic observation to ensure stability.  No NSAIDs due to low GFR which makes treating her chronic pain more challenging.

## 2023-12-13 NOTE — ASSESSMENT & PLAN NOTE
Chronic stable problem, continue medical therapy with dulaglutide 0.75 mg weekly on Saturdays. Other diabetic metrics up to date following normal monofilament exam today.
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
## 2024-03-03 DIAGNOSIS — F11.20 UNCOMPLICATED OPIOID DEPENDENCE (HCC): ICD-10-CM

## 2024-03-03 DIAGNOSIS — M54.50 CHRONIC BILATERAL LOW BACK PAIN WITHOUT SCIATICA: ICD-10-CM

## 2024-03-03 DIAGNOSIS — G89.29 CHRONIC BILATERAL LOW BACK PAIN WITHOUT SCIATICA: ICD-10-CM

## 2024-03-04 RX ORDER — HYDROCODONE BITARTRATE AND ACETAMINOPHEN 7.5; 325 MG/1; MG/1
1 TABLET ORAL EVERY 8 HOURS PRN
Qty: 70 TABLET | Refills: 0 | Status: SHIPPED | OUTPATIENT
Start: 2024-03-04 | End: 2024-03-14 | Stop reason: SDUPTHER

## 2024-03-14 ENCOUNTER — OFFICE VISIT (OUTPATIENT)
Dept: MEDICAL GROUP | Facility: PHYSICIAN GROUP | Age: 74
End: 2024-03-14
Payer: MEDICARE

## 2024-03-14 VITALS
TEMPERATURE: 98.8 F | HEIGHT: 66 IN | WEIGHT: 232 LBS | OXYGEN SATURATION: 97 % | HEART RATE: 68 BPM | SYSTOLIC BLOOD PRESSURE: 124 MMHG | BODY MASS INDEX: 37.28 KG/M2 | DIASTOLIC BLOOD PRESSURE: 78 MMHG

## 2024-03-14 DIAGNOSIS — I70.0 AORTIC ATHEROSCLEROSIS (HCC): ICD-10-CM

## 2024-03-14 DIAGNOSIS — E78.2 MIXED HYPERLIPIDEMIA: ICD-10-CM

## 2024-03-14 DIAGNOSIS — M54.50 CHRONIC BILATERAL LOW BACK PAIN WITHOUT SCIATICA: ICD-10-CM

## 2024-03-14 DIAGNOSIS — E11.9 TYPE 2 DIABETES MELLITUS WITHOUT COMPLICATION, WITHOUT LONG-TERM CURRENT USE OF INSULIN (HCC): ICD-10-CM

## 2024-03-14 DIAGNOSIS — G89.29 CHRONIC BILATERAL LOW BACK PAIN WITHOUT SCIATICA: ICD-10-CM

## 2024-03-14 DIAGNOSIS — T49.4X5A HYPOTHYROIDISM FROM RESORCINOL: ICD-10-CM

## 2024-03-14 DIAGNOSIS — E03.2 HYPOTHYROIDISM FROM RESORCINOL: ICD-10-CM

## 2024-03-14 DIAGNOSIS — I15.2 HYPERTENSION DUE TO ENDOCRINE DISORDER: ICD-10-CM

## 2024-03-14 DIAGNOSIS — N18.32 STAGE 3B CHRONIC KIDNEY DISEASE: ICD-10-CM

## 2024-03-14 DIAGNOSIS — E66.01 SEVERE OBESITY (HCC): ICD-10-CM

## 2024-03-14 DIAGNOSIS — C50.919 RECURRENT MALIGNANT NEOPLASM OF BREAST, UNSPECIFIED LATERALITY (HCC): ICD-10-CM

## 2024-03-14 DIAGNOSIS — F11.20 UNCOMPLICATED OPIOID DEPENDENCE (HCC): ICD-10-CM

## 2024-03-14 LAB
HBA1C MFR BLD: 5.6 % (ref ?–5.8)
POCT INT CON NEG: NEGATIVE
POCT INT CON POS: POSITIVE

## 2024-03-14 PROCEDURE — 99214 OFFICE O/P EST MOD 30 MIN: CPT | Performed by: INTERNAL MEDICINE

## 2024-03-14 PROCEDURE — 83036 HEMOGLOBIN GLYCOSYLATED A1C: CPT | Performed by: INTERNAL MEDICINE

## 2024-03-14 PROCEDURE — 3074F SYST BP LT 130 MM HG: CPT | Performed by: INTERNAL MEDICINE

## 2024-03-14 PROCEDURE — 3078F DIAST BP <80 MM HG: CPT | Performed by: INTERNAL MEDICINE

## 2024-03-14 RX ORDER — ANASTROZOLE 1 MG/1
1 TABLET ORAL DAILY
Qty: 100 TABLET | Refills: 3 | Status: SHIPPED | OUTPATIENT
Start: 2024-03-14

## 2024-03-14 RX ORDER — HYDROCODONE BITARTRATE AND ACETAMINOPHEN 7.5; 325 MG/1; MG/1
1 TABLET ORAL EVERY 8 HOURS PRN
Qty: 70 TABLET | Refills: 0 | Status: SHIPPED | OUTPATIENT
Start: 2024-05-03 | End: 2024-06-02

## 2024-03-14 RX ORDER — HYDROCODONE BITARTRATE AND ACETAMINOPHEN 7.5; 325 MG/1; MG/1
1 TABLET ORAL EVERY 8 HOURS PRN
Qty: 70 TABLET | Refills: 0 | Status: SHIPPED | OUTPATIENT
Start: 2024-04-03 | End: 2024-05-03

## 2024-03-14 RX ORDER — LISINOPRIL 40 MG/1
40 TABLET ORAL DAILY
Qty: 100 TABLET | Refills: 3 | Status: SHIPPED | OUTPATIENT
Start: 2024-03-14

## 2024-03-14 RX ORDER — DULAGLUTIDE 0.75 MG/.5ML
1 INJECTION, SOLUTION SUBCUTANEOUS
Qty: 1.5 ML | Refills: 3 | Status: SHIPPED | OUTPATIENT
Start: 2024-03-14 | End: 2024-03-14

## 2024-03-14 RX ORDER — ATORVASTATIN CALCIUM 20 MG/1
20 TABLET, FILM COATED ORAL
Qty: 50 TABLET | Refills: 3 | Status: SHIPPED | OUTPATIENT
Start: 2024-03-14

## 2024-03-14 RX ORDER — DULAGLUTIDE 0.75 MG/.5ML
1 INJECTION, SOLUTION SUBCUTANEOUS
Qty: 1.5 ML | Refills: 3 | Status: SHIPPED
Start: 2024-03-14

## 2024-03-14 RX ORDER — FLUTICASONE PROPIONATE 50 MCG
2 SPRAY, SUSPENSION (ML) NASAL 2 TIMES DAILY
Qty: 48 ML | Refills: 3 | Status: SHIPPED | OUTPATIENT
Start: 2024-03-14

## 2024-03-14 RX ORDER — HYDROCODONE BITARTRATE AND ACETAMINOPHEN 7.5; 325 MG/1; MG/1
1 TABLET ORAL EVERY 8 HOURS PRN
Qty: 70 TABLET | Refills: 0 | Status: SHIPPED | OUTPATIENT
Start: 2024-06-02 | End: 2024-07-02

## 2024-03-14 RX ORDER — METOPROLOL SUCCINATE 25 MG/1
25 TABLET, EXTENDED RELEASE ORAL DAILY
Qty: 100 TABLET | Refills: 3 | Status: SHIPPED | OUTPATIENT
Start: 2024-03-14

## 2024-03-14 RX ORDER — LEVOTHYROXINE SODIUM 0.12 MG/1
125 TABLET ORAL DAILY
Qty: 100 TABLET | Refills: 3 | Status: SHIPPED | OUTPATIENT
Start: 2024-03-14

## 2024-03-14 ASSESSMENT — FIBROSIS 4 INDEX: FIB4 SCORE: 1.86

## 2024-03-14 ASSESSMENT — PATIENT HEALTH QUESTIONNAIRE - PHQ9: CLINICAL INTERPRETATION OF PHQ2 SCORE: 0

## 2024-03-14 NOTE — ASSESSMENT & PLAN NOTE
Chronic stable problem, blood pressure well-controlled on current medical therapy, continue dual regiment with lisinopril 40 mg daily and metoprolol succinate 25 mg daily.

## 2024-03-14 NOTE — ASSESSMENT & PLAN NOTE
Chronic ongoing problem, continue medical therapy with anastrozole 1 mg daily due to history of breast cancer with recurrence.
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
## 2024-03-14 NOTE — ASSESSMENT & PLAN NOTE
"Chronic ongoing problem, continues to find hydrocodone-acetaminophen 7.5-325 mg 1 tablet 2-3 times daily as needed acceptable to control her pain.  She has chronic kidney disease due to chemotherapy from prior breast cancer treatment and cannot take NSAIDs.  No deleterious side effects.  She is compliant with follow-ups, will update controlled substance agreement today.    Obtained and reviewed patient utilization report from [facility] pharmacy database on 3/14/2024 12:31 PM  prior to writing prescription for controlled substance II, III or IV per [place] bill . Based on assessment of the report, the prescription is medically necessary.     Patient understands this prescription is a controlled substance which is potentially habit-forming and its use is regulated by the [employer]. We also discussed the new \"black box\" warning regarding the lethal combination of opioids and benzodiazepines. Refills are subject to terms of a controlled substance agreement and patient has an updated one on file. Most recent UDS is appropriate. Any refill requires an office visit. Narcotics have may adverse effects and the risks of addiction, accidental overdose and death were emphasized. Provided prescriptions for the next three months.    "

## 2024-03-14 NOTE — ASSESSMENT & PLAN NOTE
Chronic stable problem, maintaining reduced weight with Trulicity 0.75 mg weekly.  Continue with healthy dietary choices and activity as tolerated.

## 2024-03-14 NOTE — ASSESSMENT & PLAN NOTE
"Chronic ongoing problem, no issues with hydrocodone-acetaminophen 7.5-325 mg, averages 2 pills/day with extra dose 10 times per month as needed.    Obtained and reviewed patient utilization report from [facility] pharmacy database on 3/14/2024 12:36 PM  prior to writing prescription for controlled substance II, III or IV per [place] bill . Based on assessment of the report, the prescription is medically necessary.     Patient understands this prescription is a controlled substance which is potentially habit-forming and its use is regulated by the [place]. We also discussed the new \"black box\" warning regarding the lethal combination of opioids and benzodiazepines. Refills are subject to terms of a controlled substance agreement and patient has an updated one on file. Most recent UDS is appropriate. Any refill requires an office visit. Narcotics have may adverse effects and the risks of addiction, accidental overdose and death were emphasized. Provided prescriptions for the next three months.    "

## 2024-03-14 NOTE — ASSESSMENT & PLAN NOTE
Chronic ongoing problem, thyroid continues to be well-controlled on current medical therapy, continue levothyroxine 125 mcg daily and update lab work before next visit.

## 2024-03-14 NOTE — ASSESSMENT & PLAN NOTE
Chronic and ongoing problem, kidney function remains stable, continue good oral hydration and limit nephrotoxic agents as able.

## 2024-03-14 NOTE — ASSESSMENT & PLAN NOTE
Chronic stable problem, continue medical therapy with atorvastatin 20 mg daily and update lipid panel before next follow-up visit.

## 2024-03-14 NOTE — ASSESSMENT & PLAN NOTE
Chronic ongoing problem, continue dulaglutide 0.75 mg weekly, will send in updated prescription to [facility] as patient reports that was requested from them.  Paperwork was updated in November 2023.

## 2024-04-22 DIAGNOSIS — I15.2 HYPERTENSION DUE TO ENDOCRINE DISORDER: ICD-10-CM

## 2024-04-22 DIAGNOSIS — E03.2 HYPOTHYROIDISM FROM RESORCINOL: ICD-10-CM

## 2024-04-22 DIAGNOSIS — T49.4X5A HYPOTHYROIDISM FROM RESORCINOL: ICD-10-CM

## 2024-04-22 RX ORDER — METOPROLOL SUCCINATE 25 MG/1
25 TABLET, EXTENDED RELEASE ORAL DAILY
Qty: 100 TABLET | Refills: 3 | Status: SHIPPED | OUTPATIENT
Start: 2024-04-22

## 2024-04-22 RX ORDER — LEVOTHYROXINE SODIUM 0.12 MG/1
125 TABLET ORAL DAILY
Qty: 100 TABLET | Refills: 3 | Status: SHIPPED | OUTPATIENT
Start: 2024-04-22

## 2024-04-22 NOTE — TELEPHONE ENCOUNTER
Received request via: Patient    Was the patient seen in the last year in this department? Yes    Does the patient have an active prescription (recently filled or refills available) for medication(s) requested? No    Pharmacy Name: [facility]    Does the patient have long term Plus and need 100 day supply (blood pressure, diabetes and cholesterol meds only)? Yes, quantity updated to 100 days

## 2024-05-03 DIAGNOSIS — E03.2 HYPOTHYROIDISM FROM RESORCINOL: ICD-10-CM

## 2024-05-03 DIAGNOSIS — T49.4X5A HYPOTHYROIDISM FROM RESORCINOL: ICD-10-CM

## 2024-05-03 NOTE — TELEPHONE ENCOUNTER
Was the patient seen in the last year in this department? Yes   Does patient have an active prescription for medications requested? No   Received Request Via: Pharmacy  Pt needs rx to go to [facility] pharmacy [place]

## 2024-05-05 RX ORDER — LEVOTHYROXINE SODIUM 0.12 MG/1
125 TABLET ORAL DAILY
Qty: 100 TABLET | Refills: 3 | Status: SHIPPED | OUTPATIENT
Start: 2024-05-05

## 2024-05-07 DIAGNOSIS — I15.2 HYPERTENSION DUE TO ENDOCRINE DISORDER: ICD-10-CM

## 2024-05-07 RX ORDER — METOPROLOL SUCCINATE 25 MG/1
25 TABLET, EXTENDED RELEASE ORAL DAILY
Qty: 100 TABLET | Refills: 3 | Status: SHIPPED | OUTPATIENT
Start: 2024-05-07

## 2024-06-12 ENCOUNTER — OFFICE VISIT (OUTPATIENT)
Dept: MEDICAL GROUP | Facility: PHYSICIAN GROUP | Age: 74
End: 2024-06-12
Payer: MEDICARE

## 2024-06-12 VITALS
WEIGHT: 229.8 LBS | OXYGEN SATURATION: 98 % | TEMPERATURE: 96.8 F | HEART RATE: 77 BPM | SYSTOLIC BLOOD PRESSURE: 128 MMHG | BODY MASS INDEX: 36.07 KG/M2 | RESPIRATION RATE: 16 BRPM | DIASTOLIC BLOOD PRESSURE: 70 MMHG | HEIGHT: 67 IN

## 2024-06-12 DIAGNOSIS — N18.32 STAGE 3B CHRONIC KIDNEY DISEASE: ICD-10-CM

## 2024-06-12 DIAGNOSIS — E11.9 TYPE 2 DIABETES MELLITUS WITHOUT COMPLICATION, WITHOUT LONG-TERM CURRENT USE OF INSULIN (HCC): ICD-10-CM

## 2024-06-12 DIAGNOSIS — E66.01 SEVERE OBESITY (HCC): ICD-10-CM

## 2024-06-12 DIAGNOSIS — G89.29 CHRONIC BILATERAL LOW BACK PAIN WITHOUT SCIATICA: ICD-10-CM

## 2024-06-12 DIAGNOSIS — G89.29 CHRONIC NECK PAIN: ICD-10-CM

## 2024-06-12 DIAGNOSIS — F11.20 UNCOMPLICATED OPIOID DEPENDENCE (HCC): ICD-10-CM

## 2024-06-12 DIAGNOSIS — M54.50 CHRONIC BILATERAL LOW BACK PAIN WITHOUT SCIATICA: ICD-10-CM

## 2024-06-12 DIAGNOSIS — M54.2 CHRONIC NECK PAIN: ICD-10-CM

## 2024-06-12 PROCEDURE — 99214 OFFICE O/P EST MOD 30 MIN: CPT | Performed by: INTERNAL MEDICINE

## 2024-06-12 PROCEDURE — 3074F SYST BP LT 130 MM HG: CPT | Performed by: INTERNAL MEDICINE

## 2024-06-12 PROCEDURE — 3078F DIAST BP <80 MM HG: CPT | Performed by: INTERNAL MEDICINE

## 2024-06-12 PROCEDURE — G2211 COMPLEX E/M VISIT ADD ON: HCPCS | Performed by: INTERNAL MEDICINE

## 2024-06-12 RX ORDER — HYDROCODONE BITARTRATE AND ACETAMINOPHEN 7.5; 325 MG/1; MG/1
1 TABLET ORAL EVERY 8 HOURS PRN
Qty: 75 TABLET | Refills: 0 | Status: SHIPPED | OUTPATIENT
Start: 2024-07-03 | End: 2024-08-02

## 2024-06-12 RX ORDER — HYDROCODONE BITARTRATE AND ACETAMINOPHEN 7.5; 325 MG/1; MG/1
1 TABLET ORAL EVERY 8 HOURS PRN
Qty: 75 TABLET | Refills: 0 | Status: SHIPPED | OUTPATIENT
Start: 2024-09-01 | End: 2024-10-01

## 2024-06-12 RX ORDER — HYDROCODONE BITARTRATE AND ACETAMINOPHEN 7.5; 325 MG/1; MG/1
1 TABLET ORAL EVERY 8 HOURS PRN
Qty: 75 TABLET | Refills: 0 | Status: SHIPPED | OUTPATIENT
Start: 2024-08-02 | End: 2024-09-01

## 2024-06-12 ASSESSMENT — FIBROSIS 4 INDEX: FIB4 SCORE: 1.86

## 2024-06-12 NOTE — ASSESSMENT & PLAN NOTE
"Chronic ongoing problem, she continues to find benefit with hydrocodone-acetaminophen 5-325 mg 1 tablet 2-3 times daily as needed.  Tries to limit this to 2 times daily however for breakthrough pain with take a 3rd dose. She has CKD from prior chemotherapy  related to breast cancer and cannot utilize NSAIDs. No deleterious side effects noted. Controlled substance agreement up to date, UDS due this year.    Obtained and reviewed patient utilization report from [facility] pharmacy database on 6/12/2024 1:37 PM  prior to writing prescription for controlled substance II, III or IV per [place] bill . Based on assessment of the report, the prescription is medically necessary.     Patient understands this prescription is a controlled substance which is potentially habit-forming and its use is regulated by the [place]. We also discussed the new \"black box\" warning regarding the lethal combination of opioids and benzodiazepines. Refills are subject to terms of a controlled substance agreement and patient has an updated one on file. Most recent UDS is appropriate. Any refill requires an office visit. Narcotics have may adverse effects and the risks of addiction, accidental overdose and death were emphasized. Provided prescriptions for the next three months.    "

## 2024-06-12 NOTE — ASSESSMENT & PLAN NOTE
Chronic and ongoing problem, need to adjust medications carefully to chronic kidney disease, update lab work to ensure stability, continue good oral hydration and avoid NSAIDs.

## 2024-06-12 NOTE — ASSESSMENT & PLAN NOTE
Chronic improved, no significant drop with sustained weight loss, chronic medical conditions are under good control with current regimen, continue dulaglutide 0.75 mg weekly.

## 2024-06-12 NOTE — ASSESSMENT & PLAN NOTE
Chronic and stable problem, continue current medical therapy dulaglutide 75 mg weekly, no noted side effects, dose is appropriate.
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
## 2024-06-12 NOTE — ASSESSMENT & PLAN NOTE
"Chronic ongoing problem, she continues to find benefit with hydrocodone-acetaminophen 5-325 mg 1 tablet 2-3 times daily as needed.  Tries to limit this to 2 times daily however for breakthrough pain with take a 3rd dose. She has CKD from prior chemotherapy  related to breast cancer and cannot utilize NSAIDs. No deleterious side effects noted. Controlled substance agreement up to date, UDS due this year.    Obtained and reviewed patient utilization report from [facility] pharmacy database on 6/12/2024 1:37 PM  prior to writing prescription for controlled substance II, III or IV per [place] bill . Based on assessment of the report, the prescription is medically necessary.     Patient understands this prescription is a controlled substance which is potentially habit-forming and its use is regulated by the [employer]. We also discussed the new \"black box\" warning regarding the lethal combination of opioids and benzodiazepines. Refills are subject to terms of a controlled substance agreement and patient has an updated one on file. Most recent UDS is appropriate. Any refill requires an office visit. Narcotics have may adverse effects and the risks of addiction, accidental overdose and death were emphasized. Provided prescriptions for the next three months.    "

## 2024-06-12 NOTE — ASSESSMENT & PLAN NOTE
"Chronic ongoing problem, she continues to find benefit with hydrocodone-acetaminophen 5-325 mg 1 tablet 2-3 times daily as needed.  Tries to limit this to 2 times daily however for breakthrough pain with take a 3rd dose. She has CKD from prior chemotherapy  related to breast cancer and cannot utilize NSAIDs. No deleterious side effects noted. Controlled substance agreement up to date, UDS due this year.    Obtained and reviewed patient utilization report from [facility] pharmacy database on 6/12/2024 1:37 PM  prior to writing prescription for controlled substance II, III or IV per [place] bill . Based on assessment of the report, the prescription is medically necessary.     Patient understands this prescription is a controlled substance which is potentially habit-forming and its use is regulated by the [employer]. We also discussed the new \"black box\" warning regarding the lethal combination of opioids and benzodiazepines. Refills are subject to terms of a controlled substance agreement and patient has an updated one on file. Most recent UDS is appropriate. Any refill requires an office visit. Narcotics have may adverse effects and the risks of addiction, accidental overdose and death were emphasized. Provided prescriptions for the next three months.    "

## 2024-07-18 NOTE — ADDENDUM NOTE
Addended by: HARLEY COMBS on: 9/12/2023 12:56 PM     Modules accepted: Orders     Controlled with current regime

## 2024-07-30 ENCOUNTER — TELEPHONE (OUTPATIENT)
Dept: HEALTH INFORMATION MANAGEMENT | Facility: OTHER | Age: 74
End: 2024-07-30
Payer: MEDICARE

## 2024-08-30 ENCOUNTER — HOSPITAL ENCOUNTER (OUTPATIENT)
Dept: LAB | Facility: MEDICAL CENTER | Age: 74
End: 2024-08-30
Attending: INTERNAL MEDICINE
Payer: MEDICARE

## 2024-08-30 DIAGNOSIS — E78.2 MIXED HYPERLIPIDEMIA: ICD-10-CM

## 2024-08-30 DIAGNOSIS — F11.20 UNCOMPLICATED OPIOID DEPENDENCE (HCC): ICD-10-CM

## 2024-08-30 DIAGNOSIS — E11.9 TYPE 2 DIABETES MELLITUS WITHOUT COMPLICATION, WITHOUT LONG-TERM CURRENT USE OF INSULIN (HCC): ICD-10-CM

## 2024-08-30 DIAGNOSIS — I15.2 HYPERTENSION DUE TO ENDOCRINE DISORDER: ICD-10-CM

## 2024-08-30 LAB
25(OH)D3 SERPL-MCNC: 45 NG/ML (ref 30–100)
ALBUMIN SERPL BCP-MCNC: 4.6 G/DL (ref 3.2–4.9)
ALBUMIN/GLOB SERPL: 1.6 G/DL
ALP SERPL-CCNC: 92 U/L (ref 30–99)
ALT SERPL-CCNC: 30 U/L (ref 2–50)
ANION GAP SERPL CALC-SCNC: 13 MMOL/L (ref 7–16)
AST SERPL-CCNC: 43 U/L (ref 12–45)
BASOPHILS # BLD AUTO: 0.7 % (ref 0–1.8)
BASOPHILS # BLD: 0.04 K/UL (ref 0–0.12)
BILIRUB SERPL-MCNC: 0.8 MG/DL (ref 0.1–1.5)
BUN SERPL-MCNC: 16 MG/DL (ref 8–22)
CALCIUM ALBUM COR SERPL-MCNC: 9.3 MG/DL (ref 8.5–10.5)
CALCIUM SERPL-MCNC: 9.8 MG/DL (ref 8.5–10.5)
CHLORIDE SERPL-SCNC: 105 MMOL/L (ref 96–112)
CHOLEST SERPL-MCNC: 214 MG/DL (ref 100–199)
CO2 SERPL-SCNC: 23 MMOL/L (ref 20–33)
CREAT SERPL-MCNC: 1.05 MG/DL (ref 0.5–1.4)
CREAT UR-MCNC: 76.84 MG/DL
EOSINOPHIL # BLD AUTO: 0.19 K/UL (ref 0–0.51)
EOSINOPHIL NFR BLD: 3.3 % (ref 0–6.9)
ERYTHROCYTE [DISTWIDTH] IN BLOOD BY AUTOMATED COUNT: 48.6 FL (ref 35.9–50)
EST. AVERAGE GLUCOSE BLD GHB EST-MCNC: 114 MG/DL
FASTING STATUS PATIENT QL REPORTED: NORMAL
GFR SERPLBLD CREATININE-BSD FMLA CKD-EPI: 56 ML/MIN/1.73 M 2
GLOBULIN SER CALC-MCNC: 2.8 G/DL (ref 1.9–3.5)
GLUCOSE SERPL-MCNC: 128 MG/DL (ref 65–99)
HBA1C MFR BLD: 5.6 % (ref 4–5.6)
HCT VFR BLD AUTO: 47.4 % (ref 37–47)
HDLC SERPL-MCNC: 50 MG/DL
HGB BLD-MCNC: 15.3 G/DL (ref 12–16)
IMM GRANULOCYTES # BLD AUTO: 0.01 K/UL (ref 0–0.11)
IMM GRANULOCYTES NFR BLD AUTO: 0.2 % (ref 0–0.9)
LDLC SERPL CALC-MCNC: 111 MG/DL
LYMPHOCYTES # BLD AUTO: 1.82 K/UL (ref 1–4.8)
LYMPHOCYTES NFR BLD: 31.3 % (ref 22–41)
MCH RBC QN AUTO: 31.9 PG (ref 27–33)
MCHC RBC AUTO-ENTMCNC: 32.3 G/DL (ref 32.2–35.5)
MCV RBC AUTO: 98.8 FL (ref 81.4–97.8)
MICROALBUMIN UR-MCNC: <1.2 MG/DL
MICROALBUMIN/CREAT UR: NORMAL MG/G (ref 0–30)
MONOCYTES # BLD AUTO: 0.37 K/UL (ref 0–0.85)
MONOCYTES NFR BLD AUTO: 6.4 % (ref 0–13.4)
NEUTROPHILS # BLD AUTO: 3.39 K/UL (ref 1.82–7.42)
NEUTROPHILS NFR BLD: 58.1 % (ref 44–72)
NRBC # BLD AUTO: 0 K/UL
NRBC BLD-RTO: 0 /100 WBC (ref 0–0.2)
PLATELET # BLD AUTO: 218 K/UL (ref 164–446)
PMV BLD AUTO: 10.1 FL (ref 9–12.9)
POTASSIUM SERPL-SCNC: 3.9 MMOL/L (ref 3.6–5.5)
PROT SERPL-MCNC: 7.4 G/DL (ref 6–8.2)
RBC # BLD AUTO: 4.8 M/UL (ref 4.2–5.4)
SODIUM SERPL-SCNC: 141 MMOL/L (ref 135–145)
T4 FREE SERPL-MCNC: 1.33 NG/DL (ref 0.93–1.7)
TRIGL SERPL-MCNC: 263 MG/DL (ref 0–149)
TSH SERPL-ACNC: 22.2 UIU/ML (ref 0.35–5.5)
VIT B12 SERPL-MCNC: 971 PG/ML (ref 211–911)
WBC # BLD AUTO: 5.8 K/UL (ref 4.8–10.8)

## 2024-08-30 PROCEDURE — 84443 ASSAY THYROID STIM HORMONE: CPT

## 2024-08-30 PROCEDURE — 85025 COMPLETE CBC W/AUTO DIFF WBC: CPT

## 2024-08-30 PROCEDURE — 80053 COMPREHEN METABOLIC PANEL: CPT

## 2024-08-30 PROCEDURE — 84439 ASSAY OF FREE THYROXINE: CPT

## 2024-08-30 PROCEDURE — 36415 COLL VENOUS BLD VENIPUNCTURE: CPT

## 2024-08-30 PROCEDURE — 82607 VITAMIN B-12: CPT

## 2024-08-30 PROCEDURE — 82306 VITAMIN D 25 HYDROXY: CPT

## 2024-08-30 PROCEDURE — 82570 ASSAY OF URINE CREATININE: CPT

## 2024-08-30 PROCEDURE — 80061 LIPID PANEL: CPT

## 2024-08-30 PROCEDURE — G0481 DRUG TEST DEF 8-14 CLASSES: HCPCS

## 2024-08-30 PROCEDURE — 82043 UR ALBUMIN QUANTITATIVE: CPT

## 2024-08-30 PROCEDURE — 83036 HEMOGLOBIN GLYCOSYLATED A1C: CPT

## 2024-09-03 LAB
1OH-MIDAZOLAM UR QL SCN: NOT DETECTED
6MAM UR QL: NOT DETECTED
7AMINOCLONAZEPAM UR QL: NOT DETECTED
A-OH ALPRAZ UR QL: NOT DETECTED
ALPRAZ UR QL: NOT DETECTED
AMPHET UR QL SCN: NOT DETECTED
ANNOTATION COMMENT IMP: NORMAL
BARBITURATES UR QL: NEGATIVE
BUPRENORPHINE UR QL: NOT DETECTED
BZE UR QL: NEGATIVE
CARBOXYTHC UR QL: NEGATIVE
CARISOPRODOL UR QL: NEGATIVE
CLONAZEPAM UR QL: NOT DETECTED
CODEINE UR QL: NOT DETECTED
CREAT UR-MCNC: 79.9 MG/DL (ref 20–400)
DIAZEPAM UR QL: NOT DETECTED
ETHYL GLUCURONIDE UR QL: NEGATIVE
FENTANYL UR QL: NOT DETECTED
GABAPENTIN UR QL CFM: NOT DETECTED
HYDROCODONE UR QL: PRESENT
HYDROMORPHONE UR QL: PRESENT
LORAZEPAM UR QL: NOT DETECTED
MDA UR QL: NOT DETECTED
MDEA UR QL: NOT DETECTED
MDMA UR QL: NOT DETECTED
ME-PHENIDATE UR QL: NOT DETECTED
METHADONE UR QL: NEGATIVE
METHAMPHET UR QL: NOT DETECTED
MIDAZOLAM UR QL SCN: NOT DETECTED
MORPHINE UR QL: NOT DETECTED
NALOXONE UR QL CFM: NOT DETECTED
NORBUPRENORPHINE UR QL CFM: NOT DETECTED
NORDIAZEPAM UR QL: NOT DETECTED
NORFENTANYL UR QL: NOT DETECTED
NORHYDROCODONE UR QL CFM: PRESENT
NORMEPERIDINE UR QL CFM: NOT DETECTED
NOROXYCODONE UR QL CFM: NOT DETECTED
NOROXYMORPHONE UR QL SCN: NOT DETECTED
OXAZEPAM UR QL: NOT DETECTED
OXYCODONE UR QL: NOT DETECTED
OXYMORPHONE UR QL: NOT DETECTED
PATHOLOGY STUDY: NORMAL
PCP UR QL: NEGATIVE
PHENTERMINE UR QL: NOT DETECTED
PREGABALIN UR QL CFM: NOT DETECTED
SERVICE CMNT-IMP: NORMAL
TAPENTADOL UR QL SCN: NOT DETECTED
TAPENTADOL UR QL SCN: NOT DETECTED
TEMAZEPAM UR QL: NOT DETECTED
TRAMADOL UR QL: NEGATIVE
ZOLPIDEM PHENYL-4-CARB UR QL SCN: NOT DETECTED
ZOLPIDEM UR QL: NOT DETECTED

## 2024-09-10 ENCOUNTER — OFFICE VISIT (OUTPATIENT)
Dept: MEDICAL GROUP | Facility: PHYSICIAN GROUP | Age: 74
End: 2024-09-10
Payer: MEDICARE

## 2024-09-10 VITALS
HEART RATE: 79 BPM | SYSTOLIC BLOOD PRESSURE: 126 MMHG | WEIGHT: 236.3 LBS | BODY MASS INDEX: 37.09 KG/M2 | RESPIRATION RATE: 16 BRPM | OXYGEN SATURATION: 95 % | DIASTOLIC BLOOD PRESSURE: 70 MMHG | TEMPERATURE: 97.1 F | HEIGHT: 67 IN

## 2024-09-10 DIAGNOSIS — E78.2 MIXED HYPERLIPIDEMIA: ICD-10-CM

## 2024-09-10 DIAGNOSIS — E03.2 HYPOTHYROIDISM FROM RESORCINOL: ICD-10-CM

## 2024-09-10 DIAGNOSIS — T49.4X5A HYPOTHYROIDISM FROM RESORCINOL: ICD-10-CM

## 2024-09-10 DIAGNOSIS — M54.50 CHRONIC BILATERAL LOW BACK PAIN WITHOUT SCIATICA: ICD-10-CM

## 2024-09-10 DIAGNOSIS — F11.20 UNCOMPLICATED OPIOID DEPENDENCE (HCC): ICD-10-CM

## 2024-09-10 DIAGNOSIS — G89.29 CHRONIC BILATERAL LOW BACK PAIN WITHOUT SCIATICA: ICD-10-CM

## 2024-09-10 DIAGNOSIS — E11.9 TYPE 2 DIABETES MELLITUS WITHOUT COMPLICATION, WITHOUT LONG-TERM CURRENT USE OF INSULIN (HCC): ICD-10-CM

## 2024-09-10 PROCEDURE — 3078F DIAST BP <80 MM HG: CPT | Performed by: INTERNAL MEDICINE

## 2024-09-10 PROCEDURE — 3074F SYST BP LT 130 MM HG: CPT | Performed by: INTERNAL MEDICINE

## 2024-09-10 PROCEDURE — 99214 OFFICE O/P EST MOD 30 MIN: CPT | Performed by: INTERNAL MEDICINE

## 2024-09-10 RX ORDER — HYDROCODONE BITARTRATE AND ACETAMINOPHEN 7.5; 325 MG/1; MG/1
1 TABLET ORAL EVERY 8 HOURS PRN
Qty: 75 TABLET | Refills: 0 | Status: SHIPPED | OUTPATIENT
Start: 2024-11-01 | End: 2024-12-01

## 2024-09-10 RX ORDER — ATORVASTATIN CALCIUM 20 MG/1
20 TABLET, FILM COATED ORAL SEE ADMIN INSTRUCTIONS
Qty: 100 TABLET | Refills: 3 | Status: SHIPPED | OUTPATIENT
Start: 2024-09-10

## 2024-09-10 RX ORDER — HYDROCODONE BITARTRATE AND ACETAMINOPHEN 7.5; 325 MG/1; MG/1
1 TABLET ORAL EVERY 8 HOURS PRN
Qty: 75 TABLET | Refills: 0 | Status: SHIPPED | OUTPATIENT
Start: 2024-10-02 | End: 2024-11-01

## 2024-09-10 RX ORDER — HYDROCODONE BITARTRATE AND ACETAMINOPHEN 7.5; 325 MG/1; MG/1
1 TABLET ORAL EVERY 8 HOURS PRN
Qty: 75 TABLET | Refills: 0 | Status: SHIPPED | OUTPATIENT
Start: 2024-12-01 | End: 2024-12-31

## 2024-09-10 RX ORDER — DULAGLUTIDE 0.75 MG/.5ML
1 INJECTION, SOLUTION SUBCUTANEOUS
Qty: 1.5 ML | Refills: 3 | Status: SHIPPED | OUTPATIENT
Start: 2024-09-10

## 2024-09-10 ASSESSMENT — FIBROSIS 4 INDEX: FIB4 SCORE: 2.63

## 2024-09-11 NOTE — PROGRESS NOTES
Subjective:   Chief Complaint/History of Present Illness:  Keyur Burt is a 74 y.o. female established patient who presents today to discuss medical problems as listed below. Karen is unaccompanied for today's visit.    History of Present Illness  The patient presents for evaluation of multiple medical concerns.    She has been experiencing severe and sometimes debilitating pain. She is currently taking Norco 7.5 mg 2-3 times daily as needed.     She has made dietary changes, now living with her son which she enjoys. Additionally, her thyroid medication was changed from a gray pill to a round one due to a change in the . She is uncertain if this change has had any impact on her health.    She is currently taking Lipitor 20 mg every other day. The dosage was reduced from daily to every other day following a blood test approximately 6 months ago? We couldn't find in the chart why this was adjusted. She had a previous adverse reaction to cholesterol medication prescribed by a nurse practitioner, which resulted in bleeding from all orifices and heart palpitations. She discontinued the medication and noticed an improvement within 2 days. However, when she sought medical attention at the nurse practitioner's office, she was not seen.    She has a history of breast cancer in 2015 and remains on anastrozole.    She is due to complete patient assistant paperwork for Trulicity.         Current Medications:  Current Outpatient Medications Ordered in Epic   Medication Sig Dispense Refill    [START ON 10/2/2024] HYDROcodone-acetaminophen (NORCO) 7.5-325 MG tab Take 1 Tablet by mouth every 8 hours as needed for Severe Pain for up to 30 days. 75 Tablet 0    [START ON 11/1/2024] HYDROcodone-acetaminophen (NORCO) 7.5-325 MG tab Take 1 Tablet by mouth every 8 hours as needed for Severe Pain for up to 30 days. 75 Tablet 0    [START ON 12/1/2024] HYDROcodone-acetaminophen (NORCO) 7.5-325 MG tab Take 1 Tablet by mouth  "every 8 hours as needed for Severe Pain for up to 30 days. 75 Tablet 0    Dulaglutide (TRULICITY) 0.75 MG/0.5ML Solution Pen-injector Inject 1 Pen under the skin every 7 days. 1.5 mL 3    atorvastatin (LIPITOR) 20 MG Tab Take 1 Tablet by mouth see administration instructions. Take 20 mg on Mon, Wed, Fri and 10 mg on Tues,Thurs, Sat, Sun 100 Tablet 3    metoprolol SR (TOPROL XL) 25 MG TABLET SR 24 HR Take 1 Tablet by mouth every day. 100 Tablet 3    levothyroxine (SYNTHROID) 125 MCG Tab Take 1 Tablet by mouth every day. 100 Tablet 3    anastrozole (ARIMIDEX) 1 MG Tab Take 1 Tablet by mouth every day. 100 Tablet 3    lisinopril (PRINIVIL) 40 MG tablet Take 1 Tablet by mouth every day. 100 Tablet 3    fluticasone (FLONASE) 50 MCG/ACT nasal spray Administer 2 Sprays into affected nostril(S) 2 times a day. 48 mL 3    ondansetron (ZOFRAN ODT) 4 MG TABLET DISPERSIBLE Take 4 mg by mouth every 6 hours as needed for Nausea.      cyanocobalamin (VITAMIN B-12) 100 MCG Tab Take 100 mcg by mouth every day.      cyclobenzaprine (FLEXERIL) 5 MG tablet Take 1-2 Tabs by mouth 3 times a day as needed. 100 Tab 1    Cholecalciferol (VITAMIN D) 2000 UNITS Cap Take  by mouth.       No current Epic-ordered facility-administered medications on file.          Objective:   Physical Exam:    Vitals: /70 (BP Location: Right arm, Patient Position: Sitting, BP Cuff Size: Adult)   Pulse 79   Temp 36.2 °C (97.1 °F) (Temporal)   Resp 16   Ht 1.689 m (5' 6.5\")   Wt 107 kg (236 lb 4.8 oz)   SpO2 95%    BMI: Body mass index is 37.57 kg/m².  Physical Exam  Constitutional:       General: She is not in acute distress.     Appearance: Normal appearance. She is not ill-appearing.   HENT:      Right Ear: External ear normal.      Left Ear: External ear normal.   Eyes:      General: No scleral icterus.     Conjunctiva/sclera: Conjunctivae normal.   Cardiovascular:      Rate and Rhythm: Normal rate and regular rhythm.      Pulses: Normal pulses. "   Pulmonary:      Effort: Pulmonary effort is normal. No respiratory distress.      Breath sounds: No wheezing.   Abdominal:      General: Bowel sounds are normal. There is no distension.      Palpations: Abdomen is soft.   Musculoskeletal:         General: Swelling and tenderness present.      Right lower leg: Edema present.      Left lower leg: Edema present.   Skin:     General: Skin is warm and dry.      Findings: No rash.   Neurological:      Gait: Gait abnormal.   Psychiatric:         Mood and Affect: Mood normal.         Behavior: Behavior normal.         Thought Content: Thought content normal.         Judgment: Judgment normal.           Results  Laboratory Studies  TSH level is 20. Free T4 is normal. LDL cholesterol dropped by 120-130 points.    Assessment & Plan  Chronic bilateral low back pain  Uncomplicated opioid dependence  Chronic ongoing problem, she continues to find benefit with hydrocodone-acetaminophen 7.5-325 mg 1 tablet 2-3 times daily as needed.  Tries to limit this to 2 times daily however for breakthrough pain with take a 3rd dose. 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 No deleterious side effects noted. Controlled substance agreement up to date, UDS due this year.  A refill of Norco was provided, with the next refill due on 12/31/2024. A virtual visit is scheduled for 12/18/2024 at 11:40 AM to assess her response to the medication.    3. Hypothyroidism.  Her TSH levels are elevated, while free T4 levels remain within the normal range. The increase in TSH could be attributed to the change in the  of her thyroid medication. A non-fasting blood test will be conducted in a few months to monitor her TSH levels.    4. Hyperlipidemia.  Her triglyceride levels are elevated, which could be due to an increased intake of carbohydrates and sugars. Her LDL levels are also high, likely due to genetic factors. She has responded well to Lipitor,  with a significant decrease in LDL levels. The dosage of Lipitor will be adjusted to 20 mg three days a week and 10 mg for the remaining four days. This regimen will be followed for a few weeks to assess its effectiveness.    5. Type 2 diabetes  Continues to be well controlled with dulaglutide 0.75 mg weekly, no noted side effects. Due to have PAP completed for the next year.          Assessment and Plan:   Keyur is a 74 y.o. female with the following:  Problem List Items Addressed This Visit       Chronic bilateral low back pain without sciatica    Relevant Medications    HYDROcodone-acetaminophen (NORCO) 7.5-325 MG tab (Start on 10/2/2024)    HYDROcodone-acetaminophen (NORCO) 7.5-325 MG tab (Start on 11/1/2024)    HYDROcodone-acetaminophen (NORCO) 7.5-325 MG tab (Start on 12/1/2024)    Hypothyroidism from resorcinol    Relevant Orders    TSH    FREE THYROXINE    Mixed hyperlipidemia    Relevant Medications    atorvastatin (LIPITOR) 20 MG Tab    Other Relevant Orders    Lipid Profile    Type 2 diabetes mellitus without complication, without long-term current use of insulin (HCC)    Relevant Medications    Dulaglutide (TRULICITY) 0.75 MG/0.5ML Solution Pen-injector    atorvastatin (LIPITOR) 20 MG Tab    Uncomplicated opioid dependence (HCC)    Relevant Medications    HYDROcodone-acetaminophen (NORCO) 7.5-325 MG tab (Start on 10/2/2024)    HYDROcodone-acetaminophen (NORCO) 7.5-325 MG tab (Start on 11/1/2024)    HYDROcodone-acetaminophen (NORCO) 7.5-325 MG tab (Start on 12/1/2024)      Obtained and reviewed patient utilization report from [facility] pharmacy database on 9/10/2024 9:34 PM  prior to writing prescription for controlled substance II, III or IV per [place] bill . Based on assessment of the report, the prescription is medically necessary.     Patient understands this prescription is a controlled substance which is potentially habit-forming and its use is regulated by the [place]. We also discussed the  "new \"black box\" warning regarding the lethal combination of opioids and benzodiazepines. Refills are subject to terms of a controlled substance agreement and patient has an updated one on file. Most recent UDS is appropriate. Any refill requires an office visit. Narcotics have may adverse effects and the risks of addiction, accidental overdose and death were emphasized. Provided prescriptions for the next three months.      RTC: Return in about 3 months (around 12/10/2024).    I spent a total of 38 minutes with record review, exam, communication with the patient, communication with other providers, and documentation of this encounter.    Verbal consent was acquired by the patient to use [employer] ambient listening note generation during this visit Yes       PLEASE NOTE: This dictation was created using voice recognition software. I have made every reasonable attempt to correct obvious errors, but I expect that there are errors of grammar and possibly content that I did not discover before finalizing the note.      Aidee Carpenter, DO  Geriatric and Internal Medicine  [facility]     "

## 2024-11-27 DIAGNOSIS — I15.2 HYPERTENSION DUE TO ENDOCRINE DISORDER: ICD-10-CM

## 2024-11-27 RX ORDER — LISINOPRIL 40 MG/1
40 TABLET ORAL DAILY
Qty: 100 TABLET | Refills: 0 | Status: SHIPPED | OUTPATIENT
Start: 2024-11-27

## 2024-11-27 NOTE — TELEPHONE ENCOUNTER
Received request via: Pharmacy    Was the patient seen in the last year in this department? Yes    Does the patient have an active prescription (recently filled or refills available) for medication(s) requested? No    Ph[facility] Name: [facility]    Does the patient have USP Plus and need 100-day supply? (This applies to ALL medications) Yes, quantity updated to 100 days

## 2024-11-27 NOTE — TELEPHONE ENCOUNTER
Requested Prescriptions     Pending Prescriptions Disp Refills    lisinopril (PRINIVIL) 40 MG tablet 100 Tablet 0     Sig: Take 1 Tablet by mouth every day.     Joana Dominguez M.D.

## 2024-12-02 ENCOUNTER — HOSPITAL ENCOUNTER (OUTPATIENT)
Dept: LAB | Facility: MEDICAL CENTER | Age: 74
End: 2024-12-02
Attending: INTERNAL MEDICINE
Payer: MEDICARE

## 2024-12-02 DIAGNOSIS — E03.2 HYPOTHYROIDISM FROM RESORCINOL: ICD-10-CM

## 2024-12-02 DIAGNOSIS — E78.2 MIXED HYPERLIPIDEMIA: ICD-10-CM

## 2024-12-02 DIAGNOSIS — T49.4X5A HYPOTHYROIDISM FROM RESORCINOL: ICD-10-CM

## 2024-12-02 PROCEDURE — 84439 ASSAY OF FREE THYROXINE: CPT

## 2024-12-02 PROCEDURE — 36415 COLL VENOUS BLD VENIPUNCTURE: CPT

## 2024-12-02 PROCEDURE — 84443 ASSAY THYROID STIM HORMONE: CPT

## 2024-12-02 PROCEDURE — 80061 LIPID PANEL: CPT

## 2024-12-03 LAB
CHOLEST SERPL-MCNC: 182 MG/DL (ref 100–199)
FASTING STATUS PATIENT QL REPORTED: NORMAL
HDLC SERPL-MCNC: 58 MG/DL
LDLC SERPL CALC-MCNC: 70 MG/DL
T4 FREE SERPL-MCNC: 1.14 NG/DL (ref 0.93–1.7)
TRIGL SERPL-MCNC: 272 MG/DL (ref 0–149)
TSH SERPL-ACNC: 26.4 UIU/ML (ref 0.35–5.5)

## 2024-12-18 ENCOUNTER — APPOINTMENT (OUTPATIENT)
Dept: MEDICAL GROUP | Facility: PHYSICIAN GROUP | Age: 74
End: 2024-12-18
Payer: MEDICARE

## 2024-12-18 VITALS
TEMPERATURE: 98.3 F | WEIGHT: 234 LBS | BODY MASS INDEX: 37.61 KG/M2 | OXYGEN SATURATION: 94 % | RESPIRATION RATE: 16 BRPM | DIASTOLIC BLOOD PRESSURE: 70 MMHG | HEIGHT: 66 IN | SYSTOLIC BLOOD PRESSURE: 120 MMHG | HEART RATE: 83 BPM

## 2024-12-18 DIAGNOSIS — G89.29 CHRONIC BILATERAL LOW BACK PAIN WITHOUT SCIATICA: ICD-10-CM

## 2024-12-18 DIAGNOSIS — E03.2 HYPOTHYROIDISM FROM RESORCINOL: ICD-10-CM

## 2024-12-18 DIAGNOSIS — Z87.828 HISTORY OF MOTOR VEHICLE ACCIDENT: ICD-10-CM

## 2024-12-18 DIAGNOSIS — F11.20 UNCOMPLICATED OPIOID DEPENDENCE (HCC): ICD-10-CM

## 2024-12-18 DIAGNOSIS — M54.50 CHRONIC BILATERAL LOW BACK PAIN WITHOUT SCIATICA: ICD-10-CM

## 2024-12-18 DIAGNOSIS — N18.32 STAGE 3B CHRONIC KIDNEY DISEASE: ICD-10-CM

## 2024-12-18 DIAGNOSIS — I15.2 HYPERTENSION DUE TO ENDOCRINE DISORDER: ICD-10-CM

## 2024-12-18 DIAGNOSIS — T49.4X5A HYPOTHYROIDISM FROM RESORCINOL: ICD-10-CM

## 2024-12-18 DIAGNOSIS — R11.0 NAUSEA: ICD-10-CM

## 2024-12-18 DIAGNOSIS — E11.9 TYPE 2 DIABETES MELLITUS WITHOUT COMPLICATION, WITHOUT LONG-TERM CURRENT USE OF INSULIN (HCC): ICD-10-CM

## 2024-12-18 PROCEDURE — 3078F DIAST BP <80 MM HG: CPT | Performed by: INTERNAL MEDICINE

## 2024-12-18 PROCEDURE — 99214 OFFICE O/P EST MOD 30 MIN: CPT | Performed by: INTERNAL MEDICINE

## 2024-12-18 PROCEDURE — 3074F SYST BP LT 130 MM HG: CPT | Performed by: INTERNAL MEDICINE

## 2024-12-18 PROCEDURE — G2211 COMPLEX E/M VISIT ADD ON: HCPCS | Performed by: INTERNAL MEDICINE

## 2024-12-18 RX ORDER — LEVOTHYROXINE SODIUM 125 UG/1
125 TABLET ORAL
Qty: 50 TABLET | Refills: 3 | Status: SHIPPED | OUTPATIENT
Start: 2024-12-18

## 2024-12-18 RX ORDER — CYCLOBENZAPRINE HCL 5 MG
5-10 TABLET ORAL 3 TIMES DAILY PRN
Qty: 100 TABLET | Refills: 3 | Status: SHIPPED | OUTPATIENT
Start: 2024-12-18

## 2024-12-18 RX ORDER — ONDANSETRON 4 MG/1
4 TABLET, ORALLY DISINTEGRATING ORAL EVERY 6 HOURS PRN
Qty: 30 TABLET | Refills: 3 | Status: SHIPPED | OUTPATIENT
Start: 2024-12-18

## 2024-12-18 RX ORDER — HYDROCODONE BITARTRATE AND ACETAMINOPHEN 7.5; 325 MG/1; MG/1
1 TABLET ORAL EVERY 8 HOURS PRN
Qty: 90 TABLET | Refills: 0 | Status: SHIPPED | OUTPATIENT
Start: 2024-12-31 | End: 2025-01-30

## 2024-12-18 RX ORDER — HYDROCODONE BITARTRATE AND ACETAMINOPHEN 7.5; 325 MG/1; MG/1
1 TABLET ORAL EVERY 8 HOURS PRN
Qty: 90 TABLET | Refills: 0 | Status: SHIPPED | OUTPATIENT
Start: 2025-01-30 | End: 2025-03-01

## 2024-12-18 RX ORDER — LEVOTHYROXINE SODIUM 137 UG/1
137 TABLET ORAL
Qty: 50 TABLET | Refills: 3 | Status: SHIPPED | OUTPATIENT
Start: 2024-12-18

## 2024-12-18 RX ORDER — LISINOPRIL 40 MG/1
40 TABLET ORAL DAILY
Qty: 100 TABLET | Refills: 3 | Status: SHIPPED | OUTPATIENT
Start: 2024-12-18

## 2024-12-18 RX ORDER — HYDROCODONE BITARTRATE AND ACETAMINOPHEN 7.5; 325 MG/1; MG/1
1 TABLET ORAL EVERY 8 HOURS PRN
Qty: 90 TABLET | Refills: 0 | Status: SHIPPED | OUTPATIENT
Start: 2025-03-01 | End: 2025-03-31

## 2024-12-18 ASSESSMENT — FIBROSIS 4 INDEX: FIB4 SCORE: 2.63
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
## 2024-12-26 DIAGNOSIS — I15.2 HYPERTENSION DUE TO ENDOCRINE DISORDER: ICD-10-CM

## 2024-12-26 NOTE — TELEPHONE ENCOUNTER
Received request via: Pharmacy    Was the patient seen in the last year in this department? Yes    Does the patient have an active prescription (recently filled or refills available) for medication(s) requested? No    Does the patient have correction Plus and need 100-day supply? (This applies to ALL medications) Yes, quantity updated to 100 days

## 2024-12-31 RX ORDER — FLUTICASONE PROPIONATE 50 MCG
2 SPRAY, SUSPENSION (ML) NASAL 2 TIMES DAILY
Qty: 48 G | Refills: 0 | Status: SHIPPED | OUTPATIENT
Start: 2024-12-31

## 2025-02-09 DIAGNOSIS — I15.2 HYPERTENSION DUE TO ENDOCRINE DISORDER: ICD-10-CM

## 2025-02-10 ENCOUNTER — APPOINTMENT (OUTPATIENT)
Dept: RADIOLOGY | Facility: MEDICAL CENTER | Age: 75
DRG: 082 | End: 2025-02-10
Attending: INTERNAL MEDICINE
Payer: MEDICARE

## 2025-02-10 ENCOUNTER — APPOINTMENT (OUTPATIENT)
Dept: RADIOLOGY | Facility: MEDICAL CENTER | Age: 75
DRG: 082 | End: 2025-02-10
Payer: MEDICARE

## 2025-02-10 ENCOUNTER — APPOINTMENT (OUTPATIENT)
Dept: RADIOLOGY | Facility: MEDICAL CENTER | Age: 75
DRG: 082 | End: 2025-02-10
Attending: EMERGENCY MEDICINE
Payer: MEDICARE

## 2025-02-10 ENCOUNTER — HOSPITAL ENCOUNTER (INPATIENT)
Facility: MEDICAL CENTER | Age: 75
LOS: 11 days | DRG: 082 | End: 2025-02-21
Attending: EMERGENCY MEDICINE | Admitting: INTERNAL MEDICINE
Payer: MEDICARE

## 2025-02-10 ENCOUNTER — APPOINTMENT (OUTPATIENT)
Dept: RADIOLOGY | Facility: MEDICAL CENTER | Age: 75
DRG: 082 | End: 2025-02-10
Attending: PSYCHIATRY & NEUROLOGY
Payer: MEDICARE

## 2025-02-10 DIAGNOSIS — I60.9 SAH (SUBARACHNOID HEMORRHAGE) (HCC): ICD-10-CM

## 2025-02-10 DIAGNOSIS — I10 HYPERTENSION, UNSPECIFIED TYPE: ICD-10-CM

## 2025-02-10 DIAGNOSIS — G93.40 ACUTE ENCEPHALOPATHY: ICD-10-CM

## 2025-02-10 DIAGNOSIS — I62.9 ACUTE INTRACRANIAL HEMORRHAGE (HCC): ICD-10-CM

## 2025-02-10 DIAGNOSIS — J96.01 ACUTE RESPIRATORY FAILURE WITH HYPOXIA (HCC): ICD-10-CM

## 2025-02-10 DIAGNOSIS — N39.0 URINARY TRACT INFECTION WITHOUT HEMATURIA, SITE UNSPECIFIED: ICD-10-CM

## 2025-02-10 DIAGNOSIS — I62.9 INTRACRANIAL BLEEDING (HCC): ICD-10-CM

## 2025-02-10 DIAGNOSIS — I16.0 HYPERTENSIVE URGENCY: ICD-10-CM

## 2025-02-10 LAB
ALBUMIN SERPL BCP-MCNC: 4.3 G/DL (ref 3.2–4.9)
ALBUMIN/GLOB SERPL: 1.4 G/DL
ALP SERPL-CCNC: 86 U/L (ref 30–99)
ALT SERPL-CCNC: 57 U/L (ref 2–50)
AMMONIA PLAS-SCNC: 21 UMOL/L (ref 11–45)
AMPHET UR QL SCN: NEGATIVE
ANION GAP SERPL CALC-SCNC: 20 MMOL/L (ref 7–16)
APPEARANCE UR: ABNORMAL
APTT PPP: 29.8 SEC (ref 24.7–36)
ARTERIAL PATENCY WRIST A: ABNORMAL
AST SERPL-CCNC: 65 U/L (ref 12–45)
BACTERIA #/AREA URNS HPF: ABNORMAL /HPF
BARBITURATES UR QL SCN: NEGATIVE
BASE EXCESS BLDA CALC-SCNC: -7 MMOL/L (ref -4–3)
BASOPHILS # BLD AUTO: 0.1 % (ref 0–1.8)
BASOPHILS # BLD: 0.01 K/UL (ref 0–0.12)
BENZODIAZ UR QL SCN: POSITIVE
BILIRUB SERPL-MCNC: 0.9 MG/DL (ref 0.1–1.5)
BILIRUB UR QL STRIP.AUTO: NEGATIVE
BODY TEMPERATURE: ABNORMAL DEGREES
BREATHS SETTING VENT: 20
BUN SERPL-MCNC: 12 MG/DL (ref 8–22)
BZE UR QL SCN: NEGATIVE
CALCIUM ALBUM COR SERPL-MCNC: 9.2 MG/DL (ref 8.5–10.5)
CALCIUM SERPL-MCNC: 9.4 MG/DL (ref 8.5–10.5)
CANNABINOIDS UR QL SCN: NEGATIVE
CASTS URNS QL MICRO: ABNORMAL /LPF (ref 0–2)
CHLORIDE SERPL-SCNC: 99 MMOL/L (ref 96–112)
CO2 BLDA-SCNC: 22 MMOL/L (ref 32–48)
CO2 SERPL-SCNC: 18 MMOL/L (ref 20–33)
COHGB MFR BLD: 1.7 % (ref 0–4.9)
COLOR UR: YELLOW
CREAT SERPL-MCNC: 0.96 MG/DL (ref 0.5–1.4)
DELSYS IDSYS: ABNORMAL
EOSINOPHIL # BLD AUTO: 0 K/UL (ref 0–0.51)
EOSINOPHIL NFR BLD: 0 % (ref 0–6.9)
EPITHELIAL CELLS 1715: ABNORMAL /HPF (ref 0–5)
ERYTHROCYTE [DISTWIDTH] IN BLOOD BY AUTOMATED COUNT: 44.6 FL (ref 35.9–50)
ETHANOL BLD-MCNC: <10.1 MG/DL
FENTANYL UR QL: NEGATIVE
FLUAV RNA SPEC QL NAA+PROBE: POSITIVE
FLUBV RNA SPEC QL NAA+PROBE: NEGATIVE
GFR SERPLBLD CREATININE-BSD FMLA CKD-EPI: 62 ML/MIN/1.73 M 2
GLOBULIN SER CALC-MCNC: 3.1 G/DL (ref 1.9–3.5)
GLUCOSE BLD STRIP.AUTO-MCNC: 161 MG/DL (ref 65–99)
GLUCOSE SERPL-MCNC: 180 MG/DL (ref 65–99)
GLUCOSE UR STRIP.AUTO-MCNC: NEGATIVE MG/DL
HCO3 BLDA-SCNC: 20.7 MMOL/L (ref 21–28)
HCT VFR BLD AUTO: 40.9 % (ref 37–47)
HGB BLD-MCNC: 13.8 G/DL (ref 12–16)
HOROWITZ INDEX BLDA+IHG-RTO: 273 MM[HG]
IMM GRANULOCYTES # BLD AUTO: 0.08 K/UL (ref 0–0.11)
IMM GRANULOCYTES NFR BLD AUTO: 0.7 % (ref 0–0.9)
INR PPP: 1.06 (ref 0.87–1.13)
KETONES UR STRIP.AUTO-MCNC: 40 MG/DL
LACTATE BLD-SCNC: 6.5 MMOL/L (ref 0.5–2)
LEUKOCYTE ESTERASE UR QL STRIP.AUTO: ABNORMAL
LYMPHOCYTES # BLD AUTO: 0.95 K/UL (ref 1–4.8)
LYMPHOCYTES NFR BLD: 8.1 % (ref 22–41)
MCH RBC QN AUTO: 31.7 PG (ref 27–33)
MCHC RBC AUTO-ENTMCNC: 33.7 G/DL (ref 32.2–35.5)
MCV RBC AUTO: 93.8 FL (ref 81.4–97.8)
METHADONE UR QL SCN: NEGATIVE
MICRO URNS: ABNORMAL
MODE IMODE: ABNORMAL
MONOCYTES # BLD AUTO: 0.39 K/UL (ref 0–0.85)
MONOCYTES NFR BLD AUTO: 3.3 % (ref 0–13.4)
NEUTROPHILS # BLD AUTO: 10.36 K/UL (ref 1.82–7.42)
NEUTROPHILS NFR BLD: 87.8 % (ref 44–72)
NITRITE UR QL STRIP.AUTO: POSITIVE
NRBC # BLD AUTO: 0 K/UL
NRBC BLD-RTO: 0 /100 WBC (ref 0–0.2)
O2/TOTAL GAS SETTING VFR VENT: 40 %
OPIATES UR QL SCN: POSITIVE
OXYCODONE UR QL SCN: NEGATIVE
PCO2 BLDA: 50.2 MMHG (ref 32–48)
PCO2 TEMP ADJ BLDA: 49.5 MMHG (ref 32–48)
PCP UR QL SCN: NEGATIVE
PEEP END EXPIRATORY PRESSURE IPEEP: 8 CMH20
PH BLDA: 7.22 [PH] (ref 7.35–7.45)
PH TEMP ADJ BLDA: 7.23 [PH] (ref 7.35–7.45)
PH UR STRIP.AUTO: 6.5 [PH] (ref 5–8)
PLATELET # BLD AUTO: 214 K/UL (ref 164–446)
PMV BLD AUTO: 8.9 FL (ref 9–12.9)
PO2 BLDA: 109 MMHG (ref 83–108)
PO2 TEMP ADJ BLDA: 107 MMHG (ref 83–108)
POTASSIUM SERPL-SCNC: 3.2 MMOL/L (ref 3.6–5.5)
PROPOXYPH UR QL SCN: NEGATIVE
PROT SERPL-MCNC: 7.4 G/DL (ref 6–8.2)
PROT UR QL STRIP: 30 MG/DL
PROTHROMBIN TIME: 13.8 SEC (ref 12–14.6)
RBC # BLD AUTO: 4.36 M/UL (ref 4.2–5.4)
RBC # URNS HPF: ABNORMAL /HPF (ref 0–2)
RBC UR QL AUTO: NEGATIVE
RSV RNA SPEC QL NAA+PROBE: NEGATIVE
SAO2 % BLDA: 97 % (ref 93–99)
SARS-COV-2 RNA RESP QL NAA+PROBE: NOTDETECTED
SODIUM SERPL-SCNC: 137 MMOL/L (ref 135–145)
SP GR UR STRIP.AUTO: 1.02
SPECIMEN DRAWN FROM PATIENT: ABNORMAL
TIDAL VOLUME IVT: 380 ML
UROBILINOGEN UR STRIP.AUTO-MCNC: 1 EU/DL
WBC # BLD AUTO: 11.8 K/UL (ref 4.8–10.8)
WBC #/AREA URNS HPF: ABNORMAL /HPF

## 2025-02-10 PROCEDURE — 94002 VENT MGMT INPAT INIT DAY: CPT

## 2025-02-10 PROCEDURE — 96375 TX/PRO/DX INJ NEW DRUG ADDON: CPT

## 2025-02-10 PROCEDURE — 51702 INSERT TEMP BLADDER CATH: CPT

## 2025-02-10 PROCEDURE — 99291 CRITICAL CARE FIRST HOUR: CPT | Mod: 25 | Performed by: INTERNAL MEDICINE

## 2025-02-10 PROCEDURE — 85610 PROTHROMBIN TIME: CPT

## 2025-02-10 PROCEDURE — 0241U HCHG SARS-COV-2 COVID-19 NFCT DS RESP RNA 4 TRGT ED POC: CPT

## 2025-02-10 PROCEDURE — 82140 ASSAY OF AMMONIA: CPT

## 2025-02-10 PROCEDURE — 96366 THER/PROPH/DIAG IV INF ADDON: CPT

## 2025-02-10 PROCEDURE — 71045 X-RAY EXAM CHEST 1 VIEW: CPT

## 2025-02-10 PROCEDURE — 85730 THROMBOPLASTIN TIME PARTIAL: CPT

## 2025-02-10 PROCEDURE — 80053 COMPREHEN METABOLIC PANEL: CPT

## 2025-02-10 PROCEDURE — 303105 HCHG CATHETER EXTRA

## 2025-02-10 PROCEDURE — 82375 ASSAY CARBOXYHB QUANT: CPT

## 2025-02-10 PROCEDURE — 83605 ASSAY OF LACTIC ACID: CPT

## 2025-02-10 PROCEDURE — 700111 HCHG RX REV CODE 636 W/ 250 OVERRIDE (IP): Performed by: EMERGENCY MEDICINE

## 2025-02-10 PROCEDURE — 31500 INSERT EMERGENCY AIRWAY: CPT | Performed by: INTERNAL MEDICINE

## 2025-02-10 PROCEDURE — 81001 URINALYSIS AUTO W/SCOPE: CPT

## 2025-02-10 PROCEDURE — 82803 BLOOD GASES ANY COMBINATION: CPT

## 2025-02-10 PROCEDURE — 99291 CRITICAL CARE FIRST HOUR: CPT

## 2025-02-10 PROCEDURE — 92950 HEART/LUNG RESUSCITATION CPR: CPT

## 2025-02-10 PROCEDURE — 5A1945Z RESPIRATORY VENTILATION, 24-96 CONSECUTIVE HOURS: ICD-10-PCS | Performed by: INTERNAL MEDICINE

## 2025-02-10 PROCEDURE — 82962 GLUCOSE BLOOD TEST: CPT

## 2025-02-10 PROCEDURE — 700101 HCHG RX REV CODE 250: Performed by: EMERGENCY MEDICINE

## 2025-02-10 PROCEDURE — 700101 HCHG RX REV CODE 250: Performed by: INTERNAL MEDICINE

## 2025-02-10 PROCEDURE — 96365 THER/PROPH/DIAG IV INF INIT: CPT

## 2025-02-10 PROCEDURE — 82077 ASSAY SPEC XCP UR&BREATH IA: CPT

## 2025-02-10 PROCEDURE — 80307 DRUG TEST PRSMV CHEM ANLYZR: CPT

## 2025-02-10 PROCEDURE — 770022 HCHG ROOM/CARE - ICU (200)

## 2025-02-10 PROCEDURE — 96368 THER/DIAG CONCURRENT INF: CPT

## 2025-02-10 PROCEDURE — 36600 WITHDRAWAL OF ARTERIAL BLOOD: CPT

## 2025-02-10 PROCEDURE — 700111 HCHG RX REV CODE 636 W/ 250 OVERRIDE (IP): Mod: JZ

## 2025-02-10 PROCEDURE — 36415 COLL VENOUS BLD VENIPUNCTURE: CPT

## 2025-02-10 PROCEDURE — 0BH17EZ INSERTION OF ENDOTRACHEAL AIRWAY INTO TRACHEA, VIA NATURAL OR ARTIFICIAL OPENING: ICD-10-PCS | Performed by: INTERNAL MEDICINE

## 2025-02-10 PROCEDURE — 700105 HCHG RX REV CODE 258: Performed by: INTERNAL MEDICINE

## 2025-02-10 PROCEDURE — 70450 CT HEAD/BRAIN W/O DYE: CPT

## 2025-02-10 PROCEDURE — 99222 1ST HOSP IP/OBS MODERATE 55: CPT | Mod: AI | Performed by: PSYCHIATRY & NEUROLOGY

## 2025-02-10 PROCEDURE — 700105 HCHG RX REV CODE 258: Performed by: EMERGENCY MEDICINE

## 2025-02-10 PROCEDURE — 85025 COMPLETE CBC W/AUTO DIFF WBC: CPT

## 2025-02-10 PROCEDURE — 31500 INSERT EMERGENCY AIRWAY: CPT

## 2025-02-10 PROCEDURE — 700111 HCHG RX REV CODE 636 W/ 250 OVERRIDE (IP): Mod: JZ | Performed by: INTERNAL MEDICINE

## 2025-02-10 PROCEDURE — 99292 CRITICAL CARE ADDL 30 MIN: CPT | Mod: 25 | Performed by: INTERNAL MEDICINE

## 2025-02-10 RX ORDER — MIDAZOLAM HYDROCHLORIDE 1 MG/ML
INJECTION INTRAMUSCULAR; INTRAVENOUS
Status: COMPLETED
Start: 2025-02-10 | End: 2025-02-10

## 2025-02-10 RX ORDER — HYDRALAZINE HYDROCHLORIDE 20 MG/ML
20 INJECTION INTRAMUSCULAR; INTRAVENOUS EVERY 4 HOURS PRN
Status: DISCONTINUED | OUTPATIENT
Start: 2025-02-10 | End: 2025-02-21 | Stop reason: HOSPADM

## 2025-02-10 RX ORDER — LEVOTHYROXINE SODIUM 125 UG/1
125 TABLET ORAL
Status: DISCONTINUED | OUTPATIENT
Start: 2025-02-11 | End: 2025-02-11

## 2025-02-10 RX ORDER — ATORVASTATIN CALCIUM 20 MG/1
20 TABLET, FILM COATED ORAL
Status: DISCONTINUED | OUTPATIENT
Start: 2025-02-12 | End: 2025-02-11

## 2025-02-10 RX ORDER — SODIUM CHLORIDE 9 MG/ML
INJECTION, SOLUTION INTRAVENOUS CONTINUOUS
Status: DISCONTINUED | OUTPATIENT
Start: 2025-02-10 | End: 2025-02-13

## 2025-02-10 RX ORDER — AMOXICILLIN 250 MG
2 CAPSULE ORAL 2 TIMES DAILY
Status: DISCONTINUED | OUTPATIENT
Start: 2025-02-11 | End: 2025-02-15

## 2025-02-10 RX ORDER — ACETAMINOPHEN 325 MG/1
650 TABLET ORAL EVERY 4 HOURS PRN
Status: DISCONTINUED | OUTPATIENT
Start: 2025-02-10 | End: 2025-02-15

## 2025-02-10 RX ORDER — ANASTROZOLE 1 MG/1
1 TABLET ORAL DAILY
Status: DISCONTINUED | OUTPATIENT
Start: 2025-02-11 | End: 2025-02-21 | Stop reason: HOSPADM

## 2025-02-10 RX ORDER — BISACODYL 10 MG
10 SUPPOSITORY, RECTAL RECTAL
Status: DISCONTINUED | OUTPATIENT
Start: 2025-02-10 | End: 2025-02-10

## 2025-02-10 RX ORDER — POLYETHYLENE GLYCOL 3350 17 G/17G
1 POWDER, FOR SOLUTION ORAL
Status: DISCONTINUED | OUTPATIENT
Start: 2025-02-10 | End: 2025-02-10

## 2025-02-10 RX ORDER — ONDANSETRON 2 MG/ML
4 INJECTION INTRAMUSCULAR; INTRAVENOUS EVERY 4 HOURS PRN
Status: DISCONTINUED | OUTPATIENT
Start: 2025-02-10 | End: 2025-02-10

## 2025-02-10 RX ORDER — LACOSAMIDE 10 MG/ML
100 INJECTION, SOLUTION INTRAVENOUS 2 TIMES DAILY
Status: DISCONTINUED | OUTPATIENT
Start: 2025-02-10 | End: 2025-02-14

## 2025-02-10 RX ORDER — BISACODYL 10 MG
10 SUPPOSITORY, RECTAL RECTAL
Status: DISCONTINUED | OUTPATIENT
Start: 2025-02-10 | End: 2025-02-15

## 2025-02-10 RX ORDER — POLYETHYLENE GLYCOL 3350 17 G/17G
1 POWDER, FOR SOLUTION ORAL
Status: DISCONTINUED | OUTPATIENT
Start: 2025-02-10 | End: 2025-02-15

## 2025-02-10 RX ORDER — LORAZEPAM 2 MG/ML
2 INJECTION INTRAMUSCULAR ONCE
Status: COMPLETED | OUTPATIENT
Start: 2025-02-10 | End: 2025-02-10

## 2025-02-10 RX ORDER — ACETAMINOPHEN 650 MG/1
650 SUPPOSITORY RECTAL EVERY 4 HOURS PRN
Status: DISCONTINUED | OUTPATIENT
Start: 2025-02-10 | End: 2025-02-10

## 2025-02-10 RX ORDER — ONDANSETRON 4 MG/1
4 TABLET, ORALLY DISINTEGRATING ORAL EVERY 4 HOURS PRN
Status: DISCONTINUED | OUTPATIENT
Start: 2025-02-10 | End: 2025-02-10

## 2025-02-10 RX ORDER — ROCURONIUM BROMIDE 10 MG/ML
100 INJECTION, SOLUTION INTRAVENOUS ONCE
Status: COMPLETED | OUTPATIENT
Start: 2025-02-10 | End: 2025-02-10

## 2025-02-10 RX ORDER — CEFTRIAXONE 2 G/1
2000 INJECTION, POWDER, FOR SOLUTION INTRAMUSCULAR; INTRAVENOUS ONCE
Status: COMPLETED | OUTPATIENT
Start: 2025-02-10 | End: 2025-02-11

## 2025-02-10 RX ORDER — LORAZEPAM 2 MG/ML
2 INJECTION INTRAMUSCULAR
Status: DISCONTINUED | OUTPATIENT
Start: 2025-02-10 | End: 2025-02-21 | Stop reason: HOSPADM

## 2025-02-10 RX ORDER — ONDANSETRON 2 MG/ML
4 INJECTION INTRAMUSCULAR; INTRAVENOUS EVERY 4 HOURS PRN
Status: DISCONTINUED | OUTPATIENT
Start: 2025-02-10 | End: 2025-02-15

## 2025-02-10 RX ORDER — FLUTICASONE PROPIONATE 50 MCG
2 SPRAY, SUSPENSION (ML) NASAL 2 TIMES DAILY
Qty: 48 G | Refills: 0 | Status: SHIPPED | OUTPATIENT
Start: 2025-02-10

## 2025-02-10 RX ORDER — LABETALOL HYDROCHLORIDE 5 MG/ML
10-20 INJECTION, SOLUTION INTRAVENOUS EVERY 4 HOURS PRN
Status: DISCONTINUED | OUTPATIENT
Start: 2025-02-10 | End: 2025-02-21 | Stop reason: HOSPADM

## 2025-02-10 RX ORDER — FAMOTIDINE 20 MG/1
20 TABLET, FILM COATED ORAL EVERY 12 HOURS
Status: DISCONTINUED | OUTPATIENT
Start: 2025-02-11 | End: 2025-02-13

## 2025-02-10 RX ORDER — ATORVASTATIN CALCIUM 10 MG/1
10 TABLET, FILM COATED ORAL
Status: DISCONTINUED | OUTPATIENT
Start: 2025-02-11 | End: 2025-02-11

## 2025-02-10 RX ORDER — INSULIN LISPRO 100 [IU]/ML
2-9 INJECTION, SOLUTION INTRAVENOUS; SUBCUTANEOUS EVERY 6 HOURS
Status: DISCONTINUED | OUTPATIENT
Start: 2025-02-11 | End: 2025-02-15

## 2025-02-10 RX ORDER — ACETAMINOPHEN 325 MG/1
650 TABLET ORAL EVERY 6 HOURS PRN
Status: DISCONTINUED | OUTPATIENT
Start: 2025-02-10 | End: 2025-02-10

## 2025-02-10 RX ORDER — METHYLPREDNISOLONE SODIUM SUCCINATE 125 MG/2ML
125 INJECTION, POWDER, LYOPHILIZED, FOR SOLUTION INTRAMUSCULAR; INTRAVENOUS ONCE
Status: COMPLETED | OUTPATIENT
Start: 2025-02-10 | End: 2025-02-10

## 2025-02-10 RX ORDER — ACETAMINOPHEN 650 MG/1
650 SUPPOSITORY RECTAL EVERY 4 HOURS PRN
Status: DISCONTINUED | OUTPATIENT
Start: 2025-02-10 | End: 2025-02-15

## 2025-02-10 RX ORDER — ONDANSETRON 4 MG/1
4 TABLET, ORALLY DISINTEGRATING ORAL EVERY 4 HOURS PRN
Status: DISCONTINUED | OUTPATIENT
Start: 2025-02-10 | End: 2025-02-15

## 2025-02-10 RX ORDER — DIPHENHYDRAMINE HYDROCHLORIDE 50 MG/ML
INJECTION, SOLUTION INTRAMUSCULAR; INTRAVENOUS
Status: COMPLETED
Start: 2025-02-10 | End: 2025-02-10

## 2025-02-10 RX ORDER — MIDAZOLAM HYDROCHLORIDE 1 MG/ML
1 INJECTION INTRAMUSCULAR; INTRAVENOUS ONCE
Status: COMPLETED | OUTPATIENT
Start: 2025-02-10 | End: 2025-02-10

## 2025-02-10 RX ORDER — DEXMEDETOMIDINE HYDROCHLORIDE 4 UG/ML
.1-1.5 INJECTION, SOLUTION INTRAVENOUS CONTINUOUS
Status: DISCONTINUED | OUTPATIENT
Start: 2025-02-10 | End: 2025-02-11

## 2025-02-10 RX ORDER — ACETAMINOPHEN 325 MG/1
650 TABLET ORAL EVERY 4 HOURS PRN
Status: DISCONTINUED | OUTPATIENT
Start: 2025-02-10 | End: 2025-02-10

## 2025-02-10 RX ORDER — DEXTROSE MONOHYDRATE 25 G/50ML
25 INJECTION, SOLUTION INTRAVENOUS
Status: DISCONTINUED | OUTPATIENT
Start: 2025-02-10 | End: 2025-02-15

## 2025-02-10 RX ORDER — LEVOTHYROXINE SODIUM 137 UG/1
137 TABLET ORAL
Status: DISCONTINUED | OUTPATIENT
Start: 2025-02-10 | End: 2025-02-10

## 2025-02-10 RX ORDER — FAMOTIDINE 20 MG/1
20 TABLET, FILM COATED ORAL EVERY 12 HOURS
Status: DISCONTINUED | OUTPATIENT
Start: 2025-02-10 | End: 2025-02-10

## 2025-02-10 RX ORDER — ETOMIDATE 2 MG/ML
20 INJECTION INTRAVENOUS ONCE
Status: COMPLETED | OUTPATIENT
Start: 2025-02-10 | End: 2025-02-10

## 2025-02-10 RX ORDER — AMOXICILLIN 250 MG
2 CAPSULE ORAL 2 TIMES DAILY
Status: DISCONTINUED | OUTPATIENT
Start: 2025-02-10 | End: 2025-02-10

## 2025-02-10 RX ORDER — ATORVASTATIN CALCIUM 20 MG/1
20 TABLET, FILM COATED ORAL SEE ADMIN INSTRUCTIONS
Status: DISCONTINUED | OUTPATIENT
Start: 2025-02-10 | End: 2025-02-10

## 2025-02-10 RX ADMIN — FENTANYL CITRATE 100 MCG: 50 INJECTION, SOLUTION INTRAMUSCULAR; INTRAVENOUS at 22:10

## 2025-02-10 RX ADMIN — ETOMIDATE 20 MG: 2 INJECTION, SOLUTION INTRAVENOUS at 22:00

## 2025-02-10 RX ADMIN — MIDAZOLAM HYDROCHLORIDE 1 MG: 1 INJECTION, SOLUTION INTRAMUSCULAR; INTRAVENOUS at 22:05

## 2025-02-10 RX ADMIN — MIDAZOLAM HYDROCHLORIDE 1 MG: 1 INJECTION, SOLUTION INTRAMUSCULAR; INTRAVENOUS at 19:20

## 2025-02-10 RX ADMIN — ROCURONIUM BROMIDE 100 MG: 10 INJECTION INTRAVENOUS at 22:00

## 2025-02-10 RX ADMIN — SODIUM CHLORIDE 2.5 MG/HR: 9 INJECTION, SOLUTION INTRAVENOUS at 20:44

## 2025-02-10 RX ADMIN — SODIUM CHLORIDE 10 MG/HR: 9 INJECTION, SOLUTION INTRAVENOUS at 23:52

## 2025-02-10 RX ADMIN — MIDAZOLAM HYDROCHLORIDE 2 MG: 1 INJECTION, SOLUTION INTRAMUSCULAR; INTRAVENOUS at 22:04

## 2025-02-10 RX ADMIN — METHYLPREDNISOLONE SODIUM SUCCINATE 125 MG: 125 INJECTION, POWDER, FOR SOLUTION INTRAMUSCULAR; INTRAVENOUS at 21:15

## 2025-02-10 RX ADMIN — DIPHENHYDRAMINE HYDROCHLORIDE 25 MG: 50 INJECTION, SOLUTION INTRAMUSCULAR; INTRAVENOUS at 21:30

## 2025-02-10 RX ADMIN — PROPOFOL 30 MCG/KG/MIN: 10 INJECTION, EMULSION INTRAVENOUS at 22:01

## 2025-02-10 RX ADMIN — LORAZEPAM 2 MG: 2 INJECTION INTRAMUSCULAR; INTRAVENOUS at 21:45

## 2025-02-10 ASSESSMENT — FIBROSIS 4 INDEX: FIB4 SCORE: 2.63

## 2025-02-11 ENCOUNTER — APPOINTMENT (OUTPATIENT)
Dept: RADIOLOGY | Facility: MEDICAL CENTER | Age: 75
DRG: 082 | End: 2025-02-11
Attending: INTERNAL MEDICINE
Payer: MEDICARE

## 2025-02-11 ENCOUNTER — APPOINTMENT (OUTPATIENT)
Dept: CARDIOLOGY | Facility: MEDICAL CENTER | Age: 75
DRG: 082 | End: 2025-02-11
Attending: INTERNAL MEDICINE
Payer: MEDICARE

## 2025-02-11 ENCOUNTER — APPOINTMENT (OUTPATIENT)
Dept: RADIOLOGY | Facility: MEDICAL CENTER | Age: 75
DRG: 082 | End: 2025-02-11
Attending: PSYCHIATRY & NEUROLOGY
Payer: MEDICARE

## 2025-02-11 ENCOUNTER — APPOINTMENT (OUTPATIENT)
Dept: RADIOLOGY | Facility: MEDICAL CENTER | Age: 75
DRG: 082 | End: 2025-02-11
Attending: EMERGENCY MEDICINE
Payer: MEDICARE

## 2025-02-11 PROBLEM — E87.20 LACTIC ACIDOSIS: Status: ACTIVE | Noted: 2025-02-11

## 2025-02-11 PROBLEM — E87.6 HYPOKALEMIA: Status: ACTIVE | Noted: 2025-02-11

## 2025-02-11 LAB
AMPHET UR QL SCN: NEGATIVE
ANION GAP SERPL CALC-SCNC: 21 MMOL/L (ref 7–16)
BARBITURATES UR QL SCN: NEGATIVE
BASE EXCESS BLDA CALC-SCNC: -4 MMOL/L (ref -4–3)
BASOPHILS # BLD AUTO: 0.1 % (ref 0–1.8)
BASOPHILS # BLD: 0.01 K/UL (ref 0–0.12)
BENZODIAZ UR QL SCN: POSITIVE
BODY TEMPERATURE: ABNORMAL DEGREES
BREATHS SETTING VENT: 24
BUN SERPL-MCNC: 14 MG/DL (ref 8–22)
BZE UR QL SCN: NEGATIVE
CALCIUM SERPL-MCNC: 8.6 MG/DL (ref 8.5–10.5)
CANNABINOIDS UR QL SCN: NEGATIVE
CFT BLD TEG: 3.5 MIN (ref 4.6–9.1)
CFT P HPASE BLD TEG: 3.3 MIN (ref 4.3–8.3)
CHLORIDE SERPL-SCNC: 102 MMOL/L (ref 96–112)
CHOLEST SERPL-MCNC: 146 MG/DL (ref 100–199)
CLOT ANGLE BLD TEG: 75 DEGREES (ref 63–78)
CO2 BLDA-SCNC: 20 MMOL/L (ref 32–48)
CO2 SERPL-SCNC: 16 MMOL/L (ref 20–33)
CREAT SERPL-MCNC: 1.24 MG/DL (ref 0.5–1.4)
CT.EXTRINSIC BLD ROTEM: 1.1 MIN (ref 0.8–2.1)
DELSYS IDSYS: ABNORMAL
EOSINOPHIL # BLD AUTO: 0.01 K/UL (ref 0–0.51)
EOSINOPHIL NFR BLD: 0.1 % (ref 0–6.9)
ERYTHROCYTE [DISTWIDTH] IN BLOOD BY AUTOMATED COUNT: 45.5 FL (ref 35.9–50)
FENTANYL UR QL: POSITIVE
GFR SERPLBLD CREATININE-BSD FMLA CKD-EPI: 46 ML/MIN/1.73 M 2
GLUCOSE BLD STRIP.AUTO-MCNC: 122 MG/DL (ref 65–99)
GLUCOSE BLD STRIP.AUTO-MCNC: 124 MG/DL (ref 65–99)
GLUCOSE BLD STRIP.AUTO-MCNC: 124 MG/DL (ref 65–99)
GLUCOSE SERPL-MCNC: 174 MG/DL (ref 65–99)
HCO3 BLDA-SCNC: 19 MMOL/L (ref 21–28)
HCT VFR BLD AUTO: 45.4 % (ref 37–47)
HDLC SERPL-MCNC: 47 MG/DL
HGB BLD-MCNC: 15.6 G/DL (ref 12–16)
HOROWITZ INDEX BLDA+IHG-RTO: 333 MM[HG]
IMM GRANULOCYTES # BLD AUTO: 0.1 K/UL (ref 0–0.11)
IMM GRANULOCYTES NFR BLD AUTO: 0.8 % (ref 0–0.9)
LACTATE BLD-SCNC: 4.1 MMOL/L (ref 0.5–2)
LACTATE SERPL-SCNC: 2 MMOL/L (ref 0.5–2)
LACTATE SERPL-SCNC: 2.4 MMOL/L (ref 0.5–2)
LACTATE SERPL-SCNC: 2.8 MMOL/L (ref 0.5–2)
LACTATE SERPL-SCNC: 3 MMOL/L (ref 0.5–2)
LDLC SERPL CALC-MCNC: 65 MG/DL
LV EJECT FRACT  99904: 61
LV EJECT FRACT MOD 2C 99903: 54.83
LV EJECT FRACT MOD 4C 99902: 64.03
LV EJECT FRACT MOD BP 99901: 61.11
LYMPHOCYTES # BLD AUTO: 0.66 K/UL (ref 1–4.8)
LYMPHOCYTES NFR BLD: 5.4 % (ref 22–41)
MAGNESIUM SERPL-MCNC: 1.5 MG/DL (ref 1.5–2.5)
MCF BLD TEG: 60.2 MM (ref 52–69)
MCF.PLATELET INHIB BLD ROTEM: 18.9 MM (ref 15–32)
MCH RBC QN AUTO: 32.2 PG (ref 27–33)
MCHC RBC AUTO-ENTMCNC: 34.4 G/DL (ref 32.2–35.5)
MCV RBC AUTO: 93.6 FL (ref 81.4–97.8)
METHADONE UR QL SCN: NEGATIVE
MODE IMODE: ABNORMAL
MONOCYTES # BLD AUTO: 0.71 K/UL (ref 0–0.85)
MONOCYTES NFR BLD AUTO: 5.9 % (ref 0–13.4)
NEUTROPHILS # BLD AUTO: 10.64 K/UL (ref 1.82–7.42)
NEUTROPHILS NFR BLD: 87.7 % (ref 44–72)
NRBC # BLD AUTO: 0 K/UL
NRBC BLD-RTO: 0 /100 WBC (ref 0–0.2)
O2/TOTAL GAS SETTING VFR VENT: 30 %
OPIATES UR QL SCN: NEGATIVE
OXYCODONE UR QL SCN: NEGATIVE
PA AA BLD-ACNC: 8.9 % (ref 0–11)
PA ADP BLD-ACNC: 25.1 % (ref 0–17)
PCO2 BLDA: 28.6 MMHG (ref 32–48)
PCO2 TEMP ADJ BLDA: 30.7 MMHG (ref 32–48)
PCP UR QL SCN: NEGATIVE
PEEP END EXPIRATORY PRESSURE IPEEP: 8 CMH20
PH BLDA: 7.43 [PH] (ref 7.35–7.45)
PH TEMP ADJ BLDA: 7.41 [PH] (ref 7.35–7.45)
PHOSPHATE SERPL-MCNC: 2.4 MG/DL (ref 2.5–4.5)
PLATELET # BLD AUTO: 218 K/UL (ref 164–446)
PMV BLD AUTO: 9 FL (ref 9–12.9)
PO2 BLDA: 100 MMHG (ref 83–108)
PO2 TEMP ADJ BLDA: 110 MMHG (ref 83–108)
POTASSIUM SERPL-SCNC: 2.8 MMOL/L (ref 3.6–5.5)
PROPOXYPH UR QL SCN: NEGATIVE
RBC # BLD AUTO: 4.85 M/UL (ref 4.2–5.4)
SAO2 % BLDA: 98 % (ref 93–99)
SODIUM SERPL-SCNC: 135 MMOL/L (ref 135–145)
SODIUM SERPL-SCNC: 138 MMOL/L (ref 135–145)
SODIUM SERPL-SCNC: 139 MMOL/L (ref 135–145)
SPECIMEN DRAWN FROM PATIENT: ABNORMAL
TEG ALGORITHM TGALG: ABNORMAL
TIDAL VOLUME IVT: 380 ML
TRIGL SERPL-MCNC: 172 MG/DL (ref 0–149)
TSH SERPL DL<=0.005 MIU/L-ACNC: 2.19 UIU/ML (ref 0.38–5.33)
WBC # BLD AUTO: 12.1 K/UL (ref 4.8–10.8)

## 2025-02-11 PROCEDURE — 80048 BASIC METABOLIC PNL TOTAL CA: CPT

## 2025-02-11 PROCEDURE — 84443 ASSAY THYROID STIM HORMONE: CPT

## 2025-02-11 PROCEDURE — 94799 UNLISTED PULMONARY SVC/PX: CPT

## 2025-02-11 PROCEDURE — 93306 TTE W/DOPPLER COMPLETE: CPT | Mod: 26 | Performed by: INTERNAL MEDICINE

## 2025-02-11 PROCEDURE — 80307 DRUG TEST PRSMV CHEM ANLYZR: CPT

## 2025-02-11 PROCEDURE — 99232 SBSQ HOSP IP/OBS MODERATE 35: CPT | Mod: 25 | Performed by: PSYCHIATRY & NEUROLOGY

## 2025-02-11 PROCEDURE — A9579 GAD-BASE MR CONTRAST NOS,1ML: HCPCS | Mod: JZ | Performed by: INTERNAL MEDICINE

## 2025-02-11 PROCEDURE — C9254 INJECTION, LACOSAMIDE: HCPCS | Performed by: PSYCHIATRY & NEUROLOGY

## 2025-02-11 PROCEDURE — 700111 HCHG RX REV CODE 636 W/ 250 OVERRIDE (IP): Performed by: INTERNAL MEDICINE

## 2025-02-11 PROCEDURE — A9270 NON-COVERED ITEM OR SERVICE: HCPCS | Performed by: INTERNAL MEDICINE

## 2025-02-11 PROCEDURE — 82803 BLOOD GASES ANY COMBINATION: CPT

## 2025-02-11 PROCEDURE — 99291 CRITICAL CARE FIRST HOUR: CPT | Mod: GC | Performed by: INTERNAL MEDICINE

## 2025-02-11 PROCEDURE — 700111 HCHG RX REV CODE 636 W/ 250 OVERRIDE (IP)

## 2025-02-11 PROCEDURE — 700111 HCHG RX REV CODE 636 W/ 250 OVERRIDE (IP): Performed by: PSYCHIATRY & NEUROLOGY

## 2025-02-11 PROCEDURE — 700105 HCHG RX REV CODE 258

## 2025-02-11 PROCEDURE — 700101 HCHG RX REV CODE 250: Performed by: INTERNAL MEDICINE

## 2025-02-11 PROCEDURE — 4A10X4Z MONITORING OF CENTRAL NERVOUS ELECTRICAL ACTIVITY, EXTERNAL APPROACH: ICD-10-PCS | Performed by: STUDENT IN AN ORGANIZED HEALTH CARE EDUCATION/TRAINING PROGRAM

## 2025-02-11 PROCEDURE — 97602 WOUND(S) CARE NON-SELECTIVE: CPT

## 2025-02-11 PROCEDURE — 83605 ASSAY OF LACTIC ACID: CPT

## 2025-02-11 PROCEDURE — 85025 COMPLETE CBC W/AUTO DIFF WBC: CPT

## 2025-02-11 PROCEDURE — 85576 BLOOD PLATELET AGGREGATION: CPT | Mod: 91

## 2025-02-11 PROCEDURE — 80061 LIPID PANEL: CPT

## 2025-02-11 PROCEDURE — 71045 X-RAY EXAM CHEST 1 VIEW: CPT

## 2025-02-11 PROCEDURE — 84295 ASSAY OF SERUM SODIUM: CPT

## 2025-02-11 PROCEDURE — 95700 EEG CONT REC W/VID EEG TECH: CPT | Performed by: STUDENT IN AN ORGANIZED HEALTH CARE EDUCATION/TRAINING PROGRAM

## 2025-02-11 PROCEDURE — 85347 COAGULATION TIME ACTIVATED: CPT

## 2025-02-11 PROCEDURE — 700117 HCHG RX CONTRAST REV CODE 255: Performed by: EMERGENCY MEDICINE

## 2025-02-11 PROCEDURE — 36600 WITHDRAWAL OF ARTERIAL BLOOD: CPT

## 2025-02-11 PROCEDURE — 83735 ASSAY OF MAGNESIUM: CPT

## 2025-02-11 PROCEDURE — 700111 HCHG RX REV CODE 636 W/ 250 OVERRIDE (IP): Mod: JZ | Performed by: EMERGENCY MEDICINE

## 2025-02-11 PROCEDURE — 95718 EEG PHYS/QHP 2-12 HR W/VEEG: CPT | Performed by: STUDENT IN AN ORGANIZED HEALTH CARE EDUCATION/TRAINING PROGRAM

## 2025-02-11 PROCEDURE — 84100 ASSAY OF PHOSPHORUS: CPT

## 2025-02-11 PROCEDURE — 700102 HCHG RX REV CODE 250 W/ 637 OVERRIDE(OP): Performed by: INTERNAL MEDICINE

## 2025-02-11 PROCEDURE — 85384 FIBRINOGEN ACTIVITY: CPT | Mod: 91

## 2025-02-11 PROCEDURE — 70553 MRI BRAIN STEM W/O & W/DYE: CPT

## 2025-02-11 PROCEDURE — 770022 HCHG ROOM/CARE - ICU (200)

## 2025-02-11 PROCEDURE — 95714 VEEG EA 12-26 HR UNMNTR: CPT | Performed by: STUDENT IN AN ORGANIZED HEALTH CARE EDUCATION/TRAINING PROGRAM

## 2025-02-11 PROCEDURE — 700105 HCHG RX REV CODE 258: Performed by: INTERNAL MEDICINE

## 2025-02-11 PROCEDURE — 94003 VENT MGMT INPAT SUBQ DAY: CPT

## 2025-02-11 PROCEDURE — 82962 GLUCOSE BLOOD TEST: CPT

## 2025-02-11 PROCEDURE — 93306 TTE W/DOPPLER COMPLETE: CPT

## 2025-02-11 PROCEDURE — 70498 CT ANGIOGRAPHY NECK: CPT

## 2025-02-11 PROCEDURE — 87040 BLOOD CULTURE FOR BACTERIA: CPT

## 2025-02-11 PROCEDURE — 70496 CT ANGIOGRAPHY HEAD: CPT

## 2025-02-11 PROCEDURE — 70544 MR ANGIOGRAPHY HEAD W/O DYE: CPT

## 2025-02-11 PROCEDURE — 8E0ZXY6 ISOLATION: ICD-10-PCS

## 2025-02-11 PROCEDURE — 700117 HCHG RX CONTRAST REV CODE 255: Mod: JZ | Performed by: INTERNAL MEDICINE

## 2025-02-11 RX ORDER — OSELTAMIVIR PHOSPHATE 75 MG/1
75 CAPSULE ORAL ONCE
Status: COMPLETED | OUTPATIENT
Start: 2025-02-11 | End: 2025-02-11

## 2025-02-11 RX ORDER — LEVOTHYROXINE SODIUM 125 UG/1
125 TABLET ORAL
Status: DISCONTINUED | OUTPATIENT
Start: 2025-02-13 | End: 2025-02-15

## 2025-02-11 RX ORDER — OSELTAMIVIR PHOSPHATE 30 MG/1
30 CAPSULE ORAL 2 TIMES DAILY
Status: DISCONTINUED | OUTPATIENT
Start: 2025-02-11 | End: 2025-02-12

## 2025-02-11 RX ORDER — SODIUM CHLORIDE, SODIUM LACTATE, POTASSIUM CHLORIDE, AND CALCIUM CHLORIDE .6; .31; .03; .02 G/100ML; G/100ML; G/100ML; G/100ML
500 INJECTION, SOLUTION INTRAVENOUS ONCE
Status: COMPLETED | OUTPATIENT
Start: 2025-02-11 | End: 2025-02-11

## 2025-02-11 RX ORDER — POTASSIUM CHLORIDE 7.45 MG/ML
10 INJECTION INTRAVENOUS
Status: DISCONTINUED | OUTPATIENT
Start: 2025-02-11 | End: 2025-02-11

## 2025-02-11 RX ORDER — MAGNESIUM SULFATE HEPTAHYDRATE 40 MG/ML
2 INJECTION, SOLUTION INTRAVENOUS ONCE
Status: COMPLETED | OUTPATIENT
Start: 2025-02-11 | End: 2025-02-11

## 2025-02-11 RX ORDER — ATORVASTATIN CALCIUM 20 MG/1
20 TABLET, FILM COATED ORAL
Status: DISCONTINUED | OUTPATIENT
Start: 2025-02-12 | End: 2025-02-15

## 2025-02-11 RX ORDER — ATORVASTATIN CALCIUM 10 MG/1
10 TABLET, FILM COATED ORAL
Status: DISCONTINUED | OUTPATIENT
Start: 2025-02-13 | End: 2025-02-15

## 2025-02-11 RX ADMIN — FENTANYL CITRATE 100 MCG: 50 INJECTION, SOLUTION INTRAMUSCULAR; INTRAVENOUS at 11:05

## 2025-02-11 RX ADMIN — SODIUM CHLORIDE: 9 INJECTION, SOLUTION INTRAVENOUS at 13:51

## 2025-02-11 RX ADMIN — POTASSIUM BICARBONATE 25 MEQ: 978 TABLET, EFFERVESCENT ORAL at 10:22

## 2025-02-11 RX ADMIN — SODIUM CHLORIDE 2.5 MG/HR: 9 INJECTION, SOLUTION INTRAVENOUS at 05:51

## 2025-02-11 RX ADMIN — SODIUM CHLORIDE, POTASSIUM CHLORIDE, SODIUM LACTATE AND CALCIUM CHLORIDE 500 ML: 600; 310; 30; 20 INJECTION, SOLUTION INTRAVENOUS at 10:21

## 2025-02-11 RX ADMIN — ACETAMINOPHEN 650 MG: 325 TABLET ORAL at 02:42

## 2025-02-11 RX ADMIN — SODIUM CHLORIDE, POTASSIUM CHLORIDE, SODIUM LACTATE AND CALCIUM CHLORIDE 500 ML: 600; 310; 30; 20 INJECTION, SOLUTION INTRAVENOUS at 18:37

## 2025-02-11 RX ADMIN — OSELTAMIVIR PHOSPHATE 75 MG: 75 CAPSULE ORAL at 10:22

## 2025-02-11 RX ADMIN — LORAZEPAM 2 MG: 2 INJECTION, SOLUTION INTRAMUSCULAR; INTRAVENOUS at 19:01

## 2025-02-11 RX ADMIN — FAMOTIDINE 20 MG: 20 TABLET, FILM COATED ORAL at 18:42

## 2025-02-11 RX ADMIN — LACOSAMIDE 100 MG: 10 INJECTION INTRAVENOUS at 05:39

## 2025-02-11 RX ADMIN — ATORVASTATIN CALCIUM 10 MG: 10 TABLET, FILM COATED ORAL at 05:40

## 2025-02-11 RX ADMIN — PROPOFOL 60 MCG/KG/MIN: 10 INJECTION, EMULSION INTRAVENOUS at 18:47

## 2025-02-11 RX ADMIN — PROPOFOL 80 MCG/KG/MIN: 10 INJECTION, EMULSION INTRAVENOUS at 22:01

## 2025-02-11 RX ADMIN — LEVOTHYROXINE SODIUM 125 MCG: 0.12 TABLET ORAL at 05:41

## 2025-02-11 RX ADMIN — POTASSIUM PHOSPHATE, MONOBASIC AND POTASSIUM PHOSPHATE, DIBASIC 15 MMOL: 224; 236 INJECTION, SOLUTION, CONCENTRATE INTRAVENOUS at 08:50

## 2025-02-11 RX ADMIN — POTASSIUM BICARBONATE 25 MEQ: 978 TABLET, EFFERVESCENT ORAL at 13:46

## 2025-02-11 RX ADMIN — POTASSIUM BICARBONATE 25 MEQ: 978 TABLET, EFFERVESCENT ORAL at 18:42

## 2025-02-11 RX ADMIN — LACOSAMIDE 100 MG: 10 INJECTION INTRAVENOUS at 02:00

## 2025-02-11 RX ADMIN — SODIUM CHLORIDE 5 MG/HR: 9 INJECTION, SOLUTION INTRAVENOUS at 13:53

## 2025-02-11 RX ADMIN — GADOTERIDOL 20 ML: 279.3 INJECTION, SOLUTION INTRAVENOUS at 18:05

## 2025-02-11 RX ADMIN — IOHEXOL 80 ML: 350 INJECTION, SOLUTION INTRAVENOUS at 01:32

## 2025-02-11 RX ADMIN — OSELTAMIVIR PHOSPHATE 30 MG: 30 CAPSULE ORAL at 18:42

## 2025-02-11 RX ADMIN — FENTANYL CITRATE 100 MCG: 50 INJECTION, SOLUTION INTRAMUSCULAR; INTRAVENOUS at 01:38

## 2025-02-11 RX ADMIN — SENNOSIDES AND DOCUSATE SODIUM 2 TABLET: 50; 8.6 TABLET ORAL at 05:39

## 2025-02-11 RX ADMIN — FENTANYL CITRATE 100 MCG: 50 INJECTION, SOLUTION INTRAMUSCULAR; INTRAVENOUS at 03:20

## 2025-02-11 RX ADMIN — PROPOFOL 80 MCG/KG/MIN: 10 INJECTION, EMULSION INTRAVENOUS at 02:04

## 2025-02-11 RX ADMIN — FAMOTIDINE 20 MG: 20 TABLET, FILM COATED ORAL at 05:40

## 2025-02-11 RX ADMIN — MAGNESIUM SULFATE HEPTAHYDRATE 2 G: 2 INJECTION, SOLUTION INTRAVENOUS at 09:00

## 2025-02-11 RX ADMIN — SENNOSIDES AND DOCUSATE SODIUM 2 TABLET: 50; 8.6 TABLET ORAL at 18:42

## 2025-02-11 RX ADMIN — FENTANYL CITRATE 100 MCG: 50 INJECTION, SOLUTION INTRAMUSCULAR; INTRAVENOUS at 07:36

## 2025-02-11 RX ADMIN — LACOSAMIDE 100 MG: 10 INJECTION INTRAVENOUS at 18:49

## 2025-02-11 RX ADMIN — Medication 50 MCG/HR: at 03:26

## 2025-02-11 RX ADMIN — LABETALOL HYDROCHLORIDE 20 MG: 5 INJECTION INTRAVENOUS at 11:34

## 2025-02-11 RX ADMIN — CEFTRIAXONE SODIUM 2000 MG: 2 INJECTION, POWDER, FOR SOLUTION INTRAMUSCULAR; INTRAVENOUS at 00:10

## 2025-02-11 RX ADMIN — FENTANYL CITRATE 100 MCG: 50 INJECTION, SOLUTION INTRAMUSCULAR; INTRAVENOUS at 17:00

## 2025-02-11 RX ADMIN — ANASTROZOLE 1 MG: 1 TABLET ORAL at 05:40

## 2025-02-11 RX ADMIN — PROPOFOL 20 MCG/KG/MIN: 10 INJECTION, EMULSION INTRAVENOUS at 08:58

## 2025-02-11 RX ADMIN — SODIUM CHLORIDE: 9 INJECTION, SOLUTION INTRAVENOUS at 00:35

## 2025-02-11 RX ADMIN — CEFTRIAXONE SODIUM 2000 MG: 10 INJECTION, POWDER, FOR SOLUTION INTRAVENOUS at 18:55

## 2025-02-11 ASSESSMENT — PAIN DESCRIPTION - PAIN TYPE
TYPE: ACUTE PAIN

## 2025-02-11 ASSESSMENT — FIBROSIS 4 INDEX: FIB4 SCORE: 2.88

## 2025-02-11 NOTE — DISCHARGE PLANNING
Case Management Discharge Planning    Admission Date: 2/10/2025  GMLOS:    ALOS: 1    6-Clicks ADL Score:    6-Clicks Mobility Score:      Anticipated Discharge Dispo: Discharge Disposition: Disch to IP rehab facility or distinct part unit (62)    DME Needed: No    Action(s) Taken:   Chart review completed. Patient was discussed during IDT rounds.     Per IDT, pt is intubated on vent day 2.     Discharge assessment was completed (see below).     Escalations Completed: None    Medically Clear: No    Next Steps:  CM RN to follow up with medical team to discuss discharge barriers or needs.     Barriers to Discharge: Medical clearance    Care Transition Team Assessment    Patient is currently intubated and unable to answer questions; information for assessment obtained from her son, Vernon Burt, over the phone.     Case management role discussed; understanding of case management role verbalized.   Demographics on face sheet verified.   Please see H&P for pertinent PMH and reason for admission.   Housing: Pt lived in a single story [address] with her son Vernon [address] [place] [address].   IADLS/ADLS: Independent with all ADL/IALDs and was an active  PTA.   DME: None owned or used PTA.    O2: RA at baseline PTA.   Social Hx: Pt is  and has 2 adult sons. Her primary support in the local area is Vernon.   Patient's PCP is: Aidee Carpenter D.O.   Patient's preferred pharmacy is: [facility] - [place], [place].   Insurance: [employer] Medicare HMO.   Monthly Income/employment status: Retired.   AD paperwork: None on file.   Mental Health Concerns: None indicated.   Substance Abuse: None indicated  Prior services: None - home independent.   Discharge planning: Rehab vs SNF vs HH    Information Source  Orientation Level: Unable to assess  Information Given By: Relative  Informant's Name: Vernon Burt (son)  Who is responsible for making decisions for patient? : Adult child  Name(s) of Primary  Decision Maker: Vernon Burt    Readmission Evaluation  Is this a readmission?: No    Elopement Risk  Legal Hold: No  Ambulatory or Self Mobile in Wheelchair: No-Not an Elopement Risk    Discharge Preparedness  What is your plan after discharge?: Uncertain - pending medical team collaboration  What are your discharge supports?: Child  Prior Functional Level: Ambulatory, Drives Self, Independent with Activities of Daily Living, Independent with Medication Management  Difficulity with ADLs: None  Difficulity with IADLs: None    Functional Assesment  Prior Functional Level: Ambulatory, Drives Self, Independent with Activities of Daily Living, Independent with Medication Management    Finances  Financial Barriers to Discharge: No  Prescription Coverage: Yes    Advance Directive  Advance Directive?: None    Domestic Abuse  Have you ever been the victim of abuse or violence?: No    Psychological Assessment  History of Substance Abuse: None  History of Psychiatric Problems: No    Discharge Risks or Barriers  Discharge risks or barriers?: Complex medical needs  Patient risk factors: Complex medical needs    Anticipated Discharge Information  Discharge Disposition: Disch to  rehab facility or distinct part unit (62)

## 2025-02-11 NOTE — ED NOTES
Pt unable to be calm and stay still for CTA.  Pt continued after several attempts to relax.  Preparing pt for intubation

## 2025-02-11 NOTE — PROGRESS NOTES
4 Eyes Skin Assessment Completed by Temo RN and RANDOLPH Coleman.    Head WDL  Ears WDL  Nose WDL  Mouth WDL  Neck WDL  Breast/Chest Scar  Shoulder Blades WDL  Spine WDL  (R) Arm/Elbow/Hand Bruising  (L) Arm/Elbow/Hand Redness and Blanching  Abdomen Scar  Groin WDL  Scrotum/Coccyx/Buttocks WDL  (R) Leg Bruising  (L) Leg Bruising  (R) Heel/Foot/Toe interior right red/nonblanching  (L) Heel/Foot/Toe calloused, boggy , red/blanching          Devices In Places ECG, Blood Pressure Cuff, Pulse Ox, Ryan, SCD's, ET Tube, and OG/NG      Interventions In Place Sacral Mepilex, TAP System, Q2 Turns, Low Air Loss Mattress, Dri-Seferino Pads, and Heels Loaded W/Pillows    Possible Skin Injury Yes    Pictures Uploaded Into Epic Yes  Wound Consult Placed Yes  RN Wound Prevention Protocol Ordered Yes

## 2025-02-11 NOTE — DISCHARGE PLANNING
[facility] Transitional Care Coordination     Referral from: Dr. Wise  Insurance Provider on Facesheet: MCR/HMO  Potential Rehab Diagnosis: [facility]     Chart review indicates patient may have on going medical management and may have therapy needs to possibly meet inpatient rehab facility criteria with the goal of returning to community.     D/C support: son     Physiatry consultation pended per protocol.   Would appreciate updated therapy evaluations as appropriate.  TCC will follow      Thank you for the referral.

## 2025-02-11 NOTE — ASSESSMENT & PLAN NOTE
PBD #4 aneurysmal?? 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

## 2025-02-11 NOTE — ASSESSMENT & PLAN NOTE
Right posterior temporal parenchymal hemorrhage noted + SAH as above  CTA H&N without obvious aneurism  Hemorrhage at this time appears small without significant mass effect or hydrocephalus to warrant surgical decompression or EVD   Brain MRI/MRA without vascular abnormalities, stable hematoma   - Statin   - See plan for SAH   - Neuro improving, continue weaning/stopping sedation

## 2025-02-11 NOTE — PROGRESS NOTES
Neurology Progress Note  Neurohospitalist Service, [facility]    Referring Physician: Gibson Webster D.O.      Interval History: No acute events overnight.  Had to be intubated due to severe agitation.  CTA head and neck without obvious vascular lesion.      Review of systems: In addition to what is detailed in the HPI and/or updated in the interval history, all other systems reviewed and are negative.    Past Medical History, Past Surgical History and Social History reviewed and unchanged from prior    Medications:    Current Facility-Administered Medications:     magnesium sulfate IVPB premix 2 g, 2 g, Intravenous, Once, Radha Osei M.D., Last Rate: 25 mL/hr at 02/11/25 0900, 2 g at 02/11/25 0900    [START ON 2/12/2025] atorvastatin (Lipitor) tablet 20 mg, 20 mg, Enteral Tube, Once per day on Monday Wednesday Friday **AND** [START ON 2/13/2025] atorvastatin (Lipitor) tablet 10 mg, 10 mg, Enteral Tube, Once per day on Sunday Tuesday Thursday Saturday, Gibson Webster D.O.    [START ON 2/13/2025] levothyroxine (Synthroid) tablet 125 mcg, 125 mcg, Enteral Tube, Q48HRS **AND** [START ON 2/12/2025] levothyroxine (Synthroid) tablet 137 mcg, 137 mcg, Enteral Tube, Q48HRS, Gibson Webster D.O.    potassium phosphate 15 mmol in  mL ivpb, 15 mmol, Intravenous, Once, Gibson Webster D.O., Last Rate: 125 mL/hr at 02/11/25 0850, 15 mmol at 02/11/25 0850    potassium bicarbonate (Klyte) effervescent tablet 25 mEq, 25 mEq, Enteral Tube, Q4HRS, Gibson Webster D.O.    LR (Bolus) infusion 500 mL, 500 mL, Intravenous, Once, Radha Osei M.D.    oseltamivir (Tamiflu) capsule 75 mg, 75 mg, Enteral Tube, Once, Gibson Webster D.O.    oseltamivir (Tamiflu) capsule 30 mg, 30 mg, Enteral Tube, BID, Gibson Webster D.O.    Respiratory Therapy Consult, , Nebulization, Continuous RT, Shagufta Wise M.D.    MD Alert...ICU Electrolyte Replacement per Pharmacy, , Other, PHARMACY TO DOSE, Shagufta Wise M.D.    lidocaine  (Xylocaine) 1 % injection 2 mL, 2 mL, Tracheal Tube, Q30 MIN PRN, Shagufta Wise M.D.    propofol (DIPRIVAN) injection, 0-80 mcg/kg/min (Ideal), Intravenous, Continuous, Last Rate: 6.8 mL/hr at 02/11/25 0858, 20 mcg/kg/min at 02/11/25 0858 **AND** Triglycerides Starting now and then Every 3 Days, , , Every 3 Days (0300), Shagufta Wise M.D.    fentaNYL (Sublimaze) injection 50 mcg, 50 mcg, Intravenous, Q HOUR PRN **OR** fentaNYL (Sublimaze) injection 100 mcg, 100 mcg, Intravenous, Q HOUR PRN, Shagufta Wise M.D., 100 mcg at 02/11/25 0736    fentaNYL (SUBLIMAZE) 50 mcg/mL in 50mL, , Intravenous, Continuous, Shagufta Wise M.D., Last Rate: 1.5 mL/hr at 02/11/25 0600, 75 mcg/hr at 02/11/25 0600    insulin lispro (HumaLOG,AdmeLOG) subcutaneous injection, 2-9 Units, Subcutaneous, Q6HRS **AND** POC blood glucose manual result, , , Q6H **AND** NOTIFY MD and PharmD, , , Once **AND** Administer 20 grams of glucose (approximately 8 ounces of fruit juice) every 15 minutes PRN FSBG less than 70 mg/dL, , , PRN **AND** dextrose 50% (D50W) injection 25 g, 25 g, Intravenous, Q15 MIN PRN, Shagufta Wise M.D.    NS infusion, , Intravenous, Continuous, Shagufta Wise M.D., Last Rate: 80 mL/hr at 02/11/25 0035, New Bag at 02/11/25 0035    LORazepam (Ativan) injection 2 mg, 2 mg, Intravenous, Q5 MIN PRN, Shagufta Wise M.D.    niCARdipine (Cardene) 25 mg in  mL Standard Infusion, 0-15 mg/hr, Intravenous, Continuous, Shagufta Wise M.D., Paused at 02/11/25 0815    labetalol (Normodyne/Trandate) injection 10-20 mg, 10-20 mg, Intravenous, Q4HRS PRN, Shagufta Wise M.D.    hydrALAZINE (Apresoline) injection 20 mg, 20 mg, Intravenous, Q4HRS PRN, Shagufta Wise M.D.    senna-docusate (Pericolace Or Senokot S) 8.6-50 MG per tablet 2 Tablet, 2 Tablet, Enteral Tube, BID, 2 Tablet at 02/11/25 0539 **AND** polyethylene glycol/lytes (Miralax) Packet 1 Packet, 1 Packet, Enteral Tube, QDAY PRN **AND** magnesium  "hydroxide (Milk Of Magnesia) suspension 30 mL, 30 mL, Enteral Tube, QDAY PRN **AND** bisacodyl (Dulcolax) suppository 10 mg, 10 mg, Rectal, QDAY PRN, Shagufta Wise M.D.    famotidine (Pepcid) tablet 20 mg, 20 mg, Enteral Tube, Q12HRS, 20 mg at 02/11/25 0540 **OR** famotidine (Pepcid) injection 20 mg, 20 mg, Intravenous, Q12HRS, Shagufta Wise M.D.    acetaminophen (Tylenol) tablet 650 mg, 650 mg, Enteral Tube, Q4HRS PRN, 650 mg at 02/11/25 0242 **OR** acetaminophen (Tylenol) suppository 650 mg, 650 mg, Rectal, Q4HRS PRN, Shagufta Wise M.D.    ondansetron (Zofran ODT) dispertab 4 mg, 4 mg, Enteral Tube, Q4HRS PRN **OR** ondansetron (Zofran) syringe/vial injection 4 mg, 4 mg, Intravenous, Q4HRS PRN, Shagufta Wise M.D.    cefTRIAXone (Rocephin) syringe 1,000 mg, 1,000 mg, Intravenous, Q24HRS, Shagufta Wise M.D.    [Held by provider] anastrozole (Arimidex) tablet 1 mg, 1 mg, Oral, DAILY, Shagufta Wise M.D., 1 mg at 02/11/25 0540    lacosamide (Vimpat) injection 100 mg, 100 mg, Intravenous, BID, Raciel Kraft M.D., 100 mg at 02/11/25 0539    Physical Examination:   /56   Pulse 84   Temp 37.1 °C (98.7 °F) (Temporal)   Resp (!) 21   Ht 1.651 m (5' 5\")   Wt 104 kg (228 lb 6.3 oz)   SpO2 94%   BMI 38.01 kg/m²       General: Patient is intubated, sedated  Neck: There is normal range of motion  CV: Regular rate   Extremities:  Warm, dry, and intact, without peripheral lower extremity edema    NEUROLOGICAL EXAM:     Mental status:  Intubated sedated  Speech and language: Intubated, no command folloiwng.  Cranial nerve exam: Pupils are equal, round and reactive to light bilaterally. No clear blink to threat bilaterally.  No tracking, eyes midline.  Face grossly symmetric.  Motor exam: Withdraws all 4 extremity to noxious  Sensory exam:  Reacts to tactile in all 4 extremities as above  Coordination: No significant movements elicited to test    Objective Data:    Labs:  Lab Results   Component " Value Date/Time    PROTHROMBTM 13.8 02/10/2025 08:38 PM    INR 1.06 02/10/2025 08:38 PM      Lab Results   Component Value Date/Time    WBC 12.1 (H) 02/11/2025 02:10 AM    RBC 4.85 02/11/2025 02:10 AM    HEMOGLOBIN 15.6 02/11/2025 02:10 AM    HEMATOCRIT 45.4 02/11/2025 02:10 AM    MCV 93.6 02/11/2025 02:10 AM    MCH 32.2 02/11/2025 02:10 AM    MCHC 34.4 02/11/2025 02:10 AM    MPV 9.0 02/11/2025 02:10 AM    NEUTSPOLYS 87.70 (H) 02/11/2025 02:10 AM    LYMPHOCYTES 5.40 (L) 02/11/2025 02:10 AM    MONOCYTES 5.90 02/11/2025 02:10 AM    EOSINOPHILS 0.10 02/11/2025 02:10 AM    BASOPHILS 0.10 02/11/2025 02:10 AM      Lab Results   Component Value Date/Time    SODIUM 139 02/11/2025 02:10 AM    POTASSIUM 2.8 (L) 02/11/2025 02:10 AM    CHLORIDE 102 02/11/2025 02:10 AM    CO2 16 (L) 02/11/2025 02:10 AM    GLUCOSE 174 (H) 02/11/2025 02:10 AM    BUN 14 02/11/2025 02:10 AM    CREATININE 1.24 02/11/2025 02:10 AM    BUNCREATRAT 12 07/18/2017 08:08 AM      Lab Results   Component Value Date/Time    CHOLSTRLTOT 146 02/11/2025 02:10 AM    LDL 65 02/11/2025 02:10 AM    HDL 47 02/11/2025 02:10 AM    TRIGLYCERIDE 172 (H) 02/11/2025 02:10 AM       Lab Results   Component Value Date/Time    ALKPHOSPHAT 86 02/10/2025 05:45 PM    ASTSGOT 65 (H) 02/10/2025 05:45 PM    ALTSGPT 57 (H) 02/10/2025 05:45 PM    TBILIRUBIN 0.9 02/10/2025 05:45 PM        Imaging/Testing:    I interpreted and/or reviewed the patient's neuroimaging    DX-CHEST-PORTABLE (1 VIEW)   Final Result         1.  No acute cardiopulmonary disease.   2.  Nasogastric tube terminates just distal to the gastroesophageal junction with side-port in the distal esophagus, recommend advancement      CT-CTA NECK WITH & W/O-POST PROCESSING   Final Result         1.  CT angiogram of the neck within normal limits.         CT-CTA HEAD WITH & W/O-POST PROCESS   Final Result         1.  No large vessel occlusion or aneurysm identified.   2.  Right posterior temporal parenchymal hemorrhage, stable  since prior study.   3.  Scattered subarachnoid hemorrhages, overall stable since prior study.      DX-CHEST-PORTABLE (1 VIEW)   Final Result         1.  No acute cardiopulmonary disease.   2.  Nasogastric tube with tip just distal to the gastroesophageal junction, recommend advancement      DX-ABDOMEN FOR TUBE PLACEMENT   Final Result         1.  Nonspecific bowel gas pattern in the upper abdomen.   2.  Nasogastric tube with tip just distal to the gastroesophageal junction, recommend advancement      CT-HEAD W/O   Final Result         1.  Right posterior temporal parenchymal hemorrhages stranding vasogenic edema   2.  Bilateral subarachnoid hemorrhages as described.   3.  Right posterior parafalcine subdural hematoma measuring 2.4 mm.   4.  Subdural versus subarachnoid hemorrhage along the right tentorium measuring 3.1 mm.      These findings were discussed with the patient's clinician, George Wise, on 2/10/2025 8:42 PM.         DX-CHEST-PORTABLE (1 VIEW)   Final Result      Hypoinflation without a definite acute abnormality.      EC-ECHOCARDIOGRAM COMPLETE W/O CONT    (Results Pending)   MR-BRAIN-WITH & W/O    (Results Pending)   MR-MRA HEAD-W/O    (Results Pending)       Assessment and Plan:  Keyur Burt is a 74 year old woman presenting with confusion, agitation, found to have R temporal lobar-based intracranial hemorrhage, and bilateral SAH.  No clinical findings to suggest traumatic head injury.  Radiographic appearance- both SAH and lobar-based location of ICH is atypical for hypertensive disease.  CTA without evidence of underlying vascular lesion. Recommend full diagnostic evaluation for underlying vascular and structural etiology with MRI ICH protocol.  Although likely accounted for by the flu/RSV, given infectious prodrome in setting of ICH/SAH, prudent to rule out infective endocarditis and possible mycotic aneurysm.  ICU team to attain blood cultures and ECHO.  Will continue close  neuromonitoring and blood pressure control.  Fortunately hemorrhage at this time appears small without significant mass effect or hydrocephalus to warrant surgical decompression or EVD.      Problem list:   R temporal ICH   SAH   Hypertensive     Plan:              - q2h neurochecks, wean sedation, extubate if possible              - MRI brain ICH protocol- this is MRI brain w/wo and MRA head without              - SBP goal 120-140, cardene PRN, transition to enteral medications when able              - continue home atorvastatin 20mg daily for statin's neuroprotective effects in ICH              - no antithrombotics, SCDs only              - normal Na goal 135-145              - maintain normothermia, net even I/O, FSBS    - attain blood cultures, ECHO              - PT/OT/SLP as tolerated              - unclear if there is precipitating seizure event as being found down- given epileptogenic location of ICH, may start vimpat 100mg IV q12h- avoid keppra given baseline agitation, combativeness    The evaluation of the patient, and recommended management, was discussed with Dr. Webster, ICU attending. I have performed a physical exam and reviewed and updated ROS and Plan today (2/11/2025).     Raciel Kraft MD  Vascular Neurology

## 2025-02-11 NOTE — CARE PLAN
Problem: Ventilation  Goal: Ability to achieve and maintain unassisted ventilation or tolerate decreased levels of ventilator support  Description: Target End Date:  4 days     Document on Vent flowsheet    1.  Support and monitor invasive and noninvasive mechanical ventilation  2.  Monitor ventilator weaning response  3.  Perform ventilator associated pneumonia prevention interventions  4.  Manage ventilation therapy by monitoring diagnostic test results  Outcome: Progressing   VD 2  CMV 22 380 +8 30%  8.0 @25

## 2025-02-11 NOTE — FLOWSHEET NOTE
02/10/25 2152   Events/Summary/Plan   Events/Summary/Plan pt intubated for airway protection   Skin Integrity Intact   Protective Device Foam Pads   Location upper lip   Ventiliation   $ Ventilation - Initial Yes   Vent Clock Initiated Yes   Ventilator Management Group   Intensivist Group Yes   General Vent Information   Vent Mode (S)CMV   Vent Alarms   Ventilation Alarms Reviewed / Activated Yes   Upper Pressure Limit Alarm 45   Lower Pressure Limit 6   Low VE Alarm 4   High Respiratory Rate Alarm 40   Low Respiratory Rate Alarm 8   Low VT Alarm 150   Apnea Parameters Checked / Activated Yes   % Leak Alarm off   Vent Settings   FiO2% 40 %   Rate (breaths/min) 20   Vent Temp HME Yes   Vt Target (mL) 380   TI (Seconds) 0.8   PEEP/CPAP 8   Pramp 70   Trigger Type Flow Trigger   Sensitivity Setting 5   Vent Readings   PIP 16    Minute Volume 7.5   Control VTE (exp VT) 375   f Total (Breaths/Min) 20   I:E Ratio 1:2.5   MAP 11   PEEP/CPAP MONITORED 8.6   Static Compliance (ml / cm H2O) 75.9   R exp 0.73

## 2025-02-11 NOTE — ASSESSMENT & PLAN NOTE
Suspect poor PO intake - hypovolemia and sepsis   - Blood cultures drawn   - IVF PRN - hemodynamics monitoring  - Monitor BMP and Lactic   Resolved

## 2025-02-11 NOTE — ED TRIAGE NOTES
Pt BIB [facility] for ALOC. Pt was diagnosed with RSV recently. Last seen normal at 1930 2/6/25. Pt son went to work at 3 and came home at 4:30 after being notified by pt friend that she could not get a hold of her. Pt was found on floor upon fire rescue arrival. Pt was altered and combative on scene. 2 Mg versed given IM and 1 mg Versed IV PTA. Pt  PTA. Pt arrives lethargic, placed on monitor, protocol orders laced, bed alarm on, and call light in reach. Awaiting ERP to see.

## 2025-02-11 NOTE — H&P
Critical Care History & Physical Note    Date of Service  2/10/2025    Primary Care Physician  Aidee Carpenter D.O.    Consultants  critical care and neurology    Specialist Names: Dr. Fabián Kraft    Code Status  Full Code    Chief Complaint  Chief Complaint   Patient presents with    ALOC       History of Presenting Illness  Keyur Burt is a 74 y.o. female with a past medical history significant for obesity with a BMI of 38, hypothyroidism, hypertension, hyperlipidemia, stage IIIb chronic kidney disease, breast cancer, and type 2 diabetes who was last seen normal yesterday when she was complaining of cough and congestion.  The patient did not come to a meeting with her friends and the patient's son checked on her on 2/10/2025 and found that she was laying on the floor and confused.  EMS was activated and upon arrival found the patient again very combative and agitated.  She received several doses of Versed that help calm her down; however, she was never able to follow commands.  A CT of the head did reveal in intracranial hemorrhage.  Unfortunately, the patient continued to remain combative and was unable to stay still for further testing.  She was intubated in the emergency department for behavioral reasons.  She will be admitted to the ICU for ongoing care.    I discussed the plan of care with bedside RN, charge RN, pharmacy, and RT.    Review of Systems  Review of Systems   Unable to perform ROS: Critical illness       Past Medical History   has a past medical history of Allergic rhinitis, Allergy to pollen, Aortic atherosclerosis (HCC) (5/27/2021), Ataxia (3/26/2019), Breast cancer (HCC), Chronic bilateral low back pain without sciatica (7/20/2017), Chronic neck pain, Chronic post-traumatic headache (3/26/2019), Chronic use of opiate for therapeutic purpose (7/20/2017), CKD (chronic kidney disease), stage III, Fatty liver, Hematuria, History of concussion (3/26/2019), Hyperlipidemia LDL goal < 100,  Hypertension, Hypothyroidism, Impaired fasting glucose, Obesity (BMI 30-39.9) (3/22/2017), Tension headache, Thyroid cancer (HCC), Tinnitus of both ears (3/26/2019), and Type 2 diabetes mellitus without complication, without long-term current use of insulin (HCC) (10/14/2021).    Surgical History   has a past surgical history that includes abdominal hysterectomy total; tonsillectomy and adenoidectomy; rotator cuff repair; gastric banding laparoscopic; lumpectomy (Left, 2015); cholecystectomy; hiatal hernia repair; rotator cuff repair (2002); mastectomy (Bilateral, 8/15/2017); and axillary node dissection (8/15/2017).     Family History  family history includes Cancer in her mother; Diabetes in her mother; Heart Disease in her father and mother; Stroke in her father.   Family history reviewed with patient. There is no family history that is pertinent to the chief complaint.     Social History   reports that she has never smoked. She has never used smokeless tobacco. She reports that she does not drink alcohol and does not use drugs.    Allergies  Allergies   Allergen Reactions    Shellfish Allergy Anaphylaxis    Iodine      Burning,Swelling    Pcn [Penicillins]      SOB, Hives     Sulfa Drugs      Swelling       Medications  Prior to Admission Medications   Prescriptions Last Dose Informant Patient Reported? Taking?   Dulaglutide (TRULICITY) 0.75 MG/0.5ML Solution Pen-injector Unknown Historical No No   Sig: Inject 1 Pen under the skin every 7 days.   HYDROcodone-acetaminophen (NORCO) 7.5-325 MG tab Unknown Historical No No   Sig: Take 1 Tablet by mouth every 8 hours as needed for Severe Pain for up to 30 days.   HYDROcodone-acetaminophen (NORCO) 7.5-325 MG tab Unknown Historical No No   Sig: Take 1 Tablet by mouth every 8 hours as needed for Severe Pain for up to 30 days.   anastrozole (ARIMIDEX) 1 MG Tab Unknown Historical No No   Sig: Take 1 Tablet by mouth every day.   atorvastatin (LIPITOR) 20 MG Tab Unknown  Historical No No   Sig: Take 1 Tablet by mouth see administration instructions. Take 20 mg on Mon, Wed, Fri and 10 mg on Tues,Thurs, Sat, Sun   cyclobenzaprine (FLEXERIL) 5 mg tablet Unknown Historical No No   Sig: Take 1-2 Tablets by mouth 3 times a day as needed for Muscle Spasms or Moderate Pain.   fluticasone (FLONASE) 50 MCG/ACT nasal spray Unknown Historical No No   Sig: USE 2 SPRAY(S) IN EACH NOSTRIL TWICE DAILY   levothyroxine (SYNTHROID) 125 MCG Tab Unknown Historical No No   Sig: Take 1 Tablet by mouth every 48 hours. Alternate every other day with 137 mcg   levothyroxine (SYNTHROID) 137 MCG Tab Unknown Historical No No   Sig: Take 1 Tablet by mouth every 48 hours. Alternate every other day with 125 mcg   lisinopril (PRINIVIL) 40 MG tablet Unknown Historical No No   Sig: Take 1 Tablet by mouth every day.   metoprolol SR (TOPROL XL) 25 MG TABLET SR 24 HR Unknown Historical No No   Sig: Take 1 Tablet by mouth every day.   ondansetron (ZOFRAN ODT) 4 MG TABLET DISPERSIBLE Unknown Historical No No   Sig: Take 1 Tablet by mouth every 6 hours as needed for Nausea/Vomiting.      Facility-Administered Medications: None       Physical Exam  Temp:  [37.1 °C (98.7 °F)] 37.1 °C (98.7 °F)  Pulse:  [88-98] 88  Resp:  [17-18] 18  BP: (180-191)/(81-84) 180/81  SpO2:  [93 %-95 %] 93 %  Blood Pressure : (!) 180/81   Temperature: 37.1 °C (98.7 °F)   Pulse: 88   Respiration: 18   Pulse Oximetry: 93 %       Physical Exam  Vitals and nursing note reviewed.   Constitutional:       General: She is in acute distress.      Appearance: She is obese. She is ill-appearing.   HENT:      Head: Normocephalic and atraumatic.      Right Ear: External ear normal.      Left Ear: External ear normal.      Nose: Nose normal. No rhinorrhea.      Mouth/Throat:      Mouth: Mucous membranes are dry.      Pharynx: Oropharynx is clear.   Eyes:      General: No scleral icterus.     Conjunctiva/sclera: Conjunctivae normal.      Pupils: Pupils are  "equal, round, and reactive to light.   Cardiovascular:      Rate and Rhythm: Regular rhythm. Tachycardia present.      Pulses: Normal pulses.      Heart sounds: Normal heart sounds. No murmur heard.  Pulmonary:      Breath sounds: Normal breath sounds. No wheezing.   Chest:      Chest wall: No tenderness.   Abdominal:      Palpations: Abdomen is soft.      Tenderness: There is no abdominal tenderness. There is no guarding or rebound.   Genitourinary:     Comments: Ryan in place  Musculoskeletal:         General: Normal range of motion.      Cervical back: Normal range of motion and neck supple.      Right lower leg: No edema.      Left lower leg: No edema.   Lymphadenopathy:      Cervical: No cervical adenopathy.   Skin:     General: Skin is warm and dry.      Capillary Refill: Capillary refill takes less than 2 seconds.      Findings: No rash.   Neurological:      Mental Status: She is disoriented.      Sensory: No sensory deficit.      Motor: No weakness.      Comments: Moving all x 4 strongly, not following commands, very agitated and combative   Psychiatric:      Comments: Unable to assess         Laboratory:  Recent Labs     02/10/25  1909   WBC 11.8*   RBC 4.36   HEMOGLOBIN 13.8   HEMATOCRIT 40.9   MCV 93.8   MCH 31.7   MCHC 33.7   RDW 44.6   PLATELETCT 214   MPV 8.9*     Recent Labs     02/10/25  1745   SODIUM 137   POTASSIUM 3.2*   CHLORIDE 99   CO2 18*   GLUCOSE 180*   BUN 12   CREATININE 0.96   CALCIUM 9.4     Recent Labs     02/10/25  1745   ALTSGPT 57*   ASTSGOT 65*   ALKPHOSPHAT 86   TBILIRUBIN 0.9   GLUCOSE 180*     Recent Labs     02/10/25  2038   APTT 29.8   INR 1.06     No results for input(s): \"NTPROBNP\" in the last 72 hours.      No results for input(s): \"TROPONINT\" in the last 72 hours.    Imaging:  CT-HEAD W/O   Final Result         1.  Right posterior temporal parenchymal hemorrhages stranding vasogenic edema   2.  Bilateral subarachnoid hemorrhages as described.   3.  Right posterior " parafalcine subdural hematoma measuring 2.4 mm.   4.  Subdural versus subarachnoid hemorrhage along the right tentorium measuring 3.1 mm.      These findings were discussed with the patient's clinician, George Wise, on 2/10/2025 8:42 PM.         DX-CHEST-PORTABLE (1 VIEW)   Final Result      Hypoinflation without a definite acute abnormality.      CT-CTA HEAD WITH & W/O-POST PROCESS    (Results Pending)   CT-CTA NECK WITH & W/O-POST PROCESSING    (Results Pending)   DX-CHEST-PORTABLE (1 VIEW)    (Results Pending)   DX-CHEST-PORTABLE (1 VIEW)    (Results Pending)   EC-ECHOCARDIOGRAM COMPLETE W/O CONT    (Results Pending)         Assessment/Plan:  Justification for Admission Status  I anticipate this patient will require at least two midnights for appropriate medical management, necessitating inpatient admission because of ICH and acute encephalopathy    Patient will need a ICU (Adult and Pediatrics) bed on MEDICAL service .  The need is secondary to acute right ICH with bilateral SAH requiring intubation for agitation and airway protection.    * Acute intracranial hemorrhage (HCC)- (present on admission)  Assessment & Plan  Right posterior temporal parenchymal hemorrhage noted  CTA head/neck still pending  SBP goals >120 and < 140  Nicardipine infusion, I am actively titrating to goal  Na every 6 hours, maintain eunatremia: 135-145  PT/OT/SLP when appropriate  Neurology following  Maintain normothermia and euglycemia  ECHO  MRI brain w/ and w/o, MRA brain    Acute respiratory failure with hypoxia (HCC)- (present on admission)  Assessment & Plan  Intubated for airway protection and hypoxia  Cont full vent support  RT/O2 protocols  Vent bundle protocols  I am actively adjusting vent settings based on ABGs/vent mechanics  SAT/SBTs when clinically appropriate    Acute encephalopathy- (present on admission)  Assessment & Plan  Due to ICH/SAH and UTI  Limit sedation as much as possible  Intubated for airway  protection  Cont to correct all underlying metabolic disturbances    Urinary tract infection without hematuria- (present on admission)  Assessment & Plan  Follow cultures  Cont ceftriaxone for 3 days     Hypertensive urgency- (present on admission)  Assessment & Plan  SBP goals > 120 and < 140  I am actively titrating nicardine infusion for SBP goals  Labetalol/hydralazine as needed    SAH (subarachnoid hemorrhage) (HCC)- (present on admission)  Assessment & Plan  ?aneurysmal  CTA head/neck pending  SBP goals >120 and < 140  Maintain euglycemia, normothermia, eunatremia  Nicardipine infusion, actively titrating  Na every 6 hours  TCDs  PT/OT/SLP  ECHO, MRI brain w/ and w/o, MRA brain    Type 2 diabetes mellitus without complication, without long-term current use of insulin (HCC)- (present on admission)  Assessment & Plan  BG goals 120-180s  Medium ISS      Recurrent breast cancer (HCC)- (present on admission)  Assessment & Plan  Cont home anastrozole    Hypothyroidism from resorcinol- (present on admission)  Assessment & Plan  Cont home synthroid regimen    Mixed hyperlipidemia- (present on admission)  Assessment & Plan  Resume home statin of atorvastatin 20mg PO daily        VTE prophylaxis: SCDs/TEDs and pharmacologic prophylaxis contraindicated due to ICH    The patient remains critically ill.  I have assessed and reassessed the respiratory status and made ventilator adjustments based upon arterial blood gas analysis, ventilator waveforms and airway mechanics.  I have assessed and reassessed the blood pressure, hemodynamics, cardiovascular status. This patient remains at high risk for worsening cardiopulmonary dysfunction and death without the above critical care interventions.    Critical care time = 110 minutes.

## 2025-02-11 NOTE — CONSULTS
Neurology Initial Consult H&P  Neurohospitalist Service, [facility]    Referring Physician: Shagufta Wise M.D.    Chief Complaint   Patient presents with    ALOC       HPI: Keyur Burt is a 74 year old woman with hypertension, presenting with ALOC.  She is currently unable to provide history which is primarily taken from chart review.  Per medics, and son- Karen was diagnosed with an RSV infection little less than a week ago.  Last night, she was complaining of generalized malaise.  She was suppose to meet up with friends today, but did not show up.  The son called her- but there was no pickup.  Eventually, EMS was called, and found her on the floor, non-verbal and confused.  She was noted to be combative and agitated.  SBPs in 180s.  On arrival to [facility], there was no noted motor weakness, but she was not able to converse or follow commands.  A head CT revealing SAH and a R temporal lobe ICH.  Stroke Neurology consulted to assist with further medical management.  Per chart review, she is not on any anticoagulants.  Unclear compliance with medications.      Review of systems: In addition to what is detailed in the HPI above, all other systems reviewed and are negative.    Past Medical History:    has a past medical history of Allergic rhinitis, Allergy to pollen, Aortic atherosclerosis (HCC) (5/27/2021), Ataxia (3/26/2019), Breast cancer ([facility]), Chronic bilateral low back pain without sciatica (7/20/2017), Chronic neck pain, Chronic post-traumatic headache (3/26/2019), Chronic use of opiate for therapeutic purpose (7/20/2017), CKD (chronic kidney disease), stage III, Fatty liver, Hematuria, History of concussion (3/26/2019), Hyperlipidemia LDL goal < 100, Hypertension, Hypothyroidism, Impaired fasting glucose, Obesity (BMI 30-39.9) (3/22/2017), Tension headache, Thyroid cancer (HCC), Tinnitus of both ears (3/26/2019), and Type 2 diabetes mellitus without complication, without  long-term current use of insulin ([facility]) (10/14/2021).    She has no past medical history of Addisons disease ([facility]), Adrenal disorder ([facility]), Allergy, Anemia, Anxiety, Arrhythmia, Arthritis, Asthma, Blood transfusion without reported diagnosis, Cataract, CHF (congestive heart failure) ([facility]), Clotting disorder ([facility]), COPD (chronic obstructive pulmonary disease) ([facility]), Cushings syndrome ([facility]), Depression, Diabetic neuropathy ([facility]), GERD (gastroesophageal reflux disease), Glaucoma, Goiter, Head ache, Heart attack ([facility]), Heart murmur, HIV (human immunodeficiency virus infection) ([facility]), IBD (inflammatory bowel disease), Meningitis, Migraine, Muscle disorder, Osteoporosis, Parathyroid disorder ([facility]), Pituitary disease ([facility]), Pulmonary emphysema ([facility]), Seizure ([facility]), Sickle cell disease ([facility]), Stroke ([facility]), Tuberculosis, or Urinary tract infection.    FHx:  family history includes Cancer in her mother; Diabetes in her mother; Heart Disease in her father and mother; Stroke in her father.    SHx:   reports that she has never smoked. She has never used smokeless tobacco. She reports that she does not drink alcohol and does not use drugs.    Allergies:  Allergies   Allergen Reactions    Shellfish Allergy Anaphylaxis    Iodine      Burning,Swelling    Pcn [Penicillins]      SOB, Hives     Sulfa Drugs      Swelling       Medications:    Current Facility-Administered Medications:     niCARdipine (Cardene) 25 mg in  mL Standard Infusion, 0-15 mg/hr, Intravenous, Continuous, George Wise M.D., Last Rate: 25 mL/hr at 02/10/25 2044, 2.5 mg/hr at 02/10/25 2044    MIDAZOLAM HCL 1 MG/ML INJ SOLN (WRAPPER), , , ,     DIPHENHYDRAMINE HCL 50 MG/ML INJ SOLN, , , ,     methylPREDNISolone sod succ (SOLU-MEDROL) 125 MG injection 125 mg, 125 mg, Intravenous, Once, George Wise M.D.    Current Outpatient Medications:     fluticasone (FLONASE) 50 MCG/ACT nasal spray, USE 2 SPRAY(S) IN EACH NOSTRIL TWICE DAILY, Disp: 48 g, Rfl: 0     HYDROcodone-acetaminophen (NORCO) 7.5-325 MG tab, Take 1 Tablet by mouth every 8 hours as needed for Severe Pain for up to 30 days., Disp: 90 Tablet, Rfl: 0    [START ON 3/1/2025] HYDROcodone-acetaminophen (NORCO) 7.5-325 MG tab, Take 1 Tablet by mouth every 8 hours as needed for Severe Pain for up to 30 days., Disp: 90 Tablet, Rfl: 0    cyclobenzaprine (FLEXERIL) 5 mg tablet, Take 1-2 Tablets by mouth 3 times a day as needed for Muscle Spasms or Moderate Pain., Disp: 100 Tablet, Rfl: 3    lisinopril (PRINIVIL) 40 MG tablet, Take 1 Tablet by mouth every day., Disp: 100 Tablet, Rfl: 3    ondansetron (ZOFRAN ODT) 4 MG TABLET DISPERSIBLE, Take 1 Tablet by mouth every 6 hours as needed for Nausea/Vomiting., Disp: 30 Tablet, Rfl: 3    levothyroxine (SYNTHROID) 137 MCG Tab, Take 1 Tablet by mouth every 48 hours. Alternate every other day with 125 mcg, Disp: 50 Tablet, Rfl: 3    levothyroxine (SYNTHROID) 125 MCG Tab, Take 1 Tablet by mouth every 48 hours. Alternate every other day with 137 mcg, Disp: 50 Tablet, Rfl: 3    Dulaglutide (TRULICITY) 0.75 MG/0.5ML Solution Pen-injector, Inject 1 Pen under the skin every 7 days., Disp: 1.5 mL, Rfl: 3    atorvastatin (LIPITOR) 20 MG Tab, Take 1 Tablet by mouth see administration instructions. Take 20 mg on Mon, Wed, Fri and 10 mg on Tues,Thurs, Sat, Sun, Disp: 100 Tablet, Rfl: 3    metoprolol SR (TOPROL XL) 25 MG TABLET SR 24 HR, Take 1 Tablet by mouth every day., Disp: 100 Tablet, Rfl: 3    anastrozole (ARIMIDEX) 1 MG Tab, Take 1 Tablet by mouth every day., Disp: 100 Tablet, Rfl: 3    cyanocobalamin (VITAMIN B-12) 100 MCG Tab, Take 100 mcg by mouth every day., Disp: , Rfl:     Cholecalciferol (VITAMIN D) 2000 UNITS Cap, Take  by mouth., Disp: , Rfl:     Physical Examination:     General: Patient appears sedated, agitated when waking up  Eye: Examination of optic disks not indicated at this time given acuity of consult  Neck: There is normal range of motion  CV: regular rate  "  Extremities:  clear, dry, intact, without peripheral edema    NEUROLOGICAL EXAM:     BP (!) 180/81   Pulse 88   Temp 37.1 °C (98.7 °F) (Temporal)   Resp 18   Ht 1.651 m (5' 5\")   Wt 106 kg (233 lb 11 oz)   SpO2 93%   BMI 38.89 kg/m²       Mental status: Sleepy.  When awaken- is mostly non-interactive  Speech and language: Non-verbal, no command following  Cranial nerve exam: Visual fields are full to threat, does not track, but does regard both side of room on own.  Face symmetric  Motor exam: Robust antigravity, purposeful movements in all 4 extremities  Sensory exam: Reacts to tactile in all 4 extremities  Coordination: No ataxia on elicited movements  Gait: Deferred due to patient preference    [facility] documentation ICH    GCS: Eye: (voice 3) Motor: (localizes to pain 5) Verbal:(no verbal response 1) Total:  9  2.   Age: 74  3.   Infratentorial ICH:  No  4.   IVH present:  No  5.   ICH volume:  9.2 ml by ABC/2 method  6.   ICH score: 1   Time/Date completed: 900PM 2/10/2025  (GCS 3-4 2pts, 5-12 1pt, 13-15 0pt; Age > 80 1pt; Infra ICH 1pt; Volume > 30cc 1pt; IVH 1pt)  7.   Lobar hemorrhage:  Yes  8.   INR > 1.4: No  9.   If INR > 1.4, was procoagulant reversal agent given (if no, why not):  N/A    Objective Data:    Labs:  No results found for: \"PROTHROMBTM\", \"INR\"   Lab Results   Component Value Date/Time    WBC 11.8 (H) 02/10/2025 07:09 PM    RBC 4.36 02/10/2025 07:09 PM    HEMOGLOBIN 13.8 02/10/2025 07:09 PM    HEMATOCRIT 40.9 02/10/2025 07:09 PM    MCV 93.8 02/10/2025 07:09 PM    MCH 31.7 02/10/2025 07:09 PM    MCHC 33.7 02/10/2025 07:09 PM    MPV 8.9 (L) 02/10/2025 07:09 PM    NEUTSPOLYS 87.80 (H) 02/10/2025 07:09 PM    LYMPHOCYTES 8.10 (L) 02/10/2025 07:09 PM    MONOCYTES 3.30 02/10/2025 07:09 PM    EOSINOPHILS 0.00 02/10/2025 07:09 PM    BASOPHILS 0.10 02/10/2025 07:09 PM      Lab Results   Component Value Date/Time    SODIUM 137 02/10/2025 05:45 PM    POTASSIUM 3.2 (L) 02/10/2025 05:45 PM    CHLORIDE " 99 02/10/2025 05:45 PM    CO2 18 (L) 02/10/2025 05:45 PM    GLUCOSE 180 (H) 02/10/2025 05:45 PM    BUN 12 02/10/2025 05:45 PM    CREATININE 0.96 02/10/2025 05:45 PM    BUNCREATRAT 12 07/18/2017 08:08 AM      Lab Results   Component Value Date/Time    CHOLSTRLTOT 182 12/02/2024 11:34 AM    LDL 70 12/02/2024 11:34 AM    HDL 58 12/02/2024 11:34 AM    TRIGLYCERIDE 272 (H) 12/02/2024 11:34 AM       Lab Results   Component Value Date/Time    ALKPHOSPHAT 86 02/10/2025 05:45 PM    ASTSGOT 65 (H) 02/10/2025 05:45 PM    ALTSGPT 57 (H) 02/10/2025 05:45 PM    TBILIRUBIN 0.9 02/10/2025 05:45 PM        Imaging/Testing:    I interpreted and/or reviewed the patient's neuroimaging    CT-HEAD W/O   Final Result         1.  Right posterior temporal parenchymal hemorrhages stranding vasogenic edema   2.  Bilateral subarachnoid hemorrhages as described.   3.  Right posterior parafalcine subdural hematoma measuring 2.4 mm.   4.  Subdural versus subarachnoid hemorrhage along the right tentorium measuring 3.1 mm.      These findings were discussed with the patient's clinician, George Wise, on 2/10/2025 8:42 PM.         DX-CHEST-PORTABLE (1 VIEW)   Final Result      Hypoinflation without a definite acute abnormality.      CT-CTA HEAD WITH & W/O-POST PROCESS    (Results Pending)   CT-CTA NECK WITH & W/O-POST PROCESSING    (Results Pending)       Assessment and Plan:  Keyur Burt is a 74 year old woman presenting with confusion, agitation, found to have R temporal lobar-based intracranial hemorrhage, and bilateral SAH.  No clinical findings to suggest traumatic head injury.  Radiographic appearance- both SAH and lobar-based location of ICH is atypical for hypertensive disease.  Recommend full diagnostic evaluation for underlying vascular and structural lesion with CT and MRI studies.  Will admit to RICU for close neuromonitoring and blood pressure control.  Fortunately hemorrhage at this time appears small without significant mass  effect or hydrocephalus to warrant surgical decompression or EVD.     Problem list:   R temporal ICH   SAH   Hypertensive    Plan:   - admit to RICU, q2h neurochecks   - attain CT angiogram of head and neck STAT   - MRI brain ICH protocol- this is MRI brain w/wo and MRA head without   - SBP goal 120-140, cardene PRN, transition to enteral medications when able   - continue home atorvastatin 20mg daily for statin's neuroprotective effects in ICH   - no antithrombotics, SCDs only   - normal Na goal 135-145   - maintain normothermia, net even I/O, FSBS    - PT/OT/SLP as tolerated   - unclear if there is precipitating seizure event as being found down- given epileptogenic location of ICH, may start vimpat 100mg IV q12h- avoid keppra given baseline agitation, combativeness    The evaluation of the patient, and recommended management, was discussed with Dr. Wise, ER attending.    Raciel Kraft MD  Vascular Neurology

## 2025-02-11 NOTE — ED PROVIDER NOTES
ED PHYSICIAN NOTE    CHIEF COMPLAINT  Chief Complaint   Patient presents with    ALOC       EXTERNAL RECORDS REVIEWED  Outpatient Notes from primary care December 18, 2024 with noted history of diabetes, persistent fatigue, additionally on chronic pain medications with hydrocodone as well as cyclobenzaprine    HPI/ROS  LIMITATION TO HISTORY   Select: Altered mental status / Confusion  OUTSIDE HISTORIAN(S):  Family for history and EMS as below    Keyur Burt is a 74 y.o. female who presents with altered mental status.  Patient is unable to provide history at this time so all history is obtained via EMS and family.  Per her son, she was recently diagnosed with RSV on Wednesday.  Stated she was feeling poorly last night but fairly nonspecific.  Reported no headaches, chest pain, difficulty breathing just states she felt poorly.  Today she was supposed to meet people however never showed up so he went back home as he was unable to get her on the phone and found her unresponsive.  On EMS arrival patient became combative and was given a total of 3 mg of Versed.    There was no known report of trauma, no seizure activity for EMS.    No other history is obtainable due to patient's current clinical condition    PAST MEDICAL HISTORY  Past Medical History:   Diagnosis Date    Allergic rhinitis     Allergy to pollen     Aortic atherosclerosis (HCC) 5/27/2021    Ataxia 3/26/2019    Breast cancer (HCC)     Chronic bilateral low back pain without sciatica 7/20/2017    Chronic neck pain     Chronic post-traumatic headache 3/26/2019    Chronic use of opiate for therapeutic purpose 7/20/2017    CKD (chronic kidney disease), stage III     Fatty liver     elevated lft's    Hematuria     History of concussion 3/26/2019    Hyperlipidemia LDL goal < 100     Hypertension     Hypothyroidism     Impaired fasting glucose     Obesity (BMI 30-39.9) 3/22/2017    Tension headache     Thyroid cancer (HCC)     treated with total  "thyroidectomy    Tinnitus of both ears 3/26/2019    Type 2 diabetes mellitus without complication, without long-term current use of insulin ([facility]) 10/14/2021       SOCIAL HISTORY  Social History     Tobacco Use    Smoking status: Never    Smokeless tobacco: Never   Vaping Use    Vaping status: Never Used   Substance Use Topics    Alcohol use: No     Alcohol/week: 0.0 oz    Drug use: No     Comment: THC and CBD       CURRENT MEDICATIONS  Home Medications    **Home medications have not yet been reviewed for this encounter**       Audit from Redirected Encounters    **Home medications have not yet been reviewed for this encounter**         ALLERGIES  Allergies   Allergen Reactions    Shellfish Allergy Anaphylaxis    Iodine      Burning,Swelling    Pcn [Penicillins]      SOB, Hives     Sulfa Drugs      Swelling       PHYSICAL EXAM  VITAL SIGNS: BP (!) 180/81   Pulse 88   Temp 37.1 °C (98.7 °F) (Temporal)   Resp 18   Ht 1.651 m (5' 5\")   Wt 106 kg (233 lb 11 oz)   SpO2 93%   BMI 38.89 kg/m²    Constitutional: Patient is somnolent, will respond to physical stimuli  HENT: Normal inspection, no signs of trauma  Eyes: Normal inspection, Pupils equal 4mm, non-icteric  Neck:  No stridor  Cardiovascular: Regular rate and rhythm, no murmurs.  Symmetric peripheral pulses at radial   Thorax & Lungs: No respiratory distress, No wheezing, No rales, No rhonchi, No obvious chest tenderness.   Abdomen:  soft, non-distended, no obvious tenderness, no mass  Skin: No obvious rash. Warm. Dry.   Back: No tenderness, No CVA tenderness.   Extremities: No cyanosis, no edema  Neurologic: Somnolent will respond to painful stimuli in all 4 extremities, no clonus        DIAGNOSTIC STUDIES / PROCEDURES  LABS/EKG  Labs Reviewed   COMP METABOLIC PANEL - Abnormal; Notable for the following components:       Result Value    Potassium 3.2 (*)     Co2 18 (*)     Anion Gap 20.0 (*)     Glucose 180 (*)     AST(SGOT) 65 (*)     ALT(SGPT) 57 (*)     " All other components within normal limits   URINALYSIS - Abnormal; Notable for the following components:    Character Cloudy (*)     Ketones 40 (*)     Protein 30 (*)     Nitrite Positive (*)     Leukocyte Esterase Trace (*)     All other components within normal limits   URINE DRUG SCREEN - Abnormal; Notable for the following components:    Benzodiazepines Positive (*)     Opiates Positive (*)     All other components within normal limits   CBC WITH DIFFERENTIAL - Abnormal; Notable for the following components:    WBC 11.8 (*)     MPV 8.9 (*)     Neutrophils-Polys 87.80 (*)     Lymphocytes 8.10 (*)     Neutrophils (Absolute) 10.36 (*)     Lymphs (Absolute) 0.95 (*)     All other components within normal limits    Narrative:     SPECIMEN IS A RECOLLECT   URINE MICROSCOPIC (W/UA) - Abnormal; Notable for the following components:    WBC 21-50 (*)     RBC 3-5 (*)     Bacteria Many (*)     All other components within normal limits   POCT GLUCOSE DEVICE RESULTS - Abnormal; Notable for the following components:    POC Glucose, Blood 161 (*)     All other components within normal limits   DIAGNOSTIC ALCOHOL   CARBOXYHEMOGLOBIN   AMMONIA   ESTIMATED GFR   PROTHROMBIN TIME   APTT   PLATELET MAPPING WITH BASIC TEG   POCT GLUCOSE         RADIOLOGY  I have independently interpreted the diagnostic imaging associated with this visit and am waiting the final reading from the radiologist.   My preliminary interpretation is as follows:     CT-HEAD W/O   Final Result         1.  Right posterior temporal parenchymal hemorrhages stranding vasogenic edema   2.  Bilateral subarachnoid hemorrhages as described.   3.  Right posterior parafalcine subdural hematoma measuring 2.4 mm.   4.  Subdural versus subarachnoid hemorrhage along the right tentorium measuring 3.1 mm.      These findings were discussed with the patient's clinician, George Wise, on 2/10/2025 8:42 PM.         DX-CHEST-PORTABLE (1 VIEW)   Final Result      Hypoinflation  without a definite acute abnormality.      CT-CTA HEAD WITH & W/O-POST PROCESS    (Results Pending)   CT-CTA NECK WITH & W/O-POST PROCESSING    (Results Pending)         COURSE & MEDICAL DECISION MAKING    INITIAL ASSESSMENT, COURSE AND PLAN  Care Narrative: 5:39 PM  Patient presents with altered mental status, did have some agitation as well.  At this point differential diagnosis is broad and includes intracranial bleed, electrolyte or metabolic derangement such as hyponatremia, hypoglycemia, medication overdose, toxic ingestion.  No fevers at this time more suggestive of infectious cause but this is considered as well.  I ordered for diagnostic labs, CT head    7:29 PM  Patient became agitated again, was crawling on the bed been agitated with staff, additional Ativan is ordered.  Have additionally contacted CT for her to expedite the current delay on CT    825 PM  Patient has returned from CT.  I have looked at her CT does appear to have large intraparenchymal hemorrhage.  Order for nicardipine.    Case is discussed with Dr. Kraft from neurology.  Will obtain CTAs as well    Case discussed with Dr. Wise from the ICU for admission    Interventions  Medications   niCARdipine (Cardene) 25 mg in  mL Standard Infusion (2.5 mg/hr Intravenous New Bag 2/10/25 2044)   MIDAZOLAM HCL 1 MG/ML INJ SOLN (WRAPPER) (has no administration in time range)   DIPHENHYDRAMINE HCL 50 MG/ML INJ SOLN (has no administration in time range)   methylPREDNISolone sod succ (SOLU-MEDROL) 125 MG injection 125 mg (has no administration in time range)   MIDAZOLAM HCL 1 MG/ML INJ SOLN (WRAPPER) (has no administration in time range)   dexmedetomidine (Precedex) 400 mcg/100mL infusion (has no administration in time range)   LORazepam (Ativan) injection 2 mg (has no administration in time range)   cefTRIAXone (Rocephin) injection 2,000 mg (has no administration in time range)   midazolam (Versed) injection 1 mg (1 mg Intravenous Given 2/10/25  1920)            PROBLEM LIST    # Intracranial bleeding.  Patient was found down at home, ultimately able to obtain CT here which showed both intraparenchymal and subarachnoid bleeding.  Started on nicardipine for blood pressure control.  Will obtain CTAs to evaluate for potential vascular lesion.  In review of her records and discussion with her son no history of antiplatelet or anticoagulant medications    # Urinary tract infection.  Started on Rocephin    # Hypertension.  Patient with chronic hypertension, started on nicardipine in the emergency department with her intracranial bleeding        DISPOSITION AND DISCUSSIONS  I have discussed management of the patient with the following physicians and EDDIE's:  as noted above    Patient is admitted in critical condition    FINAL DIAGNOSIS  1. Intracranial bleeding (HCC)        2. Hypertension, unspecified type        3. Urinary tract infection without hematuria, site unspecified           CRITICAL CARE  The very real possibilty of a deterioration of this patient's condition required the highest level of my preparedness for sudden, emergent intervention.  I provided critical care services, which included medication orders, frequent reevaluations of the patient's condition and response to treatment, ordering and reviewing test results, and discussing the case with various consultants.  The critical care time associated with the care of the patient was 40 minutes. Review chart for interventions. This time is exclusive of any other billable procedures.       This dictation was created using voice recognition software. The accuracy of the dictation is limited to the abilities of the software. I expect there may be some errors of grammar and possibly content. The nursing notes were reviewed and certain aspects of this information were incorporated into this note.    Electronically signed by: George Wise M.D., 2/10/2025

## 2025-02-11 NOTE — CARE PLAN
The patient is Unstable - High likelihood or risk of patient condition declining or worsening         Progress made toward(s) clinical / shift goals:  n/a     Patient is not progressing towards the following goals:      Problem: Safety - Violent/Self-destructive Restraint  Goal: Remains free of injury from restraints (Restraint for Violent/Self-Destructive Behavior)  Outcome: Not Progressing  Goal: Free from restraints (Restraint for Violent or Self-Destructive Behavior)  Outcome: Not Progressing     Problem: Knowledge Deficit - Standard  Goal: Patient and family/care givers will demonstrate understanding of plan of care, disease process/condition, diagnostic tests and medications  Outcome: Not Progressing     Problem: Pain - Standard  Goal: Alleviation of pain or a reduction in pain to the patient’s comfort goal  Outcome: Not Progressing

## 2025-02-11 NOTE — DIETARY
"Nutrition Services: Initial Assessment     Day 1 of admit. Keyur Burt is a 74 y.o. female with admitting DX of Acute intracranial hemorrhage.    Consult received for TF.     Current Hospital Problems List:    Acute intracranial hemorrhage  SAH  Hypertensive urgency  UTI without hematuria  Acute encephalopathy  Acute respiratory failure with hypoxia  T2DM without complication, without long-term use of insulin  Recurrent breast cancer  Mixed HLD  [facility]       Nutrition Assessment:      Height: 165.1 cm (5' 5\")  Weight: 104 kg (228 lb 6.3 oz)  Weight taken via bed scale  Ideal Body Weight: 125 lbs (56.8 kg)  Body mass index is 38.01 kg/m². BMI classification: Obesity Class II.  Wt Readings from Last 6 Encounters:   25 104 kg (228 lb 6.3 oz)   24 106 kg (234 lb)   09/10/24 107 kg (236 lb 4.8 oz)   24 104 kg (229 lb 12.8 oz)   24 105 kg (232 lb)   23 109 kg (240 lb 3.2 oz)        Calculation Equation: REE per MSJ x 1.0 = 1544 kcals (65-70%= 8125-6488 kcals/day)  RMR per PSU (VE: 8.7 L/min and Tmax: 38.8 C) = 1865 kcals (65-70%= 1713-0379 kcals/day)  Total Calories / day: 1150 - 1450 (Calories / k - 14)  Total Grams Protein / day: 85 - 114 (Grams Protein / k.5 - 2.0 of IBW)    Objective:   Pertinent Medical Hx: obesity with BMI of 38, hypothyroidism, HTN, HLD, stage 3b CKD, breast cancer, and T2DM.   Indication for Nutrition Support: Pt is intubated on ventilator support.  Enteral Access: Per chest xray on , pt with OG tube with side-port in the distal esophagus, recommend advancement.    Pertinent Labs: K 2.8, glucose 174, GFR 46, Phos 2.4, , A1C 5.6 on 24  Pertinent Meds: lipitor, pepcid, SSI, vimpat, synthroid, Klyte, senna, anti-emetics prn, bowel protocol, fentanyl IV, cardene IV, propofol at 20 mcg/kg/min (6.8 ml/hr = 180 kcals/day)  Skin/Wounds: pressure injury of R redness and wound team consult is pending. 2+ moderate general " edema per flowsheets.   Food Allergies: shellfish  Last BM: PTA  Formula based on RD Eval: Pt is obese-appearing. ASPEN critical care guidelines used for assessment. Low CHO high protein formula is indicated to best meet estimated needs.     Current Diet Order/Intake:   NPO    Subjective:   Pt is intubated on ventilator support, unable to interview at this time.    Nutrition Focused Physical Exam (NFPE)  Weight Loss: Weight loss per weight hx of 8 lbs (3.4%) in 5 months, does not meet ASPEN criteria.  Muscle Mass: Adequate   Subcutaneous Fat: Adequate  Fluid Accumulation: Moderate 2+ general edema  Reduced  Strength: N/A in acute care setting.    Nutrition Diagnosis:      Inadequate oral intake r/t acute respiratory failure as evidenced by intubated on ventilator support.    Pt does not meet ASPEN malnutrition criteria at this time.    Nutrition Interventions:      Initiate TF Vital HP at 25 ml/hr and advance per protocol to goal rate of 50 ml/hr (on and off propofol) to provide 1200 kcals, 105 gm protein, and 1003 ml free water per day.   Fluids per MD.   Monitor propofol infusion rate.   Patient aware of active plan of care as appropriate.       Nutrition Monitoring and Evaluation:      Monitor for TF advancement to goal rate and tolerance.  Monitor nutrition POC.   Additional fluids per MD/DO  Monitor vital signs pertinent to nutrition.    RD following and will provide updated recommendations as indicated.      Leti Escobedo R.D.      .dru

## 2025-02-11 NOTE — ASSESSMENT & PLAN NOTE
UA with inflammatory changes, along with lactic acidosis and possible sepsis   -Neg cultures  -Completed ceftriaxone

## 2025-02-11 NOTE — ASSESSMENT & PLAN NOTE
Intubated for airway protection and hypoxia  CXR no consolidations/efffusions  -Extubated 2/13 on Low flow O2  -RT/O2 protocols

## 2025-02-11 NOTE — PROCEDURES
Intubation    Date/Time: 2/10/2025 10:24 PM    Performed by: Shagufta Wise M.D.  Authorized by: Shagufta Wise M.D.    Consent:     Consent obtained:  Emergent situation    Consent given by:  Healthcare agent    Risks, benefits, and alternatives were discussed: yes      Risks discussed:  Aspiration and death    Alternatives discussed:  No treatment  Universal protocol:     Patient identity confirmed:  Arm band  Pre-procedure details:     Indications: airway protection and altered consciousness      Patient status:  Altered mental status    Look externally: no concerns      Mallampati score:  II    Obstruction: none      Neck mobility: normal      Pharmacologic strategy: RSI      Induction agents:  Etomidate    Paralytics:  Rocuronium  Procedure details:     Preoxygenation:  Nasal cannula    CPR in progress: no      Number of attempts:  1  Successful intubation attempt details:     Intubation method:  Oral    Intubation technique: video assisted      Laryngoscope blade:  Mac 3    Bougie used: no      Grade view: I      Tube size (mm):  8.0    Tube type:  Cuffed    Tube visualized through cords: yes    Placement assessment:     ETT at teeth/gumline (cm):  25    Tube secured with:  ETT dong    Breath sounds:  Equal    Placement verification: chest rise, colorimetric ETCO2, CXR verification, direct visualization, equal breath sounds and tube exhalation      CXR findings:  Appropriate position  Post-procedure details:     Procedure completion:  Tolerated  Comments:      Dr. Manny Alvarado performed the intubation.

## 2025-02-11 NOTE — ED NOTES
Med Rec completed per dispense history     Antibiotics in the past 30 days: no      Anticoagulant in past 14 days: no    Pharmacy patient utilizes: [facility]  [phone]    Patient not able to participate in med rec at this time. Unknown last doses.

## 2025-02-11 NOTE — PROGRESS NOTES
[facility] Progress Note      Admit Date: 2/10/2025    Resident(s): Radha Osei M.D.   Attending:  Dr. Webster    Patient ID:    Name:  Keyur Burt   YOB: 1950  Age:  74 y.o.  female   MRN:  [id number]    Hospital Course (carried forward and updated):  Keyur Burt is a 74 y.o. female with medical history significant for obesity with a BMI of 38, hypothyroidism, hypertension, hyperlipidemia, stage IIIb chronic kidney disease, breast cancer, and type 2 diabetes (A1c 5.6%), who was last seen normal yesterday when she was complaining of cough and congestion.  The patient did not come to a meeting with her friends and the patient's son checked on her on 2/10/2025 and found that she was laying on the floor and confused.  EMS was activated and upon arrival found the patient again very combative and agitated. SBPs in 180s. She received several doses of Versed that help calm her down; however, she was never able to follow commands.  A CT of the head did reveal in intracranial hemorrhage (SAH and a R temporal lobe ICH). Atraumatic. Unfortunately, the patient continued to remain combative and was unable to stay still for further testing.  She was intubated in the emergency department for behavioral reasons.  She will be admitted to the ICU for ongoing care.    (Dr. Wise's HPI)    Consultants:  Neurology      Interval Events:    NEURO:   RASS -4 withdraws to pain in all 4 but not following commands - weaning sedation after MRI  CARDIOVASC:  HR 70s SBP 140s   RESPIRATORY:  VD #2 - CMV 22 380 +8 30%8.0 @25 Flu positive, CXR no infiltrates   GI/NUTRITION:  OG - NPO   RENAL/FLUID/LYTES: Scr 1.24 unclear baseline, bicarb 16 HAGMA lactic acid 3.0 ->2.0  HEME/ONC:   Hb 15.6 Plt 218K  INFECTIOUS D:  WBC 12K   ENDOCRINE:    BG 170s Na 135-140      Vitals Range last 24h:  Temp:  [37.1 °C (98.7 °F)] 37.1 °C (98.7 °F)  Pulse:  [] (P) 69  Resp:  [16-56] (P) 23  BP: ()/() (P)  126/60  SpO2:  [85 %-98 %] (P) 98 %      Intake/Output Summary (Last 24 hours) at 2/11/2025 1555  Last data filed at 2/11/2025 1400  Gross per 24 hour   Intake 201.14 ml   Output 730 ml   Net -528.86 ml      Review of Systems   Unable to perform ROS: Critical illness       PHYSICAL EXAM:  Vitals:    02/11/25 1300 02/11/25 1343 02/11/25 1400 02/11/25 1500   BP: (!) (P) 141/63  (P) 122/63 (P) 126/60   Pulse: (P) 73 76 (P) 79 (P) 69   Resp: (!) (P) 23 (!) 22 (!) (P) 22 (!) (P) 23   Temp:       TempSrc:   (P) Bladder    SpO2: (P) 97% 97% (P) 97% (P) 98%   Weight:       Height:        Body mass index is 38.01 kg/m².    O2 therapy: Pulse Oximetry: (P) 98 %, O2 Delivery Device: (P) Ventilator    Date 02/11/25 0700 - 02/12/25 0659   Shift 9131-1563 0930-2822 3206-9407 24 Hour Total   INTAKE   P.O. 25   25     P.O. 25   25   Shift Total 25   25   OUTPUT   Urine 300   300     Output (mL) (Urethral Catheter) 300   300   Stool         Number of Times Stooled 0 x   0 x   Shift Total 300   300   NET -275   -275     Physical Exam  Vitals and nursing note reviewed.   HENT:      Head: Atraumatic.      Mouth/Throat:      Comments: ETT no secretions  Eyes:      Pupils: Pupils are equal, round, and reactive to light.   Cardiovascular:      Rate and Rhythm: Normal rate and regular rhythm.      Pulses: Normal pulses.      Heart sounds: Normal heart sounds.   Pulmonary:      Effort: Pulmonary effort is normal.      Breath sounds: Normal breath sounds.      Comments: Coarse in bases  Abdominal:      General: Abdomen is flat. There is no distension.      Palpations: Abdomen is soft.   Musculoskeletal:         General: No swelling. Normal range of motion.      Cervical back: No rigidity.   Skin:     General: Skin is warm.      Capillary Refill: Capillary refill takes less than 2 seconds.   Neurological:      Comments: Withdraws to pain in 4, no spontaneous movements/ not following commands         Recent Labs     02/10/25  0299  02/11/25  0311   ISTATAPH 7.223* 7.429   ISTATAPCO2 50.2* 28.6*   ISTATAPO2 109* 100   ISTATATCO2 22* 20*   PDCTFYU2CNH 97 98   ISTATARTHCO3 20.7* 19.0*   ISTATARTBE -7* -4   ISTATTEMP 98.0 F 101.5 F   ISTATFIO2 40 30   ISTATSPEC Arterial Arterial   ISTATAPHTC 7.228* 7.405   RUOPICSV9HZ 107 110*     Recent Labs     02/10/25  1745 02/11/25  0210 02/11/25  1320   SODIUM 137 139 135   POTASSIUM 3.2* 2.8*  --    CHLORIDE 99 102  --    CO2 18* 16*  --    BUN 12 14  --    CREATININE 0.96 1.24  --    MAGNESIUM  --  1.5  --    PHOSPHORUS  --  2.4*  --    CALCIUM 9.4 8.6  --      Recent Labs     02/10/25  1745 02/11/25  0210   ALTSGPT 57*  --    ASTSGOT 65*  --    ALKPHOSPHAT 86  --    TBILIRUBIN 0.9  --    GLUCOSE 180* 174*     Recent Labs     02/10/25  1909 02/10/25  2038 02/11/25  0210   RBC 4.36  --  4.85   HEMOGLOBIN 13.8  --  15.6   HEMATOCRIT 40.9  --  45.4   PLATELETCT 214  --  218   PROTHROMBTM  --  13.8  --    APTT  --  29.8  --    INR  --  1.06  --      Recent Labs     02/10/25  1745 02/10/25  1909 02/11/25  0210   WBC  --  11.8* 12.1*   NEUTSPOLYS  --  87.80* 87.70*   LYMPHOCYTES  --  8.10* 5.40*   MONOCYTES  --  3.30 5.90   EOSINOPHILS  --  0.00 0.10   BASOPHILS  --  0.10 0.10   ASTSGOT 65*  --   --    ALTSGPT 57*  --   --    ALKPHOSPHAT 86  --   --    TBILIRUBIN 0.9  --   --        Meds:   [START ON 2/12/2025] atorvastatin  20 mg      And    [START ON 2/13/2025] atorvastatin  10 mg      [START ON 2/13/2025] levothyroxine  125 mcg      And    [START ON 2/12/2025] levothyroxine  137 mcg      potassium bicarbonate  25 mEq      oseltamivir  30 mg      Pharmacy  1 Each      Respiratory Therapy Consult        MD Alert...Adult ICU Electrolyte Replacement per Pharmacy        lidocaine  2 mL      propofol  0-80 mcg/kg/min (Ideal) 20 mcg/kg/min (02/11/25 0858)    fentaNYL  50 mcg      Or    fentaNYL  100 mcg      fentaNYL   50 mcg/hr (02/11/25 0700)    insulin lispro  2-9 Units      And    dextrose bolus  25 g      NS    80 mL/hr at 02/11/25 1351    LORazepam  2 mg      niCARdipine (Cardene) Standard Infusion 0.1 mg/mL  0-15 mg/hr 5 mg/hr (02/11/25 1353)    labetalol  10-20 mg      hydrALAZINE  20 mg      senna-docusate  2 Tablet      And    polyethylene glycol/lytes  1 Packet      And    magnesium hydroxide  30 mL      And    bisacodyl  10 mg      famotidine  20 mg      acetaminophen  650 mg      Or    acetaminophen  650 mg      ondansetron  4 mg      Or    ondansetron  4 mg      cefTRIAXone (ROCEPHIN) IV  1,000 mg      [Held by provider] anastrozole  1 mg      lacosamide  100 mg          Procedures:  Intubation 2/10    Imaging:  DX-CHEST-PORTABLE (1 VIEW)   Final Result         1.  No acute cardiopulmonary disease.   2.  Nasogastric tube terminates just distal to the gastroesophageal junction with side-port in the distal esophagus, recommend advancement      CT-CTA NECK WITH & W/O-POST PROCESSING   Final Result         1.  CT angiogram of the neck within normal limits.         CT-CTA HEAD WITH & W/O-POST PROCESS   Final Result         1.  No large vessel occlusion or aneurysm identified.   2.  Right posterior temporal parenchymal hemorrhage, stable since prior study.   3.  Scattered subarachnoid hemorrhages, overall stable since prior study.      DX-CHEST-PORTABLE (1 VIEW)   Final Result         1.  No acute cardiopulmonary disease.   2.  Nasogastric tube with tip just distal to the gastroesophageal junction, recommend advancement      DX-ABDOMEN FOR TUBE PLACEMENT   Final Result         1.  Nonspecific bowel gas pattern in the upper abdomen.   2.  Nasogastric tube with tip just distal to the gastroesophageal junction, recommend advancement      CT-HEAD W/O   Final Result         1.  Right posterior temporal parenchymal hemorrhages stranding vasogenic edema   2.  Bilateral subarachnoid hemorrhages as described.   3.  Right posterior parafalcine subdural hematoma measuring 2.4 mm.   4.  Subdural versus subarachnoid hemorrhage along  the right tentorium measuring 3.1 mm.      These findings were discussed with the patient's clinician, George Wise, on 2/10/2025 8:42 PM.         DX-CHEST-PORTABLE (1 VIEW)   Final Result      Hypoinflation without a definite acute abnormality.      EC-ECHOCARDIOGRAM COMPLETE W/O CONT    (Results Pending)   MR-BRAIN-WITH & W/O    (Results Pending)   MR-MRA HEAD-W/O    (Results Pending)       ASSESSEMENT and PLAN:    * Acute intracranial hemorrhage (HCC)- (present on admission)  Assessment & Plan  Right posterior temporal parenchymal hemorrhage noted = SAH as above  CTA H&N without obvious aneurism  - See plan for SAH   - Wean sedation for full neuro evaluation once after MRI     Acute encephalopathy- (present on admission)  Assessment & Plan  Admitted with agitation  ICH/SAH and UTI  - Check UDS and TSH (TSH abnormal in the past)   - Intubated for airway protection  - Cont to correct all underlying metabolic disturbances  - SAT as appropriate (after MRI) for full neuro exam    SAH (subarachnoid hemorrhage) (HCC)- (present on admission)  Assessment & Plan  Atraumatic - ?aneurysmal  CTA H&N without obvious aneurism  -Pending MRI/MRA  -SBP goals >120 and <140  -Maintain euglycemia, normothermia, eunatremia  -Nicardipine infusion, actively titrating  -Na every 6 hours  -TCDs  -PT/OT/SLP  -ECHO pending     Hypokalemia  Assessment & Plan  - Replacing, goal >4  - IV Mg as needed as well     Lactic acidosis  Assessment & Plan  Suspect poor PO intake - hypovolemia and sepsis   - Blood cultures drawn   - IVF PRN - hemodynamics monitoring  - Monitor BMP and Lactic   - Improving    Acute respiratory failure with hypoxia (HCC)- (present on admission)  Assessment & Plan  Intubated for airway protection and hypoxia  CXR no consolidations/efffusions  -Cont full vent support, weaning   -RT/O2 protocols  -A-F bundle   -SAT/SBTs when clinically appropriate    Urinary tract infection without hematuria- (present on  admission)  Assessment & Plan  UA with inflammatori changes, along with lactic acidosis and possible sepsis   -Follow cultures  -Empiric ceftriaxone    Hypertensive urgency- (present on admission)  Assessment & Plan  SBP goals > 120 and < 140  Improved  Labetalol/hydralazine as needed    Type 2 diabetes mellitus without complication, without long-term current use of insulin (HCC)- (present on admission)  Assessment & Plan  A1c 5.6%  -BG goals 120-180s  -Medium ISS      Recurrent breast cancer (HCC)- (present on admission)  Assessment & Plan  Cont home anastrozole    Hypothyroidism from resorcinol- (present on admission)  Assessment & Plan  Cont home synthroid regimen    Mixed hyperlipidemia- (present on admission)  Assessment & Plan  Resume home statin of atorvastatin 20mg PO daily        DISPO: ICU level of care    CODE STATUS: FULL     Quality Measures:  Feeding: Advancin tube feeds  Analgesia: PRN fent   Sedation: Propofol  Thromboprophylaxis: Enoxaparin  Head of bed: >30 degrees  Ulcer prophylaxis: Famotidine  Glycemic control: Correctional  Bowel care: bowel regimen  Indwelling lines: 4 PIV, barber, OGT  Deescalation of antibiotics: Ceftriaxone     Radha FLOREZ PGY-3  Internal Medicine [facility]

## 2025-02-11 NOTE — THERAPY
Physical Therapy Contact Note    Patient Name: Keyur Burt  Age:  74 y.o., Sex:  female  Medical Record #: [id number]  Today's Date: 2/11/2025 02/11/25 4306   Initial Contact Note    Initial Contact Note Order Received and Verified, Physical Therapy Evaluation in Progress with Full Report to Follow.   Interdisciplinary Plan of Care Collaboration   Collaboration Comments PT orders received.  Pt currently has bed rest orders and is sedated.  PT will follow up when cleared for activity.   Session Information   Date / Session Number  2/11- bed rest

## 2025-02-11 NOTE — ED NOTES
Pt bed alarm went off and pt at end of bed with gown half off. Pt not following direction and ripping off monitors. Called for assistance and called Dr. Wise for orders.  At bedside to angelica pt. Orders received. Report to RANDOLPH Tillman

## 2025-02-11 NOTE — THERAPY
Speech Language Therapy Contact Note    Patient Name: Keyur Burt  Age:  74 y.o., Sex:  female  Medical Record #: [id number]  Today's Date: 2/11/2025 02/11/25 0708   Treatment Variance   Reason For Missed Therapy Medical - Patient on Hold from Therapy;Medical - Patient Is Not Medically Stable;Medical - Patient Unarousable   Initial Contact Note    Initial Contact Note  Order Received and Verified, Speech Therapy Evaluation in Progress with Full Report to Follow.   Vitals   O2 Delivery Device Ventilator   Interdisciplinary Plan of Care Collaboration   IDT Collaboration with  Other (See Comments)  (EMR)   Collaboration Comments Orders recieved for CSE. Per EMR - patient is currently intubated. SLP to hold and monitor for extubation.     - Helen Romero, SLP

## 2025-02-12 ENCOUNTER — HOSPITAL ENCOUNTER (OUTPATIENT)
Dept: RADIOLOGY | Facility: MEDICAL CENTER | Age: 75
End: 2025-02-12
Attending: INTERNAL MEDICINE
Payer: MEDICARE

## 2025-02-12 PROBLEM — R56.9 SEIZURE (HCC): Status: ACTIVE | Noted: 2025-02-12

## 2025-02-12 PROBLEM — J10.1 INFLUENZA A: Status: ACTIVE | Noted: 2025-02-12

## 2025-02-12 LAB
ANION GAP SERPL CALC-SCNC: 12 MMOL/L (ref 7–16)
BASE EXCESS BLDA CALC-SCNC: -1 MMOL/L (ref -4–3)
BODY TEMPERATURE: ABNORMAL DEGREES
BREATHS SETTING VENT: 22
BUN SERPL-MCNC: 19 MG/DL (ref 8–22)
CALCIUM SERPL-MCNC: 8.3 MG/DL (ref 8.5–10.5)
CHLORIDE SERPL-SCNC: 106 MMOL/L (ref 96–112)
CO2 BLDA-SCNC: 23 MMOL/L (ref 32–48)
CO2 SERPL-SCNC: 22 MMOL/L (ref 20–33)
CREAT SERPL-MCNC: 0.9 MG/DL (ref 0.5–1.4)
CRP SERPL HS-MCNC: 2.43 MG/DL (ref 0–0.75)
DELSYS IDSYS: ABNORMAL
END TIDAL CARBON DIOXIDE IECO2: 30 MMHG
GFR SERPLBLD CREATININE-BSD FMLA CKD-EPI: 67 ML/MIN/1.73 M 2
GLUCOSE BLD STRIP.AUTO-MCNC: 101 MG/DL (ref 65–99)
GLUCOSE BLD STRIP.AUTO-MCNC: 109 MG/DL (ref 65–99)
GLUCOSE BLD STRIP.AUTO-MCNC: 115 MG/DL (ref 65–99)
GLUCOSE BLD STRIP.AUTO-MCNC: 118 MG/DL (ref 65–99)
GLUCOSE SERPL-MCNC: 123 MG/DL (ref 65–99)
HCO3 BLDA-SCNC: 22.1 MMOL/L (ref 21–28)
HOROWITZ INDEX BLDA+IHG-RTO: 263 MM[HG]
LACTATE BLD-SCNC: 2.3 MMOL/L (ref 0.5–2)
MAGNESIUM SERPL-MCNC: 2.2 MG/DL (ref 1.5–2.5)
MODE IMODE: ABNORMAL
O2/TOTAL GAS SETTING VFR VENT: 30 %
PCO2 BLDA: 32.4 MMHG (ref 32–48)
PCO2 TEMP ADJ BLDA: 32.4 MMHG (ref 32–48)
PEEP END EXPIRATORY PRESSURE IPEEP: 8 CMH20
PH BLDA: 7.44 [PH] (ref 7.35–7.45)
PH TEMP ADJ BLDA: 7.44 [PH] (ref 7.35–7.45)
PHOSPHATE SERPL-MCNC: 2.6 MG/DL (ref 2.5–4.5)
PO2 BLDA: 79 MMHG (ref 83–108)
PO2 TEMP ADJ BLDA: 79 MMHG (ref 83–108)
POTASSIUM SERPL-SCNC: 3.7 MMOL/L (ref 3.6–5.5)
SAO2 % BLDA: 96 % (ref 93–99)
SCCMEC + MECA PNL NOSE NAA+PROBE: NEGATIVE
SODIUM SERPL-SCNC: 140 MMOL/L (ref 135–145)
SPECIMEN DRAWN FROM PATIENT: ABNORMAL
TIDAL VOLUME IVT: 380 ML
TRIGL SERPL-MCNC: 275 MG/DL (ref 0–149)

## 2025-02-12 PROCEDURE — 700111 HCHG RX REV CODE 636 W/ 250 OVERRIDE (IP): Performed by: PSYCHIATRY & NEUROLOGY

## 2025-02-12 PROCEDURE — 99232 SBSQ HOSP IP/OBS MODERATE 35: CPT | Mod: 25 | Performed by: PSYCHIATRY & NEUROLOGY

## 2025-02-12 PROCEDURE — 82803 BLOOD GASES ANY COMBINATION: CPT

## 2025-02-12 PROCEDURE — 84478 ASSAY OF TRIGLYCERIDES: CPT

## 2025-02-12 PROCEDURE — 82962 GLUCOSE BLOOD TEST: CPT | Mod: 91

## 2025-02-12 PROCEDURE — 86140 C-REACTIVE PROTEIN: CPT

## 2025-02-12 PROCEDURE — A9270 NON-COVERED ITEM OR SERVICE: HCPCS | Performed by: INTERNAL MEDICINE

## 2025-02-12 PROCEDURE — 700101 HCHG RX REV CODE 250: Performed by: INTERNAL MEDICINE

## 2025-02-12 PROCEDURE — 87641 MR-STAPH DNA AMP PROBE: CPT

## 2025-02-12 PROCEDURE — 700105 HCHG RX REV CODE 258: Performed by: INTERNAL MEDICINE

## 2025-02-12 PROCEDURE — 71045 X-RAY EXAM CHEST 1 VIEW: CPT

## 2025-02-12 PROCEDURE — 84100 ASSAY OF PHOSPHORUS: CPT

## 2025-02-12 PROCEDURE — 700102 HCHG RX REV CODE 250 W/ 637 OVERRIDE(OP): Performed by: INTERNAL MEDICINE

## 2025-02-12 PROCEDURE — 94799 UNLISTED PULMONARY SVC/PX: CPT

## 2025-02-12 PROCEDURE — 36600 WITHDRAWAL OF ARTERIAL BLOOD: CPT

## 2025-02-12 PROCEDURE — 700105 HCHG RX REV CODE 258

## 2025-02-12 PROCEDURE — C9254 INJECTION, LACOSAMIDE: HCPCS | Performed by: PSYCHIATRY & NEUROLOGY

## 2025-02-12 PROCEDURE — 95720 EEG PHY/QHP EA INCR W/VEEG: CPT | Performed by: STUDENT IN AN ORGANIZED HEALTH CARE EDUCATION/TRAINING PROGRAM

## 2025-02-12 PROCEDURE — 94003 VENT MGMT INPAT SUBQ DAY: CPT

## 2025-02-12 PROCEDURE — 4A10X4Z MONITORING OF CENTRAL NERVOUS ELECTRICAL ACTIVITY, EXTERNAL APPROACH: ICD-10-PCS | Performed by: STUDENT IN AN ORGANIZED HEALTH CARE EDUCATION/TRAINING PROGRAM

## 2025-02-12 PROCEDURE — 95714 VEEG EA 12-26 HR UNMNTR: CPT | Performed by: STUDENT IN AN ORGANIZED HEALTH CARE EDUCATION/TRAINING PROGRAM

## 2025-02-12 PROCEDURE — 83735 ASSAY OF MAGNESIUM: CPT

## 2025-02-12 PROCEDURE — 83605 ASSAY OF LACTIC ACID: CPT

## 2025-02-12 PROCEDURE — 99291 CRITICAL CARE FIRST HOUR: CPT | Mod: GC | Performed by: INTERNAL MEDICINE

## 2025-02-12 PROCEDURE — 80048 BASIC METABOLIC PNL TOTAL CA: CPT

## 2025-02-12 PROCEDURE — 700111 HCHG RX REV CODE 636 W/ 250 OVERRIDE (IP): Performed by: INTERNAL MEDICINE

## 2025-02-12 PROCEDURE — 700111 HCHG RX REV CODE 636 W/ 250 OVERRIDE (IP)

## 2025-02-12 PROCEDURE — 770022 HCHG ROOM/CARE - ICU (200)

## 2025-02-12 RX ORDER — OSELTAMIVIR PHOSPHATE 75 MG/1
75 CAPSULE ORAL 2 TIMES DAILY
Status: DISCONTINUED | OUTPATIENT
Start: 2025-02-12 | End: 2025-02-15

## 2025-02-12 RX ORDER — CALCIUM GLUCONATE 20 MG/ML
1 INJECTION, SOLUTION INTRAVENOUS ONCE
Status: COMPLETED | OUTPATIENT
Start: 2025-02-12 | End: 2025-02-12

## 2025-02-12 RX ORDER — DEXMEDETOMIDINE HYDROCHLORIDE 4 UG/ML
.1-1.5 INJECTION, SOLUTION INTRAVENOUS CONTINUOUS
Status: DISCONTINUED | OUTPATIENT
Start: 2025-02-12 | End: 2025-02-13

## 2025-02-12 RX ADMIN — SODIUM CHLORIDE 2.5 MG/HR: 9 INJECTION, SOLUTION INTRAVENOUS at 20:37

## 2025-02-12 RX ADMIN — HYDRALAZINE HYDROCHLORIDE 20 MG: 20 INJECTION, SOLUTION INTRAMUSCULAR; INTRAVENOUS at 13:03

## 2025-02-12 RX ADMIN — POTASSIUM BICARBONATE 25 MEQ: 978 TABLET, EFFERVESCENT ORAL at 07:16

## 2025-02-12 RX ADMIN — FAMOTIDINE 20 MG: 20 TABLET, FILM COATED ORAL at 18:01

## 2025-02-12 RX ADMIN — CALCIUM GLUCONATE 1 G: 20 INJECTION, SOLUTION INTRAVENOUS at 07:02

## 2025-02-12 RX ADMIN — DEXMEDETOMIDINE HYDROCHLORIDE 0.2 MCG/KG/HR: 100 INJECTION, SOLUTION INTRAVENOUS at 19:08

## 2025-02-12 RX ADMIN — PROPOFOL 80 MCG/KG/MIN: 10 INJECTION, EMULSION INTRAVENOUS at 02:53

## 2025-02-12 RX ADMIN — LACOSAMIDE 100 MG: 10 INJECTION INTRAVENOUS at 18:07

## 2025-02-12 RX ADMIN — FAMOTIDINE 20 MG: 20 TABLET, FILM COATED ORAL at 05:42

## 2025-02-12 RX ADMIN — PROPOFOL 80 MCG/KG/MIN: 10 INJECTION, EMULSION INTRAVENOUS at 15:50

## 2025-02-12 RX ADMIN — OSELTAMIVIR PHOSPHATE 75 MG: 75 CAPSULE ORAL at 18:09

## 2025-02-12 RX ADMIN — ATORVASTATIN CALCIUM 20 MG: 20 TABLET, FILM COATED ORAL at 05:42

## 2025-02-12 RX ADMIN — Medication 50 MCG/HR: at 12:43

## 2025-02-12 RX ADMIN — SODIUM CHLORIDE 80 ML: 9 INJECTION, SOLUTION INTRAVENOUS at 12:35

## 2025-02-12 RX ADMIN — LEVOTHYROXINE SODIUM 137 MCG: 0.11 TABLET ORAL at 05:43

## 2025-02-12 RX ADMIN — SENNOSIDES AND DOCUSATE SODIUM 2 TABLET: 50; 8.6 TABLET ORAL at 18:01

## 2025-02-12 RX ADMIN — LABETALOL HYDROCHLORIDE 10 MG: 5 INJECTION INTRAVENOUS at 11:51

## 2025-02-12 RX ADMIN — PROPOFOL 80 MCG/KG/MIN: 10 INJECTION, EMULSION INTRAVENOUS at 12:28

## 2025-02-12 RX ADMIN — OSELTAMIVIR PHOSPHATE 30 MG: 30 CAPSULE ORAL at 05:47

## 2025-02-12 RX ADMIN — SODIUM CHLORIDE 5 MG/HR: 9 INJECTION, SOLUTION INTRAVENOUS at 13:18

## 2025-02-12 RX ADMIN — LACOSAMIDE 100 MG: 10 INJECTION INTRAVENOUS at 05:43

## 2025-02-12 RX ADMIN — SENNOSIDES AND DOCUSATE SODIUM 2 TABLET: 50; 8.6 TABLET ORAL at 05:43

## 2025-02-12 RX ADMIN — PROPOFOL 80 MCG/KG/MIN: 10 INJECTION, EMULSION INTRAVENOUS at 04:49

## 2025-02-12 RX ADMIN — CEFTRIAXONE SODIUM 2000 MG: 10 INJECTION, POWDER, FOR SOLUTION INTRAVENOUS at 18:09

## 2025-02-12 RX ADMIN — SODIUM CHLORIDE 7.5 MG/HR: 9 INJECTION, SOLUTION INTRAVENOUS at 17:22

## 2025-02-12 RX ADMIN — PROPOFOL 80 MCG/KG/MIN: 10 INJECTION, EMULSION INTRAVENOUS at 08:33

## 2025-02-12 ASSESSMENT — PAIN DESCRIPTION - PAIN TYPE
TYPE: ACUTE PAIN

## 2025-02-12 NOTE — EEG PROGRESS NOTE
Continuous inpatient video EEG monitoring   Preliminary review    EEG monitoring thus far shows continuous generalized slowing of the background, with brief periods of discontinuity. This finding is consistent with moderate to marked diffuse cerebral dysfunction of a nonspecific etiology, with sedative/medication effects potentially contributing.     No seizures or epileptiform discharges are observed.     Detailed report to follow in AM.     Fabiola Lindsay M.D.   Epileptologist/Neurologist   [facility], Department of Neurology at [facility]

## 2025-02-12 NOTE — PROGRESS NOTES
Called for witnessed seizure by RN.  They treated with ativan with resolution of symptoms.  I have ordered a cEEG and increased her propofol from 60 to 80.

## 2025-02-12 NOTE — THERAPY
Physical Therapy Contact Note    Patient Name: Keyur Burt  Age:  74 y.o., Sex:  female  Medical Record #: [id number]  Today's Date: 2/12/2025 02/12/25 1411   Interdisciplinary Plan of Care Collaboration   Collaboration Comments continued sedation.  PT will monitor.   Session Information   Date / Session Number  2/12- sedated

## 2025-02-12 NOTE — DIETARY
Nutrition Services: Brief Update    Pt with increased propofol rate, currently running at maxed rate of 80 mcg/kg/min (27.4 ml/hr) provides 723 kcals/day. TF and propofol kcals within RMR estimated kcal needs per RD assessment from 2/11. TF Vital HP with goal rate 50 ml/hr provides 1200 kcals + kcals from propofol, 105 gm protein, and 1003 ml free water per day and remains appropriate.      Problem: Nutritional:  Goal: Nutrition support tolerated and meeting greater than 85% of estimated needs  Outcome: MET    RD following.

## 2025-02-12 NOTE — CARE PLAN
Problem: Ventilation  Goal: Ability to achieve and maintain unassisted ventilation or tolerate decreased levels of ventilator support  Description: Target End Date:  4 days     Document on Vent flowsheet    1.  Support and monitor invasive and noninvasive mechanical ventilation  2.  Monitor ventilator weaning response  3.  Perform ventilator associated pneumonia prevention interventions  4.  Manage ventilation therapy by monitoring diagnostic test results  Outcome: Progressing  VD 3  CMV 22 380 +8 30%  8.0 @25

## 2025-02-12 NOTE — PROCEDURES
VIDEO ELECTROENCEPHALOGRAM REPORT - Inpatient continuous EEG monitoring    REFERRING PROVIDER: Gibson Webster D.o.  DOS: 2/12/2024  ROOM: R103/00   TOTAL RECORDING TIME: 25 hours and 37 minutes of total recording time    INDICATION:  Keuyr Burt 74 y.o. female presenting with nontraumatic intracranial hemorrhage.     RELEVANT MEDICATIONS/TREATMENTS:   Scheduled Medications   Medication Dose Frequency    magnesium sulfate  2 g Once    furosemide  20 mg Q12HRS    oseltamivir  75 mg BID    atorvastatin  20 mg Once per day on Monday Wednesday Friday    And    atorvastatin  10 mg Once per day on Sunday Tuesday Thursday Saturday    levothyroxine  125 mcg Q48HRS    And    levothyroxine  137 mcg Q48HRS    Pharmacy  1 Each PHARMACY TO DOSE    MD Alert...Adult ICU Electrolyte Replacement per Pharmacy   PHARMACY TO DOSE    insulin lispro  2-9 Units Q6HRS    senna-docusate  2 Tablet BID    famotidine  20 mg Q12HRS    [Held by provider] anastrozole  1 mg DAILY    lacosamide  100 mg BID         TECHNIQUE:   CVEEG was set up by a Neurodiagnostic technologist who performed education to the patient and staff. A minimum of 23 electrodes and 23 channel recording was setup and performed by Neurodiagnostic technologist, in accordance with the international 10-20 system. Impedence, electrode integrity, and technical impressions were documented a minimum of every 2-24 hour period by a Neurodiagnostic Technologist and reviewed by Interpreting Physician. The study was reviewed in bipolar and referential montages. The recording examined the patient in the comatose/sedated state(s).     DESCRIPTION OF THE RECORD:  During the awake state, there is polymorphic theta slowing, at times with brief non-rhythmic delta activity most prominent bifrontally. Otherwise, there is mild generalized slowing of the background in predominantly sedated/drowsy/sleep states.     Stage II sleep transients were present in the form of sleep spindles and vertex  waves over the central region.    ICTAL AND INTERICTAL FINDINGS:   1) Occasional brief runs of sharply contoured right > left fronto-central slowing.  2) No definite epileptiform discharges.   3) No seizures.     ACTIVATION PROCEDURES:   Intermittent Photic stimulation was performed in a stepwise fashion from 1 to 30 Hz and did not elicited any abnormalities on EEG.     EKG: Sampling of the EKG recording showed sinus rhythm    EVENTS:  None    INTERPRETATION:  Abnormal - day #2  video EEG recording in the drowsy/sleep and sedated state(s):  Occasional brief runs of sharply contoured right greater than left fronto-central slowing. This finding is consistent with known structural abnormality and may indicate a predisposition for seizures, though alternatively may signify cortical disruption due to injury. Clinical and radiologic correlation recommended.   Mild continuous generalized slowing of the background throughout, a finding consistent with nonspecific diffuse cerebral dysfunction, with sedative/medication effects contributing.   No seizures.   No push button events.    Comments: compared to previous day's recording, no significant changes are observed.     Fabiola Lindsay M.D.   Diplomate, Neurology with Special Qualification in Epilepsy, American Board of Psychiatry and Neurology   of Clinical Neurology, [facility]  [facility], Department of Neurology at [facility]

## 2025-02-12 NOTE — ASSESSMENT & PLAN NOTE
Clinical seizure x1 2/11  cEEG without epileptiform activity  Doing well, awake, non focal  - Continue vimpat BID  - Will need outpt neuro f/u

## 2025-02-12 NOTE — PROGRESS NOTES
Neurology Progress Note  Neurohospitalist Service, [facility]    Referring Physician: Gibson Webster D.O.      Interval History: Apparent seizure-like activity.  EEG placed- no electrographic seizures noted overnight.  Remains on high dose propofol.  MRI ICH protocol completed, with no clear ICH etiology.    Review of systems: In addition to what is detailed in the HPI and/or updated in the interval history, all other systems reviewed and are negative.    Past Medical History, Past Surgical History and Social History reviewed and unchanged from prior    Medications:    Current Facility-Administered Medications:     oseltamivir (Tamiflu) capsule 75 mg, 75 mg, Enteral Tube, BID, Gibson Webster D.O.    atorvastatin (Lipitor) tablet 20 mg, 20 mg, Enteral Tube, Once per day on Monday Wednesday Friday, 20 mg at 02/12/25 0542 **AND** [START ON 2/13/2025] atorvastatin (Lipitor) tablet 10 mg, 10 mg, Enteral Tube, Once per day on Sunday Tuesday Thursday Saturday, Gibson Webster D.O.    [START ON 2/13/2025] levothyroxine (Synthroid) tablet 125 mcg, 125 mcg, Enteral Tube, Q48HRS **AND** levothyroxine (Synthroid) tablet 137 mcg, 137 mcg, Enteral Tube, Q48HRS, Gibson Webster D.O., 137 mcg at 02/12/25 0543    Pharmacy Consult: Enteral tube insertion - review meds/change route/product selection, 1 Each, Other, PHARMACY TO DOSE, Radha Osei M.D.    cefTRIAXone (Rocephin) syringe 2,000 mg, 2,000 mg, Intravenous, Q24HRS, Radha Osei M.D., 2,000 mg at 02/11/25 1855    Respiratory Therapy Consult, , Nebulization, Continuous RT, Shagufta Wise M.D.    MD Alert...ICU Electrolyte Replacement per Pharmacy, , Other, PHARMACY TO DOSE, Shagufta Wise M.D.    lidocaine (Xylocaine) 1 % injection 2 mL, 2 mL, Tracheal Tube, Q30 MIN PRN, Shagufta Wise M.D.    propofol (DIPRIVAN) injection, 0-80 mcg/kg/min (Ideal), Intravenous, Continuous, Last Rate: 27.4 mL/hr at 02/12/25 0833, 80 mcg/kg/min at 02/12/25 0833 **AND**  Triglycerides Starting now and then Every 3 Days, , , Every 3 Days (0300), Shagufta Wise M.D.    fentaNYL (Sublimaze) injection 50 mcg, 50 mcg, Intravenous, Q HOUR PRN **OR** fentaNYL (Sublimaze) injection 100 mcg, 100 mcg, Intravenous, Q HOUR PRN, Shagufta Wise M.D., 100 mcg at 02/11/25 1700    fentaNYL (SUBLIMAZE) 50 mcg/mL in 50mL, , Intravenous, Continuous, Shagufta Wise M.D., Stopped at 02/11/25 2323    insulin lispro (HumaLOG,AdmeLOG) subcutaneous injection, 2-9 Units, Subcutaneous, Q6HRS **AND** POC blood glucose manual result, , , Q6H **AND** NOTIFY MD and PharmD, , , Once **AND** Administer 20 grams of glucose (approximately 8 ounces of fruit juice) every 15 minutes PRN FSBG less than 70 mg/dL, , , PRN **AND** dextrose 50% (D50W) injection 25 g, 25 g, Intravenous, Q15 MIN PRN, Shagufta Wise M.D.    NS infusion, , Intravenous, Continuous, Shagufta Wise M.D., Last Rate: 80 mL/hr at 02/12/25 0000, Rate Verify at 02/12/25 0000    LORazepam (Ativan) injection 2 mg, 2 mg, Intravenous, Q5 MIN PRN, Shagufta Wise M.D., 2 mg at 02/11/25 1901    niCARdipine (Cardene) 25 mg in  mL Standard Infusion, 0-15 mg/hr, Intravenous, Continuous, Shagufta Wise M.D., Stopped at 02/11/25 1945    labetalol (Normodyne/Trandate) injection 10-20 mg, 10-20 mg, Intravenous, Q4HRS PRN, Shagufta Wise M.D., 20 mg at 02/11/25 1134    hydrALAZINE (Apresoline) injection 20 mg, 20 mg, Intravenous, Q4HRS PRN, Shagufta Wise M.D.    senna-docusate (Pericolace Or Senokot S) 8.6-50 MG per tablet 2 Tablet, 2 Tablet, Enteral Tube, BID, 2 Tablet at 02/12/25 0543 **AND** polyethylene glycol/lytes (Miralax) Packet 1 Packet, 1 Packet, Enteral Tube, QDAY PRN **AND** magnesium hydroxide (Milk Of Magnesia) suspension 30 mL, 30 mL, Enteral Tube, QDAY PRN **AND** bisacodyl (Dulcolax) suppository 10 mg, 10 mg, Rectal, QDAY PRN, Shagufta Wise M.D.    famotidine (Pepcid) tablet 20 mg, 20 mg, Enteral Tube, Q12HRS, 20  "mg at 02/12/25 0542 **OR** [DISCONTINUED] famotidine (Pepcid) injection 20 mg, 20 mg, Intravenous, Q12HRS, Shagufta Wise M.D.    acetaminophen (Tylenol) tablet 650 mg, 650 mg, Enteral Tube, Q4HRS PRN, 650 mg at 02/11/25 0242 **OR** acetaminophen (Tylenol) suppository 650 mg, 650 mg, Rectal, Q4HRS PRN, Shagufta Wise M.D.    ondansetron (Zofran ODT) dispertab 4 mg, 4 mg, Enteral Tube, Q4HRS PRN **OR** ondansetron (Zofran) syringe/vial injection 4 mg, 4 mg, Intravenous, Q4HRS PRN, Shagufta Wise M.D.    [Held by provider] anastrozole (Arimidex) tablet 1 mg, 1 mg, Oral, DAILY, Shagufta Wise M.D., 1 mg at 02/11/25 0540    lacosamide (Vimpat) injection 100 mg, 100 mg, Intravenous, BID, Raciel Kraft M.D., 100 mg at 02/12/25 0543    Physical Examination:   /63   Pulse 65   Temp 37.1 °C (98.7 °F) (Temporal)   Resp 20   Ht 1.651 m (5' 5\")   Wt 104 kg (228 lb 6.3 oz)   SpO2 96%   BMI 38.01 kg/m²       General: Patient is intubated, sedated  Neck: There is normal range of motion  CV: Regular rate   Extremities:  Warm, dry, and intact, without peripheral lower extremity edema    NEUROLOGICAL EXAM:     Mental status:  Intubated sedated  Speech and language: Intubated, no command folloiwng.  Cranial nerve exam: Pupils are equal, round and reactive to light bilaterally. No clear blink to threat bilaterally.  No tracking, eyes midline.  Face grossly symmetric.  Motor exam: No movement to tactile in all 4 extremities  Sensory exam:  As above  Coordination: No significant movements elicited to test    Objective Data:    Labs:  Lab Results   Component Value Date/Time    PROTHROMBTM 13.8 02/10/2025 08:38 PM    INR 1.06 02/10/2025 08:38 PM      Lab Results   Component Value Date/Time    WBC 12.1 (H) 02/11/2025 02:10 AM    RBC 4.85 02/11/2025 02:10 AM    HEMOGLOBIN 15.6 02/11/2025 02:10 AM    HEMATOCRIT 45.4 02/11/2025 02:10 AM    MCV 93.6 02/11/2025 02:10 AM    MCH 32.2 02/11/2025 02:10 AM    MCHC 34.4 " 02/11/2025 02:10 AM    MPV 9.0 02/11/2025 02:10 AM    NEUTSPOLYS 87.70 (H) 02/11/2025 02:10 AM    LYMPHOCYTES 5.40 (L) 02/11/2025 02:10 AM    MONOCYTES 5.90 02/11/2025 02:10 AM    EOSINOPHILS 0.10 02/11/2025 02:10 AM    BASOPHILS 0.10 02/11/2025 02:10 AM      Lab Results   Component Value Date/Time    SODIUM 140 02/12/2025 03:46 AM    POTASSIUM 3.7 02/12/2025 03:46 AM    CHLORIDE 106 02/12/2025 03:46 AM    CO2 22 02/12/2025 03:46 AM    GLUCOSE 123 (H) 02/12/2025 03:46 AM    BUN 19 02/12/2025 03:46 AM    CREATININE 0.90 02/12/2025 03:46 AM    BUNCREATRAT 12 07/18/2017 08:08 AM      Lab Results   Component Value Date/Time    CHOLSTRLTOT 146 02/11/2025 02:10 AM    LDL 65 02/11/2025 02:10 AM    HDL 47 02/11/2025 02:10 AM    TRIGLYCERIDE 172 (H) 02/11/2025 02:10 AM       Lab Results   Component Value Date/Time    ALKPHOSPHAT 86 02/10/2025 05:45 PM    ASTSGOT 65 (H) 02/10/2025 05:45 PM    ALTSGPT 57 (H) 02/10/2025 05:45 PM    TBILIRUBIN 0.9 02/10/2025 05:45 PM        Imaging/Testing:    I interpreted and/or reviewed the patient's neuroimaging    DX-CHEST-PORTABLE (1 VIEW)   Final Result         1.  Left basilar atelectasis, no focal infiltrate   2.  Nasogastric tube terminates just distal to the gastroesophageal junction with side-port in the distal esophagus, position is unchanged since prior study, recommend advancement      MR-MRA HEAD-W/O   Final Result      1.  There is no evidence of aneurysm, stenosis or vascular malformation in the visualized proximal vasculature or adjacent to the hematoma.   2.  Right posterior temporal parenchymal hemorrhage.   3.  Mild right cerebral diffuse subarachnoid hemorrhage.   4.  Mild right cerebral subdural hemorrhage.      MR-BRAIN-WITH & W/O   Final Result      Right posterior temporal hemorrhage. There are no abnormal flow voids or large nodular enhancement. There is tiny punctate enhancement within the hemorrhage likely representing vascular enhancement.Pre and postcontrast MR  examination is recommended after    6 weeks. Due to the atypical location of the hemorrhage, catheter angiogram may be needed to rule out the possibility of any tiny vascular malformation.      EC-ECHOCARDIOGRAM COMPLETE W/O CONT   Final Result      DX-CHEST-PORTABLE (1 VIEW)   Final Result         1.  No acute cardiopulmonary disease.   2.  Nasogastric tube terminates just distal to the gastroesophageal junction with side-port in the distal esophagus, recommend advancement      CT-CTA NECK WITH & W/O-POST PROCESSING   Final Result         1.  CT angiogram of the neck within normal limits.         CT-CTA HEAD WITH & W/O-POST PROCESS   Final Result         1.  No large vessel occlusion or aneurysm identified.   2.  Right posterior temporal parenchymal hemorrhage, stable since prior study.   3.  Scattered subarachnoid hemorrhages, overall stable since prior study.      DX-CHEST-PORTABLE (1 VIEW)   Final Result         1.  No acute cardiopulmonary disease.   2.  Nasogastric tube with tip just distal to the gastroesophageal junction, recommend advancement      DX-ABDOMEN FOR TUBE PLACEMENT   Final Result         1.  Nonspecific bowel gas pattern in the upper abdomen.   2.  Nasogastric tube with tip just distal to the gastroesophageal junction, recommend advancement      CT-HEAD W/O   Final Result         1.  Right posterior temporal parenchymal hemorrhages stranding vasogenic edema   2.  Bilateral subarachnoid hemorrhages as described.   3.  Right posterior parafalcine subdural hematoma measuring 2.4 mm.   4.  Subdural versus subarachnoid hemorrhage along the right tentorium measuring 3.1 mm.      These findings were discussed with the patient's clinician, George Wise, on 2/10/2025 8:42 PM.         DX-CHEST-PORTABLE (1 VIEW)   Final Result      Hypoinflation without a definite acute abnormality.      IR-CAROTID-CEREBRAL BILATERAL    (Results Pending)       Assessment and Plan:  Keyur Burt is a 74 year old  woman presenting with confusion, agitation, found to have R temporal lobar-based intracranial hemorrhage, and bilateral SAH.  No clinical findings to suggest traumatic head injury.  Radiographic appearance- both SAH and lobar-based location of ICH is atypical for hypertensive disease.  CTA without evidence of underlying vascular lesion. MRI ICH protocol also without clear etiology.  Will complete diagnostic evaluation with catheter angiography.  TTE without valvular abnormality, and blood cultures from 2/11 negative. Infectious prodrome likely attributed to RSV/flu, and not infective endocarditis.  Unclear if seizure event last night- may continue EEG monitoring for additional 24 hours while weaning sedation today and working towards extubation.  Fortunately hemorrhage at this time appears small without significant mass effect or hydrocephalus to warrant surgical decompression or EVD.      Problem list:   R temporal ICH   SAH   Hypertensive     Plan:              - q2h neurochecks, wean sedation, extubate if possible              - SBP goal 120-140, cardene PRN, transition to enteral medications when able              - continue home atorvastatin 20mg daily for statin's neuroprotective effects in ICH              - no antithrombotics, SCDs only              - normal Na goal 135-145              - maintain normothermia, net even I/O, FSBS    - Neuro-IR consult for catheter angiography, plan for 8AM on 2/13              - PT/OT/SLP as tolerated   - continue cEEG, continue vimpat 100mg IV q12h    The evaluation of the patient, and recommended management, was discussed with Dr. Webster, ICU attending. I have performed a physical exam and reviewed and updated ROS and Plan today (2/12/2025).     Raciel Kraft MD  Vascular Neurology

## 2025-02-12 NOTE — CONSULTS
Radiology Consult  Author: ELOY Desai Date & Time created: 2/12/2025  9:15 AM   Date of admission  2/10/2025  Note to reader: this note follows the APSO format rather than the historical SOAP format. Assessment and plan located at the top of the note for ease of use.    Chief Complaint  74 y.o. female admitted 2/10/2025 with   Chief Complaint   Patient presents with    ALOC     Referring provider: Raciel Kraft MD    HPI  74-year-old female with past medical history significant for HTN, HLD, CKD3B, breast cancer, DMT2, and recent diagnosis of RSV infection approximately 1 week ago admitted 02/10/2025 for CT finding of SAH and right temporal intracranial hemorrhage after being found down, confused, and combative.  Patient was last seen well 02/09/25.  She was intubated and sedated in the ED and admitted to ICU.  Further workup revealed no large vessel occlusion or aneurysm. MICHAEL was consulted for diagnostic cerebral aneurysm to rule out any small vascular malformations. Images reviewed with Dr Carrillo.     Pt seen and examined in the ICU. She is intubated and sedated. Does not open eyes to voice or follow commands. Procedure discussed in detail with pt's son, Vernon, over the telephone. Risks reviewed including but not limited to bleeding, infection, endothelial damage, stroke, and death.  Patient's son would like to proceed.     Assessment/Plan     Principal Problem:    Acute intracranial hemorrhage (HCC)  Active Problems:    Mixed hyperlipidemia    Hypothyroidism from resorcinol    Recurrent breast cancer (HCC)    Type 2 diabetes mellitus without complication, without long-term current use of insulin (HCC)    SAH (subarachnoid hemorrhage) (HCC)    Hypertensive urgency    Urinary tract infection without hematuria    Acute encephalopathy    Acute respiratory failure with hypoxia (HCC)    Lactic acidosis    Hypokalemia      Plan IR  - Plan for diagnostic cerebral angiogram tomorrow 02/13/25 at 0800  with Dr Carrillo  - Hold Tube Feeds at midnight  - INR 1.06 on 02/10/2025  - Recommend repeat MRI with and without contrast in 6 weeks.   - Plan discussed with neurology Dr. Kraft and intensivist   - Plan discussed with son, Vernon, via telephone.     Thank you for allowing Interventional Radiology team to participate in the patients care, if any additonal care or requests are needed in the future please do not hesitate call or place IR order           Review of Systems  Physical Exam   Review of Systems   Reason unable to perform ROS: Acuity of condition.      Vitals:    02/12/25 0617   BP:    Pulse: 65   Resp: 20   Temp:    SpO2: 96%      Physical Exam  Constitutional:       Appearance: She is ill-appearing.      Interventions: She is sedated and intubated.   Cardiovascular:      Rate and Rhythm: Normal rate.   Pulmonary:      Effort: She is intubated.   Abdominal:      Palpations: Abdomen is soft.   Skin:     General: Skin is warm and dry.   Neurological:      Comments: Intubated, sedated, not opening eyes to voice  PERRL  Face grossly symmetrical around ventilator  Moves all 4 extremities to noxious stimuli   Psychiatric:         Mood and Affect: Mood normal.         Behavior: Behavior normal.             Labs    Recent Labs     02/10/25  1909 02/11/25  0210   WBC 11.8* 12.1*   RBC 4.36 4.85   HEMOGLOBIN 13.8 15.6   HEMATOCRIT 40.9 45.4   MCV 93.8 93.6   MCH 31.7 32.2   MCHC 33.7 34.4   RDW 44.6 45.5   PLATELETCT 214 218   MPV 8.9* 9.0     Recent Labs     02/10/25  1745 02/11/25  0210 02/11/25  1320 02/11/25  2155 02/12/25  0346   SODIUM 137 139 135 138 140   POTASSIUM 3.2* 2.8*  --   --  3.7   CHLORIDE 99 102  --   --  106   CO2 18* 16*  --   --  22   GLUCOSE 180* 174*  --   --  123*   BUN 12 14  --   --  19   CREATININE 0.96 1.24  --   --  0.90   CALCIUM 9.4 8.6  --   --  8.3*     DX-CHEST-PORTABLE (1 VIEW)   Final Result         1.  Left basilar atelectasis, no focal infiltrate   2.  Nasogastric tube  terminates just distal to the gastroesophageal junction with side-port in the distal esophagus, position is unchanged since prior study, recommend advancement      MR-MRA HEAD-W/O   Final Result      1.  There is no evidence of aneurysm, stenosis or vascular malformation in the visualized proximal vasculature or adjacent to the hematoma.   2.  Right posterior temporal parenchymal hemorrhage.   3.  Mild right cerebral diffuse subarachnoid hemorrhage.   4.  Mild right cerebral subdural hemorrhage.      MR-BRAIN-WITH & W/O   Final Result      Right posterior temporal hemorrhage. There are no abnormal flow voids or large nodular enhancement. There is tiny punctate enhancement within the hemorrhage likely representing vascular enhancement.Pre and postcontrast MR examination is recommended after    6 weeks. Due to the atypical location of the hemorrhage, catheter angiogram may be needed to rule out the possibility of any tiny vascular malformation.      EC-ECHOCARDIOGRAM COMPLETE W/O CONT   Final Result      DX-CHEST-PORTABLE (1 VIEW)   Final Result         1.  No acute cardiopulmonary disease.   2.  Nasogastric tube terminates just distal to the gastroesophageal junction with side-port in the distal esophagus, recommend advancement      CT-CTA NECK WITH & W/O-POST PROCESSING   Final Result         1.  CT angiogram of the neck within normal limits.         CT-CTA HEAD WITH & W/O-POST PROCESS   Final Result         1.  No large vessel occlusion or aneurysm identified.   2.  Right posterior temporal parenchymal hemorrhage, stable since prior study.   3.  Scattered subarachnoid hemorrhages, overall stable since prior study.      DX-CHEST-PORTABLE (1 VIEW)   Final Result         1.  No acute cardiopulmonary disease.   2.  Nasogastric tube with tip just distal to the gastroesophageal junction, recommend advancement      DX-ABDOMEN FOR TUBE PLACEMENT   Final Result         1.  Nonspecific bowel gas pattern in the upper abdomen.  "  2.  Nasogastric tube with tip just distal to the gastroesophageal junction, recommend advancement      CT-HEAD W/O   Final Result         1.  Right posterior temporal parenchymal hemorrhages stranding vasogenic edema   2.  Bilateral subarachnoid hemorrhages as described.   3.  Right posterior parafalcine subdural hematoma measuring 2.4 mm.   4.  Subdural versus subarachnoid hemorrhage along the right tentorium measuring 3.1 mm.      These findings were discussed with the patient's clinician, George Wise, on 2/10/2025 8:42 PM.         DX-CHEST-PORTABLE (1 VIEW)   Final Result      Hypoinflation without a definite acute abnormality.      IR-CAROTID-CEREBRAL BILATERAL    (Results Pending)     Recent Labs     02/10/25  1745 02/11/25  0210 02/11/25  1320 02/11/25  2155 02/12/25  0346   SODIUM 137 139 135 138 140   POTASSIUM 3.2* 2.8*  --   --  3.7   CHLORIDE 99 102  --   --  106   CO2 18* 16*  --   --  22   GLUCOSE 180* 174*  --   --  123*   BUN 12 14  --   --  19     INR   Date Value Ref Range Status   02/10/2025 1.06 0.87 - 1.13 Final     Comment:     INR - Non-therapeutic Reference Range: 0.87-1.13  INR - Therapeutic Reference Range: 2.0-4.0       No results found for: \"POCINR\"     Intake/Output Summary (Last 24 hours) at 2/12/2025 0915  Last data filed at 2/12/2025 0600  Gross per 24 hour   Intake 4197.8 ml   Output 1195 ml   Net 3002.8 ml      Labs not explicitly included in this progress note were reviewed by the author. Radiology/imaging not explicitly included in this progress note was reviewed by the author.     Past Medical History:   Diagnosis Date    Acute encephalopathy 2/10/2025    Allergic rhinitis     Allergy to pollen     Aortic atherosclerosis (HCC) 5/27/2021    Ataxia 3/26/2019    Breast cancer (HCC)     Chronic bilateral low back pain without sciatica 7/20/2017    Chronic neck pain     Chronic post-traumatic headache 3/26/2019    Chronic use of opiate for therapeutic purpose 7/20/2017    CKD " (chronic kidney disease), stage III     Fatty liver     elevated lft's    Hematuria     History of concussion 3/26/2019    Hyperlipidemia LDL goal < 100     Hypertension     Hypothyroidism     Impaired fasting glucose     Obesity (BMI 30-39.9) 3/22/2017    Tension headache     Thyroid cancer (HCC)     treated with total thyroidectomy    Tinnitus of both ears 3/26/2019    Type 2 diabetes mellitus without complication, without long-term current use of insulin (HCC) 10/14/2021    Urinary tract infection without hematuria 2/10/2025        Home Medications    Medication Sig Taking? Last Dose Authorizing Provider   fluticasone (FLONASE) 50 MCG/ACT nasal spray USE 2 SPRAY(S) IN EACH NOSTRIL TWICE DAILY  Unknown Lenora Arenas M.D.   HYDROcodone-acetaminophen (NORCO) 7.5-325 MG tab Take 1 Tablet by mouth every 8 hours as needed for Severe Pain for up to 30 days.  Unknown Aidee Carpenter D.O.   HYDROcodone-acetaminophen (NORCO) 7.5-325 MG tab Take 1 Tablet by mouth every 8 hours as needed for Severe Pain for up to 30 days.  Unknown Aidee Carpenter D.O.   cyclobenzaprine (FLEXERIL) 5 mg tablet Take 1-2 Tablets by mouth 3 times a day as needed for Muscle Spasms or Moderate Pain.  Unknown Aidee Carpenter D.O.   lisinopril (PRINIVIL) 40 MG tablet Take 1 Tablet by mouth every day.  Unknown Aidee Carpenter D.O.   ondansetron (ZOFRAN ODT) 4 MG TABLET DISPERSIBLE Take 1 Tablet by mouth every 6 hours as needed for Nausea/Vomiting.  Unknown Aidee Carpenter D.O.   levothyroxine (SYNTHROID) 137 MCG Tab Take 1 Tablet by mouth every 48 hours. Alternate every other day with 125 mcg  Unknown Aidee Carpenter D.O.   levothyroxine (SYNTHROID) 125 MCG Tab Take 1 Tablet by mouth every 48 hours. Alternate every other day with 137 mcg  Unknown Aidee Carpenter D.O.   Dulaglutide (TRULICITY) 0.75 MG/0.5ML Solution Pen-injector Inject 1 Pen under the skin every 7 days.  Unknown Aidee Carpenter D.O.    atorvastatin (LIPITOR) 20 MG Tab Take 1 Tablet by mouth see administration instructions. Take 20 mg on Mon, Wed, Fri and 10 mg on Tues,Thurs, Sat, Sun  Unknown Aidee Carpenter D.O.   metoprolol SR (TOPROL XL) 25 MG TABLET SR 24 HR Take 1 Tablet by mouth every day.  Unknown Aidee Carpenter D.O.   anastrozole (ARIMIDEX) 1 MG Tab Take 1 Tablet by mouth every day.  Unknown Aidee Carpenter D.O.       I have performed a physical exam and reviewed and updated ROS and Plan today (2/12/2025).     62 minutes in directly providing and coordinating care and extensive data review.  No time overlap and excludes procedures.

## 2025-02-12 NOTE — PROCEDURES
VIDEO ELECTROENCEPHALOGRAM REPORT - Inpatient continuous EEG monitoring    REFERRING PROVIDER: Gibson Webster D.o.  DOS: 2/11/2024  ROOM: R103/00   TOTAL RECORDING TIME: 8 hours and 12 minutes of total recording time    INDICATION:  Keyur Burt 74 y.o. female presenting with nontraumatic intracranial hemorrhage.     RELEVANT MEDICATIONS/TREATMENTS:   Scheduled Medications   Medication Dose Frequency    oseltamivir  75 mg BID    atorvastatin  20 mg Once per day on Monday Wednesday Friday    And    [START ON 2/13/2025] atorvastatin  10 mg Once per day on Sunday Tuesday Thursday Saturday    [START ON 2/13/2025] levothyroxine  125 mcg Q48HRS    And    levothyroxine  137 mcg Q48HRS    Pharmacy  1 Each PHARMACY TO DOSE    cefTRIAXone (ROCEPHIN) IV  2,000 mg Q24HRS    MD Alert...Adult ICU Electrolyte Replacement per Pharmacy   PHARMACY TO DOSE    insulin lispro  2-9 Units Q6HRS    senna-docusate  2 Tablet BID    famotidine  20 mg Q12HRS    [Held by provider] anastrozole  1 mg DAILY    lacosamide  100 mg BID         TECHNIQUE:   CVEEG was set up by a Neurodiagnostic technologist who performed education to the patient and staff. A minimum of 23 electrodes and 23 channel recording was setup and performed by Neurodiagnostic technologist, in accordance with the international 10-20 system. Impedence, electrode integrity, and technical impressions were documented a minimum of every 2-24 hour period by a Neurodiagnostic Technologist and reviewed by Interpreting Physician. The study was reviewed in bipolar and referential montages. The recording examined the patient in the comatose/sedated state(s).     DESCRIPTION OF THE RECORD:  Brief periods of wakefulness are present and characterized by polymorphic theta slowing, at times with brief non-rhythmic delta activity (example 12:50 AM). Otherwise, there is mild generalized slowing of the background in predominantly sedated/drowsy/sleep states.     Stage II sleep transients were  present in the form of rudimentary sleep spindles and poorly formed vertex waves over the central region.    ICTAL AND INTERICTAL FINDINGS:   1) Occasional brief runs of sharply contoured right fronto-central slowing.  2) No definite epileptiform discharges.   3) No seizures.     ACTIVATION PROCEDURES:   Intermittent Photic stimulation was performed in a stepwise fashion from 1 to 30 Hz and did not elicited any abnormalities on EEG.     EKG: Sampling of the EKG recording showed sinus rhythm    EVENTS:      11:24:09 PM  Patient Event    12:50:16 AM  Patient Event    04:14:58 AM  Patient Event    Clinical symptoms during these times are not clear, patient however appears to be awake. EEG shows reactive background with polymorphic theta > delta slowing. No epileptiform correlates.     INTERPRETATION:  Abnormal - day #1 (initial 8 hours and 12 minutes) video EEG recording in the drowsy/sleep and sedated state(s):  Occasional brief runs of sharply contoured right fronto-central slowing. This finding is consistent with known structural abnormality and may indicate a predisposition for seizures, though alternatively may signify cortical disruption due to injury. Clinical and radiologic correlation recommended.   Mild continuous generalized slowing of the background throughout, a finding consistent with nonspecific diffuse cerebral dysfunction, with sedative/medication effects contributing.   No seizures.   Three push button events for unclear clinical symptoms. EEG at these times shows polymorphic theta > delta slowing in the wakeful/drowsy state. No epileptiform correlates.         Fabiola Lindsay M.D.   Diplomate, Neurology with Special Qualification in Epilepsy, American Board of Psychiatry and Neurology   of Clinical Neurology, [facility]  Level III Epilepsy Center, Department of Neurology at [facility]

## 2025-02-12 NOTE — WOUND TEAM
[facility]  Inpatient Services  Initial Wound and Skin Care Evaluation    Admission Date: 2/10/2025     Last order of IP CONSULT TO WOUND CARE was found on 2/11/2025 from Hospital Encounter on 2/10/2025     HPI, PMH, SH: Reviewed    Past Surgical History:   Procedure Laterality Date    MASTECTOMY Bilateral 8/15/2017    Procedure: MASTECTOMY-TOTAL ;  Surgeon: Debora Kam M.D.;  Location: SURGERY SAME DAY [facility] ORS;  Service:     AXILLARY NODE DISSECTION  8/15/2017    Procedure: AXILLARY LYMPH NODE DISSECTION-  ;  Surgeon: Debora Kam M.D.;  Location: SURGERY SAME DAY [facility] ORS;  Service:     LUMPECTOMY Left 2015    ROTATOR CUFF REPAIR  2002    ABDOMINAL HYSTERECTOMY TOTAL      uterus only DUB AFTER CHILDBIRTH    CHOLECYSTECTOMY      GASTRIC BANDING LAPAROSCOPIC      done 2x    HIATAL HERNIA REPAIR      ROTATOR CUFF REPAIR      right shoulder x 2    TONSILLECTOMY AND ADENOIDECTOMY       Social History     Tobacco Use    Smoking status: Never    Smokeless tobacco: Never   Substance Use Topics    Alcohol use: No     Alcohol/week: 0.0 oz     Chief Complaint   Patient presents with    ALOC     Diagnosis: Acute intracranial hemorrhage (HCC) [I62.9]    Unit where seen by Wound Team: R103/00     WOUND CONSULT RELATED TO:  Right Medial Ankle    WOUND TEAM PLAN OF CARE - Frequency of Follow-up:   Nursing to follow dressing orders written for wound care. Contact wound team if area fails to progress, deteriorates or with any questions/concerns if something comes up before next scheduled follow up (See below as to whether wound is following and frequency of wound follow up)   Not following, consult as needed  - Right Medial Ankle    WOUND HISTORY:          WOUND ASSESSMENT/LDA  Wound 02/11/25 Pressure Injury Ankle;Heel;Leg Medial;Lateral Bilateral POA sDTI (Active)   Date First Assessed/Time First Assessed: 02/11/25 0147   Present on Original Admission: Yes  Primary Wound Type: Pressure Injury   Location: Ankle;Heel;Leg  Wound Orientation: Medial;Lateral  Laterality: Bilateral  Wound Description (Comments): POA sDTI      Assessments 2/11/2025  5:00 PM   Wound Image        Site Assessment Purple;Red   Periwound Assessment Red   Margins Attached edges;Defined edges   Closure Open to air   Drainage Amount None   Treatments Offloading   Wound Cleansing Not Applicable   Dressing Status Open to Air   NEXT Weekly Photo (Inpatient Only) 02/18/25   Wound Team Following Not following   Pressure Injury Stage Deep Tissue   Shape Irregular   Wound Odor None        Vascular:    AMY:   No results found.    Lab Values:    Lab Results   Component Value Date/Time    WBC 12.1 (H) 02/11/2025 02:10 AM    RBC 4.85 02/11/2025 02:10 AM    HEMOGLOBIN 15.6 02/11/2025 02:10 AM    HEMATOCRIT 45.4 02/11/2025 02:10 AM    HBA1C 5.6 08/30/2024 09:00 AM         Culture Results show:  No results found for this or any previous visit (from the past 720 hours).    Pain Level/Medicated:  None, Tolerated without pain medication       INTERVENTIONS BY WOUND TEAM:  Chart and images reviewed. Discussed with bedside RN. All areas of concern (based on picture review, LDA review and discussion with bedside RN) have been thoroughly assessed. Documentation of areas based on significant findings. This RN in to assess patient. Performed standard wound care which includes appropriate positioning, dressing removal and non-selective debridement. Pictures and measurements obtained weekly if/when required.    Wound:  Right Ankle, BLE  SHIELA    Advanced Wound Care Discharge Planning  Number of Clinicians necessary to complete wound care: 1-2 for turning  Is patient requiring IV pain medications for dressing changes:  No   Length of time for dressing change 15 min. (This does not include chart review, pre-medication time, set up, clean up or time spent charting.)    Interdisciplinary consultation: Patient, Bedside RN, N/A.  Pressure injury and staging reviewed  with N/A.    EVALUATION / RATIONALE FOR TREATMENT:     Date:  02/11/25  Wound Status:  Initial evaluation    Patient with POA bruising and several areas that are red, purple, and non-blanching on BLE, R Medial and lateral ankle, that are sDTI's. Applied heel offloading dressings. Continue with offloading measures.  Sacrum is intact and blanching. Sacral offloading dressing applied. Continued with offloading measures.          Goals: Steady decrease in wound area and depth weekly.    NURSING PLAN OF CARE ORDERS:  RN Prevention Protocol    NUTRITION RECOMMENDATIONS   Wound Team Recommendations:  N/A    DIET ORDERS (From admission to next 24h)       Start     Ordered    02/11/25 1255  Diet: Diet Tube Feed; Formula: Vital HP; Vital HP: Vital HP RTH; Goal Rate (mL/Hour): 50; Duration: 24 HR  ALL MEALS        Comments: Start at 25mL/hr and advance per protocol to goal rate   Question Answer Comment   Diet Diet Tube Feed    Formula: Vital HP    Vital HP: Vital HP RTH    Goal Rate (mL/Hour) 50    Route OG    Duration 24 HR        02/11/25 1255    02/10/25 2146  Diet NPO Restrict to: Strict  ALL MEALS        Question:  Diet NPO Restrict to:  Answer:  Strict    02/10/25 2147                    PREVENTATIVE INTERVENTIONS:    Q shift Artem - performed per nursing policy  Q shift pressure point assessments - performed per nursing policy    Surface/Positioning  ICU Low Airloss - Currently in Place  Reposition q 2 hours - Currently in Place  TAPs Turning system - Currently in Place    Offloading/Redistribution  Sacral offloading dressing (Silicone dressing) - Currently in Place  Heel offloading dressing (Silicone dressing) - Currently in Place      Respiratory  Anchorfast - Currently in Place    Containment/Moisture Prevention    Dri-elizabeth pad - Currently in Place  Ryan Catheter - Currently in Place    Anticipated discharge plans:  N/A        Vac Discharge Needs:  Vac Discharge plan is purely a recommendation from wound team and  not a requirement for discharge unless otherwise stated by physician.  Not Applicable Pt not on a wound vac

## 2025-02-12 NOTE — PROGRESS NOTES
[facility] ICU Progress Note      Admit Date: 2/10/2025    Resident(s): Radha Osei M.D.   Attending:  Dr. Webster    Patient ID:    Name:  Keyur Burt   YOB: 1950  Age:  74 y.o.  female   MRN:  [id number]    HPI and Hospital Course (carried forward and updated):  Keyur Burt is a 74 y.o. female with medical history significant for obesity with a BMI of 38, hypothyroidism, hypertension, hyperlipidemia, stage IIIb chronic kidney disease, breast cancer, and type 2 diabetes (A1c 5.6%), who was last seen normal yesterday when she was complaining of cough and congestion.  The patient did not come to a meeting with her friends and the patient's son checked on her on 2/10/2025 and found that she was laying on the floor and confused.  EMS was activated and upon arrival found the patient again very combative and agitated. SBPs in 180s. She received several doses of Versed that help calm her down; however, she was never able to follow commands.  A CT of the head did reveal in intracranial hemorrhage (SAH and a R temporal lobe ICH). Atraumatic. Unfortunately, the patient continued to remain combative and was unable to stay still for further testing.  She was intubated in the emergency department for behavioral reasons.  She will be admitted to the ICU for ongoing care.    (Dr. Wise's HPI)    2/11: Flu positive, started on Tamiflu, no evidence of PNA. MRI-MRA done, no major vascular abnormalities/aneurism. Overnight seizure activity, increased propofol - cEGG w/background slowing.     Consultants:  Neurology      Interval Events:    NEURO:   Overnight 1 seizure activity, ativan x1- increased propofol - cEGG w/background slowing. RASS -4 withdraws to pain in all 4 but not following commands  CARDIOVASC:  HR 70s -140s (at goal) - nimodipine  RESPIRATORY:  VD #3 - CMV 22 380 +8 30%8.0, CXR no infiltrates   GI/NUTRITION:  OG - TF @ goal    RENAL/FLUID/LYTES: Scr 1.24 -> 0.9, HAGMA/lactic acid   resolved  HEME/ONC:   Hb 15.6 Plt 218K  INFECTIOUS D:  WBC 12K - Flu positive, started on Tamiflu  ENDOCRINE:    BG 123s Na 138-140    Family: Son Vernon updated on plan of care. All questions answered.     Vitals Range last 24h:  Pulse:  [63-93] 78  Resp:  [16-34] 18  BP: (103-168)/(55-91) 136/60  SpO2:  [95 %-99 %] 97 %      Intake/Output Summary (Last 24 hours) at 2/12/2025 1443  Last data filed at 2/12/2025 1400  Gross per 24 hour   Intake 4045.32 ml   Output 1630 ml   Net 2415.32 ml      Review of Systems   Unable to perform ROS: Critical illness     PHYSICAL EXAM:  Vitals:    02/12/25 1345 02/12/25 1400 02/12/25 1404 02/12/25 1415   BP: (!) 158/65 (!) 153/62 (!) 153/62 136/60   Pulse: 89 80 83 78   Resp: (!) 21 (!) 28 18 18   Temp:       TempSrc:       SpO2:  97% 99% 97%   Weight:       Height:        Body mass index is 38.01 kg/m².    O2 therapy: Pulse Oximetry: 97 %, O2 Delivery Device: Ventilator    Date 02/12/25 0700 - 02/13/25 0659   Shift 0785-3016 1508-8089 7952-9502 24 Hour Total   INTAKE   I.V. 490   490     Fentanyl Volume 8   8     Propofol Volume 162   162     Volume (mL) (NS infusion) 320   320   NG/   300     Intake (mL) (Enteral Tube 02/10/25 Orogastric 16 Fr. Oral) 300   300   IV Piggyback 50   50     Volume (mL) (calcium GLUConate 1 g in NaCl IVPB premix) 50   50   Shift Total 840   840   OUTPUT   Urine 650   650     Output (mL) (Urethral Catheter) 650   650   Stool         Number of Times Stooled 1 x   1 x   Shift Total 650   650      190     Physical Exam  Vitals and nursing note reviewed.   HENT:      Head: Atraumatic.      Mouth/Throat:      Comments: ETT no secretions  Eyes:      Conjunctiva/sclera: Conjunctivae normal.      Pupils: Pupils are equal, round, and reactive to light.   Cardiovascular:      Rate and Rhythm: Normal rate and regular rhythm.      Pulses: Normal pulses.      Heart sounds: Normal heart sounds.   Pulmonary:      Effort: Pulmonary effort is normal.       Breath sounds: Normal breath sounds.      Comments: Coarse in bases  Abdominal:      General: Abdomen is flat. There is no distension.      Palpations: Abdomen is soft.   Musculoskeletal:         General: No swelling. Normal range of motion.      Cervical back: No rigidity.   Skin:     General: Skin is warm.      Capillary Refill: Capillary refill takes less than 2 seconds.   Neurological:      Comments: Withdraws to pain in 4, no spontaneous movements/ not following commands, facial symmetry         Recent Labs     02/10/25  2248 02/11/25  0311 02/12/25  0254   ISTATAPH 7.223* 7.429 7.442   ISTATAPCO2 50.2* 28.6* 32.4   ISTATAPO2 109* 100 79*   ISTATATCO2 22* 20* 23*   PRYPNJV5HPH 97 98 96   ISTATARTHCO3 20.7* 19.0* 22.1   ISTATARTBE -7* -4 -1   ISTATTEMP 98.0 F 101.5 F 98.6 F   ISTATFIO2 40 30 30   ISTATSPEC Arterial Arterial Arterial   ISTATAPHTC 7.228* 7.405 7.442   HCSDNOQA0GF 107 110* 79*     Recent Labs     02/10/25  1745 02/11/25 0210 02/11/25  1320 02/11/25  2155 02/12/25  0346   SODIUM 137 139 135 138 140   POTASSIUM 3.2* 2.8*  --   --  3.7   CHLORIDE 99 102  --   --  106   CO2 18* 16*  --   --  22   BUN 12 14  --   --  19   CREATININE 0.96 1.24  --   --  0.90   MAGNESIUM  --  1.5  --   --  2.2   PHOSPHORUS  --  2.4*  --   --  2.6   CALCIUM 9.4 8.6  --   --  8.3*     Recent Labs     02/10/25  1745 02/11/25  0210 02/12/25  0346   ALTSGPT 57*  --   --    ASTSGOT 65*  --   --    ALKPHOSPHAT 86  --   --    TBILIRUBIN 0.9  --   --    GLUCOSE 180* 174* 123*     Recent Labs     02/10/25  1909 02/10/25  2038 02/11/25  0210   RBC 4.36  --  4.85   HEMOGLOBIN 13.8  --  15.6   HEMATOCRIT 40.9  --  45.4   PLATELETCT 214  --  218   PROTHROMBTM  --  13.8  --    APTT  --  29.8  --    INR  --  1.06  --      Recent Labs     02/10/25  1745 02/10/25  1909 02/11/25  0210   WBC  --  11.8* 12.1*   NEUTSPOLYS  --  87.80* 87.70*   LYMPHOCYTES  --  8.10* 5.40*   MONOCYTES  --  3.30 5.90   EOSINOPHILS  --  0.00 0.10   BASOPHILS  --   0.10 0.10   ASTSGOT 65*  --   --    ALTSGPT 57*  --   --    ALKPHOSPHAT 86  --   --    TBILIRUBIN 0.9  --   --        Meds:   oseltamivir  75 mg      atorvastatin  20 mg      And    [START ON 2/13/2025] atorvastatin  10 mg      [START ON 2/13/2025] levothyroxine  125 mcg      And    levothyroxine  137 mcg      Pharmacy  1 Each      cefTRIAXone (ROCEPHIN) IV  2,000 mg      Respiratory Therapy Consult        MD Alert...Adult ICU Electrolyte Replacement per Pharmacy        lidocaine  2 mL      propofol  0-80 mcg/kg/min (Ideal) 80 mcg/kg/min (02/12/25 1228)    fentaNYL  50 mcg      Or    fentaNYL  100 mcg      fentaNYL   50 mcg/hr (02/12/25 1243)    insulin lispro  2-9 Units      And    dextrose bolus  25 g      NS   80 mL (02/12/25 1235)    LORazepam  2 mg      niCARdipine (Cardene) Standard Infusion 0.1 mg/mL  0-15 mg/hr 7.5 mg/hr (02/12/25 1352)    labetalol  10-20 mg      hydrALAZINE  20 mg      senna-docusate  2 Tablet      And    polyethylene glycol/lytes  1 Packet      And    magnesium hydroxide  30 mL      And    bisacodyl  10 mg      famotidine  20 mg      acetaminophen  650 mg      Or    acetaminophen  650 mg      ondansetron  4 mg      Or    ondansetron  4 mg      [Held by provider] anastrozole  1 mg      lacosamide  100 mg          Procedures:  Intubation 2/10    Imaging:  DX-CHEST-PORTABLE (1 VIEW)   Final Result         1.  Left basilar atelectasis, no focal infiltrate   2.  Nasogastric tube terminates just distal to the gastroesophageal junction with side-port in the distal esophagus, position is unchanged since prior study, recommend advancement      MR-MRA HEAD-W/O   Final Result      1.  There is no evidence of aneurysm, stenosis or vascular malformation in the visualized proximal vasculature or adjacent to the hematoma.   2.  Right posterior temporal parenchymal hemorrhage.   3.  Mild right cerebral diffuse subarachnoid hemorrhage.   4.  Mild right cerebral subdural hemorrhage.      MR-BRAIN-WITH &  W/O   Final Result      Right posterior temporal hemorrhage. There are no abnormal flow voids or large nodular enhancement. There is tiny punctate enhancement within the hemorrhage likely representing vascular enhancement.Pre and postcontrast MR examination is recommended after    6 weeks. Due to the atypical location of the hemorrhage, catheter angiogram may be needed to rule out the possibility of any tiny vascular malformation.      EC-ECHOCARDIOGRAM COMPLETE W/O CONT   Final Result      DX-CHEST-PORTABLE (1 VIEW)   Final Result         1.  No acute cardiopulmonary disease.   2.  Nasogastric tube terminates just distal to the gastroesophageal junction with side-port in the distal esophagus, recommend advancement      CT-CTA NECK WITH & W/O-POST PROCESSING   Final Result         1.  CT angiogram of the neck within normal limits.         CT-CTA HEAD WITH & W/O-POST PROCESS   Final Result         1.  No large vessel occlusion or aneurysm identified.   2.  Right posterior temporal parenchymal hemorrhage, stable since prior study.   3.  Scattered subarachnoid hemorrhages, overall stable since prior study.      DX-CHEST-PORTABLE (1 VIEW)   Final Result         1.  No acute cardiopulmonary disease.   2.  Nasogastric tube with tip just distal to the gastroesophageal junction, recommend advancement      DX-ABDOMEN FOR TUBE PLACEMENT   Final Result         1.  Nonspecific bowel gas pattern in the upper abdomen.   2.  Nasogastric tube with tip just distal to the gastroesophageal junction, recommend advancement      CT-HEAD W/O   Final Result         1.  Right posterior temporal parenchymal hemorrhages stranding vasogenic edema   2.  Bilateral subarachnoid hemorrhages as described.   3.  Right posterior parafalcine subdural hematoma measuring 2.4 mm.   4.  Subdural versus subarachnoid hemorrhage along the right tentorium measuring 3.1 mm.      These findings were discussed with the patient's clinician, George Wise, on  2/10/2025 8:42 PM.         DX-CHEST-PORTABLE (1 VIEW)   Final Result      Hypoinflation without a definite acute abnormality.      IR-CAROTID-CEREBRAL BILATERAL    (Results Pending)       ASSESSEMENT and PLAN:    * Acute intracranial hemorrhage (HCC)- (present on admission)  Assessment & Plan  Right posterior temporal parenchymal hemorrhage noted + SAH as above  CTA H&N without obvious aneurism  Hemorrhage at this time appears small without significant mass effect or hydrocephalus to warrant surgical decompression or EVD   Brain MRI/MRA without vascular abnormalities, stable hematoma   - See plan for SAH   - Wean sedation for full neuro evaluation     Acute encephalopathy- (present on admission)  Assessment & Plan  Admitted with agitation  ICH/SAH and UTI  - Check UDS and TSH (TSH abnormal in the past)   - Intubated for airway protection  - Cont to correct all underlying metabolic disturbances  - SAT as appropriate (after MRI) for full neuro exam    SAH (subarachnoid hemorrhage) (HCC)- (present on admission)  Assessment & Plan  PBD #3 ?aneurysmal (found down, unclear if traumatic)  CTA H&N without obvious aneurism, MRI/MRA without vascular abnormalities   Per neuro, given location of SAH and ICH, w/u of vascular disease  Given epileptogenic location, started on AED    1 seizure overnight, cEEG without epileptiform activity  -Planned for angiogram 2/13  -SBP goals >120 and <140  -Maintain euglycemia, normothermia, eunatremia  -Nicardipine infusion, actively titrating  -Vimpat 100mg IV q12h  -Na every 12 hours  -Neurology following  -PT/OT/SLP    Influenza A  Assessment & Plan  Flu A +   No evidence of pneumonia  - Oseltamivir day 2     Seizure (HCC)  Assessment & Plan  Clinical seizure x1 last night  cEEG without epileptiform activity  - Continue vimpat  - Attempt to wean sedation  - See SAH/ICH    Hypokalemia  Assessment & Plan  - Replacing, goal >4  - IV Mg as needed as well     Lactic acidosis  Assessment &  Plan  Suspect poor PO intake - hypovolemia and sepsis   - Blood cultures drawn   - IVF PRN - hemodynamics monitoring  - Monitor BMP and Lactic   - Improving    Acute respiratory failure with hypoxia (HCC)- (present on admission)  Assessment & Plan  Intubated for airway protection and hypoxia  CXR no consolidations/efffusions  -Cont full vent support, weaning   -RT/O2 protocols  -A-F bundle   -SAT/SBTs when clinically appropriate    Urinary tract infection without hematuria- (present on admission)  Assessment & Plan  UA with inflammatory changes, along with lactic acidosis and possible sepsis   -Follow cultures  -Empiric ceftriaxone 2/3    Hypertensive urgency- (present on admission)  Assessment & Plan  SBP goals > 120 and < 140  Improved  Labetalol/hydralazine as needed    Type 2 diabetes mellitus without complication, without long-term current use of insulin (HCC)- (present on admission)  Assessment & Plan  A1c 5.6%  -BG goals 120-180s  -Medium ISS      Recurrent breast cancer (HCC)- (present on admission)  Assessment & Plan  Holding home anastrazole    Hypothyroidism from resorcinol- (present on admission)  Assessment & Plan  Cont home synthroid regimen    Mixed hyperlipidemia- (present on admission)  Assessment & Plan  Resume home statin of atorvastatin 20mg PO daily      DISPO: ICU level of care    CODE STATUS: FULL     Quality Measures:  Feeding: Advancin tube feeds  Analgesia: PRN fent   Sedation: Propofol  Thromboprophylaxis: None due to ICH  Head of bed: >30 degrees  Ulcer prophylaxis: Famotidine  Glycemic control: Correctional  Bowel care: bowel regimen  Indwelling lines: 4 PIV, barber, OGT  Deescalation of antibiotics: Ceftriaxone     Radha FLOREZ PGY-3  Internal Medicine [facility]

## 2025-02-12 NOTE — CARE PLAN
The patient is Unstable - High likelihood or risk of patient condition declining or worsening    Shift Goals  Clinical Goals: q1 neuro, MRI, pulm hygiene  Patient Goals: unable to assess  Family Goals: patient improvement in healh status    Progress made toward(s) clinical / shift goals:      Patient is not progressing towards the following goals:      Problem: Safety - Violent/Self-destructive Restraint  Goal: Remains free of injury from restraints (Restraint for Violent/Self-Destructive Behavior)  Outcome: Not Progressing  Goal: Free from restraints (Restraint for Violent or Self-Destructive Behavior)  Outcome: Not Progressing     Problem: Knowledge Deficit - Standard  Goal: Patient and family/care givers will demonstrate understanding of plan of care, disease process/condition, diagnostic tests and medications  Outcome: Not Progressing     Problem: Pain - Standard  Goal: Alleviation of pain or a reduction in pain to the patient’s comfort goal  Outcome: Not Progressing     Problem: Skin Integrity  Goal: Skin integrity is maintained or improved  Outcome: Not Progressing     Problem: Fall Risk  Goal: Patient will remain free from falls  Outcome: Not Progressing

## 2025-02-12 NOTE — CARE PLAN
Problem: Ventilation  Goal: Ability to achieve and maintain unassisted ventilation or tolerate decreased levels of ventilator support  Description: Target End Date:  4 days     Document on Vent flowsheet    1.  Support and monitor invasive and noninvasive mechanical ventilation  2.  Monitor ventilator weaning response  3.  Perform ventilator associated pneumonia prevention interventions  4.  Manage ventilation therapy by monitoring diagnostic test results  Outcome: Progressing     APVCMV 22, 380, +8, 30%

## 2025-02-12 NOTE — CARE PLAN
Problem: Ventilation  Goal: Ability to achieve and maintain unassisted ventilation or tolerate decreased levels of ventilator support  Description: Target End Date:  4 days     Document on Vent flowsheet    1.  Support and monitor invasive and noninvasive mechanical ventilation  2.  Monitor ventilator weaning response  3.  Perform ventilator associated pneumonia prevention interventions  4.  Manage ventilation therapy by monitoring diagnostic test results  Outcome: Progressing     APVCMV 20, 380, +8, 30%

## 2025-02-12 NOTE — DISCHARGE PLANNING
Karen has prominence for her healthcare insurance.  Unfortunately, [facility] is not a benefit.  Recommend a referral to [facility] for post acute services. [facility] will no longer follow.  Please reach out to myself if a PMR consult referral is needed for medical management or with any questions.

## 2025-02-13 ENCOUNTER — APPOINTMENT (OUTPATIENT)
Dept: RADIOLOGY | Facility: MEDICAL CENTER | Age: 75
DRG: 082 | End: 2025-02-13
Attending: PSYCHIATRY & NEUROLOGY
Payer: MEDICARE

## 2025-02-13 LAB
ANION GAP SERPL CALC-SCNC: 10 MMOL/L (ref 7–16)
BASE EXCESS BLDA CALC-SCNC: -1 MMOL/L (ref -4–3)
BASOPHILS # BLD AUTO: 0.2 % (ref 0–1.8)
BASOPHILS # BLD: 0.02 K/UL (ref 0–0.12)
BODY TEMPERATURE: ABNORMAL DEGREES
BREATHS SETTING VENT: 20
BUN SERPL-MCNC: 18 MG/DL (ref 8–22)
CALCIUM SERPL-MCNC: 7.8 MG/DL (ref 8.5–10.5)
CHLORIDE SERPL-SCNC: 108 MMOL/L (ref 96–112)
CO2 BLDA-SCNC: 24 MMOL/L (ref 32–48)
CO2 SERPL-SCNC: 22 MMOL/L (ref 20–33)
CREAT SERPL-MCNC: 0.71 MG/DL (ref 0.5–1.4)
DELSYS IDSYS: ABNORMAL
END TIDAL CARBON DIOXIDE IECO2: 31 MMHG
EOSINOPHIL # BLD AUTO: 0.03 K/UL (ref 0–0.51)
EOSINOPHIL NFR BLD: 0.3 % (ref 0–6.9)
ERYTHROCYTE [DISTWIDTH] IN BLOOD BY AUTOMATED COUNT: 47.3 FL (ref 35.9–50)
GFR SERPLBLD CREATININE-BSD FMLA CKD-EPI: 89 ML/MIN/1.73 M 2
GLUCOSE BLD STRIP.AUTO-MCNC: 115 MG/DL (ref 65–99)
GLUCOSE BLD STRIP.AUTO-MCNC: 133 MG/DL (ref 65–99)
GLUCOSE BLD STRIP.AUTO-MCNC: 136 MG/DL (ref 65–99)
GLUCOSE BLD STRIP.AUTO-MCNC: 95 MG/DL (ref 65–99)
GLUCOSE SERPL-MCNC: 145 MG/DL (ref 65–99)
HCO3 BLDA-SCNC: 22.9 MMOL/L (ref 21–28)
HCT VFR BLD AUTO: 36.7 % (ref 37–47)
HGB BLD-MCNC: 12.6 G/DL (ref 12–16)
HOROWITZ INDEX BLDA+IHG-RTO: 300 MM[HG]
IMM GRANULOCYTES # BLD AUTO: 0.05 K/UL (ref 0–0.11)
IMM GRANULOCYTES NFR BLD AUTO: 0.6 % (ref 0–0.9)
LACTATE BLD-SCNC: 1.1 MMOL/L (ref 0.5–2)
LYMPHOCYTES # BLD AUTO: 1.55 K/UL (ref 1–4.8)
LYMPHOCYTES NFR BLD: 17.6 % (ref 22–41)
MAGNESIUM SERPL-MCNC: 1.8 MG/DL (ref 1.5–2.5)
MCH RBC QN AUTO: 32.6 PG (ref 27–33)
MCHC RBC AUTO-ENTMCNC: 34.3 G/DL (ref 32.2–35.5)
MCV RBC AUTO: 94.8 FL (ref 81.4–97.8)
MODE IMODE: ABNORMAL
MONOCYTES # BLD AUTO: 0.65 K/UL (ref 0–0.85)
MONOCYTES NFR BLD AUTO: 7.4 % (ref 0–13.4)
NEUTROPHILS # BLD AUTO: 6.5 K/UL (ref 1.82–7.42)
NEUTROPHILS NFR BLD: 73.9 % (ref 44–72)
NRBC # BLD AUTO: 0 K/UL
NRBC BLD-RTO: 0 /100 WBC (ref 0–0.2)
O2/TOTAL GAS SETTING VFR VENT: 30 %
PCO2 BLDA: 35 MMHG (ref 32–48)
PCO2 TEMP ADJ BLDA: 33.3 MMHG (ref 32–48)
PEEP END EXPIRATORY PRESSURE IPEEP: 8 CMH20
PH BLDA: 7.42 [PH] (ref 7.35–7.45)
PH TEMP ADJ BLDA: 7.44 [PH] (ref 7.35–7.45)
PHOSPHATE SERPL-MCNC: 2.6 MG/DL (ref 2.5–4.5)
PLATELET # BLD AUTO: 158 K/UL (ref 164–446)
PMV BLD AUTO: 9 FL (ref 9–12.9)
PO2 BLDA: 90 MMHG (ref 83–108)
PO2 TEMP ADJ BLDA: 84 MMHG (ref 83–108)
POTASSIUM SERPL-SCNC: 3.5 MMOL/L (ref 3.6–5.5)
RBC # BLD AUTO: 3.87 M/UL (ref 4.2–5.4)
SAO2 % BLDA: 97 % (ref 93–99)
SODIUM SERPL-SCNC: 140 MMOL/L (ref 135–145)
SPECIMEN DRAWN FROM PATIENT: ABNORMAL
TIDAL VOLUME IVT: 380 ML
WBC # BLD AUTO: 8.8 K/UL (ref 4.8–10.8)

## 2025-02-13 PROCEDURE — 700117 HCHG RX CONTRAST REV CODE 255: Performed by: RADIOLOGY

## 2025-02-13 PROCEDURE — 94003 VENT MGMT INPAT SUBQ DAY: CPT

## 2025-02-13 PROCEDURE — 36600 WITHDRAWAL OF ARTERIAL BLOOD: CPT

## 2025-02-13 PROCEDURE — 700111 HCHG RX REV CODE 636 W/ 250 OVERRIDE (IP): Mod: JZ,TB | Performed by: NURSE PRACTITIONER

## 2025-02-13 PROCEDURE — 85025 COMPLETE CBC W/AUTO DIFF WBC: CPT

## 2025-02-13 PROCEDURE — 99232 SBSQ HOSP IP/OBS MODERATE 35: CPT | Performed by: PSYCHIATRY & NEUROLOGY

## 2025-02-13 PROCEDURE — 700111 HCHG RX REV CODE 636 W/ 250 OVERRIDE (IP)

## 2025-02-13 PROCEDURE — 82962 GLUCOSE BLOOD TEST: CPT | Mod: 91

## 2025-02-13 PROCEDURE — 700111 HCHG RX REV CODE 636 W/ 250 OVERRIDE (IP): Performed by: PSYCHIATRY & NEUROLOGY

## 2025-02-13 PROCEDURE — 36226 PLACE CATH VERTEBRAL ART: CPT

## 2025-02-13 PROCEDURE — 700111 HCHG RX REV CODE 636 W/ 250 OVERRIDE (IP): Performed by: RADIOLOGY

## 2025-02-13 PROCEDURE — A9270 NON-COVERED ITEM OR SERVICE: HCPCS | Performed by: INTERNAL MEDICINE

## 2025-02-13 PROCEDURE — C9254 INJECTION, LACOSAMIDE: HCPCS | Performed by: PSYCHIATRY & NEUROLOGY

## 2025-02-13 PROCEDURE — 700105 HCHG RX REV CODE 258: Performed by: INTERNAL MEDICINE

## 2025-02-13 PROCEDURE — 99999 PR NO CHARGE: CPT | Performed by: STUDENT IN AN ORGANIZED HEALTH CARE EDUCATION/TRAINING PROGRAM

## 2025-02-13 PROCEDURE — 99291 CRITICAL CARE FIRST HOUR: CPT | Mod: GC | Performed by: INTERNAL MEDICINE

## 2025-02-13 PROCEDURE — 700111 HCHG RX REV CODE 636 W/ 250 OVERRIDE (IP): Performed by: INTERNAL MEDICINE

## 2025-02-13 PROCEDURE — 700111 HCHG RX REV CODE 636 W/ 250 OVERRIDE (IP): Mod: JZ

## 2025-02-13 PROCEDURE — 94150 VITAL CAPACITY TEST: CPT

## 2025-02-13 PROCEDURE — 700102 HCHG RX REV CODE 250 W/ 637 OVERRIDE(OP): Performed by: INTERNAL MEDICINE

## 2025-02-13 PROCEDURE — 83735 ASSAY OF MAGNESIUM: CPT

## 2025-02-13 PROCEDURE — B31DZZZ FLUOROSCOPY OF RIGHT VERTEBRAL ARTERY: ICD-10-PCS | Performed by: RADIOLOGY

## 2025-02-13 PROCEDURE — 95714 VEEG EA 12-26 HR UNMNTR: CPT | Performed by: STUDENT IN AN ORGANIZED HEALTH CARE EDUCATION/TRAINING PROGRAM

## 2025-02-13 PROCEDURE — 80048 BASIC METABOLIC PNL TOTAL CA: CPT

## 2025-02-13 PROCEDURE — 83605 ASSAY OF LACTIC ACID: CPT

## 2025-02-13 PROCEDURE — 700105 HCHG RX REV CODE 258

## 2025-02-13 PROCEDURE — 770022 HCHG ROOM/CARE - ICU (200)

## 2025-02-13 PROCEDURE — 700111 HCHG RX REV CODE 636 W/ 250 OVERRIDE (IP): Mod: JZ | Performed by: INTERNAL MEDICINE

## 2025-02-13 PROCEDURE — 700101 HCHG RX REV CODE 250: Performed by: INTERNAL MEDICINE

## 2025-02-13 PROCEDURE — B313ZZZ FLUOROSCOPY OF RIGHT COMMON CAROTID ARTERY: ICD-10-PCS | Performed by: RADIOLOGY

## 2025-02-13 PROCEDURE — 82803 BLOOD GASES ANY COMBINATION: CPT

## 2025-02-13 PROCEDURE — 94799 UNLISTED PULMONARY SVC/PX: CPT

## 2025-02-13 PROCEDURE — 84100 ASSAY OF PHOSPHORUS: CPT

## 2025-02-13 PROCEDURE — 700101 HCHG RX REV CODE 250

## 2025-02-13 RX ORDER — HEPARIN SODIUM 1000 [USP'U]/ML
INJECTION, SOLUTION INTRAVENOUS; SUBCUTANEOUS
Status: COMPLETED
Start: 2025-02-13 | End: 2025-02-13

## 2025-02-13 RX ORDER — MAGNESIUM SULFATE HEPTAHYDRATE 40 MG/ML
2 INJECTION, SOLUTION INTRAVENOUS ONCE
Status: COMPLETED | OUTPATIENT
Start: 2025-02-13 | End: 2025-02-13

## 2025-02-13 RX ORDER — FUROSEMIDE 10 MG/ML
20 INJECTION INTRAMUSCULAR; INTRAVENOUS EVERY 12 HOURS
Status: DISCONTINUED | OUTPATIENT
Start: 2025-02-13 | End: 2025-02-13

## 2025-02-13 RX ORDER — MIDAZOLAM HYDROCHLORIDE 1 MG/ML
.5-2 INJECTION INTRAMUSCULAR; INTRAVENOUS PRN
Status: ACTIVE | OUTPATIENT
Start: 2025-02-13 | End: 2025-02-13

## 2025-02-13 RX ORDER — VERAPAMIL HYDROCHLORIDE 2.5 MG/ML
INJECTION, SOLUTION INTRAVENOUS
Status: COMPLETED
Start: 2025-02-13 | End: 2025-02-13

## 2025-02-13 RX ORDER — VERAPAMIL HYDROCHLORIDE 2.5 MG/ML
INJECTION, SOLUTION INTRAVENOUS
Status: COMPLETED | OUTPATIENT
Start: 2025-02-13 | End: 2025-02-13

## 2025-02-13 RX ORDER — HYDROMORPHONE HYDROCHLORIDE 1 MG/ML
0.5 INJECTION, SOLUTION INTRAMUSCULAR; INTRAVENOUS; SUBCUTANEOUS EVERY 4 HOURS PRN
Status: COMPLETED | OUTPATIENT
Start: 2025-02-13 | End: 2025-02-14

## 2025-02-13 RX ORDER — SODIUM CHLORIDE 9 MG/ML
500 INJECTION, SOLUTION INTRAVENOUS
Status: ACTIVE | OUTPATIENT
Start: 2025-02-13 | End: 2025-02-13

## 2025-02-13 RX ORDER — DEXMEDETOMIDINE HYDROCHLORIDE 4 UG/ML
0-1.5 INJECTION, SOLUTION INTRAVENOUS CONTINUOUS
Status: DISCONTINUED | OUTPATIENT
Start: 2025-02-13 | End: 2025-02-14

## 2025-02-13 RX ORDER — MIDAZOLAM HYDROCHLORIDE 1 MG/ML
INJECTION INTRAMUSCULAR; INTRAVENOUS
Status: COMPLETED
Start: 2025-02-13 | End: 2025-02-13

## 2025-02-13 RX ORDER — HEPARIN SODIUM 1000 [USP'U]/ML
INJECTION, SOLUTION INTRAVENOUS; SUBCUTANEOUS
Status: COMPLETED | OUTPATIENT
Start: 2025-02-13 | End: 2025-02-13

## 2025-02-13 RX ORDER — NITROGLYCERIN 20 MG/100ML
INJECTION INTRAVENOUS
Status: COMPLETED | OUTPATIENT
Start: 2025-02-13 | End: 2025-02-13

## 2025-02-13 RX ORDER — MIDAZOLAM HYDROCHLORIDE 1 MG/ML
2 INJECTION INTRAMUSCULAR; INTRAVENOUS ONCE
Status: COMPLETED | OUTPATIENT
Start: 2025-02-13 | End: 2025-02-13

## 2025-02-13 RX ADMIN — SODIUM CHLORIDE 15 MG/HR: 9 INJECTION, SOLUTION INTRAVENOUS at 13:57

## 2025-02-13 RX ADMIN — NITROGLYCERIN 200 MCG: 20 INJECTION INTRAVENOUS at 09:08

## 2025-02-13 RX ADMIN — FAMOTIDINE 20 MG: 20 TABLET, FILM COATED ORAL at 06:03

## 2025-02-13 RX ADMIN — LABETALOL HYDROCHLORIDE 20 MG: 5 INJECTION INTRAVENOUS at 14:51

## 2025-02-13 RX ADMIN — DEXMEDETOMIDINE HYDROCHLORIDE 1 MCG/KG/HR: 100 INJECTION, SOLUTION INTRAVENOUS at 08:37

## 2025-02-13 RX ADMIN — LABETALOL HYDROCHLORIDE 20 MG: 5 INJECTION INTRAVENOUS at 23:21

## 2025-02-13 RX ADMIN — Medication 100 MCG/HR: at 08:30

## 2025-02-13 RX ADMIN — HEPARIN SODIUM 2000 UNITS: 1000 INJECTION, SOLUTION INTRAVENOUS; SUBCUTANEOUS at 09:07

## 2025-02-13 RX ADMIN — OSELTAMIVIR PHOSPHATE 75 MG: 75 CAPSULE ORAL at 17:50

## 2025-02-13 RX ADMIN — MAGNESIUM SULFATE HEPTAHYDRATE 2 G: 2 INJECTION, SOLUTION INTRAVENOUS at 10:19

## 2025-02-13 RX ADMIN — MIDAZOLAM HYDROCHLORIDE 2 MG: 1 INJECTION, SOLUTION INTRAMUSCULAR; INTRAVENOUS at 08:43

## 2025-02-13 RX ADMIN — POTASSIUM BICARBONATE 50 MEQ: 978 TABLET, EFFERVESCENT ORAL at 10:21

## 2025-02-13 RX ADMIN — FENTANYL CITRATE 50 MCG: 50 INJECTION, SOLUTION INTRAMUSCULAR; INTRAVENOUS at 16:43

## 2025-02-13 RX ADMIN — IOHEXOL 85 ML: 300 INJECTION, SOLUTION INTRAVENOUS at 10:00

## 2025-02-13 RX ADMIN — LACOSAMIDE 100 MG: 10 INJECTION INTRAVENOUS at 18:22

## 2025-02-13 RX ADMIN — OSELTAMIVIR PHOSPHATE 75 MG: 75 CAPSULE ORAL at 06:44

## 2025-02-13 RX ADMIN — SODIUM CHLORIDE 15 MG/HR: 9 INJECTION, SOLUTION INTRAVENOUS at 21:31

## 2025-02-13 RX ADMIN — SODIUM CHLORIDE 12.5 MG/HR: 9 INJECTION, SOLUTION INTRAVENOUS at 19:38

## 2025-02-13 RX ADMIN — LACOSAMIDE 100 MG: 10 INJECTION INTRAVENOUS at 06:03

## 2025-02-13 RX ADMIN — SODIUM CHLORIDE 10 MG/HR: 9 INJECTION, SOLUTION INTRAVENOUS at 23:29

## 2025-02-13 RX ADMIN — ATORVASTATIN CALCIUM 10 MG: 10 TABLET, FILM COATED ORAL at 06:03

## 2025-02-13 RX ADMIN — SENNOSIDES AND DOCUSATE SODIUM 2 TABLET: 50; 8.6 TABLET ORAL at 06:03

## 2025-02-13 RX ADMIN — MIDAZOLAM HYDROCHLORIDE 2 MG: 1 INJECTION INTRAMUSCULAR; INTRAVENOUS at 08:43

## 2025-02-13 RX ADMIN — HYDROMORPHONE HYDROCHLORIDE 0.5 MG: 1 INJECTION, SOLUTION INTRAMUSCULAR; INTRAVENOUS; SUBCUTANEOUS at 23:45

## 2025-02-13 RX ADMIN — SODIUM CHLORIDE 15 MG/HR: 9 INJECTION, SOLUTION INTRAVENOUS at 15:36

## 2025-02-13 RX ADMIN — SODIUM CHLORIDE 15 MG/HR: 9 INJECTION, SOLUTION INTRAVENOUS at 17:34

## 2025-02-13 RX ADMIN — VERAPAMIL HYDROCHLORIDE 2.5 MG: 2.5 INJECTION, SOLUTION INTRAVENOUS at 09:08

## 2025-02-13 RX ADMIN — LEVOTHYROXINE SODIUM 125 MCG: 0.12 TABLET ORAL at 06:03

## 2025-02-13 RX ADMIN — DEXMEDETOMIDINE HYDROCHLORIDE 0.6 MCG/KG/HR: 100 INJECTION, SOLUTION INTRAVENOUS at 01:47

## 2025-02-13 ASSESSMENT — PAIN DESCRIPTION - PAIN TYPE
TYPE: CHRONIC PAIN

## 2025-02-13 ASSESSMENT — PULMONARY FUNCTION TESTS: FVC: 1.56

## 2025-02-13 NOTE — RESPIRATORY CARE
S.T.O.P for Intrahospital Transportation Safety for Ventilated Patients     S - Freedman then maneuver.  Were airway secretions cleared? yes    T - Check your tube. Is your airway patent and in correct position? Yes    O - Transition from oxygen cylinder to wall oxygen source on return.  Was this accomplished?  yes    P - Transition from ventilator battery power to wall power source on return.  Was this accomplished?  Yes

## 2025-02-13 NOTE — DISCHARGE PLANNING
Case Management Discharge Planning    Admission Date: 2/10/2025  GMLOS: 4.5  ALOS: 3    6-Clicks ADL Score:    6-Clicks Mobility Score:      Anticipated Discharge Dispo: Discharge Disposition: Disch to IP rehab facility or distinct part unit (62)    DME Needed: No    Action(s) Taken:   Chart review completed. Patient was discussed during IDT rounds.     Per IDT, pt remains intubated on vent day 3. Plan for angiogram with IR today.     PMR consult was placed by MD; [facility] is not contracted with pt's insurance provider. Referral to be sent to [facility] for post-acute services per protocol; pending therapy evaluation/recommendations and pending procedures.     Escalations Completed: None    Medically Clear: No    Next Steps:  CM RN to follow up with medical team to discuss discharge barriers or needs.     Barriers to Discharge: Medical clearance, Pending PT Evaluation, and Pending Procedures

## 2025-02-13 NOTE — PROGRESS NOTES
[facility] ICU Progress Note      Admit Date: 2/10/2025    Resident(s): Radha Osei M.D.   Attending:  Dr. Webster    Patient ID:    Name:  Keyur Burt   YOB: 1950  Age:  74 y.o.  female   MRN:  [id number]    HPI and Hospital Course (carried forward and updated):  Keyur Burt is a 74 y.o. female with medical history significant for obesity with a BMI of 38, hypothyroidism, hypertension, hyperlipidemia, stage IIIb chronic kidney disease, breast cancer, and type 2 diabetes (A1c 5.6%), who was last seen normal yesterday when she was complaining of cough and congestion.  The patient did not come to a meeting with her friends and the patient's son checked on her on 2/10/2025 and found that she was laying on the floor and confused.  EMS was activated and upon arrival found the patient again very combative and agitated. SBPs in 180s. She received several doses of Versed that help calm her down; however, she was never able to follow commands.  A CT of the head did reveal in intracranial hemorrhage (SAH and a R temporal lobe ICH). Atraumatic. Unfortunately, the patient continued to remain combative and was unable to stay still for further testing.  She was intubated in the emergency department for behavioral reasons.  She will be admitted to the ICU for ongoing care.    (Dr. Wise's HPI)    2/11: Flu positive, started on Tamiflu, no evidence of PNA. MRI-MRA done, no major vascular abnormalities/aneurism. Overnight seizure activity, increased propofol - cEGG w/background slowing.     Consultants:  Neurology      Interval Events:    NEURO:   cEGG no sezisure activity. Now on precedex (from prop yesterday), stopping fentanyl   RASS -1 following commands symmetrically in all 4. - plan to dc EEG and wean sedation/SAT  CARDIOVASC:  HR 50s -140s (at goal) - will liberalize depending on radiology findings   RESPIRATORY:  VD #3 - APVcmv/20/380/+8/30%, CXR no infiltrates   GI/NUTRITION:  OG - TF @  goal    RENAL/FLUID/LYTES: Scr 0.71,  UO 1.9lt K 3.5  HEME/ONC:   Hb 12.6 Plt 158K   INFECTIOUS D:  WBC 8.8K - Flu positive, started on Tamiflu, Blood culture NGTD  ENDOCRINE:    BG 145s Na 138-140    Family: Vernon updated on plan of care. All questions answered.     Vitals Range last 24h:  Pulse:  [] 60  Resp:  [16-42] 24  BP: (113-168)/(55-90) 149/70  SpO2:  [94 %-99 %] 97 %      Intake/Output Summary (Last 24 hours) at 2/13/2025 1042  Last data filed at 2/13/2025 0800  Gross per 24 hour   Intake 3051.59 ml   Output 1935 ml   Net 1116.59 ml      Review of Systems   Unable to perform ROS: Critical illness     PHYSICAL EXAM:  Vitals:    02/13/25 0925 02/13/25 0927 02/13/25 0930 02/13/25 0932   BP: 127/64 135/72  (!) 149/70   Pulse: (!) 53  60    Resp: (!) 24      Temp:       TempSrc:       SpO2: 97%  97%    Weight:       Height:        Body mass index is 38.01 kg/m².    O2 therapy: Pulse Oximetry: 97 %, O2 Delivery Device: Ventilator    Date 02/13/25 0700 - 02/14/25 0659   Shift 3483-4545 7209-1938 8912-6286 24 Hour Total   INTAKE   I.V. 21   21     Fentanyl Volume 3.8   3.8     Precedex Volume 17.2   17.2   Shift Total 21   21   OUTPUT   Urine 80   80     Output (mL) (Urethral Catheter) 80   80   Shift Total 80   80   NET -59   -59       Physical Exam  Vitals and nursing note reviewed.   HENT:      Head: Atraumatic.      Mouth/Throat:      Comments: ETT no secretions  Eyes:      Conjunctiva/sclera: Conjunctivae normal.   Cardiovascular:      Rate and Rhythm: Normal rate and regular rhythm.      Pulses: Normal pulses.      Heart sounds: Normal heart sounds.   Pulmonary:      Effort: Pulmonary effort is normal.      Breath sounds: Normal breath sounds.      Comments: Coarse in bases  Abdominal:      General: Abdomen is flat. There is no distension.      Palpations: Abdomen is soft.   Musculoskeletal:         General: No swelling. Normal range of motion.      Cervical back: No rigidity.   Skin:     General:  Skin is warm.      Capillary Refill: Capillary refill takes less than 2 seconds.   Neurological:      Comments: Reflexes intact. Withdraws to pain in 4, following commands in all 4, facial symmetry.         Recent Labs     02/11/25  0311 02/12/25  0254 02/13/25  0427   ISTATAPH 7.429 7.442 7.424   ISTATAPCO2 28.6* 32.4 35.0   ISTATAPO2 100 79* 90   ISTATATCO2 20* 23* 24*   UFJABBG3VGG 98 96 97   ISTATARTHCO3 19.0* 22.1 22.9   ISTATARTBE -4 -1 -1   ISTATTEMP 101.5 F 98.6 F 96.6 F   ISTATFIO2 30 30 30   ISTATSPEC Arterial Arterial Arterial   ISTATAPHTC 7.405 7.442 7.440   UJFEMTIG1RB 110* 79* 84     Recent Labs     02/11/25 0210 02/11/25  1320 02/11/25  2155 02/12/25 0346 02/13/25  0615   SODIUM 139   < > 138 140 140   POTASSIUM 2.8*  --   --  3.7 3.5*   CHLORIDE 102  --   --  106 108   CO2 16*  --   --  22 22   BUN 14  --   --  19 18   CREATININE 1.24  --   --  0.90 0.71   MAGNESIUM 1.5  --   --  2.2 1.8   PHOSPHORUS 2.4*  --   --  2.6 2.6   CALCIUM 8.6  --   --  8.3* 7.8*    < > = values in this interval not displayed.     Recent Labs     02/10/25  1745 02/11/25  0210 02/12/25  0346 02/13/25  0615   ALTSGPT 57*  --   --   --    ASTSGOT 65*  --   --   --    ALKPHOSPHAT 86  --   --   --    TBILIRUBIN 0.9  --   --   --    GLUCOSE 180* 174* 123* 145*     Recent Labs     02/10/25  1909 02/10/25  2038 02/11/25  0210 02/13/25  0615   RBC 4.36  --  4.85 3.87*   HEMOGLOBIN 13.8  --  15.6 12.6   HEMATOCRIT 40.9  --  45.4 36.7*   PLATELETCT 214  --  218 158*   PROTHROMBTM  --  13.8  --   --    APTT  --  29.8  --   --    INR  --  1.06  --   --      Recent Labs     02/10/25  1745 02/10/25  1909 02/11/25  0210 02/13/25  0615   WBC  --  11.8* 12.1* 8.8   NEUTSPOLYS  --  87.80* 87.70* 73.90*   LYMPHOCYTES  --  8.10* 5.40* 17.60*   MONOCYTES  --  3.30 5.90 7.40   EOSINOPHILS  --  0.00 0.10 0.30   BASOPHILS  --  0.10 0.10 0.20   ASTSGOT 65*  --   --   --    ALTSGPT 57*  --   --   --    ALKPHOSPHAT 86  --   --   --    TBILIRUBIN 0.9   --   --   --        Meds:   fentaNYL  100 mcg      And    fentaNYL  200 mcg      And    fentaNYL   Stopped (02/13/25 0954)    And    dexmedetomidine (Precedex) infusion  0-1.5 mcg/kg/hr (Ideal) 0.5 mcg/kg/hr (02/13/25 0911)    magnesium sulfate  2 g 2 g (02/13/25 1019)    NS  500 mL      fentaNYL  12.5-50 mcg      midazolam  0.5-2 mg      furosemide  20 mg      oseltamivir  75 mg      atorvastatin  20 mg      And    atorvastatin  10 mg      levothyroxine  125 mcg      And    levothyroxine  137 mcg      Pharmacy  1 Each      Respiratory Therapy Consult        MD Alert...Adult ICU Electrolyte Replacement per Pharmacy        lidocaine  2 mL      fentaNYL  50 mcg      Or    fentaNYL  100 mcg      insulin lispro  2-9 Units      And    dextrose bolus  25 g      LORazepam  2 mg      niCARdipine (Cardene) Standard Infusion 0.1 mg/mL  0-15 mg/hr 2.5 mg/hr (02/13/25 0954)    labetalol  10-20 mg      hydrALAZINE  20 mg      senna-docusate  2 Tablet      And    polyethylene glycol/lytes  1 Packet      And    magnesium hydroxide  30 mL      And    bisacodyl  10 mg      famotidine  20 mg      acetaminophen  650 mg      Or    acetaminophen  650 mg      ondansetron  4 mg      Or    ondansetron  4 mg      [Held by provider] anastrozole  1 mg      lacosamide  100 mg          Procedures:  Intubation 2/10    Imaging:  DX-CHEST-PORTABLE (1 VIEW)   Final Result         1.  Left basilar atelectasis, no focal infiltrate   2.  Nasogastric tube terminates just distal to the gastroesophageal junction with side-port in the distal esophagus, position is unchanged since prior study, recommend advancement      MR-MRA HEAD-W/O   Final Result      1.  There is no evidence of aneurysm, stenosis or vascular malformation in the visualized proximal vasculature or adjacent to the hematoma.   2.  Right posterior temporal parenchymal hemorrhage.   3.  Mild right cerebral diffuse subarachnoid hemorrhage.   4.  Mild right cerebral subdural hemorrhage.       MR-BRAIN-WITH & W/O   Final Result      Right posterior temporal hemorrhage. There are no abnormal flow voids or large nodular enhancement. There is tiny punctate enhancement within the hemorrhage likely representing vascular enhancement.Pre and postcontrast MR examination is recommended after    6 weeks. Due to the atypical location of the hemorrhage, catheter angiogram may be needed to rule out the possibility of any tiny vascular malformation.      EC-ECHOCARDIOGRAM COMPLETE W/O CONT   Final Result      DX-CHEST-PORTABLE (1 VIEW)   Final Result         1.  No acute cardiopulmonary disease.   2.  Nasogastric tube terminates just distal to the gastroesophageal junction with side-port in the distal esophagus, recommend advancement      CT-CTA NECK WITH & W/O-POST PROCESSING   Final Result         1.  CT angiogram of the neck within normal limits.         CT-CTA HEAD WITH & W/O-POST PROCESS   Final Result         1.  No large vessel occlusion or aneurysm identified.   2.  Right posterior temporal parenchymal hemorrhage, stable since prior study.   3.  Scattered subarachnoid hemorrhages, overall stable since prior study.      DX-CHEST-PORTABLE (1 VIEW)   Final Result         1.  No acute cardiopulmonary disease.   2.  Nasogastric tube with tip just distal to the gastroesophageal junction, recommend advancement      DX-ABDOMEN FOR TUBE PLACEMENT   Final Result         1.  Nonspecific bowel gas pattern in the upper abdomen.   2.  Nasogastric tube with tip just distal to the gastroesophageal junction, recommend advancement      CT-HEAD W/O   Final Result         1.  Right posterior temporal parenchymal hemorrhages stranding vasogenic edema   2.  Bilateral subarachnoid hemorrhages as described.   3.  Right posterior parafalcine subdural hematoma measuring 2.4 mm.   4.  Subdural versus subarachnoid hemorrhage along the right tentorium measuring 3.1 mm.      These findings were discussed with the patient's clinician, George  NEMESIO Wise, on 2/10/2025 8:42 PM.         DX-CHEST-PORTABLE (1 VIEW)   Final Result      Hypoinflation without a definite acute abnormality.      IR-CAROTID-CEREBRAL BILATERAL    (Results Pending)       ASSESSEMENT and PLAN:    * Acute intracranial hemorrhage (HCC)- (present on admission)  Assessment & Plan  Right posterior temporal parenchymal hemorrhage noted + SAH as above  CTA H&N without obvious aneurism  Hemorrhage at this time appears small without significant mass effect or hydrocephalus to warrant surgical decompression or EVD   Brain MRI/MRA without vascular abnormalities, stable hematoma   - Statin   - See plan for SAH   - Neuro improving, continue weaning/stopping sedation     Acute encephalopathy- (present on admission)  Assessment & Plan  Admitted with agitation  ICH/SAH and UTI  Neuro exam improving and no seizures on EEG  - Check UDS and TSH (TSH abnormal in the past)   - Intubated for airway protection  - Cont to correct all underlying metabolic disturbances  - SAT as appropriate     SAH (subarachnoid hemorrhage) (HCC)- (present on admission)  Assessment & Plan  PBD #4 aneurysmal?? (found down, unclear if traumatic)  Per neuro, given location of SAH and ICH, w/u of vascular disease  CTA H&N without obvious aneurism, MRI/MRA without vascular abnormalities   Given epileptogenic location, started on AED    1 clinical seizure 2/10, cEEG without epileptiform activity  Neuro exam improving weaning sedation  -Neuro IR angiogram today  -SBP goals >120 and <140 - may liberalize depending on IR findings  -Maintain euglycemia, normothermia, eunatremia  -Nicardipine infusion, actively titrating  -Vimpat 100mg IV q12h  -Na daily  -Neurology following  -PT/OT/SLP    Influenza A  Assessment & Plan  Flu A +   No evidence of pneumonia  - Oseltamivir day 2     Seizure (HCC)  Assessment & Plan  Clinical seizure x1 last night  cEEG without epileptiform activity  - Continue vimpat  - Attempt to wean sedation  - See  SAH/ICH    Hypokalemia  Assessment & Plan  - Replacing, goal >4  - IV Mg as needed as well     Lactic acidosis  Assessment & Plan  Suspect poor PO intake - hypovolemia and sepsis   - Blood cultures drawn   - IVF PRN - hemodynamics monitoring  - Monitor BMP and Lactic   Resolved    Acute respiratory failure with hypoxia (HCC)- (present on admission)  Assessment & Plan  Intubated for airway protection and hypoxia  CXR no consolidations/efffusions  -Cont full vent support, weaning   -RT/O2 protocols  -A-F bundle   -SAT/SBTs when clinically appropriate    Urinary tract infection without hematuria- (present on admission)  Assessment & Plan  UA with inflammatory changes, along with lactic acidosis and possible sepsis   -Neg cultures  -Empiric ceftriaxone 3/3    Hypertensive urgency- (present on admission)  Assessment & Plan  Resolved  -See hypertension and SAH    Type 2 diabetes mellitus without complication, without long-term current use of insulin (HCC)- (present on admission)  Assessment & Plan  A1c 5.6%  -BG goals 120-180s  -Medium ISS      Recurrent breast cancer (HCC)- (present on admission)  Assessment & Plan  Holding home anastrazole    Hypothyroidism from resorcinol- (present on admission)  Assessment & Plan  Cont home synthroid regimen    Mixed hyperlipidemia- (present on admission)  Assessment & Plan  Resume home statin of atorvastatin 20mg PO daily      DISPO: ICU level of care    CODE STATUS: FULL     Quality Measures:  Feeding: Advancing tube feeds  Analgesia: PRN fent   Sedation: Precedex  Thromboprophylaxis: None due to ICH  Head of bed: >30 degrees  Ulcer prophylaxis: Famotidine  Glycemic control: Correctional  Bowel care: bowel regimen  Indwelling lines: 4 PIV, barber, OGT  Deescalation of antibiotics: Ceftriaxone finish today     Radha FLOREZ PGY-3  Internal Medicine [facility]

## 2025-02-13 NOTE — PROGRESS NOTES
Neurology Progress Note  Neurohospitalist Service, [facility]iences    Referring Physician: Gibson Webster D.O.      Interval History: DSA completed this AM.  There is an early blush in area of hematoma.  Remains intubated.    Review of systems: In addition to what is detailed in the HPI and/or updated in the interval history, all other systems reviewed and are negative.    Past Medical History, Past Surgical History and Social History reviewed and unchanged from prior    Medications:    Current Facility-Administered Medications:     fentaNYL (Sublimaze) injection 100 mcg, 100 mcg, Intravenous, Q15 MIN PRN **AND** fentaNYL (Sublimaze) injection 200 mcg, 200 mcg, Intravenous, Q15 MIN PRN **AND** fentaNYL (SUBLIMAZE) 50 mcg/mL in 50mL (Continuous Infusion), , Intravenous, Continuous, Stopped at 02/13/25 0954 **AND** dexmedetomidine (Precedex) 400 mcg/100mL infusion, 0-1.5 mcg/kg/hr (Ideal), Intravenous, Continuous, Radha Osei M.D., Last Rate: 7.1 mL/hr at 02/13/25 0911, 0.5 mcg/kg/hr at 02/13/25 0911    potassium bicarbonate (Klyte) effervescent tablet 50 mEq, 50 mEq, Enteral Tube, Once, Gibson Webster D.O.    magnesium sulfate IVPB premix 2 g, 2 g, Intravenous, Once, Gibson Webster D.O.    NS (Bolus) 0.9 % infusion 500 mL, 500 mL, Intravenous, Once PRN, Kyle Carrillo M.D.    fentaNYL (Sublimaze) injection 12.5-50 mcg, 12.5-50 mcg, Intravenous, PRN, Kyle Carrillo M.D.    midazolam (Versed) injection 0.5-2 mg, 0.5-2 mg, Intravenous, PRN, Kyle Carrillo M.D.    furosemide (Lasix) injection 20 mg, 20 mg, Intravenous, Q12HRS, Gibson Webster D.O.    oseltamivir (Tamiflu) capsule 75 mg, 75 mg, Enteral Tube, BID, Gibson Webster D.O., 75 mg at 02/13/25 0644    atorvastatin (Lipitor) tablet 20 mg, 20 mg, Enteral Tube, Once per day on Monday Wednesday Friday, 20 mg at 02/12/25 0542 **AND** atorvastatin (Lipitor) tablet 10 mg, 10 mg, Enteral Tube, Once per day on Sunday Tuesday Thursday SaturdayGibson  Darin, D.O., 10 mg at 02/13/25 0603    levothyroxine (Synthroid) tablet 125 mcg, 125 mcg, Enteral Tube, Q48HRS, 125 mcg at 02/13/25 0603 **AND** levothyroxine (Synthroid) tablet 137 mcg, 137 mcg, Enteral Tube, Q48HRS, NEMESIO Herman.SHOSHANA., 137 mcg at 02/12/25 0543    Pharmacy Consult: Enteral tube insertion - review meds/change route/product selection, 1 Each, Other, PHARMACY TO DOSE, Radha Osei M.D.    Respiratory Therapy Consult, , Nebulization, Continuous RT, Shagutfa Wise M.D.    MD Alert...ICU Electrolyte Replacement per Pharmacy, , Other, PHARMACY TO DOSE, Shagufta Wise M.D.    lidocaine (Xylocaine) 1 % injection 2 mL, 2 mL, Tracheal Tube, Q30 MIN PRN, Shagufta Wise M.D.    fentaNYL (Sublimaze) injection 50 mcg, 50 mcg, Intravenous, Q HOUR PRN **OR** fentaNYL (Sublimaze) injection 100 mcg, 100 mcg, Intravenous, Q HOUR PRN, Shagufta Wise M.D., 100 mcg at 02/11/25 1700    insulin lispro (HumaLOG,AdmeLOG) subcutaneous injection, 2-9 Units, Subcutaneous, Q6HRS **AND** POC blood glucose manual result, , , Q6H **AND** NOTIFY MD and PharmD, , , Once **AND** Administer 20 grams of glucose (approximately 8 ounces of fruit juice) every 15 minutes PRN FSBG less than 70 mg/dL, , , PRN **AND** dextrose 50% (D50W) injection 25 g, 25 g, Intravenous, Q15 MIN PRN, Shagufta Wise M.D.    LORazepam (Ativan) injection 2 mg, 2 mg, Intravenous, Q5 MIN PRN, Shagufta Wise M.D., 2 mg at 02/11/25 1901    niCARdipine (Cardene) 25 mg in  mL Standard Infusion, 0-15 mg/hr, Intravenous, Continuous, Shagufta Wise M.D., Last Rate: 25 mL/hr at 02/13/25 0954, 2.5 mg/hr at 02/13/25 0954    labetalol (Normodyne/Trandate) injection 10-20 mg, 10-20 mg, Intravenous, Q4HRS PRN, Shagufta Wise M.D., 10 mg at 02/12/25 1151    hydrALAZINE (Apresoline) injection 20 mg, 20 mg, Intravenous, Q4HRS PRN, Shagufta Wise M.D., 20 mg at 02/12/25 1303    senna-docusate (Pericolace Or Senokot S) 8.6-50 MG per tablet 2  "Tablet, 2 Tablet, Enteral Tube, BID, 2 Tablet at 02/13/25 0603 **AND** polyethylene glycol/lytes (Miralax) Packet 1 Packet, 1 Packet, Enteral Tube, QDAY PRN **AND** magnesium hydroxide (Milk Of Magnesia) suspension 30 mL, 30 mL, Enteral Tube, QDAY PRN **AND** bisacodyl (Dulcolax) suppository 10 mg, 10 mg, Rectal, QDAY PRN, Shagufta Wise M.D.    famotidine (Pepcid) tablet 20 mg, 20 mg, Enteral Tube, Q12HRS, 20 mg at 02/13/25 0603 **OR** [DISCONTINUED] famotidine (Pepcid) injection 20 mg, 20 mg, Intravenous, Q12HRS, Shagufta Wise M.D.    acetaminophen (Tylenol) tablet 650 mg, 650 mg, Enteral Tube, Q4HRS PRN, 650 mg at 02/11/25 0242 **OR** acetaminophen (Tylenol) suppository 650 mg, 650 mg, Rectal, Q4HRS PRN, Shagufta Wise M.D.    ondansetron (Zofran ODT) dispertab 4 mg, 4 mg, Enteral Tube, Q4HRS PRN **OR** ondansetron (Zofran) syringe/vial injection 4 mg, 4 mg, Intravenous, Q4HRS PRN, Shagufta Wise M.D.    [Held by provider] anastrozole (Arimidex) tablet 1 mg, 1 mg, Oral, DAILY, Shagufta Wise M.D., 1 mg at 02/11/25 0540    lacosamide (Vimpat) injection 100 mg, 100 mg, Intravenous, BID, Raciel Kraft M.D., 100 mg at 02/13/25 0603    Physical Examination:   BP (!) 149/70   Pulse 60   Temp 37.1 °C (98.7 °F) (Temporal)   Resp (!) 24   Ht 1.651 m (5' 5\")   Wt 104 kg (228 lb 6.3 oz)   SpO2 97%   BMI 38.01 kg/m²       General: Patient is intubated, sedated  Neck: There is normal range of motion  CV: Regular rate   Extremities:  Warm, dry, and intact, without peripheral lower extremity edema    NEUROLOGICAL EXAM:     Mental status:  Intubated sedated  Speech and language: Intubated, no command folloiwng.  Cranial nerve exam: Pupils are equal, round and reactive to light.  No tracking, eyes midline.  Face grossly symmetric.  Motor exam: Withdraws all 4 extremities to noxious  Sensory exam:  As above  Coordination: No significant movements elicited to test    Objective Data:    Labs:  Lab Results "   Component Value Date/Time    PROTHROMBTM 13.8 02/10/2025 08:38 PM    INR 1.06 02/10/2025 08:38 PM      Lab Results   Component Value Date/Time    WBC 8.8 02/13/2025 06:15 AM    RBC 3.87 (L) 02/13/2025 06:15 AM    HEMOGLOBIN 12.6 02/13/2025 06:15 AM    HEMATOCRIT 36.7 (L) 02/13/2025 06:15 AM    MCV 94.8 02/13/2025 06:15 AM    MCH 32.6 02/13/2025 06:15 AM    MCHC 34.3 02/13/2025 06:15 AM    MPV 9.0 02/13/2025 06:15 AM    NEUTSPOLYS 73.90 (H) 02/13/2025 06:15 AM    LYMPHOCYTES 17.60 (L) 02/13/2025 06:15 AM    MONOCYTES 7.40 02/13/2025 06:15 AM    EOSINOPHILS 0.30 02/13/2025 06:15 AM    BASOPHILS 0.20 02/13/2025 06:15 AM      Lab Results   Component Value Date/Time    SODIUM 140 02/13/2025 06:15 AM    POTASSIUM 3.5 (L) 02/13/2025 06:15 AM    CHLORIDE 108 02/13/2025 06:15 AM    CO2 22 02/13/2025 06:15 AM    GLUCOSE 145 (H) 02/13/2025 06:15 AM    BUN 18 02/13/2025 06:15 AM    CREATININE 0.71 02/13/2025 06:15 AM    BUNCREATRAT 12 07/18/2017 08:08 AM      Lab Results   Component Value Date/Time    CHOLSTRLTOT 146 02/11/2025 02:10 AM    LDL 65 02/11/2025 02:10 AM    HDL 47 02/11/2025 02:10 AM    TRIGLYCERIDE 275 (H) 02/12/2025 11:30 AM       Lab Results   Component Value Date/Time    ALKPHOSPHAT 86 02/10/2025 05:45 PM    ASTSGOT 65 (H) 02/10/2025 05:45 PM    ALTSGPT 57 (H) 02/10/2025 05:45 PM    TBILIRUBIN 0.9 02/10/2025 05:45 PM        Imaging/Testing:    I interpreted and/or reviewed the patient's neuroimaging    DX-CHEST-PORTABLE (1 VIEW)   Final Result         1.  Left basilar atelectasis, no focal infiltrate   2.  Nasogastric tube terminates just distal to the gastroesophageal junction with side-port in the distal esophagus, position is unchanged since prior study, recommend advancement      MR-MRA HEAD-W/O   Final Result      1.  There is no evidence of aneurysm, stenosis or vascular malformation in the visualized proximal vasculature or adjacent to the hematoma.   2.  Right posterior temporal parenchymal hemorrhage.    3.  Mild right cerebral diffuse subarachnoid hemorrhage.   4.  Mild right cerebral subdural hemorrhage.      MR-BRAIN-WITH & W/O   Final Result      Right posterior temporal hemorrhage. There are no abnormal flow voids or large nodular enhancement. There is tiny punctate enhancement within the hemorrhage likely representing vascular enhancement.Pre and postcontrast MR examination is recommended after    6 weeks. Due to the atypical location of the hemorrhage, catheter angiogram may be needed to rule out the possibility of any tiny vascular malformation.      EC-ECHOCARDIOGRAM COMPLETE W/O CONT   Final Result      DX-CHEST-PORTABLE (1 VIEW)   Final Result         1.  No acute cardiopulmonary disease.   2.  Nasogastric tube terminates just distal to the gastroesophageal junction with side-port in the distal esophagus, recommend advancement      CT-CTA NECK WITH & W/O-POST PROCESSING   Final Result         1.  CT angiogram of the neck within normal limits.         CT-CTA HEAD WITH & W/O-POST PROCESS   Final Result         1.  No large vessel occlusion or aneurysm identified.   2.  Right posterior temporal parenchymal hemorrhage, stable since prior study.   3.  Scattered subarachnoid hemorrhages, overall stable since prior study.      DX-CHEST-PORTABLE (1 VIEW)   Final Result         1.  No acute cardiopulmonary disease.   2.  Nasogastric tube with tip just distal to the gastroesophageal junction, recommend advancement      DX-ABDOMEN FOR TUBE PLACEMENT   Final Result         1.  Nonspecific bowel gas pattern in the upper abdomen.   2.  Nasogastric tube with tip just distal to the gastroesophageal junction, recommend advancement      CT-HEAD W/O   Final Result         1.  Right posterior temporal parenchymal hemorrhages stranding vasogenic edema   2.  Bilateral subarachnoid hemorrhages as described.   3.  Right posterior parafalcine subdural hematoma measuring 2.4 mm.   4.  Subdural versus subarachnoid hemorrhage  along the right tentorium measuring 3.1 mm.      These findings were discussed with the patient's clinician, George Wise, on 2/10/2025 8:42 PM.         DX-CHEST-PORTABLE (1 VIEW)   Final Result      Hypoinflation without a definite acute abnormality.      IR-CAROTID-CEREBRAL BILATERAL    (Results Pending)       Assessment and Plan:  Keyur Burt is a 74 year old woman presenting with confusion, agitation, found to have R temporal lobar-based intracranial hemorrhage, and bilateral SAH.  No clinical findings to suggest traumatic head injury.  Radiographic appearance- both SAH and lobar-based location of ICH is atypical for hypertensive disease.  CTA without evidence of underlying vascular lesion. MRI ICH protocol also without clear etiology.  Catheter angiogram on 2/13 with early blush in area of hematoma, that may reflect residual vascular shunting lesion.  There is no clear arterial feeder or obvious early venous outflow seen.  If there is a vascular shunt lesion, it is not currently treatable.  Will recommend repeat catheter angiogram in 6-8 week period following hematoma regression. Fortunately hemorrhage at this time appears small without significant mass effect or hydrocephalus to warrant surgical decompression or EVD.       Problem list:   R temporal ICH   SAH   Hypertensive     Plan:              - q2h neurochecks, wean sedation, extubate if possible              - SBP goal 120-140, cardene PRN, transition to enteral medications when able              - continue home atorvastatin 20mg daily for statin's neuroprotective effects in ICH              - no antithrombotics, SCDs only indefinitely given cryptogenic nature of ICH, and possible vascular shunt lesion              - normal Na goal 135-145              - maintain normothermia, net even I/O, FSBS               - PT/OT/SLP as tolerated   - may discontinue cEEG   - continue vimpat 100mg BID   - Neuro-IR follow up for repeat catheter  angiography in 6-8 weeks    The evaluation of the patient, and recommended management, was discussed with Dr. Webster, ICU attending. I have performed a physical exam and reviewed and updated ROS and Plan today (2/13/2025).     Raciel Kraft MD  Vascular Neurology

## 2025-02-13 NOTE — OR SURGEON
Immediate Post- Operative Note        Findings: Right Parietal haematoma       Procedure(s): Diagnostic Cerebral angiogram     Full report to follow      Estimated Blood Loss: Less than 5 ml        Complications: None            2/13/2025     9:32 AM     Kyle Carrillo M.D.

## 2025-02-13 NOTE — PROGRESS NOTES
Pt presents to IR3. Pt was consented via Brother, Vernon Burt by MD at bedside, confirmed by this RN and consent at bedside. Pt transferred to IR table in supine position. Patient underwent a cerebral angiogram by Dr. Andrade. Procedure site was marked by MD and verified using imaging guidance. Pt placed on monitor, prepped and draped in a sterile fashion. Vitals were taken every 5 minutes and remained stable during procedure (see doc flow sheet for results). Precedex and Fentanyl drips utilized for sedation along with a 2 mg push of versed due to agitation while attempting to prep and positioning pt for procedure.     Report called to Miles DICKSON. Pt transported by stretcher with RN to R103.     TR Band applied to R radial artery at 0928 with 14 mL of air

## 2025-02-13 NOTE — CARE PLAN
Problem: Ventilation  Goal: Ability to achieve and maintain unassisted ventilation or tolerate decreased levels of ventilator support  Description: Target End Date:  4 days     Document on Vent flowsheet    1.  Support and monitor invasive and noninvasive mechanical ventilation  2.  Monitor ventilator weaning response  3.  Perform ventilator associated pneumonia prevention interventions  4.  Manage ventilation therapy by monitoring diagnostic test results  Outcome: Not Met     Ventilator Daily Summary    Vent Day # 3  Airway: 8@25    Ventilator settings: APVcmv/20/380/+8/30%  Weaning trials: none  Respiratory Procedures: none    Plan: Continue current ventilator settings and wean mechanical ventilation as tolerated per physician orders.

## 2025-02-14 DIAGNOSIS — I60.9 SAH (SUBARACHNOID HEMORRHAGE) (HCC): ICD-10-CM

## 2025-02-14 LAB
ANION GAP SERPL CALC-SCNC: 12 MMOL/L (ref 7–16)
BASOPHILS # BLD AUTO: 0.3 % (ref 0–1.8)
BASOPHILS # BLD: 0.03 K/UL (ref 0–0.12)
BUN SERPL-MCNC: 18 MG/DL (ref 8–22)
CA-I SERPL-SCNC: 1.1 MMOL/L (ref 1.1–1.3)
CALCIUM SERPL-MCNC: 8 MG/DL (ref 8.5–10.5)
CHLORIDE SERPL-SCNC: 110 MMOL/L (ref 96–112)
CO2 SERPL-SCNC: 20 MMOL/L (ref 20–33)
CREAT SERPL-MCNC: 0.67 MG/DL (ref 0.5–1.4)
EOSINOPHIL # BLD AUTO: 0.03 K/UL (ref 0–0.51)
EOSINOPHIL NFR BLD: 0.3 % (ref 0–6.9)
ERYTHROCYTE [DISTWIDTH] IN BLOOD BY AUTOMATED COUNT: 47 FL (ref 35.9–50)
GFR SERPLBLD CREATININE-BSD FMLA CKD-EPI: 92 ML/MIN/1.73 M 2
GLUCOSE BLD STRIP.AUTO-MCNC: 106 MG/DL (ref 65–99)
GLUCOSE BLD STRIP.AUTO-MCNC: 113 MG/DL (ref 65–99)
GLUCOSE BLD STRIP.AUTO-MCNC: 124 MG/DL (ref 65–99)
GLUCOSE BLD STRIP.AUTO-MCNC: 136 MG/DL (ref 65–99)
GLUCOSE SERPL-MCNC: 134 MG/DL (ref 65–99)
HCT VFR BLD AUTO: 37.6 % (ref 37–47)
HGB BLD-MCNC: 12.7 G/DL (ref 12–16)
IMM GRANULOCYTES # BLD AUTO: 0.07 K/UL (ref 0–0.11)
IMM GRANULOCYTES NFR BLD AUTO: 0.7 % (ref 0–0.9)
LYMPHOCYTES # BLD AUTO: 1.39 K/UL (ref 1–4.8)
LYMPHOCYTES NFR BLD: 14.4 % (ref 22–41)
MAGNESIUM SERPL-MCNC: 1.9 MG/DL (ref 1.5–2.5)
MCH RBC QN AUTO: 32.2 PG (ref 27–33)
MCHC RBC AUTO-ENTMCNC: 33.8 G/DL (ref 32.2–35.5)
MCV RBC AUTO: 95.2 FL (ref 81.4–97.8)
MONOCYTES # BLD AUTO: 0.63 K/UL (ref 0–0.85)
MONOCYTES NFR BLD AUTO: 6.5 % (ref 0–13.4)
NEUTROPHILS # BLD AUTO: 7.51 K/UL (ref 1.82–7.42)
NEUTROPHILS NFR BLD: 77.8 % (ref 44–72)
NRBC # BLD AUTO: 0 K/UL
NRBC BLD-RTO: 0 /100 WBC (ref 0–0.2)
PLATELET # BLD AUTO: 197 K/UL (ref 164–446)
PMV BLD AUTO: 8.9 FL (ref 9–12.9)
POTASSIUM SERPL-SCNC: 3.3 MMOL/L (ref 3.6–5.5)
RBC # BLD AUTO: 3.95 M/UL (ref 4.2–5.4)
SODIUM SERPL-SCNC: 142 MMOL/L (ref 135–145)
WBC # BLD AUTO: 9.7 K/UL (ref 4.8–10.8)

## 2025-02-14 PROCEDURE — 82330 ASSAY OF CALCIUM: CPT

## 2025-02-14 PROCEDURE — 97167 OT EVAL HIGH COMPLEX 60 MIN: CPT

## 2025-02-14 PROCEDURE — 700111 HCHG RX REV CODE 636 W/ 250 OVERRIDE (IP): Mod: JZ,TB | Performed by: NURSE PRACTITIONER

## 2025-02-14 PROCEDURE — 770020 HCHG ROOM/CARE - TELE (206)

## 2025-02-14 PROCEDURE — 700102 HCHG RX REV CODE 250 W/ 637 OVERRIDE(OP)

## 2025-02-14 PROCEDURE — 700111 HCHG RX REV CODE 636 W/ 250 OVERRIDE (IP): Performed by: PSYCHIATRY & NEUROLOGY

## 2025-02-14 PROCEDURE — C9254 INJECTION, LACOSAMIDE: HCPCS | Performed by: PSYCHIATRY & NEUROLOGY

## 2025-02-14 PROCEDURE — A9270 NON-COVERED ITEM OR SERVICE: HCPCS | Performed by: INTERNAL MEDICINE

## 2025-02-14 PROCEDURE — 97163 PT EVAL HIGH COMPLEX 45 MIN: CPT

## 2025-02-14 PROCEDURE — 700111 HCHG RX REV CODE 636 W/ 250 OVERRIDE (IP): Performed by: INTERNAL MEDICINE

## 2025-02-14 PROCEDURE — 85025 COMPLETE CBC W/AUTO DIFF WBC: CPT

## 2025-02-14 PROCEDURE — A9270 NON-COVERED ITEM OR SERVICE: HCPCS | Performed by: NURSE PRACTITIONER

## 2025-02-14 PROCEDURE — A9270 NON-COVERED ITEM OR SERVICE: HCPCS

## 2025-02-14 PROCEDURE — 99222 1ST HOSP IP/OBS MODERATE 55: CPT | Performed by: HOSPITALIST

## 2025-02-14 PROCEDURE — A9270 NON-COVERED ITEM OR SERVICE: HCPCS | Performed by: HOSPITALIST

## 2025-02-14 PROCEDURE — 82962 GLUCOSE BLOOD TEST: CPT | Mod: 91

## 2025-02-14 PROCEDURE — 700102 HCHG RX REV CODE 250 W/ 637 OVERRIDE(OP): Performed by: INTERNAL MEDICINE

## 2025-02-14 PROCEDURE — 97535 SELF CARE MNGMENT TRAINING: CPT

## 2025-02-14 PROCEDURE — 700102 HCHG RX REV CODE 250 W/ 637 OVERRIDE(OP): Performed by: NURSE PRACTITIONER

## 2025-02-14 PROCEDURE — 83735 ASSAY OF MAGNESIUM: CPT

## 2025-02-14 PROCEDURE — 99233 SBSQ HOSP IP/OBS HIGH 50: CPT | Mod: GC | Performed by: INTERNAL MEDICINE

## 2025-02-14 PROCEDURE — 700101 HCHG RX REV CODE 250: Performed by: NURSE PRACTITIONER

## 2025-02-14 PROCEDURE — 99232 SBSQ HOSP IP/OBS MODERATE 35: CPT | Performed by: PSYCHIATRY & NEUROLOGY

## 2025-02-14 PROCEDURE — 92610 EVALUATE SWALLOWING FUNCTION: CPT

## 2025-02-14 PROCEDURE — 80048 BASIC METABOLIC PNL TOTAL CA: CPT

## 2025-02-14 PROCEDURE — 92612 ENDOSCOPY SWALLOW (FEES) VID: CPT

## 2025-02-14 PROCEDURE — 700102 HCHG RX REV CODE 250 W/ 637 OVERRIDE(OP): Performed by: HOSPITALIST

## 2025-02-14 RX ORDER — MAGNESIUM SULFATE HEPTAHYDRATE 40 MG/ML
2 INJECTION, SOLUTION INTRAVENOUS ONCE
Status: COMPLETED | OUTPATIENT
Start: 2025-02-14 | End: 2025-02-14

## 2025-02-14 RX ORDER — HYDROCODONE BITARTRATE AND ACETAMINOPHEN 5; 325 MG/1; MG/1
1 TABLET ORAL EVERY 8 HOURS PRN
Refills: 0 | Status: DISCONTINUED | OUTPATIENT
Start: 2025-02-14 | End: 2025-02-14

## 2025-02-14 RX ORDER — LIDOCAINE 4 G/G
1 PATCH TOPICAL EVERY 24 HOURS
Status: DISCONTINUED | OUTPATIENT
Start: 2025-02-14 | End: 2025-02-21 | Stop reason: HOSPADM

## 2025-02-14 RX ORDER — POTASSIUM CHLORIDE 1500 MG/1
40 TABLET, EXTENDED RELEASE ORAL EVERY 4 HOURS
Status: COMPLETED | OUTPATIENT
Start: 2025-02-14 | End: 2025-02-14

## 2025-02-14 RX ORDER — LISINOPRIL 20 MG/1
20 TABLET ORAL
Status: DISCONTINUED | OUTPATIENT
Start: 2025-02-14 | End: 2025-02-14

## 2025-02-14 RX ORDER — HYDROCODONE BITARTRATE AND ACETAMINOPHEN 5; 325 MG/1; MG/1
1 TABLET ORAL EVERY 6 HOURS PRN
Status: DISCONTINUED | OUTPATIENT
Start: 2025-02-14 | End: 2025-02-15

## 2025-02-14 RX ORDER — HYDROCODONE BITARTRATE AND ACETAMINOPHEN 7.5; 325 MG/1; MG/1
1 TABLET ORAL EVERY 8 HOURS PRN
Refills: 0 | Status: DISCONTINUED | OUTPATIENT
Start: 2025-02-14 | End: 2025-02-14

## 2025-02-14 RX ORDER — LACOSAMIDE 100 MG/1
100 TABLET ORAL 2 TIMES DAILY
Status: DISCONTINUED | OUTPATIENT
Start: 2025-02-14 | End: 2025-02-15

## 2025-02-14 RX ORDER — BENZONATATE 100 MG/1
100 CAPSULE ORAL 3 TIMES DAILY PRN
Status: DISCONTINUED | OUTPATIENT
Start: 2025-02-14 | End: 2025-02-21 | Stop reason: HOSPADM

## 2025-02-14 RX ORDER — POTASSIUM CHLORIDE 1500 MG/1
40 TABLET, EXTENDED RELEASE ORAL ONCE
Status: DISCONTINUED | OUTPATIENT
Start: 2025-02-14 | End: 2025-02-14

## 2025-02-14 RX ORDER — LISINOPRIL 10 MG/1
10 TABLET ORAL ONCE
Status: COMPLETED | OUTPATIENT
Start: 2025-02-14 | End: 2025-02-14

## 2025-02-14 RX ADMIN — HYDROCODONE BITARTRATE AND ACETAMINOPHEN 1 TABLET: 5; 325 TABLET ORAL at 17:20

## 2025-02-14 RX ADMIN — LACOSAMIDE 100 MG: 10 INJECTION INTRAVENOUS at 05:49

## 2025-02-14 RX ADMIN — ACETAMINOPHEN 650 MG: 325 TABLET ORAL at 21:16

## 2025-02-14 RX ADMIN — LISINOPRIL 20 MG: 20 TABLET ORAL at 11:29

## 2025-02-14 RX ADMIN — LIDOCAINE 1 PATCH: 4 PATCH TOPICAL at 22:41

## 2025-02-14 RX ADMIN — LABETALOL HYDROCHLORIDE 10 MG: 5 INJECTION INTRAVENOUS at 19:21

## 2025-02-14 RX ADMIN — LACOSAMIDE 100 MG: 100 TABLET, FILM COATED ORAL at 17:14

## 2025-02-14 RX ADMIN — HYDROMORPHONE HYDROCHLORIDE 0.5 MG: 1 INJECTION, SOLUTION INTRAMUSCULAR; INTRAVENOUS; SUBCUTANEOUS at 10:42

## 2025-02-14 RX ADMIN — POTASSIUM CHLORIDE 40 MEQ: 1500 TABLET, EXTENDED RELEASE ORAL at 15:26

## 2025-02-14 RX ADMIN — LISINOPRIL 10 MG: 10 TABLET ORAL at 12:04

## 2025-02-14 RX ADMIN — MAGNESIUM SULFATE HEPTAHYDRATE 2 G: 2 INJECTION, SOLUTION INTRAVENOUS at 09:52

## 2025-02-14 RX ADMIN — HYDRALAZINE HYDROCHLORIDE 20 MG: 20 INJECTION, SOLUTION INTRAMUSCULAR; INTRAVENOUS at 21:22

## 2025-02-14 RX ADMIN — LABETALOL HYDROCHLORIDE 20 MG: 5 INJECTION INTRAVENOUS at 10:34

## 2025-02-14 RX ADMIN — BENZONATATE 100 MG: 100 CAPSULE ORAL at 22:41

## 2025-02-14 RX ADMIN — POTASSIUM CHLORIDE 40 MEQ: 1500 TABLET, EXTENDED RELEASE ORAL at 11:30

## 2025-02-14 RX ADMIN — HYDROCODONE BITARTRATE AND ACETAMINOPHEN 1 TABLET: 5; 325 TABLET ORAL at 23:25

## 2025-02-14 RX ADMIN — ONDANSETRON 4 MG: 2 INJECTION INTRAMUSCULAR; INTRAVENOUS at 03:12

## 2025-02-14 RX ADMIN — OSELTAMIVIR PHOSPHATE 75 MG: 75 CAPSULE ORAL at 17:17

## 2025-02-14 ASSESSMENT — ENCOUNTER SYMPTOMS
EYES NEGATIVE: 1
BRUISES/BLEEDS EASILY: 0
PALPITATIONS: 0
ABDOMINAL PAIN: 0
WEAKNESS: 0
FEVER: 0
POLYDIPSIA: 0
GASTROINTESTINAL NEGATIVE: 1
COUGH: 0
BLURRED VISION: 0
CARDIOVASCULAR NEGATIVE: 1
VOMITING: 0
DIZZINESS: 1
SENSORY CHANGE: 0
NECK PAIN: 0
DEPRESSION: 0
HEADACHES: 0
HEARTBURN: 0
CHILLS: 0
DIZZINESS: 0
SHORTNESS OF BREATH: 0
MUSCULOSKELETAL NEGATIVE: 1
MYALGIAS: 0
WEAKNESS: 1
NAUSEA: 0
TINGLING: 0
RESPIRATORY NEGATIVE: 1
FOCAL WEAKNESS: 0
NERVOUS/ANXIOUS: 1
BACK PAIN: 0
SPEECH CHANGE: 0

## 2025-02-14 ASSESSMENT — PAIN DESCRIPTION - PAIN TYPE
TYPE: CHRONIC PAIN
TYPE: ACUTE PAIN
TYPE: CHRONIC PAIN
TYPE: ACUTE PAIN

## 2025-02-14 ASSESSMENT — FIBROSIS 4 INDEX: FIB4 SCORE: 3.19

## 2025-02-14 ASSESSMENT — COGNITIVE AND FUNCTIONAL STATUS - GENERAL
EATING MEALS: A LITTLE
MOVING TO AND FROM BED TO CHAIR: TOTAL
DRESSING REGULAR LOWER BODY CLOTHING: A LOT
MOVING FROM LYING ON BACK TO SITTING ON SIDE OF FLAT BED: A LITTLE
SUGGESTED CMS G CODE MODIFIER MOBILITY: CL
SUGGESTED CMS G CODE MODIFIER DAILY ACTIVITY: CK
CLIMB 3 TO 5 STEPS WITH RAILING: TOTAL
DAILY ACTIVITIY SCORE: 14
TOILETING: A LOT
TURNING FROM BACK TO SIDE WHILE IN FLAT BAD: A LITTLE
PERSONAL GROOMING: A LITTLE
MOBILITY SCORE: 10
DRESSING REGULAR UPPER BODY CLOTHING: A LOT
WALKING IN HOSPITAL ROOM: TOTAL
HELP NEEDED FOR BATHING: A LOT
STANDING UP FROM CHAIR USING ARMS: TOTAL

## 2025-02-14 ASSESSMENT — ACTIVITIES OF DAILY LIVING (ADL): TOILETING: INDEPENDENT

## 2025-02-14 ASSESSMENT — GAIT ASSESSMENTS: GAIT LEVEL OF ASSIST: UNABLE TO PARTICIPATE

## 2025-02-14 NOTE — CARE PLAN
Problem: Knowledge Deficit - Standard  Goal: Patient and family/care givers will demonstrate understanding of plan of care, disease process/condition, diagnostic tests and medications  Outcome: Progressing     Problem: Pain - Standard  Goal: Alleviation of pain or a reduction in pain to the patient’s comfort goal  Outcome: Progressing     Problem: Knowledge Deficit - Standard  Goal: Patient and family/care givers will demonstrate understanding of plan of care, disease process/condition, diagnostic tests and medications  Outcome: Progressing     Problem: Pain - Standard  Goal: Alleviation of pain or a reduction in pain to the patient’s comfort goal  Outcome: Progressing     Problem: Safety - Violent/Self-destructive Restraint  Goal: Remains free of injury from restraints (Restraint for Violent/Self-Destructive Behavior)  Outcome: Met  Goal: Free from restraints (Restraint for Violent or Self-Destructive Behavior)  Outcome: Met   The patient is Watcher - Medium risk of patient condition declining or worsening    Shift Goals  Clinical Goals: q1 neuro, MRI, pulm hygiene  Patient Goals: unable to assess  Family Goals: patient improvement in healh status    Progress made toward(s) clinical / shift goals:      Patient is not progressing towards the following goals:

## 2025-02-14 NOTE — CARE PLAN
The patient is Watcher - Medium risk of patient condition declining or worsening    Shift Goals  Clinical Goals: q1 neuro, MRI, pulm hygiene  Patient Goals: unable to assess  Family Goals: patient improvement in healh status    Progress made toward(s) clinical / shift goals:    Problem: Knowledge Deficit - Standard  Goal: Patient and family/care givers will demonstrate understanding of plan of care, disease process/condition, diagnostic tests and medications  2/14/2025 0329 by Nela Maher R.N.  Outcome: Progressing  2/14/2025 0329 by Nela Maher R.N.  Outcome: Progressing     Problem: Pain - Standard  Goal: Alleviation of pain or a reduction in pain to the patient’s comfort goal  2/14/2025 0329 by Nela Maher R.N.  Outcome: Progressing  2/14/2025 0329 by Nela Maher R.N.  Outcome: Progressing     Problem: Skin Integrity  Goal: Skin integrity is maintained or improved  2/14/2025 0329 by Nela Maher R.N.  Outcome: Progressing  2/14/2025 0329 by Nela Maher R.N.  Outcome: Progressing     Problem: Fall Risk  Goal: Patient will remain free from falls  2/14/2025 0329 by Nela Maher R.N.  Outcome: Progressing  2/14/2025 0329 by Nela Maher R.N.  Outcome: Progressing     Problem: Neuro Status  Goal: Neuro status will remain stable or improve  Outcome: Progressing     Problem: Respiratory  Goal: Patient will achieve/maintain optimum respiratory ventilation and gas exchange  Outcome: Progressing     Problem: Mobility  Goal: Patient's capacity to carry out activities will improve  Outcome: Progressing       Patient is not progressing towards the following goals:

## 2025-02-14 NOTE — PROGRESS NOTES
Radiology Progress Note   Author: ELOY Desai Date & Time created: 2/14/2025  10:56 AM   Date of admission  2/10/2025  Note to reader: this note follows the APSO format rather than the historical SOAP format. Assessment and plan located at the top of the note for ease of use.    Chief Complaint  74 y.o. female admitted 2/10/2025 with   Chief Complaint   Patient presents with    ALOC         HPI  74-year-old female with past medical history significant for HTN, HLD, CKD3B, breast cancer, DMT2, and recent diagnosis of RSV infection approximately 1 week ago admitted 02/10/2025 for CT finding of SAH and right temporal intracranial hemorrhage after being found down, confused, and combative.  Patient was last seen well 02/09/25.  She was intubated and sedated in the ED and admitted to ICU.  Further workup revealed no large vessel occlusion or aneurysm. MICHAEL was consulted and patient underwent a diagnostic cerebral angiogram 02/13/2025 with MICHAEL Dr. Carrillo.  Right parietal hematoma identified.    Interval History:   02/14/2023 - Patient extubated.  Alert/oriented.  Neuro intact.  Right radial access site soft, nontender, without ecchymosis.  BP stable off nicardipine.  Today's labs reviewed by me including WBC 9.7, hemoglobin 12.7, sodium 142, creatinine 0.67.  I reviewed all IDT notes and discussed patient with bedside RN.    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 No lifting more than 5 pounds and no soaking/ immersing extremity for 48 hours. OK to change dressing to band aid as needed.    - Recommend repeat cerebral angiogram in approx 6 weeks. Order placed; our office will call to schedule.   - MICHAEL signing off.   -  -Thank you for allowing Interventional Radiology team to participate in the patients care, if any additional care or requests are needed in the future please do not hesitate to call or place IR order           Review of Systems  Physical Exam   Review of Systems   Constitutional:  Negative for chills and fever.   Respiratory:  Negative for shortness of breath.    Cardiovascular:  Negative for chest pain and palpitations.   Gastrointestinal:  Negative for nausea and vomiting.   Neurological:  Negative for tingling, sensory change, speech change, weakness and headaches.      Vitals:    02/14/25 0800   BP: (!) 149/73   Pulse: 84   Resp: (!) 39   Temp:    SpO2: 94%        Physical Exam  Constitutional:       Appearance: Normal appearance.   Cardiovascular:      Rate and Rhythm: Normal rate and regular rhythm.   Abdominal:      Palpations: Abdomen is soft.   Skin:     General: Skin is warm and dry.   Neurological:      Mental Status: She is alert.      Comments: A/O x 4  PERRLA  Face symmetrical  Speech fluent  Shoulder shrug intact  Antigravity with no downward drift in all extremities  Sensation intact    Psychiatric:         Mood and Affect: Mood normal.         Behavior: Behavior normal.             Labs    Recent Labs     02/13/25  0615 02/14/25  0310   WBC 8.8 9.7   RBC 3.87* 3.95*   HEMOGLOBIN 12.6 12.7   HEMATOCRIT 36.7* 37.6   MCV 94.8 95.2   MCH 32.6 32.2   MCHC 34.3 33.8   RDW 47.3 47.0   PLATELETCT 158* 197   MPV 9.0 8.9*     Recent Labs     02/12/25  0346 02/13/25  0615 02/14/25  0310   SODIUM 140 140 142   POTASSIUM 3.7 3.5* 3.3*   CHLORIDE 106 108 110   CO2 22 22 20   GLUCOSE 123* 145* 134*   BUN 19 18 18   CREATININE  0.90 0.71 0.67   CALCIUM 8.3* 7.8* 8.0*     Recent Labs     02/12/25  0346 02/13/25  0615 02/14/25  0310   CREATININE 0.90 0.71 0.67     IR-CAROTID-CEREBRAL BILATERAL   Final Result      There are a few abnormal tiny blood vessels noted in the expected location of the right temporal hemorrhage. There is slight early filling of the adjacent superficial veins compared with the other cortical veins. These findings may be secondary to the    hemorrhage itself. However, the possibility of small 'burned out' vascular malformation cannot be fully excluded. Follow-up cerebral angiogram is recommended after 6 weeks to rule out any possibility of tiny residual vascular malformation.      DX-CHEST-PORTABLE (1 VIEW)   Final Result         1.  Left basilar atelectasis, no focal infiltrate   2.  Nasogastric tube terminates just distal to the gastroesophageal junction with side-port in the distal esophagus, position is unchanged since prior study, recommend advancement      MR-MRA HEAD-W/O   Final Result      1.  There is no evidence of aneurysm, stenosis or vascular malformation in the visualized proximal vasculature or adjacent to the hematoma.   2.  Right posterior temporal parenchymal hemorrhage.   3.  Mild right cerebral diffuse subarachnoid hemorrhage.   4.  Mild right cerebral subdural hemorrhage.      MR-BRAIN-WITH & W/O   Final Result      Right posterior temporal hemorrhage. There are no abnormal flow voids or large nodular enhancement. There is tiny punctate enhancement within the hemorrhage likely representing vascular enhancement.Pre and postcontrast MR examination is recommended after    6 weeks. Due to the atypical location of the hemorrhage, catheter angiogram may be needed to rule out the possibility of any tiny vascular malformation.      EC-ECHOCARDIOGRAM COMPLETE W/O CONT   Final Result      DX-CHEST-PORTABLE (1 VIEW)   Final Result         1.  No acute cardiopulmonary disease.   2.  Nasogastric tube terminates  "just distal to the gastroesophageal junction with side-port in the distal esophagus, recommend advancement      CT-CTA NECK WITH & W/O-POST PROCESSING   Final Result         1.  CT angiogram of the neck within normal limits.         CT-CTA HEAD WITH & W/O-POST PROCESS   Final Result         1.  No large vessel occlusion or aneurysm identified.   2.  Right posterior temporal parenchymal hemorrhage, stable since prior study.   3.  Scattered subarachnoid hemorrhages, overall stable since prior study.      DX-CHEST-PORTABLE (1 VIEW)   Final Result         1.  No acute cardiopulmonary disease.   2.  Nasogastric tube with tip just distal to the gastroesophageal junction, recommend advancement      DX-ABDOMEN FOR TUBE PLACEMENT   Final Result         1.  Nonspecific bowel gas pattern in the upper abdomen.   2.  Nasogastric tube with tip just distal to the gastroesophageal junction, recommend advancement      CT-HEAD W/O   Final Result         1.  Right posterior temporal parenchymal hemorrhages stranding vasogenic edema   2.  Bilateral subarachnoid hemorrhages as described.   3.  Right posterior parafalcine subdural hematoma measuring 2.4 mm.   4.  Subdural versus subarachnoid hemorrhage along the right tentorium measuring 3.1 mm.      These findings were discussed with the patient's clinician, George Wise, on 2/10/2025 8:42 PM.         DX-CHEST-PORTABLE (1 VIEW)   Final Result      Hypoinflation without a definite acute abnormality.        INR   Date Value Ref Range Status   02/10/2025 1.06 0.87 - 1.13 Final     Comment:     INR - Non-therapeutic Reference Range: 0.87-1.13  INR - Therapeutic Reference Range: 2.0-4.0       No results found for: \"POCINR\"     Intake/Output Summary (Last 24 hours) at 2/14/2025 1056  Last data filed at 2/14/2025 0800  Gross per 24 hour   Intake 1571.8 ml   Output 1505 ml   Net 66.8 ml      I have personally reviewed the above labs and imaging      I have performed a physical exam and " reviewed and updated ROS and Plan today (2/14/2025).

## 2025-02-14 NOTE — THERAPY
Occupational Therapy   Initial Evaluation     Patient Name: Keyur Burt  Age:  74 y.o., Sex:  female  Medical Record #: [id number]  Today's Date: 2/14/2025     Precautions  Precautions: Swallow Precautions  Comments: vertogo, droplet precautions    Assessment  Patient is 74 y.o. female with a diagnosis of R posterior temporal parenchymal hemorrhage.  Pt currently limited by decreased functional mobility, activity tolerance, cognition, sensation, strength, AROM, coordination, balance, and pain which are affecting pt's ability to complete ADLs/IADLs at baseline. Pt would benefit from OT services in the acute care setting to maximize functional recovery.      Plan    Occupational Therapy Initial Treatment Plan   Treatment Interventions: (P) Self Care / Activities of Daily Living, Therapeutic Activity  Treatment Frequency: (P) 3 Times per Week  Duration: (P) Until Therapy Goals Met       Discharge Recommendations: (P) Recommend post-acute placement for additional occupational therapy services prior to discharge home        02/14/25 0805   Prior Living Situation   Housing / Facility 1 Story House   Steps Into Home 0   Bathroom Set up Bathtub / Shower Combination;Grab Bars   Equipment Owned Tub / Shower Seat   Lives with - Patient's Self Care Capacity Adult Children  (son)   Prior Level of ADL Function   Self Feeding Independent   Grooming / Hygiene Independent   Bathing Independent   Dressing Independent   Toileting Independent   Strength Upper Body   Upper Body Strength  X   Gross Strength Generalized Weakness, Equal Bilaterally.    Comments 3/5 B UEs   Balance Assessment   Sitting Balance (Static) Fair   Sitting Balance (Dynamic) Fair -   Weight Shift Sitting Fair   ADL Assessment   Grooming Minimal Assist   Upper Body Dressing Moderate Assist   Lower Body Dressing Maximal Assist   Toileting Maximal Assist   Functional Mobility   Sit to Stand Unable to Participate   Bed, Chair, Wheelchair Transfer Unable to  Participate   Short Term Goals   Short Term Goal # 1 supervised with UB dressing   Short Term Goal # 2 min A with LB dressing   Short Term Goal # 3 min A with ADL txfs   Occupational Therapy Initial Treatment Plan    Treatment Interventions Self Care / Activities of Daily Living;Therapeutic Activity   Treatment Frequency 3 Times per Week   Duration Until Therapy Goals Met   Anticipated Discharge Equipment and Recommendations   Discharge Recommendations Recommend post-acute placement for additional occupational therapy services prior to discharge home

## 2025-02-14 NOTE — DIETARY
Nutrition Services:    Day 4 of admit. Keyur Burt is a 74 y.o. female with admitting DX of Acute intracranial hemorrhage (HCC) [I62.9]    TF Vital HP with goal rate 50 ml/hr provides 1200 kcals + kcals from propofol, 105 gm protein, and 1003 ml free water per day.  Patient extubated on 2/13 post IR.   OG tube removed 2/13.  SLP recommends NPO pending FEES.      Current diet order:   NPO    Malnutrition risk: Pt does not meet ASPEN malnutrition criteria at this time.      PES statement:    Inadequate oral intake r/t acute respiratory failure as evidenced by intubated on ventilator support.   Nutrition Dx Status: resolved, pt extubated on 2/13.         Plan/ Recommendations:      Diet per SLP     RD following

## 2025-02-14 NOTE — PROGRESS NOTES
[facility] ICU Progress Note      Admit Date: 2/10/2025    Resident(s): Radha Osei M.D.   Attending:  Dr. Webster    Patient ID:    Name:  Keyur Burt   YOB: 1950  Age:  74 y.o.  female   MRN:  [id number]    HPI and Hospital Course (carried forward and updated):  Keyur Burt is a 74 y.o. female with medical history significant for obesity with a BMI of 38, hypothyroidism, hypertension, hyperlipidemia, stage IIIb chronic kidney disease, breast cancer, and type 2 diabetes (A1c 5.6%), who was last seen normal yesterday when she was complaining of cough and congestion.  The patient did not come to a meeting with her friends and the patient's son checked on her on 2/10/2025 and found that she was laying on the floor and confused.  EMS was activated and upon arrival found the patient again very combative and agitated. SBPs in 180s. She received several doses of Versed that help calm her down; however, she was never able to follow commands.  A CT of the head did reveal in intracranial hemorrhage (SAH and a R temporal lobe ICH). Atraumatic. Unfortunately, the patient continued to remain combative and was unable to stay still for further testing.  She was intubated in the emergency department for behavioral reasons.  She will be admitted to the ICU for ongoing care.    (Dr. Wise's HPI)    2/11: Flu positive, started on Tamiflu, no evidence of PNA. MRI-MRA done, no major vascular abnormalities/aneurism. Overnight seizure activity, increased propofol - cEGG w/background slowing.     2/12: No seizure activity, weaning sedation.   2/13: Angiogram, no aneurysm seen. Discontinued EEG. Extubated.     Consultants:  Neurology      Interval Events:    NEURO:   Aox4, moving all 4 symmetrically, good interaction  CARDIOVASC:  HR 80s -140s  RESPIRATORY:  2-3L NC  GI/NUTRITION:  Diet   RENAL/FLUID/LYTES: Scr 0.67,  UO 1.5lt K 3.3 - replaced  HEME/ONC:   Hb 12.7 Plt 197K   INFECTIOUS D:  WBC 9.7K - on  Tamiflu, Blood culture NGTD  ENDOCRINE:    BG 134s Na 140s    Family: Vernon updated on plan of care. All questions answered.     Vitals Range last 24h:  Pulse:  [68-91] 79  Resp:  [11-41] 20  BP: (115-166)/(55-73) 136/71  SpO2:  [89 %-97 %] 94 %      Intake/Output Summary (Last 24 hours) at 2/14/2025 1329  Last data filed at 2/14/2025 1200  Gross per 24 hour   Intake 1621.8 ml   Output 2005 ml   Net -383.2 ml      Review of Systems   Constitutional:  Negative for chills and fever.   HENT:  Positive for congestion.    Eyes:  Negative for blurred vision.   Respiratory:  Negative for shortness of breath.    Cardiovascular:  Negative for chest pain.   Gastrointestinal:  Negative for abdominal pain, nausea and vomiting.   Genitourinary:  Negative for dysuria.   Musculoskeletal:  Negative for myalgias.   Neurological:  Positive for dizziness. Negative for speech change and focal weakness.     PHYSICAL EXAM:  Vitals:    02/14/25 0848 02/14/25 0900 02/14/25 0931 02/14/25 1000   BP: 138/65 138/63 (!) 145/67 136/71   Pulse: 80 78 87 79   Resp: (!) 26 13  20   Temp:       TempSrc:       SpO2:       Weight:       Height:        Body mass index is 39.73 kg/m².    O2 therapy: Pulse Oximetry: 94 %, O2 (LPM): 3, O2 Delivery Device: Silicone Nasal Cannula    Date 02/14/25 0700 - 02/15/25 0659   Shift 1224-3918 1110-6464 5847-7663 24 Hour Total   INTAKE   I.V. 0   0     Magnesium Sulfate Volume 0   0   Other 50   50     Medications (PO/Enteral Liquids) 50   50   Shift Total 50   50   OUTPUT   Urine 800   800     Output (mL) ([REMOVED] Urethral Catheter 02/14/25 1110) 800   800   Shift Total 800   800   NET -750   -750         Physical Exam  Vitals and nursing note reviewed.   HENT:      Head: Atraumatic.      Mouth/Throat:      Mouth: Mucous membranes are moist.      Pharynx: Oropharynx is clear.   Eyes:      Extraocular Movements: Extraocular movements intact.      Conjunctiva/sclera: Conjunctivae normal.      Pupils: Pupils are  equal, round, and reactive to light.   Cardiovascular:      Rate and Rhythm: Normal rate and regular rhythm.      Pulses: Normal pulses.      Heart sounds: Normal heart sounds.   Pulmonary:      Effort: Pulmonary effort is normal.      Breath sounds: Normal breath sounds.      Comments: Trace rales in bases  Abdominal:      General: Abdomen is flat. There is no distension.      Palpations: Abdomen is soft.   Musculoskeletal:         General: No swelling. Normal range of motion.      Cervical back: No rigidity.   Skin:     General: Skin is warm.      Capillary Refill: Capillary refill takes less than 2 seconds.   Neurological:      General: No focal deficit present.      Mental Status: She is oriented to person, place, and time.      Cranial Nerves: No cranial nerve deficit.      Motor: No weakness.   Psychiatric:      Comments: Noted depressed mood, tearful         Recent Labs     02/12/25  0254 02/13/25  0427   ISTATAPH 7.442 7.424   ISTATAPCO2 32.4 35.0   ISTATAPO2 79* 90   ISTATATCO2 23* 24*   EENUQSR7XRW 96 97   ISTATARTHCO3 22.1 22.9   ISTATARTBE -1 -1   ISTATTEMP 98.6 F 96.6 F   ISTATFIO2 30 30   ISTATSPEC Arterial Arterial   ISTATAPHTC 7.442 7.440   CFEHABPL5LK 79* 84     Recent Labs     02/12/25  0346 02/13/25  0615 02/14/25  0310   SODIUM 140 140 142   POTASSIUM 3.7 3.5* 3.3*   CHLORIDE 106 108 110   CO2 22 22 20   BUN 19 18 18   CREATININE 0.90 0.71 0.67   MAGNESIUM 2.2 1.8 1.9   PHOSPHORUS 2.6 2.6  --    CALCIUM 8.3* 7.8* 8.0*     Recent Labs     02/12/25  0346 02/13/25  0615 02/14/25  0310   GLUCOSE 123* 145* 134*     Recent Labs     02/13/25  0615 02/14/25  0310   RBC 3.87* 3.95*   HEMOGLOBIN 12.6 12.7   HEMATOCRIT 36.7* 37.6   PLATELETCT 158* 197     Recent Labs     02/13/25  0615 02/14/25  0310   WBC 8.8 9.7   NEUTSPOLYS 73.90* 77.80*   LYMPHOCYTES 17.60* 14.40*   MONOCYTES 7.40 6.50   EOSINOPHILS 0.30 0.30   BASOPHILS 0.20 0.30       Meds:   potassium chloride SA  40 mEq      [START ON 2/15/2025]  lisinopril  30 mg      oseltamivir  75 mg      atorvastatin  20 mg      And    atorvastatin  10 mg      levothyroxine  125 mcg      And    levothyroxine  137 mcg      Pharmacy  1 Each      Respiratory Therapy Consult        MD Alert...Adult ICU Electrolyte Replacement per Pharmacy        lidocaine  2 mL      insulin lispro  2-9 Units      And    dextrose bolus  25 g      LORazepam  2 mg      labetalol  10-20 mg      hydrALAZINE  20 mg      senna-docusate  2 Tablet      And    polyethylene glycol/lytes  1 Packet      And    magnesium hydroxide  30 mL      And    bisacodyl  10 mg      acetaminophen  650 mg      Or    acetaminophen  650 mg      ondansetron  4 mg      Or    ondansetron  4 mg      [Held by provider] anastrozole  1 mg      lacosamide  100 mg          Procedures:  Intubation 2/10    Imaging:  IR-CAROTID-CEREBRAL BILATERAL   Final Result      There are a few abnormal tiny blood vessels noted in the expected location of the right temporal hemorrhage. There is slight early filling of the adjacent superficial veins compared with the other cortical veins. These findings may be secondary to the    hemorrhage itself. However, the possibility of small 'burned out' vascular malformation cannot be fully excluded. Follow-up cerebral angiogram is recommended after 6 weeks to rule out any possibility of tiny residual vascular malformation.      DX-CHEST-PORTABLE (1 VIEW)   Final Result         1.  Left basilar atelectasis, no focal infiltrate   2.  Nasogastric tube terminates just distal to the gastroesophageal junction with side-port in the distal esophagus, position is unchanged since prior study, recommend advancement      MR-MRA HEAD-W/O   Final Result      1.  There is no evidence of aneurysm, stenosis or vascular malformation in the visualized proximal vasculature or adjacent to the hematoma.   2.  Right posterior temporal parenchymal hemorrhage.   3.  Mild right cerebral diffuse subarachnoid hemorrhage.   4.   Mild right cerebral subdural hemorrhage.      MR-BRAIN-WITH & W/O   Final Result      Right posterior temporal hemorrhage. There are no abnormal flow voids or large nodular enhancement. There is tiny punctate enhancement within the hemorrhage likely representing vascular enhancement.Pre and postcontrast MR examination is recommended after    6 weeks. Due to the atypical location of the hemorrhage, catheter angiogram may be needed to rule out the possibility of any tiny vascular malformation.      EC-ECHOCARDIOGRAM COMPLETE W/O CONT   Final Result      DX-CHEST-PORTABLE (1 VIEW)   Final Result         1.  No acute cardiopulmonary disease.   2.  Nasogastric tube terminates just distal to the gastroesophageal junction with side-port in the distal esophagus, recommend advancement      CT-CTA NECK WITH & W/O-POST PROCESSING   Final Result         1.  CT angiogram of the neck within normal limits.         CT-CTA HEAD WITH & W/O-POST PROCESS   Final Result         1.  No large vessel occlusion or aneurysm identified.   2.  Right posterior temporal parenchymal hemorrhage, stable since prior study.   3.  Scattered subarachnoid hemorrhages, overall stable since prior study.      DX-CHEST-PORTABLE (1 VIEW)   Final Result         1.  No acute cardiopulmonary disease.   2.  Nasogastric tube with tip just distal to the gastroesophageal junction, recommend advancement      DX-ABDOMEN FOR TUBE PLACEMENT   Final Result         1.  Nonspecific bowel gas pattern in the upper abdomen.   2.  Nasogastric tube with tip just distal to the gastroesophageal junction, recommend advancement      CT-HEAD W/O   Final Result         1.  Right posterior temporal parenchymal hemorrhages stranding vasogenic edema   2.  Bilateral subarachnoid hemorrhages as described.   3.  Right posterior parafalcine subdural hematoma measuring 2.4 mm.   4.  Subdural versus subarachnoid hemorrhage along the right tentorium measuring 3.1 mm.      These findings were  discussed with the patient's clinician, George Wise, on 2/10/2025 8:42 PM.         DX-CHEST-PORTABLE (1 VIEW)   Final Result      Hypoinflation without a definite acute abnormality.          ASSESSEMENT and PLAN:    * Acute intracranial hemorrhage (HCC)- (present on admission)  Assessment & Plan  Right posterior temporal parenchymal hemorrhage noted + SAH as above  CTA H&N without obvious aneurism  Hemorrhage at this time appears small without significant mass effect or hydrocephalus to warrant surgical decompression or EVD   Brain MRI/MRA without vascular abnormalities, stable hematoma   - Statin   - See plan for SAH   - Neuro improving, continue weaning/stopping sedation     Acute encephalopathy- (present on admission)  Assessment & Plan  Admitted with agitation  ICH/SAH and UTI  Resolved   - See SAH    SAH (subarachnoid hemorrhage) (HCC)- (present on admission)  Assessment & Plan  PBD #4 aneurysmal?? (found down, unclear if traumatic)  Per neuro, given location of SAH and ICH, w/u of vascular disease  CTA H&N without obvious aneurism, MRI/MRA without vascular abnormalities   Given epileptogenic location, started on AED    1 clinical seizure 2/10, cEEG without epileptiform activity  Neuro exam improved, extubated, non focal  -Neuro IR angiogram limited due to hematoma, will need repeat in 6 w per neuro  -SBP goals 100 and <140  -Resume home lisinopril   -Vimpat 100mg IV q12h  -Will need outpt neuro and neuro IR f/u  -PT/OT    Influenza A  Assessment & Plan  Flu A +   No evidence of pneumonia  - Oseltamivir day 3    Seizure (HCC)  Assessment & Plan  Clinical seizure x1 2/11  cEEG without epileptiform activity  Doing well, awake, non focal  - Continue vimpat BID  - Will need outpt neuro f/u     Hypokalemia  Assessment & Plan  - Replacing, goal >4  - IV Mg as needed as well     Lactic acidosis  Assessment & Plan  Suspect poor PO intake - hypovolemia and sepsis   - Blood cultures drawn   - IVF PRN - hemodynamics  monitoring  - Monitor BMP and Lactic   Resolved    Acute respiratory failure with hypoxia (HCC)- (present on admission)  Assessment & Plan  Intubated for airway protection and hypoxia  CXR no consolidations/efffusions  -Extubated 2/13 on Low flow O2  -RT/O2 protocols    Urinary tract infection without hematuria- (present on admission)  Assessment & Plan  UA with inflammatory changes, along with lactic acidosis and possible sepsis   -Neg cultures  -Completed ceftriaxone     Hypertensive urgency- (present on admission)  Assessment & Plan  Resolved  -See hypertension and SAH    Type 2 diabetes mellitus without complication, without long-term current use of insulin (HCC)- (present on admission)  Assessment & Plan  A1c 5.6% BG at goal  -Medium ISS    Recurrent breast cancer (HCC)- (present on admission)  Assessment & Plan  Holding home anastrazole    Hypothyroidism from resorcinol- (present on admission)  Assessment & Plan  Cont home synthroid regimen    Mixed hyperlipidemia- (present on admission)  Assessment & Plan  Statin      DISPO: Transfer to telemetry neuro     CODE STATUS: FULL     Quality Measures:  Feeding: Diabetic diet  Analgesia: PRN opioids   Sedation: none  Thromboprophylaxis: None due to ICH  Head of bed: >30 degrees  Ulcer prophylaxis: dc  Glycemic control: Correctional  Bowel care: bowel regimen  Indwelling lines: Remove lines  Deescalation of antibiotics: N/A    Radha FLOREZ PGY-3  Internal Medicine [facility]

## 2025-02-14 NOTE — CARE PLAN
Problem: Ventilation  Goal: Ability to achieve and maintain unassisted ventilation or tolerate decreased levels of ventilator support  Description: Target End Date:  4 days     Document on Vent flowsheet    1.  Support and monitor invasive and noninvasive mechanical ventilation  2.  Monitor ventilator weaning response  3.  Perform ventilator associated pneumonia prevention interventions  4.  Manage ventilation therapy by monitoring diagnostic test results  Outcome: Met       Pt extubated

## 2025-02-14 NOTE — PROGRESS NOTES
Neurology Progress Note  Neurohospitalist Service, [facility]    Referring Physician: Gibson Webster D.O.      Interval History: Extubated, off cardene.    Review of systems: In addition to what is detailed in the HPI and/or updated in the interval history, all other systems reviewed and are negative.    Past Medical History, Past Surgical History and Social History reviewed and unchanged from prior    Medications:    Current Facility-Administered Medications:     magnesium sulfate IVPB premix 2 g, 2 g, Intravenous, Once, Gibson Webster D.O.    potassium chloride SA (Kdur) tablet 40 mEq, 40 mEq, Oral, Q4HRS, Radha Osei M.D.    lisinopril (Prinivil) tablet 20 mg, 20 mg, Oral, Q DAY, Radha Osei M.D.    [DISCONTINUED] fentaNYL (Sublimaze) injection 100 mcg, 100 mcg, Intravenous, Q15 MIN PRN **AND** [DISCONTINUED] fentaNYL (Sublimaze) injection 200 mcg, 200 mcg, Intravenous, Q15 MIN PRN **AND** [DISCONTINUED] fentaNYL (SUBLIMAZE) 50 mcg/mL in 50mL (Continuous Infusion), , Intravenous, Continuous, Stopped at 02/13/25 0954 **AND** dexmedetomidine (Precedex) 400 mcg/100mL infusion, 0-1.5 mcg/kg/hr (Ideal), Intravenous, Continuous, Radha Osei M.D., Last Rate: 7.1 mL/hr at 02/13/25 0911, 0.5 mcg/kg/hr at 02/13/25 0911    HYDROmorphone (Dilaudid) injection 0.5 mg, 0.5 mg, Intravenous, Q4HRS PRN, Arminda Yee, 0.5 mg at 02/13/25 2345    oseltamivir (Tamiflu) capsule 75 mg, 75 mg, Enteral Tube, BID, Gibson Webster D.O., 75 mg at 02/13/25 1750    atorvastatin (Lipitor) tablet 20 mg, 20 mg, Enteral Tube, Once per day on Monday Wednesday Friday, 20 mg at 02/12/25 0542 **AND** atorvastatin (Lipitor) tablet 10 mg, 10 mg, Enteral Tube, Once per day on Sunday Tuesday Thursday Saturday, Gibson Webster D.O., 10 mg at 02/13/25 0603    levothyroxine (Synthroid) tablet 125 mcg, 125 mcg, Enteral Tube, Q48HRS, 125 mcg at 02/13/25 0603 **AND** levothyroxine (Synthroid) tablet 137 mcg, 137 mcg, Enteral Tube,  Q48HRS, Gibson Webster D.O., 137 mcg at 02/12/25 0543    Pharmacy Consult: Enteral tube insertion - review meds/change route/product selection, 1 Each, Other, PHARMACY TO DOSE, Radha Osei M.D.    Respiratory Therapy Consult, , Nebulization, Continuous RT, Shagufta Wise M.D. MD Alert...ICU Electrolyte Replacement per Pharmacy, , Other, PHARMACY TO DOSE, Shagufta Wise M.D.    lidocaine (Xylocaine) 1 % injection 2 mL, 2 mL, Tracheal Tube, Q30 MIN PRN, Shagufta Wise M.D.    fentaNYL (Sublimaze) injection 50 mcg, 50 mcg, Intravenous, Q HOUR PRN, 50 mcg at 02/13/25 1643 **OR** fentaNYL (Sublimaze) injection 100 mcg, 100 mcg, Intravenous, Q HOUR PRN, Shagufta Wise M.D., 100 mcg at 02/11/25 1700    insulin lispro (HumaLOG,AdmeLOG) subcutaneous injection, 2-9 Units, Subcutaneous, Q6HRS **AND** POC blood glucose manual result, , , Q6H **AND** NOTIFY MD and PharmD, , , Once **AND** Administer 20 grams of glucose (approximately 8 ounces of fruit juice) every 15 minutes PRN FSBG less than 70 mg/dL, , , PRN **AND** dextrose 50% (D50W) injection 25 g, 25 g, Intravenous, Q15 MIN PRN, Shagufta Wise M.D.    LORazepam (Ativan) injection 2 mg, 2 mg, Intravenous, Q5 MIN PRN, Shagufta Wise M.D., 2 mg at 02/11/25 1901    niCARdipine (Cardene) 25 mg in  mL Standard Infusion, 0-15 mg/hr, Intravenous, Continuous, Shagufta Wise M.D., Stopped at 02/14/25 0200    labetalol (Normodyne/Trandate) injection 10-20 mg, 10-20 mg, Intravenous, Q4HRS PRN, Shagufta Wise M.D., 20 mg at 02/13/25 2321    hydrALAZINE (Apresoline) injection 20 mg, 20 mg, Intravenous, Q4HRS PRN, Shagufta Wise M.D., 20 mg at 02/12/25 1303    senna-docusate (Pericolace Or Senokot S) 8.6-50 MG per tablet 2 Tablet, 2 Tablet, Enteral Tube, BID, 2 Tablet at 02/13/25 0603 **AND** polyethylene glycol/lytes (Miralax) Packet 1 Packet, 1 Packet, Enteral Tube, QDAY PRN **AND** magnesium hydroxide (Milk Of Magnesia) suspension 30 mL, 30 mL,  "Enteral Tube, QDAY PRN **AND** bisacodyl (Dulcolax) suppository 10 mg, 10 mg, Rectal, QDAY PRN, Shagufta Wise M.D.    acetaminophen (Tylenol) tablet 650 mg, 650 mg, Enteral Tube, Q4HRS PRN, 650 mg at 02/11/25 0242 **OR** acetaminophen (Tylenol) suppository 650 mg, 650 mg, Rectal, Q4HRS PRN, Shagufta Wise M.D.    ondansetron (Zofran ODT) dispertab 4 mg, 4 mg, Enteral Tube, Q4HRS PRN **OR** ondansetron (Zofran) syringe/vial injection 4 mg, 4 mg, Intravenous, Q4HRS PRN, Shagufta Wise M.D., 4 mg at 02/14/25 0312    [Held by provider] anastrozole (Arimidex) tablet 1 mg, 1 mg, Oral, DAILY, Shagufta Wise M.D., 1 mg at 02/11/25 0540    lacosamide (Vimpat) injection 100 mg, 100 mg, Intravenous, BID, Raciel Kraft M.D., 100 mg at 02/14/25 0549    Physical Examination:   BP (!) 149/73   Pulse 84   Temp 37.1 °C (98.7 °F) (Temporal)   Resp (!) 39   Ht 1.651 m (5' 5\")   Wt 108 kg (238 lb 12.1 oz)   SpO2 94%   BMI 39.73 kg/m²       General: Patient is lethargic  Neck: There is normal range of motion  CV: Regular rate   Extremities:  Warm, dry, and intact, without peripheral lower extremity edema    NEUROLOGICAL EXAM:     Mental status:  Lethargic, but interactive  Speech and language: Speech paucity, but fluent, oriented when speaking.  Follows midline and distal commands.  Cranial nerve exam: Pupils are equal, round and reactive to light.  No gaze preference, tracks past midline, face symmetric.  Motor exam:  Sustained antigravity in all extremities  Sensory exam:  Reacts to tactile in all extremities, no obvious neglect  Coordination: No ataxia noted on elicited movements    Objective Data:    Labs:  Lab Results   Component Value Date/Time    PROTHROMBTM 13.8 02/10/2025 08:38 PM    INR 1.06 02/10/2025 08:38 PM      Lab Results   Component Value Date/Time    WBC 9.7 02/14/2025 03:10 AM    RBC 3.95 (L) 02/14/2025 03:10 AM    HEMOGLOBIN 12.7 02/14/2025 03:10 AM    HEMATOCRIT 37.6 02/14/2025 03:10 AM    MCV " 95.2 02/14/2025 03:10 AM    MCH 32.2 02/14/2025 03:10 AM    MCHC 33.8 02/14/2025 03:10 AM    MPV 8.9 (L) 02/14/2025 03:10 AM    NEUTSPOLYS 77.80 (H) 02/14/2025 03:10 AM    LYMPHOCYTES 14.40 (L) 02/14/2025 03:10 AM    MONOCYTES 6.50 02/14/2025 03:10 AM    EOSINOPHILS 0.30 02/14/2025 03:10 AM    BASOPHILS 0.30 02/14/2025 03:10 AM      Lab Results   Component Value Date/Time    SODIUM 142 02/14/2025 03:10 AM    POTASSIUM 3.3 (L) 02/14/2025 03:10 AM    CHLORIDE 110 02/14/2025 03:10 AM    CO2 20 02/14/2025 03:10 AM    GLUCOSE 134 (H) 02/14/2025 03:10 AM    BUN 18 02/14/2025 03:10 AM    CREATININE 0.67 02/14/2025 03:10 AM    BUNCREATRAT 12 07/18/2017 08:08 AM      Lab Results   Component Value Date/Time    CHOLSTRLTOT 146 02/11/2025 02:10 AM    LDL 65 02/11/2025 02:10 AM    HDL 47 02/11/2025 02:10 AM    TRIGLYCERIDE 275 (H) 02/12/2025 11:30 AM       Lab Results   Component Value Date/Time    ALKPHOSPHAT 86 02/10/2025 05:45 PM    ASTSGOT 65 (H) 02/10/2025 05:45 PM    ALTSGPT 57 (H) 02/10/2025 05:45 PM    TBILIRUBIN 0.9 02/10/2025 05:45 PM        Imaging/Testing:    I interpreted and/or reviewed the patient's neuroimaging    IR-CAROTID-CEREBRAL BILATERAL   Final Result      There are a few abnormal tiny blood vessels noted in the expected location of the right temporal hemorrhage. There is slight early filling of the adjacent superficial veins compared with the other cortical veins. These findings may be secondary to the    hemorrhage itself. However, the possibility of small 'burned out' vascular malformation cannot be fully excluded. Follow-up cerebral angiogram is recommended after 6 weeks to rule out any possibility of tiny residual vascular malformation.      DX-CHEST-PORTABLE (1 VIEW)   Final Result         1.  Left basilar atelectasis, no focal infiltrate   2.  Nasogastric tube terminates just distal to the gastroesophageal junction with side-port in the distal esophagus, position is unchanged since prior study,  recommend advancement      MR-MRA HEAD-W/O   Final Result      1.  There is no evidence of aneurysm, stenosis or vascular malformation in the visualized proximal vasculature or adjacent to the hematoma.   2.  Right posterior temporal parenchymal hemorrhage.   3.  Mild right cerebral diffuse subarachnoid hemorrhage.   4.  Mild right cerebral subdural hemorrhage.      MR-BRAIN-WITH & W/O   Final Result      Right posterior temporal hemorrhage. There are no abnormal flow voids or large nodular enhancement. There is tiny punctate enhancement within the hemorrhage likely representing vascular enhancement.Pre and postcontrast MR examination is recommended after    6 weeks. Due to the atypical location of the hemorrhage, catheter angiogram may be needed to rule out the possibility of any tiny vascular malformation.      EC-ECHOCARDIOGRAM COMPLETE W/O CONT   Final Result      DX-CHEST-PORTABLE (1 VIEW)   Final Result         1.  No acute cardiopulmonary disease.   2.  Nasogastric tube terminates just distal to the gastroesophageal junction with side-port in the distal esophagus, recommend advancement      CT-CTA NECK WITH & W/O-POST PROCESSING   Final Result         1.  CT angiogram of the neck within normal limits.         CT-CTA HEAD WITH & W/O-POST PROCESS   Final Result         1.  No large vessel occlusion or aneurysm identified.   2.  Right posterior temporal parenchymal hemorrhage, stable since prior study.   3.  Scattered subarachnoid hemorrhages, overall stable since prior study.      DX-CHEST-PORTABLE (1 VIEW)   Final Result         1.  No acute cardiopulmonary disease.   2.  Nasogastric tube with tip just distal to the gastroesophageal junction, recommend advancement      DX-ABDOMEN FOR TUBE PLACEMENT   Final Result         1.  Nonspecific bowel gas pattern in the upper abdomen.   2.  Nasogastric tube with tip just distal to the gastroesophageal junction, recommend advancement      CT-HEAD W/O   Final Result          1.  Right posterior temporal parenchymal hemorrhages stranding vasogenic edema   2.  Bilateral subarachnoid hemorrhages as described.   3.  Right posterior parafalcine subdural hematoma measuring 2.4 mm.   4.  Subdural versus subarachnoid hemorrhage along the right tentorium measuring 3.1 mm.      These findings were discussed with the patient's clinician, George Wise, on 2/10/2025 8:42 PM.         DX-CHEST-PORTABLE (1 VIEW)   Final Result      Hypoinflation without a definite acute abnormality.          Assessment and Plan:  Keyur Burt is a 74 year old woman presenting with confusion, agitation, found to have R temporal lobar-based intracranial hemorrhage, and bilateral SAH.  No clinical findings to suggest traumatic head injury.  Radiographic appearance- both SAH and lobar-based location of ICH is atypical for hypertensive disease.  CTA without evidence of underlying vascular lesion. MRI ICH protocol also without clear etiology.  Catheter angiogram on 2/13 with early blush in area of hematoma, that may reflect residual vascular shunting lesion.  There is no clear arterial feeder or obvious early venous outflow seen.  If there is a vascular shunt lesion, it is not currently treatable.  Will recommend repeat catheter angiogram in 6-8 week period following hematoma regression. Fortunately hemorrhage at this time appears small without significant mass effect or hydrocephalus to warrant surgical decompression or EVD.       Problem list:   R temporal ICH   SAH   Hypertensive     Plan:              - may transition to q4h neurochecks              - SBP goal 100-140, start enteral anti-HTN if needed              - continue home atorvastatin 20mg daily for statin's neuroprotective effects in ICH              - no antithrombotics, SCDs only indefinitely given cryptogenic nature of ICH, and possible vascular shunt lesion              - normal Na goal 135-145              - maintain normothermia, net even  I/O, FSBS               - PT/OT/SLP as tolerated   - continue vimpat 100mg BID   - Neuro-IR follow up for repeat catheter angiography in 6-8 weeks   - [facility] follow up   - please reconsult Stroke Neurology with any additional questions or concerns    The evaluation of the patient, and recommended management, was discussed with Dr. Webster, ICU attending. I have performed a physical exam and reviewed and updated ROS and Plan today (2/14/2025).     Raciel Kraft MD  Vascular Neurology

## 2025-02-14 NOTE — THERAPY
Speech Language Pathology   Clinical Swallow Evaluation     Patient Name: Keyur Burt  AGE:  74 y.o., SEX:  female  Medical Record #: [id number]  Date of Service: 2/14/2025      History of Present Illness  Patient is a 74 y.o. female who presented on 2/10 after being found down. Found to have a right temporal ICH and mild diffuse bilateral SAH. S/p cerebral angiogram 2/13.  Pt intubated 2/10 - 2/13.     PMHx: breast cancer, HTN, HPL, CKD, DM    CXR 2/12:  1.  Left basilar atelectasis, no focal infiltrate  2.  Nasogastric tube terminates just distal to the gastroesophageal junction with side-port in the distal esophagus, position is unchanged since prior study, recommend advancement    MR-Brain 2/11:  Right posterior temporal hemorrhage. There are no abnormal flow voids or large nodular enhancement. There is tiny punctate enhancement within the hemorrhage likely representing vascular enhancement.Pre and postcontrast MR examination is recommended after   6 weeks. Due to the atypical location of the hemorrhage, catheter angiogram may be needed to rule out the possibility of any tiny vascular malformation.      General Information:  Vitals  O2 (LPM): 3  O2 Delivery Device: Silicone Nasal Cannula  Level of Consciousness: Alert, Awake  Orientation: Self, General place, Situation, Current month, Current year  Follows Directives: Yes      Prior Living Situation & Level of Function:  Swallowing: WFL       Oral Mechanism Evaluation:  Dentition: Natural dentition   Facial Symmetry: Equal  Facial Sensation: Equal     Labial Observations: WFL   Lingual Observations: Midline       Laryngeal Function:  Secretion Management: Adequate  Voice Quality: WFL  Cough: Perceptually WNL       Subjective  Patient received awake, sitting upright in bed. Denied history of dysphagia.        Assessment  Current Method of Nutrition: NPO until cleared by speech pathology  Positioning: Beal's (60-90 degrees)  Bolus Administration:  Patient  O2 (LPM): 3 O2 Delivery Device: Silicone Nasal Cannula  Factor(s) Affecting Performance: None       Swallowing Trials:  Swallowing Trials  Thin Liquid (TN0): Impaired  Liquidised (LQ3): WFL      Comments: Patient independently brought cup to oral cavity. Complete oral bolus containment and clearance. Immediate and delayed cough appreciated throughout trials of thin liquids. Some increased WOB appreciated throughout session. One-two swallows completed per bolus. Education provided regarding indication for a diagnostic swallow study. FEES procedure explained in detail; pt initially hesitation but eventually agreement to participate following additional education.       Clinical Impressions  Patient presents with clinical indicators concerning for oropharyngeal dysphagia. Pt would benefit from a diagnostic swallow study prior to initiation of oral diet. Recommend NPO except ice chips pending study. Pt to complete FEES this date, as schedule permits.       Recommendations  Diet Consistency: NPO pending FEES  Instrumentation: FEES  Medication: Non Oral, Crush with applesauce, as appropriate  Oral Care: Q4h       SLP Treatment Plan  Treatment Plan: Dysphagia Treatment  SLP Frequency: 3x Per Week  Estimated Duration: Until Therapy Goals Met      Anticipated Discharge Needs  Discharge Recommendations:  (Pending FEES)        Patient / Family Goals  Patient / Family Goal #1: To get better  Short Term Goals  Short Term Goal # 1: Patient will complete a diagnostic swallow study to further assess swallow function and inform POC      BC Myers

## 2025-02-14 NOTE — THERAPY
"Physical Therapy   Initial Evaluation     Patient Name: Keyur Burt  Age:  74 y.o., Sex:  female  Medical Record #: [id number]  Today's Date: 2/14/2025     Precautions  Precautions: Swallow Precautions, vertigo, droplet precautions    Assessment    74 y.o. female was admitted for R temporal ICH, B SAH, respiratory failure, and acute encephalopathy with SBP in 180s after being found down with ALOC.  She was also diagnosed with RSV the week prior.  PMHx also includes HTN, breast cancer, ataxia, chronic low back pain, chronic headache /p concussion, DM, h/o thyroid cancer, and B tinnitus.  She lives with her son in a 1SH, no KRYSTINA and was independent for mobility without an AD, and driving.  On eval, she presents with vertigo with decreased balance, weakness, and decreased activity tolerance impairing her mobility.  She will benefit from continued acute PT services to address the above deficits in order to return home safely.  Recommend post acute PT services.    Plan    Physical Therapy Initial Treatment Plan   Treatment Plan : (P) Bed Mobility, Equipment, Family / Caregiver Training, Gait Training, Manual Therapy, Neuro Re-Education / Balance, Self Care / Home Evaluation, Therapeutic Activities, Therapeutic Exercise  Treatment Frequency: (P) 5 Times per Week  Duration: (P) Until Therapy Goals Met    DC Equipment Recommendations: (P) Unable to determine at this time  Discharge Recommendations: (P) Recommend post-acute placement for additional physical therapy services prior to discharge home       Subjective    \"The whole world is moving.\"     Objective       02/14/25 0931   Initial Contact Note    Initial Contact Note Order Received and Verified, Physical Therapy Evaluation in Progress with Full Report to Follow.   Precautions   Precautions Fall Risk   Vitals   Pulse 87   Patient BP Position Sitting   Blood Pressure  (!) 145/67   O2 (LPM) 3   O2 Delivery Device Silicone Nasal Cannula   Pain 0 - 10 Group " "  Therapist Pain Assessment Post Activity Pain Same as Prior to Activity  (Pt denies pain)   Prior Living Situation   Housing / Facility 1 Story House   Steps Into Home 0   Bathroom Set up Bathtub / Shower Combination;Grab Bars   Equipment Owned Tub / Shower Seat   Lives with - Patient's Self Care Capacity Adult Children  (son works)   Prior Level of Functional Mobility   Bed Mobility Independent   Transfer Status Independent   Ambulation Independent   Ambulation Distance community   Assistive Devices Used None   Comments drives, doesn't work   History of Falls   History of Falls No   Date of Last Fall   (Pt reports no falls x1 year)   Cognition    Cognition / Consciousness WDL   Level of Consciousness Responds to voice   Active ROM Upper Body   Active ROM Upper Body  WDL   Strength Upper Body   Upper Body Strength  X   Gross Strength Generalized Weakness, Equal Bilaterally.    Comments BUEs grossly 4-/5   Sensation Upper Body   Upper Extremity Sensation  WDL   Active ROM Lower Body    Active ROM Lower Body  WDL   Strength Lower Body   Lower Body Strength  X   Gross Strength Generalized Weakness, Equal Bilaterally   Comments BLEs grossly 4-/5   Sensation Lower Body   Lower Extremity Sensation   WDL   Neurological Concerns   Comments vertigo with upright positioning that persisted after laying back down.  No nystagmus noted   Balance Assessment   Sitting Balance (Static) Fair -   Sitting Balance (Dynamic) Fair -   Weight Shift Sitting Poor   Bed Mobility    Supine to Sit Contact Guard Assist   Sit to Supine Contact Guard Assist   Scooting Contact Guard Assist   Rolling Contact Guard Assist   Comments HOB partially elevated, rail   Gait Analysis   Gait Level Of Assist Unable to Participate   Functional Mobility   Sit to Stand Unable to Participate   Bed, Chair, Wheelchair Transfer Unable to Participate   Comments Pt noted to start swaying once sitting EOB with reports of \"the whole world is moving.\"   ICU Target " Mobility Level   ICU Mobility - Targeted Level Level 2   Activity Tolerance   Sitting Edge of Bed 5 min   Edema / Skin Assessment   Edema / Skin  Not Assessed   Short Term Goals    Short Term Goal # 1 Pt will perform supine < > sit SPV with bed flat, no rail in 6 visits in order to set up for upright mobility   Short Term Goal # 2 Pt will perform sit < > stand SPV in 6 visits in order to prepare for ambulation   Short Term Goal # 3 Pt will ambulate 150' SPV /c LRAD in 6 visits in order to return home safely   Education Group   Education Provided Role of Physical Therapist   Role of Physical Therapist Patient Response Patient;Acceptance;Explanation;Action Demonstration   Additional Comments closing eyes to help vertigo subside- information acknowledged   Physical Therapy Initial Treatment Plan    Treatment Plan  Bed Mobility;Equipment;Family / Caregiver Training;Gait Training;Manual Therapy;Neuro Re-Education / Balance;Self Care / Home Evaluation;Therapeutic Activities;Therapeutic Exercise   Treatment Frequency 5 Times per Week   Duration Until Therapy Goals Met   Problem List    Problems Impaired Bed Mobility;Impaired Transfers;Impaired Ambulation;Functional Strength Deficit;Impaired Balance;Decreased Activity Tolerance  (vertigo)   Anticipated Discharge Equipment and Recommendations   DC Equipment Recommendations Unable to determine at this time   Discharge Recommendations Recommend post-acute placement for additional physical therapy services prior to discharge home   Interdisciplinary Plan of Care Collaboration   IDT Collaboration with  Nursing   Patient Position at End of Therapy In Bed;Call Light within Reach;Tray Table within Reach   Collaboration Comments RN updated   Session Information   Date / Session Number  2/14 -1 (1/5, 2/20)   Priority   (CVA)

## 2025-02-14 NOTE — DISCHARGE PLANNING
Case Management Discharge Planning    Admission Date: 2/10/2025  GMLOS: 4.5  ALOS: 4    6-Clicks ADL Score:    6-Clicks Mobility Score: 10  PT and/or OT Eval ordered: Yes  Post-acute Referrals Ordered: Yes  Post-acute Choice Obtained: No  Has referral(s) been sent to post-acute provider:  Yes    Anticipated Discharge Dispo: Discharge Disposition: Disch to IP rehab facility or distinct part unit (62)    DME Needed: No    Action(s) Taken:   Chart review completed. Patient was discussed during IDT rounds.     Referrals were sent to SNFs and [facility] per protocol.     [facility] RN met with the pt's son, Vernon, at the bedside to discuss anticipated discharge. Vernon was informed on [facility] blanket referral policy and was agreeable with post-acute placement if indicated upon discharge. He was provided with PMR and SNF choice forms for review; pending accepting facilities to make choice.     PASRR was completed: [id number]    Escalations Completed: None    Medically Clear: No    Next Steps:  [facility] RN to follow up with medical team to discuss discharge barriers or needs.     Barriers to Discharge: Medical clearance and Pending Placement

## 2025-02-14 NOTE — THERAPY
Speech Language Pathology   Flexible Endoscopic Evaluation of Swallowing (FEES)        Patient Name: Keyur Burt  AGE:  74 y.o., SEX:  female  Medical Record #: [id number]  Date of Service: 2/14/2025      History of Present Illness  Patient is a 74 y.o. female who presented on 2/10 after being found down. Found to have a right temporal ICH and mild diffuse bilateral SAH. S/p cerebral angiogram 2/13.  Pt intubated 2/10 - 2/13.     PMHx: breast cancer, HTN, HPL, CKD, DM      Pertinent Information  Current Method of Nutrition: NPO until cleared by speech pathology  Patient Behaviors: Drowsy   Dentition: Fair   Feeding Tube: None   Tracheostomy: No   Factor(s) Affecting Performance: None     Discussed the risks, benefits, and alternatives of the FEES procedure. Patient/family acknowledged and agreed to proceed.      Assessment  Flexible Endoscopic Evaluation of Swallowing (FEES) completed at bedside today. The endoscope was passed transnasally via Right nare to evaluate the anatomy and physiology of swallowing. Pt tolerated the procedure with no apparent distress.    Anatomic Findings: space occupying lesions of b/l posterior vocal folds, consistent with intubation hx; hypertrophy of false vocal folds, interarytenoid thickening - could be consistent with possible reflux/esophageal dysfunction    Vocal Fold Motion: Overcompensation of R arytenoid, reduced L arytenoid movement; full adduction achieved   Secretion Management: Adequate  PO Trials: Ice Chips, Thin Liquid, Mildly Thick Liquid, Liquidised, Pudding, Regular Solid, Mixed      Consistency PAS Score Timing Residue Comments   Thin Liquid (TN0) 6 Pre Swallow Vallecular Residue: Trace (1%-5%)  Pyriform Sinus Residue: Trace (1%-5%) Tsp: PAS 4 before  Cup: PAS 1, 1  Straw: PAS 3, 6, 2, 1   Mildly Thick (MT2) 1 N/A Vallecular Residue: Trace (1%-5%)  Pyriform Sinus Residue: Trace (1%-5%) Cup: PAS 1, 1   Straw: PAS 1, 1    Liquidised (LQ3) 1 N/A Vallecular Residue:  Trace (1%-5%)  Pyriform Sinus Residue: Trace (1%-5%) PAS 1, 1    Pudding (PU4) 1 N/A Vallecular Residue: Trace (1%-5%)  Pyriform Sinus Residue: Trace (1%-5%) PAS 1, 1    Regular Solid (RG7) 1 N/A Vallecular Residue: Trace (1%-5%)  Pyriform Sinus Residue: None (0%) PAS 1   Mixed (SB6/TN0) 1 N/A Vallecular Residue: Trace (1%-5%)  Pyriform Sinus Residue: Trace (1%-5%) PAS 1     Penetration-Aspiration Scale (PAS)  1     No contrast enters airway  2     Contrast enters the airway, remains above the vocal folds, and is ejected from the airway (not seen in the airway at the end of the swallow).  3     Contrast enters the airway, remains above the vocal folds, and is not ejected from the airway (is seen in the airway after the swallow).  4     Contrast enters the airway, contacts the vocal folds, and is ejected from the airway.  5     Contrast enters the airway, contacts the vocal folds, and is not ejected from the airway  6     Contrast enters the airway, crosses the plane of the vocal folds, and is ejected from the airway.  7     Contrast enters the airway, crosses the plane of the vocal folds, and is not ejected from the airway despite effort.  8     Contrast enters the airway, crosses the plane of the vocal folds, is not ejected from the airway and there is no response to aspiration.      Oral Phase:   Patient with adequate labial seal around cup and straw without anterior loss of bolus. Suspected piecemeal deglutition with PO visualized entering pharynx between multiple swallows per bolus with chewable consistencies. Mastication was complete and functional, evidenced by well mashed bolus visualized in pharynx before the swallow. No oral residue visualized upon inspection at end of study.     Pharyngeal Phase:  - Loss of oral bolus control resulted in single instance flash aspiration (inferred) of TN0 by straw before the swallow. Deficit also resulted in laryngeal penetration (inferred) of TN0 by straw during the  swallow, which cleared with reflexive cleansing swallow.   - Trace pharyngeal residue. This is a normal variant of the swallow.     Of note, minimal amount of backflow visualized at UES with TN0 and PU4. Reflexive cleansing swallow eliminated.     Compensatory Strategies:  - Reflexive cough ejected aspiration.   - Reflexive cleansing swallow ejected penetration.     Results/recommendations discussed with patient, RN. All questions addressed.       Severity Rating:  AUBREY: Mild      Clinical Impressions  Patient presents with a mild oropharyngeal dysphagia, likely acute r/t lethargy, intubation hx, and neuro involvement. Swallow safety is impaired, while swallow efficiency is intact. Primary deficits include loss of oral bolus control. Deficits are best managed with diet modification and compensatory strategies. Recommend diet initiation of SB6/TN0 with below strategies. Patient is a good candidate for behavioral swallow rehabilitation. Consider OP GI referral for possible esophageal dysfunction. Service will continue to follow to maximize swallowing outcomes.      Please contact SLP with signs of distress during oral intake; make NPO per clinical judgement.       Recommendations  Diet Consistency: Soft and bite size solids, thin liquids  Medication: As tolerated  Supervision: Assist with meal tray set up, Distant supervision - check on patient 2-3 times per meal  Positioning: Fully upright and midline during oral intake, Remain upright for 45 minutes after oral intake  Strategies: Slow rate of intake, Alternate bites and sips  Oral Care: BID  Additional Instrumentation: None  Consult Referral(s): Gastroenterologist (OP appropriate)      SLP Treatment Plan  Treatment Plan: Dysphagia Treatment  SLP Frequency: 3x Per Week  Estimated Duration: Until Therapy Goals Met      Anticipated Discharge Needs  Discharge Recommendations: Anticipate that the patient will have no further speech therapy needs after discharge from the  hospital   Therapy Recommendations Upon DC: Not Indicated       Patient / Family Goals  Patient / Family Goal #1: To get better  Goal #1 Outcome: Progressing as expected  Short Term Goal # 1: Patient will complete a diagnostic swallow study to further assess swallow function and inform POC  Goal Outcome # 1: Goal met, new goal added  Short Term Goal # 1 B : Patient will consume least restrictive solids/TN0 without s/sx of airway invasion.      Nataliia Miguel, SLP

## 2025-02-14 NOTE — PROGRESS NOTES
4 Eyes Skin Assessment Completed by RANDOLPH Hendricks and RANDOLPH Romero.    Head WDL  Ears Redness and Blanching  Nose WDL  Mouth WDL  Neck WDL  Breast/Chest Scar bilat masectomy  Shoulder Blades WDL  Spine Redness and Blanching  (R) Arm/Elbow/Hand Redness, Bruising, Swelling, and Edema  (L) Arm/Elbow/Hand Bruising, Swelling, and Edema  Abdomen Scar  Groin WDL  Scrotum/Coccyx/Buttocks Redness and Blanching  (R) Leg Edema  (L) Leg Edema  (R) Heel/Foot/Toe WDL  (L) Heel/Foot/Toe WDL          Devices In Places Tele Box, Blood Pressure Cuff, Pulse Ox, and Nasal Cannula      Interventions In Place Gray Ear Foams, Heel Mepilex, TAP System, Pillows, Q2 Turns, and Barrier Cream    Possible Skin Injury No    Pictures Uploaded Into Epic Yes  Wound Consult Placed N/A  RN Wound Prevention Protocol Ordered   Yes

## 2025-02-15 LAB
ANION GAP SERPL CALC-SCNC: 12 MMOL/L (ref 7–16)
BUN SERPL-MCNC: 13 MG/DL (ref 8–22)
CALCIUM SERPL-MCNC: 8.5 MG/DL (ref 8.5–10.5)
CHLORIDE SERPL-SCNC: 107 MMOL/L (ref 96–112)
CO2 SERPL-SCNC: 22 MMOL/L (ref 20–33)
CREAT SERPL-MCNC: 0.68 MG/DL (ref 0.5–1.4)
ERYTHROCYTE [DISTWIDTH] IN BLOOD BY AUTOMATED COUNT: 47.9 FL (ref 35.9–50)
GFR SERPLBLD CREATININE-BSD FMLA CKD-EPI: 92 ML/MIN/1.73 M 2
GLUCOSE BLD STRIP.AUTO-MCNC: 109 MG/DL (ref 65–99)
GLUCOSE BLD STRIP.AUTO-MCNC: 116 MG/DL (ref 65–99)
GLUCOSE SERPL-MCNC: 110 MG/DL (ref 65–99)
HCT VFR BLD AUTO: 41.1 % (ref 37–47)
HGB BLD-MCNC: 13.8 G/DL (ref 12–16)
MAGNESIUM SERPL-MCNC: 2.1 MG/DL (ref 1.5–2.5)
MCH RBC QN AUTO: 32.3 PG (ref 27–33)
MCHC RBC AUTO-ENTMCNC: 33.6 G/DL (ref 32.2–35.5)
MCV RBC AUTO: 96.3 FL (ref 81.4–97.8)
NT-PROBNP SERPL IA-MCNC: 953 PG/ML (ref 0–125)
PHOSPHATE SERPL-MCNC: 2 MG/DL (ref 2.5–4.5)
PLATELET # BLD AUTO: 236 K/UL (ref 164–446)
PMV BLD AUTO: 8.9 FL (ref 9–12.9)
POTASSIUM SERPL-SCNC: 3.6 MMOL/L (ref 3.6–5.5)
RBC # BLD AUTO: 4.27 M/UL (ref 4.2–5.4)
SODIUM SERPL-SCNC: 141 MMOL/L (ref 135–145)
WBC # BLD AUTO: 10.6 K/UL (ref 4.8–10.8)

## 2025-02-15 PROCEDURE — A9270 NON-COVERED ITEM OR SERVICE: HCPCS | Performed by: STUDENT IN AN ORGANIZED HEALTH CARE EDUCATION/TRAINING PROGRAM

## 2025-02-15 PROCEDURE — 700102 HCHG RX REV CODE 250 W/ 637 OVERRIDE(OP): Performed by: INTERNAL MEDICINE

## 2025-02-15 PROCEDURE — 770020 HCHG ROOM/CARE - TELE (206)

## 2025-02-15 PROCEDURE — 700102 HCHG RX REV CODE 250 W/ 637 OVERRIDE(OP)

## 2025-02-15 PROCEDURE — A9270 NON-COVERED ITEM OR SERVICE: HCPCS | Performed by: HOSPITALIST

## 2025-02-15 PROCEDURE — 83735 ASSAY OF MAGNESIUM: CPT

## 2025-02-15 PROCEDURE — 700102 HCHG RX REV CODE 250 W/ 637 OVERRIDE(OP): Performed by: HOSPITALIST

## 2025-02-15 PROCEDURE — 700102 HCHG RX REV CODE 250 W/ 637 OVERRIDE(OP): Performed by: NURSE PRACTITIONER

## 2025-02-15 PROCEDURE — 700101 HCHG RX REV CODE 250: Performed by: NURSE PRACTITIONER

## 2025-02-15 PROCEDURE — 85027 COMPLETE CBC AUTOMATED: CPT

## 2025-02-15 PROCEDURE — 92526 ORAL FUNCTION THERAPY: CPT

## 2025-02-15 PROCEDURE — 97530 THERAPEUTIC ACTIVITIES: CPT

## 2025-02-15 PROCEDURE — 700111 HCHG RX REV CODE 636 W/ 250 OVERRIDE (IP): Performed by: INTERNAL MEDICINE

## 2025-02-15 PROCEDURE — 83880 ASSAY OF NATRIURETIC PEPTIDE: CPT

## 2025-02-15 PROCEDURE — 84100 ASSAY OF PHOSPHORUS: CPT

## 2025-02-15 PROCEDURE — 36415 COLL VENOUS BLD VENIPUNCTURE: CPT

## 2025-02-15 PROCEDURE — 700102 HCHG RX REV CODE 250 W/ 637 OVERRIDE(OP): Performed by: STUDENT IN AN ORGANIZED HEALTH CARE EDUCATION/TRAINING PROGRAM

## 2025-02-15 PROCEDURE — 92523 SPEECH SOUND LANG COMPREHEN: CPT

## 2025-02-15 PROCEDURE — 99233 SBSQ HOSP IP/OBS HIGH 50: CPT | Performed by: STUDENT IN AN ORGANIZED HEALTH CARE EDUCATION/TRAINING PROGRAM

## 2025-02-15 PROCEDURE — A9270 NON-COVERED ITEM OR SERVICE: HCPCS

## 2025-02-15 PROCEDURE — A9270 NON-COVERED ITEM OR SERVICE: HCPCS | Performed by: NURSE PRACTITIONER

## 2025-02-15 PROCEDURE — 82962 GLUCOSE BLOOD TEST: CPT

## 2025-02-15 PROCEDURE — A9270 NON-COVERED ITEM OR SERVICE: HCPCS | Performed by: INTERNAL MEDICINE

## 2025-02-15 PROCEDURE — 80048 BASIC METABOLIC PNL TOTAL CA: CPT

## 2025-02-15 RX ORDER — HYDROCODONE BITARTRATE AND ACETAMINOPHEN 5; 325 MG/1; MG/1
1 TABLET ORAL EVERY 6 HOURS PRN
Refills: 0 | Status: DISCONTINUED | OUTPATIENT
Start: 2025-02-15 | End: 2025-02-16

## 2025-02-15 RX ORDER — POTASSIUM CHLORIDE 1500 MG/1
40 TABLET, EXTENDED RELEASE ORAL ONCE
Status: COMPLETED | OUTPATIENT
Start: 2025-02-15 | End: 2025-02-15

## 2025-02-15 RX ORDER — ONDANSETRON 4 MG/1
4 TABLET, ORALLY DISINTEGRATING ORAL EVERY 4 HOURS PRN
Status: DISCONTINUED | OUTPATIENT
Start: 2025-02-15 | End: 2025-02-21 | Stop reason: HOSPADM

## 2025-02-15 RX ORDER — ACETAMINOPHEN 650 MG/1
650 SUPPOSITORY RECTAL EVERY 4 HOURS PRN
Status: DISCONTINUED | OUTPATIENT
Start: 2025-02-15 | End: 2025-02-21 | Stop reason: HOSPADM

## 2025-02-15 RX ORDER — ATORVASTATIN CALCIUM 20 MG/1
20 TABLET, FILM COATED ORAL
Status: DISCONTINUED | OUTPATIENT
Start: 2025-02-17 | End: 2025-02-21 | Stop reason: HOSPADM

## 2025-02-15 RX ORDER — LEVOTHYROXINE SODIUM 125 UG/1
125 TABLET ORAL
Status: DISCONTINUED | OUTPATIENT
Start: 2025-02-17 | End: 2025-02-21 | Stop reason: HOSPADM

## 2025-02-15 RX ORDER — LACOSAMIDE 100 MG/1
100 TABLET ORAL 2 TIMES DAILY
Status: DISCONTINUED | OUTPATIENT
Start: 2025-02-15 | End: 2025-02-21 | Stop reason: HOSPADM

## 2025-02-15 RX ORDER — ONDANSETRON 2 MG/ML
4 INJECTION INTRAMUSCULAR; INTRAVENOUS EVERY 4 HOURS PRN
Status: DISCONTINUED | OUTPATIENT
Start: 2025-02-15 | End: 2025-02-21 | Stop reason: HOSPADM

## 2025-02-15 RX ORDER — LISINOPRIL 20 MG/1
40 TABLET ORAL
Status: DISCONTINUED | OUTPATIENT
Start: 2025-02-16 | End: 2025-02-21 | Stop reason: HOSPADM

## 2025-02-15 RX ORDER — POLYETHYLENE GLYCOL 3350 17 G/17G
1 POWDER, FOR SOLUTION ORAL
Status: DISCONTINUED | OUTPATIENT
Start: 2025-02-15 | End: 2025-02-21 | Stop reason: HOSPADM

## 2025-02-15 RX ORDER — OSELTAMIVIR PHOSPHATE 75 MG/1
75 CAPSULE ORAL 2 TIMES DAILY
Status: COMPLETED | OUTPATIENT
Start: 2025-02-15 | End: 2025-02-15

## 2025-02-15 RX ORDER — AMOXICILLIN 250 MG
2 CAPSULE ORAL 2 TIMES DAILY
Status: DISCONTINUED | OUTPATIENT
Start: 2025-02-15 | End: 2025-02-21 | Stop reason: HOSPADM

## 2025-02-15 RX ORDER — ATORVASTATIN CALCIUM 10 MG/1
10 TABLET, FILM COATED ORAL
Status: DISCONTINUED | OUTPATIENT
Start: 2025-02-16 | End: 2025-02-21 | Stop reason: HOSPADM

## 2025-02-15 RX ORDER — ACETAMINOPHEN 325 MG/1
650 TABLET ORAL EVERY 4 HOURS PRN
Status: DISCONTINUED | OUTPATIENT
Start: 2025-02-15 | End: 2025-02-21 | Stop reason: HOSPADM

## 2025-02-15 RX ORDER — BISACODYL 10 MG
10 SUPPOSITORY, RECTAL RECTAL
Status: DISCONTINUED | OUTPATIENT
Start: 2025-02-15 | End: 2025-02-21 | Stop reason: HOSPADM

## 2025-02-15 RX ORDER — AMLODIPINE BESYLATE 5 MG/1
5 TABLET ORAL
Status: DISCONTINUED | OUTPATIENT
Start: 2025-02-15 | End: 2025-02-17

## 2025-02-15 RX ORDER — BUTALBITAL, ACETAMINOPHEN AND CAFFEINE 50; 325; 40 MG/1; MG/1; MG/1
1 TABLET ORAL
Status: COMPLETED | OUTPATIENT
Start: 2025-02-15 | End: 2025-02-15

## 2025-02-15 RX ORDER — HYDROCODONE BITARTRATE AND ACETAMINOPHEN 10; 325 MG/1; MG/1
1 TABLET ORAL EVERY 6 HOURS PRN
Refills: 0 | Status: DISCONTINUED | OUTPATIENT
Start: 2025-02-15 | End: 2025-02-16

## 2025-02-15 RX ADMIN — HYDROCODONE BITARTRATE AND ACETAMINOPHEN 1 TABLET: 5; 325 TABLET ORAL at 05:22

## 2025-02-15 RX ADMIN — DIBASIC SODIUM PHOSPHATE, MONOBASIC POTASSIUM PHOSPHATE AND MONOBASIC SODIUM PHOSPHATE 500 MG: 852; 155; 130 TABLET ORAL at 17:36

## 2025-02-15 RX ADMIN — LABETALOL HYDROCHLORIDE 10 MG: 5 INJECTION INTRAVENOUS at 21:21

## 2025-02-15 RX ADMIN — AMLODIPINE BESYLATE 5 MG: 5 TABLET ORAL at 08:02

## 2025-02-15 RX ADMIN — BENZONATATE 100 MG: 100 CAPSULE ORAL at 05:29

## 2025-02-15 RX ADMIN — OSELTAMIVIR PHOSPHATE 75 MG: 75 CAPSULE ORAL at 05:20

## 2025-02-15 RX ADMIN — LEVOTHYROXINE SODIUM 125 MCG: 0.12 TABLET ORAL at 05:21

## 2025-02-15 RX ADMIN — HYDROCODONE BITARTRATE AND ACETAMINOPHEN 1 TABLET: 10; 325 TABLET ORAL at 09:39

## 2025-02-15 RX ADMIN — LACOSAMIDE 100 MG: 100 TABLET, FILM COATED ORAL at 05:21

## 2025-02-15 RX ADMIN — ANASTROZOLE 1 MG: 1 TABLET ORAL at 05:21

## 2025-02-15 RX ADMIN — HYDROCODONE BITARTRATE AND ACETAMINOPHEN 1 TABLET: 10; 325 TABLET ORAL at 23:07

## 2025-02-15 RX ADMIN — BUTALBITAL, ACETAMINOPHEN AND CAFFEINE 1 TABLET: 325; 50; 40 TABLET ORAL at 12:47

## 2025-02-15 RX ADMIN — POTASSIUM CHLORIDE 40 MEQ: 1500 TABLET, EXTENDED RELEASE ORAL at 08:02

## 2025-02-15 RX ADMIN — LISINOPRIL 30 MG: 10 TABLET ORAL at 05:21

## 2025-02-15 RX ADMIN — HYDROCODONE BITARTRATE AND ACETAMINOPHEN 1 TABLET: 10; 325 TABLET ORAL at 17:37

## 2025-02-15 RX ADMIN — LIDOCAINE 1 PATCH: 4 PATCH TOPICAL at 21:22

## 2025-02-15 RX ADMIN — LABETALOL HYDROCHLORIDE 10 MG: 5 INJECTION INTRAVENOUS at 11:45

## 2025-02-15 RX ADMIN — OSELTAMIVIR PHOSPHATE 75 MG: 75 CAPSULE ORAL at 17:36

## 2025-02-15 RX ADMIN — ATORVASTATIN CALCIUM 10 MG: 10 TABLET, FILM COATED ORAL at 05:21

## 2025-02-15 RX ADMIN — HYDRALAZINE HYDROCHLORIDE 20 MG: 20 INJECTION, SOLUTION INTRAMUSCULAR; INTRAVENOUS at 23:14

## 2025-02-15 RX ADMIN — LACOSAMIDE 100 MG: 100 TABLET, FILM COATED ORAL at 17:36

## 2025-02-15 RX ADMIN — DIBASIC SODIUM PHOSPHATE, MONOBASIC POTASSIUM PHOSPHATE AND MONOBASIC SODIUM PHOSPHATE 500 MG: 852; 155; 130 TABLET ORAL at 12:47

## 2025-02-15 RX ADMIN — HYDRALAZINE HYDROCHLORIDE 20 MG: 20 INJECTION, SOLUTION INTRAMUSCULAR; INTRAVENOUS at 09:43

## 2025-02-15 ASSESSMENT — COGNITIVE AND FUNCTIONAL STATUS - GENERAL
TURNING FROM BACK TO SIDE WHILE IN FLAT BAD: A LITTLE
TOILETING: A LOT
STANDING UP FROM CHAIR USING ARMS: A LOT
SUGGESTED CMS G CODE MODIFIER DAILY ACTIVITY: CL
MOBILITY SCORE: 14
WALKING IN HOSPITAL ROOM: A LOT
WALKING IN HOSPITAL ROOM: TOTAL
DRESSING REGULAR LOWER BODY CLOTHING: A LOT
SUGGESTED CMS G CODE MODIFIER MOBILITY: CL
DRESSING REGULAR UPPER BODY CLOTHING: A LOT
HELP NEEDED FOR BATHING: A LOT
MOVING FROM LYING ON BACK TO SITTING ON SIDE OF FLAT BED: A LOT
TURNING FROM BACK TO SIDE WHILE IN FLAT BAD: A LOT
SUGGESTED CMS G CODE MODIFIER MOBILITY: CL
DAILY ACTIVITIY SCORE: 12
CLIMB 3 TO 5 STEPS WITH RAILING: TOTAL
STANDING UP FROM CHAIR USING ARMS: A LITTLE
EATING MEALS: A LOT
MOBILITY SCORE: 11
PERSONAL GROOMING: A LOT
MOVING TO AND FROM BED TO CHAIR: A LITTLE
CLIMB 3 TO 5 STEPS WITH RAILING: TOTAL
MOVING TO AND FROM BED TO CHAIR: A LOT
MOVING FROM LYING ON BACK TO SITTING ON SIDE OF FLAT BED: A LITTLE

## 2025-02-15 ASSESSMENT — PAIN DESCRIPTION - PAIN TYPE
TYPE: ACUTE PAIN
TYPE: ACUTE PAIN
TYPE: CHRONIC PAIN
TYPE: ACUTE PAIN

## 2025-02-15 ASSESSMENT — ENCOUNTER SYMPTOMS
HEADACHES: 1
FEVER: 0
WEAKNESS: 1

## 2025-02-15 ASSESSMENT — GAIT ASSESSMENTS: GAIT LEVEL OF ASSIST: UNABLE TO PARTICIPATE

## 2025-02-15 ASSESSMENT — FIBROSIS 4 INDEX: FIB4 SCORE: 2.66

## 2025-02-15 NOTE — THERAPY
Speech Language Pathology   Cognitive Evaluation     Patient Name: Keyur Burt  AGE:  74 y.o., SEX:  female  Medical Record #: [id number]  Date of Service: 2/15/2025    History of Present Illness  Patient is a 74 y.o. female who presented on 2/10 after being found down. Found to have a right temporal ICH and mild diffuse bilateral SAH. S/p cerebral angiogram 2/13.  Pt intubated 2/10 - 2/13.      PMHx: breast cancer, HTN, HPL, CKD, DM    General Information  Vitals  O2 (LPM): 3  O2 Delivery Device: Nasal Cannula  Level of Consciousness: Alert, Awake  Patient Behaviors: Fatigue  Orientation: Oriented x 4  Follows Directives: Yes    Prior Living Situation & Level of Function  Housing / Facility: [address]  Lives with - Patient's Self Care Capacity: Adult Children     Communication: WNL  Swallowing: WNL     Oral Mechanism Evaluation  Dentition: Fair  Facial Symmetry: Equal  Facial Sensation: Equal  Labial Observations: WFL  Lingual Observations: Midline  Motor Speech: WNL    Laryngeal Function Exam  Secretion Management: Adequate  Voice Quality: WFL  Cough: Perceptually WNL    Subjective  Pt seen A&Ox4 saturating on 3L NC. She endorsed that she was fatigued. She denied any changes in her cognitive-communication skills    Communication Domain(s)  Expressive Language: WFL  Receptive Language: WFL  Cognitive-Linguistic: WFL  Reading: WFL  Social/Pragmatic: WFL    Assessment  The patient was seen this date for a Speech-Language/Cognitive evaluation.    Cognistat  Orientation: Average  Attention: Average  Comprehension: Average  Repetition: Average  Naming: Average  Memory: Average  Calculations: Mild  Similarities: Average  Judgement: Average    Medication Management  Medication Management: +2/2 Indpt    Clinical Impressions  Pt's cognitive-communication skills appear to be at baseline and functional to resume iADLs post d/c. The pt demo'd slowed information processing speed which she attributed to fatigue- but was  able to complete simple problem solving, memory and medication management tasks with functional (> 80%) accuracy. No further ST services addressing speech, language or cognition appear indicated at this time.     NOTE: It is not within the scope of practice of Speech-Language Pathologists to determine patient capacity. Please defer to the physician or psych to complete this assessment.     Recommendations  Supervision Needs Upons Discharge: None  IADLs: N/A  Consult Referral(s): Gastroenterologist (OP appropriate)    SLP Treatment Plan  Treatment Plan: Dysphagia Treatment  SLP Frequency: 2x Per Week  Estimated Duration: Until Therapy Goals Met      Anticipated Discharge Needs  Discharge Recommendations: Anticipate that the patient will have no further speech therapy needs after discharge from the hospital  Therapy Recommendations Upon DC: Not Indicated    Patient / Family Goals  Patient / Family Goal #1: To get better  Goal #1 Outcome: Progressing as expected  Short Term Goal # 1: Patient will complete a diagnostic swallow study to further assess swallow function and inform POC  Goal Outcome # 1: Goal met  Short Term Goal # 1 B : Patient will consume least restrictive solids/TN0 without s/sx of airway invasion.  Goal Outcome # 2 : Progressing as expected    Candice Rosado, SLP

## 2025-02-15 NOTE — ASSESSMENT & PLAN NOTE
through 165 over the last 24 hours  - SBP improving; if you touchups greater than 140.  Has required 3 doses IV antihypertensive the last 24 hours  - Increase amlodipine to 10 mg   - Continue lisinopril 40 mg  - Close monitoring of blood pressures  - SBP goal 100-140  - Continuous telemetry monitoring; monitoring for arrhythmia in the setting of frequent IV antihypertensives for intracranial hemorrhage

## 2025-02-15 NOTE — PROGRESS NOTES
Received bedside report from RN Ruthie, pt care assumed. VSS, pt assessment complete. Pt A&Ox4,  c/o 5/10 shoulder pain at this time. POC discussed with pt and verbalizes no questions. Pt denies any additional needs at this time. Bed locked and in lowest position, bed alarm on. Pt educated on fall risk and verbalized understanding, call light within reach, hourly rounding initiated.

## 2025-02-15 NOTE — ASSESSMENT & PLAN NOTE
Intubated 2/10  Extubated 2/13  Currently requiring 2 L/min supplemental O2  - Wean oxygen as able  - Encourage incentive spirometry and ambulation as able

## 2025-02-15 NOTE — PROGRESS NOTES
[facility] Medicine Daily Progress Note    Date of Service  2/15/2025    Chief Complaint  eKyur Burt is a 74 y.o. female admitted 2/10/2025 with altered mental status.    Hospital Course  Karen Burt is a 74-year-old female with PMHx DMT2, recurrent breast cancer, hypothyroidism, HLD, HTN, CKD 3B.  Admitted 2/10 for intracranial hemorrhage.    Per history-patient was scheduled to meet with her friends.  When she did not arrive, patient's son checked on her.  He found that she was laying on the ground and confused.  EMS was called.  She was transferred to the ED for further evaluation.  CT head: Intracranial hemorrhage.  Due to agitation, confusion and combativeness-patient was intubated and transferred to the ICU for further evaluation.    MRI brain: Right posterior temporal hemorrhage.  MRA head without evidence of aneurysm, stenosis or vascular malformation.  Mild right cerebral subarachnoid hemorrhage.  Mild right cerebral subdural hemorrhage.  Patient noted to have witnessed seizure 2/11.  Not captured on cEEG.  Patient was started on Vimpat for seizure prophylaxis.    Additionally, patient found to be flu positive.    Patient underwent diagnostic cerebral angiogram 2/13.  Early blush and area of hematoma.  Recommending repeat angiogram in 6 to 8 weeks.    Patient was extubated on 2/13.  Nicardipine drip was discontinued.  SBP goal 100-140.  Stable for transfer to telemetry floor.  PT OT recommending postacute rehabilitation.    Interval Problem Update  2/15: Vitals notable for SBP ranging 129 through 166.  She has received multiple doses of both hydralazine and labetalol for blood pressure control overnight.  Start amlodipine.  Increase lisinopril to 40 mg.  Discontinue sliding scale insulin; patient has not required to the last 5 days since admission.  Potassium 3.6; replaced. Patient complains of headache. Increase norco, add Fioricet one-time dose.    I have discussed this patient's plan of care and  discharge plan at IDT rounds today with Case Management, Nursing, Nursing leadership, and other members of the IDT team.    Consultants/Specialty  critical care, neurology, and IR    Code Status  Full Code    Disposition  The patient is not medically cleared for discharge to home or a post-acute facility.      I have placed the appropriate orders for post-discharge needs.    Review of Systems  Review of Systems   Constitutional:  Positive for malaise/fatigue. Negative for fever.   Cardiovascular:  Negative for chest pain and leg swelling.   Neurological:  Positive for weakness and headaches.        Physical Exam  Temp:  [36.6 °C (97.9 °F)-37.2 °C (99 °F)] 36.8 °C (98.2 °F)  Pulse:  [72-90] 90  Resp:  [15-21] 16  BP: (131-166)/(60-81) 161/72  SpO2:  [94 %-97 %] 95 %    Physical Exam  Vitals and nursing note reviewed.   Constitutional:       General: She is not in acute distress.     Appearance: Normal appearance. She is obese. She is ill-appearing.   Cardiovascular:      Rate and Rhythm: Normal rate and regular rhythm.      Heart sounds: Murmur heard.   Pulmonary:      Effort: Pulmonary effort is normal. No respiratory distress.      Breath sounds: No wheezing.   Skin:     General: Skin is warm and dry.      Coloration: Skin is pale.   Neurological:      Mental Status: She is alert and oriented to person, place, and time.      Motor: Weakness present.   Psychiatric:         Mood and Affect: Mood normal.         Behavior: Behavior normal.         Fluids    Intake/Output Summary (Last 24 hours) at 2/15/2025 1247  Last data filed at 2/14/2025 2200  Gross per 24 hour   Intake --   Output 675 ml   Net -675 ml        Laboratory  Recent Labs     02/13/25  0615 02/14/25  0310 02/15/25  0143   WBC 8.8 9.7 10.6   RBC 3.87* 3.95* 4.27   HEMOGLOBIN 12.6 12.7 13.8   HEMATOCRIT 36.7* 37.6 41.1   MCV 94.8 95.2 96.3   MCH 32.6 32.2 32.3   MCHC 34.3 33.8 33.6   RDW 47.3 47.0 47.9   PLATELETCT 158* 197 236   MPV 9.0 8.9* 8.9*      Recent Labs     02/13/25  0615 02/14/25  0310 02/15/25  0143   SODIUM 140 142 141   POTASSIUM 3.5* 3.3* 3.6   CHLORIDE 108 110 107   CO2 22 20 22   GLUCOSE 145* 134* 110*   BUN 18 18 13   CREATININE 0.71 0.67 0.68   CALCIUM 7.8* 8.0* 8.5                     Imaging  IR-CAROTID-CEREBRAL BILATERAL   Final Result      There are a few abnormal tiny blood vessels noted in the expected location of the right temporal hemorrhage. There is slight early filling of the adjacent superficial veins compared with the other cortical veins. These findings may be secondary to the    hemorrhage itself. However, the possibility of small 'burned out' vascular malformation cannot be fully excluded. Follow-up cerebral angiogram is recommended after 6 weeks to rule out any possibility of tiny residual vascular malformation.      DX-CHEST-PORTABLE (1 VIEW)   Final Result         1.  Left basilar atelectasis, no focal infiltrate   2.  Nasogastric tube terminates just distal to the gastroesophageal junction with side-port in the distal esophagus, position is unchanged since prior study, recommend advancement      MR-MRA HEAD-W/O   Final Result      1.  There is no evidence of aneurysm, stenosis or vascular malformation in the visualized proximal vasculature or adjacent to the hematoma.   2.  Right posterior temporal parenchymal hemorrhage.   3.  Mild right cerebral diffuse subarachnoid hemorrhage.   4.  Mild right cerebral subdural hemorrhage.      MR-BRAIN-WITH & W/O   Final Result      Right posterior temporal hemorrhage. There are no abnormal flow voids or large nodular enhancement. There is tiny punctate enhancement within the hemorrhage likely representing vascular enhancement.Pre and postcontrast MR examination is recommended after    6 weeks. Due to the atypical location of the hemorrhage, catheter angiogram may be needed to rule out the possibility of any tiny vascular malformation.      EC-ECHOCARDIOGRAM COMPLETE W/O CONT   Final  Result      DX-CHEST-PORTABLE (1 VIEW)   Final Result         1.  No acute cardiopulmonary disease.   2.  Nasogastric tube terminates just distal to the gastroesophageal junction with side-port in the distal esophagus, recommend advancement      CT-CTA NECK WITH & W/O-POST PROCESSING   Final Result         1.  CT angiogram of the neck within normal limits.         CT-CTA HEAD WITH & W/O-POST PROCESS   Final Result         1.  No large vessel occlusion or aneurysm identified.   2.  Right posterior temporal parenchymal hemorrhage, stable since prior study.   3.  Scattered subarachnoid hemorrhages, overall stable since prior study.      DX-CHEST-PORTABLE (1 VIEW)   Final Result         1.  No acute cardiopulmonary disease.   2.  Nasogastric tube with tip just distal to the gastroesophageal junction, recommend advancement      DX-ABDOMEN FOR TUBE PLACEMENT   Final Result         1.  Nonspecific bowel gas pattern in the upper abdomen.   2.  Nasogastric tube with tip just distal to the gastroesophageal junction, recommend advancement      CT-HEAD W/O   Final Result         1.  Right posterior temporal parenchymal hemorrhages stranding vasogenic edema   2.  Bilateral subarachnoid hemorrhages as described.   3.  Right posterior parafalcine subdural hematoma measuring 2.4 mm.   4.  Subdural versus subarachnoid hemorrhage along the right tentorium measuring 3.1 mm.      These findings were discussed with the patient's clinician, George Wise, on 2/10/2025 8:42 PM.         DX-CHEST-PORTABLE (1 VIEW)   Final Result      Hypoinflation without a definite acute abnormality.           Assessment/Plan  * Acute intracranial hemorrhage (HCC)- (present on admission)  Assessment & Plan  Right posterior temporal parenchymal hemorrhage noted + SAH as above  CTA H&N without obvious aneurism  Hemorrhage at this time appears small without significant mass effect or hydrocephalus to warrant surgical decompression or EVD   Brain MRI/MRA  without vascular abnormalities, stable hematoma   - Statin   - See plan for SAH   - Neuro improving, continue weaning/stopping sedation     Influenza A  Assessment & Plan  Flu A +   No evidence of pneumonia  Continue Tamiflu; end date 2/15    Seizure (HCC)  Assessment & Plan  Clinical seizure x1 2/11  Secondary to ICH  cEEG without epileptiform activity  Doing well, awake, non focal  - Continue vimpat BID  - Will need outpt neuro f/u     Hypokalemia  Assessment & Plan  Potassium 3.6  Replaced  - Repeat BMP in a.m.    Lactic acidosis  Assessment & Plan  Resolved    Acute respiratory failure with hypoxia (HCC)- (present on admission)  Assessment & Plan  Intubated 2/10  Extubated 2/13  Currently requiring 3 L/min  - Wean oxygen as able  - Encourage incentive spirometry and ambulation as able    Acute encephalopathy- (present on admission)  Assessment & Plan  Admitted with agitation  ICH/SAH and UTI  Improving    Urinary tract infection without hematuria- (present on admission)  Assessment & Plan  UA abnormal  -Neg cultures  -Completed ceftriaxone     Hypertensive urgency- (present on admission)  Assessment & Plan  SBP trending in the last 24 hours  -Continue IV antihypertensives for SBP greater than 140  - Start amlodipine 5 mg  - Increase losartan to 40 mg  - Close monitoring of blood pressures  - SBP goal 100-140    SAH (subarachnoid hemorrhage) (HCC)- (present on admission)  Assessment & Plan  Etiology unknown  MRA without vascular abnormalities  Witnessed seizure 2/10  - Repeat MRI 6 weeks  - Continue Vimpat 100 mg twice daily  - PT OT pending  - Continue neurochecks every 4 hours for now  - SBP goal 100-140    Type 2 diabetes mellitus without complication, without long-term current use of insulin (HCC)- (present on admission)  Assessment & Plan  A1c 5.6% BG at goal  Discontinue SSI; patient has not required during this hospitalization    Recurrent breast cancer (HCC)- (present on admission)  Assessment &  Plan  Continue anastrazole    Hypothyroidism from resorcinol- (present on admission)  Assessment & Plan  TSH 2.19  Continue home levothyroxine    Mixed hyperlipidemia- (present on admission)  Assessment & Plan  Continue atorvastatin         VTE prophylaxis:   SCDs/TEDs   pharmacologic prophylaxis contraindicated due to ICH      I have performed a physical exam and reviewed and updated ROS and Plan today (2/15/2025). In review of yesterday's note (2/14/2025), there are no changes except as documented above.

## 2025-02-15 NOTE — HOSPITAL COURSE
Karen Burt is a 74-year-old female with PMHx DMT2, recurrent breast cancer, hypothyroidism, HLD, HTN, CKD 3B.  Admitted 2/10 for intracranial hemorrhage.    Per history-patient was scheduled to meet with her friends.  When she did not arrive, patient's son checked on her.  He found that she was laying on the ground and confused.  EMS was called.  She was transferred to the ED for further evaluation.  CT head: Intracranial hemorrhage.  Due to agitation, confusion and combativeness-patient was intubated and transferred to the ICU for further evaluation.    MRI brain: Right posterior temporal hemorrhage.  MRA head without evidence of aneurysm, stenosis or vascular malformation.  Mild right cerebral subarachnoid hemorrhage.  Mild right cerebral subdural hemorrhage.  Patient noted to have witnessed seizure 2/11.  Not captured on cEEG.  Patient was started on Vimpat for seizure prophylaxis.    Additionally, patient found to be flu positive.    Patient underwent diagnostic cerebral angiogram 2/13.  Early blush and area of hematoma.  Recommending repeat angiogram in 6 to 8 weeks.    Patient was extubated on 2/13.  Nicardipine drip was discontinued.  SBP goal 100-140.  Stable for transfer to telemetry floor.  PT OT recommending postacute rehabilitation.

## 2025-02-15 NOTE — CONSULTS
Hospital Medicine Consultation    Date of Service  2/14/2025    Referring Physician  Gibson Webster DO    Consulting Physician  Manohar Nguyen M.D.    Reason for Consultation  Hospital medicine consultation requested patient admitted with weakness, found to have a intra parenchymal hematoma as well as SAH    History of Presenting Illness  74 y.o. female who presented 2/10/2025 with a medical history of obesity, BMI of 39, hypothyroidism, hypertension, dyslipidemia, stage IIIb chronic kidney disease, history of breast cancer, type 2 diabetes, presents with weakness, was last seen normal on the day prior to admission, at that time she was complaining of a cough and congestion she stated that she was sick for several days, flulike syndrome, reportedly the patient did not come to the meeting with her friends and the patient's son checked on her on 2/10/2025 and found the patient laying on the floor confused, EMS was activated and upon arrival the patient was found very combative, agitated, blood pressure 180s, received several doses of Versed to calm her down, she was at the time not able to follow commands, CT of the head did reveal a intracranial hemorrhage SAH and right temporal lobe ICH, appeared to be atraumatic, the patient was unable to cooperate and remained to be combative and was unable to lay still for further testing was therefore intubated in the emergency department for behavioral reasons, the patient was admitted to the ICU for further management and workup, patient was thought to have possibly some seizure activity, continuous EEG was performed did not show seizure activity, the patient was placed on Vimpat, the patient was weaned off of sedation, there was no further seizure activity noted, the patient on 2/13 extubated, doing fairly well, the patient was scheduled for a angiogram on 2/13, additional neuroimaging was undertaken including MRI brain with and without and MRA with contrast there was no  evidence of mass lesion or explanation for the patient's intraparenchymal hemorrhage or SAH, the patient then undergoing a angiogram which was inconclusive, the concern was for a possible vascular shunt lesion, this is currently not treatable per neurology, and it is suggested that the patient will have a angiogram in approximately 6 to 8 weeks for follow-up.  Per neurology the patient at this time can have every 4 hours neurochecks, blood pressure goal is 100-1 40, continue antilipid therapy, no antithrombotics, SCDs only, sodium goal is 1 35-1 45, to continue Vimpat at 100 mg p.o. twice daily, at that time the patient is stabilized to transfer out of the ICU level of care.  The patient did not require further intravenous antihypertensive therapy or sedation  On my examination the patient appears lethargic, she has significant right upper extremity swelling, she appears generally weak, she denies a headache currently  The patient has otherwise normal strength, no focal deficits, no ataxia.    Review of Systems  Review of Systems   Constitutional:  Positive for malaise/fatigue. Negative for chills and fever.   HENT: Negative.     Eyes: Negative.    Respiratory: Negative.  Negative for cough.    Cardiovascular: Negative.  Negative for chest pain and palpitations.   Gastrointestinal: Negative.  Negative for heartburn, nausea and vomiting.   Genitourinary: Negative.  Negative for dysuria and frequency.   Musculoskeletal: Negative.  Negative for back pain and neck pain.   Skin: Negative.  Negative for itching and rash.   Neurological:  Positive for weakness. Negative for dizziness, focal weakness and headaches.   Endo/Heme/Allergies: Negative.  Negative for polydipsia. Does not bruise/bleed easily.   Psychiatric/Behavioral:  Negative for depression. The patient is nervous/anxious.        Past Medical History   has a past medical history of Acute encephalopathy (2/10/2025), Allergic rhinitis, Allergy to pollen, Aortic  atherosclerosis (HCC) (5/27/2021), Ataxia (3/26/2019), Breast cancer (HCC), Chronic bilateral low back pain without sciatica (7/20/2017), Chronic neck pain, Chronic post-traumatic headache (3/26/2019), Chronic use of opiate for therapeutic purpose (7/20/2017), CKD (chronic kidney disease), stage III, Fatty liver, Hematuria, History of concussion (3/26/2019), Hyperlipidemia LDL goal < 100, Hypertension, Hypothyroidism, Impaired fasting glucose, Obesity (BMI 30-39.9) (3/22/2017), Seizure ([facility]) (2/12/2025), Tension headache, Thyroid cancer ([facility]), Tinnitus of both ears (3/26/2019), Type 2 diabetes mellitus without complication, without long-term current use of insulin ([facility]) (10/14/2021), and Urinary tract infection without hematuria (2/10/2025).    She has no past medical history of Addisons disease ([facility]), Adrenal disorder ([facility]), Allergy, Anemia, Anxiety, Arrhythmia, Arthritis, Asthma, Blood transfusion without reported diagnosis, Cataract, CHF (congestive heart failure) ([facility]), Clotting disorder ([facility]), COPD (chronic obstructive pulmonary disease) ([facility]), Cushings syndrome ([facility]), Depression, Diabetic neuropathy ([facility]), GERD (gastroesophageal reflux disease), Glaucoma, Goiter, Head ache, Heart attack ([facility]), Heart murmur, HIV (human immunodeficiency virus infection) ([facility]), IBD (inflammatory bowel disease), Meningitis, Migraine, Muscle disorder, Osteoporosis, Parathyroid disorder ([facility]), Pulmonary emphysema ([facility]), Sickle cell disease ([facility]), Stroke ([facility]), or Tuberculosis.    Surgical History   has a past surgical history that includes abdominal hysterectomy total; tonsillectomy and adenoidectomy; rotator cuff repair; gastric banding laparoscopic; lumpectomy (Left, 2015); cholecystectomy; hiatal hernia repair; rotator cuff repair (2002); mastectomy (Bilateral, 8/15/2017); and axillary node dissection (8/15/2017).    Family History  family history includes Cancer in her mother; Diabetes in her mother; Heart Disease in her father  and mother; Stroke in her father.    Social History   reports that she has never smoked. She has never used smokeless tobacco. She reports that she does not drink alcohol and does not use drugs.    Medications  Prior to Admission Medications   Prescriptions Last Dose Informant Patient Reported? Taking?   Dulaglutide (TRULICITY) 0.75 MG/0.5ML Solution Pen-injector Unknown Historical No No   Sig: Inject 1 Pen under the skin every 7 days.   HYDROcodone-acetaminophen (NORCO) 7.5-325 MG tab Unknown Historical No No   Sig: Take 1 Tablet by mouth every 8 hours as needed for Severe Pain for up to 30 days.   HYDROcodone-acetaminophen (NORCO) 7.5-325 MG tab Unknown Historical No No   Sig: Take 1 Tablet by mouth every 8 hours as needed for Severe Pain for up to 30 days.   anastrozole (ARIMIDEX) 1 MG Tab Unknown Historical No No   Sig: Take 1 Tablet by mouth every day.   atorvastatin (LIPITOR) 20 MG Tab Unknown Historical No No   Sig: Take 1 Tablet by mouth see administration instructions. Take 20 mg on Mon, Wed, Fri and 10 mg on Tues,Thurs, Sat, Sun   cyclobenzaprine (FLEXERIL) 5 mg tablet Unknown Historical No No   Sig: Take 1-2 Tablets by mouth 3 times a day as needed for Muscle Spasms or Moderate Pain.   fluticasone (FLONASE) 50 MCG/ACT nasal spray Unknown Historical No No   Sig: USE 2 SPRAY(S) IN EACH NOSTRIL TWICE DAILY   levothyroxine (SYNTHROID) 125 MCG Tab Unknown Historical No No   Sig: Take 1 Tablet by mouth every 48 hours. Alternate every other day with 137 mcg   levothyroxine (SYNTHROID) 137 MCG Tab Unknown Historical No No   Sig: Take 1 Tablet by mouth every 48 hours. Alternate every other day with 125 mcg   lisinopril (PRINIVIL) 40 MG tablet Unknown Historical No No   Sig: Take 1 Tablet by mouth every day.   metoprolol SR (TOPROL XL) 25 MG TABLET SR 24 HR Unknown Historical No No   Sig: Take 1 Tablet by mouth every day.   ondansetron (ZOFRAN ODT) 4 MG TABLET DISPERSIBLE Unknown Historical No No   Sig: Take 1  Tablet by mouth every 6 hours as needed for Nausea/Vomiting.      Facility-Administered Medications: None       Allergies  Allergies   Allergen Reactions    Shellfish Allergy Anaphylaxis    Iodine      Burning,Swelling    Pcn [Penicillins]      SOB, Hives   Tolerated ceftriaxone Feb 2025    Sulfa Drugs      Swelling       Physical Exam  Temp:  [37.2 °C (99 °F)] 37.2 °C (99 °F)  Pulse:  [67-91] 81  Resp:  [13-41] 18  BP: (115-166)/(55-77) 158/77  SpO2:  [89 %-97 %] 95 %    Physical Exam  Vitals and nursing note reviewed.   Constitutional:       Appearance: She is well-developed. She is obese. She is ill-appearing. She is not diaphoretic.   HENT:      Head: Normocephalic and atraumatic.      Nose: Nose normal.   Eyes:      Conjunctiva/sclera: Conjunctivae normal.      Pupils: Pupils are equal, round, and reactive to light.   Neck:      Thyroid: No thyromegaly.      Vascular: No JVD.   Cardiovascular:      Rate and Rhythm: Normal rate and regular rhythm.      Heart sounds: Normal heart sounds.      No friction rub. No gallop.   Pulmonary:      Effort: Pulmonary effort is normal.      Breath sounds: Normal breath sounds. No wheezing or rales.   Abdominal:      General: Bowel sounds are normal. There is no distension.      Palpations: Abdomen is soft. There is no mass.      Tenderness: There is no abdominal tenderness. There is no guarding or rebound.   Musculoskeletal:         General: Swelling present. No tenderness. Normal range of motion.      Cervical back: Normal range of motion and neck supple.      Right lower leg: Edema present.      Left lower leg: Edema present.   Lymphadenopathy:      Cervical: No cervical adenopathy.   Skin:     General: Skin is warm and dry.      Coloration: Skin is pale.   Neurological:      Mental Status: She is alert and oriented to person, place, and time.      Cranial Nerves: No cranial nerve deficit.      Motor: Weakness present.   Psychiatric:         Behavior: Behavior normal.          Fluids  Date 02/14/25 0700 - 02/15/25 0659   Shift 0569-0293 8128-1462 [id number] 24 Hour Total   INTAKE   I.V. 0   0   Other 50   50   Shift Total 50   50   OUTPUT   Urine 800   800   Shift Total 800   800   Weight (kg) 108.3 108.3 108.3 108.3       Laboratory  Recent Labs     02/13/25  0615 02/14/25  0310   WBC 8.8 9.7   RBC 3.87* 3.95*   HEMOGLOBIN 12.6 12.7   HEMATOCRIT 36.7* 37.6   MCV 94.8 95.2   MCH 32.6 32.2   MCHC 34.3 33.8   RDW 47.3 47.0   PLATELETCT 158* 197   MPV 9.0 8.9*     Recent Labs     02/12/25  0346 02/13/25  0615 02/14/25  0310   SODIUM 140 140 142   POTASSIUM 3.7 3.5* 3.3*   CHLORIDE 106 108 110   CO2 22 22 20   GLUCOSE 123* 145* 134*   BUN 19 18 18   CREATININE 0.90 0.71 0.67   CALCIUM 8.3* 7.8* 8.0*              Recent Labs     02/12/25  1130   TRIGLYCERIDE 275*        Imaging  IR-CAROTID-CEREBRAL BILATERAL   Final Result      There are a few abnormal tiny blood vessels noted in the expected location of the right temporal hemorrhage. There is slight early filling of the adjacent superficial veins compared with the other cortical veins. These findings may be secondary to the    hemorrhage itself. However, the possibility of small 'burned out' vascular malformation cannot be fully excluded. Follow-up cerebral angiogram is recommended after 6 weeks to rule out any possibility of tiny residual vascular malformation.      DX-CHEST-PORTABLE (1 VIEW)   Final Result         1.  Left basilar atelectasis, no focal infiltrate   2.  Nasogastric tube terminates just distal to the gastroesophageal junction with side-port in the distal esophagus, position is unchanged since prior study, recommend advancement      MR-MRA HEAD-W/O   Final Result      1.  There is no evidence of aneurysm, stenosis or vascular malformation in the visualized proximal vasculature or adjacent to the hematoma.   2.  Right posterior temporal parenchymal hemorrhage.   3.  Mild right cerebral diffuse subarachnoid hemorrhage.    4.  Mild right cerebral subdural hemorrhage.      MR-BRAIN-WITH & W/O   Final Result      Right posterior temporal hemorrhage. There are no abnormal flow voids or large nodular enhancement. There is tiny punctate enhancement within the hemorrhage likely representing vascular enhancement.Pre and postcontrast MR examination is recommended after    6 weeks. Due to the atypical location of the hemorrhage, catheter angiogram may be needed to rule out the possibility of any tiny vascular malformation.      EC-ECHOCARDIOGRAM COMPLETE W/O CONT   Final Result      DX-CHEST-PORTABLE (1 VIEW)   Final Result         1.  No acute cardiopulmonary disease.   2.  Nasogastric tube terminates just distal to the gastroesophageal junction with side-port in the distal esophagus, recommend advancement      CT-CTA NECK WITH & W/O-POST PROCESSING   Final Result         1.  CT angiogram of the neck within normal limits.         CT-CTA HEAD WITH & W/O-POST PROCESS   Final Result         1.  No large vessel occlusion or aneurysm identified.   2.  Right posterior temporal parenchymal hemorrhage, stable since prior study.   3.  Scattered subarachnoid hemorrhages, overall stable since prior study.      DX-CHEST-PORTABLE (1 VIEW)   Final Result         1.  No acute cardiopulmonary disease.   2.  Nasogastric tube with tip just distal to the gastroesophageal junction, recommend advancement      DX-ABDOMEN FOR TUBE PLACEMENT   Final Result         1.  Nonspecific bowel gas pattern in the upper abdomen.   2.  Nasogastric tube with tip just distal to the gastroesophageal junction, recommend advancement      CT-HEAD W/O   Final Result         1.  Right posterior temporal parenchymal hemorrhages stranding vasogenic edema   2.  Bilateral subarachnoid hemorrhages as described.   3.  Right posterior parafalcine subdural hematoma measuring 2.4 mm.   4.  Subdural versus subarachnoid hemorrhage along the right tentorium measuring 3.1 mm.      These findings  were discussed with the patient's clinician, George Wise, on 2/10/2025 8:42 PM.         DX-CHEST-PORTABLE (1 VIEW)   Final Result      Hypoinflation without a definite acute abnormality.          Assessment/Plan  * Acute intracranial hemorrhage (HCC)- (present on admission)  Assessment & Plan  Right posterior temporal parenchymal hemorrhage noted + SAH as above  CTA H&N without obvious aneurism  Hemorrhage at this time appears small without significant mass effect or hydrocephalus to warrant surgical decompression or EVD   Brain MRI/MRA without vascular abnormalities, stable hematoma   - Statin   - See plan for SAH   - Neuro improving, continue weaning/stopping sedation     Influenza A  Assessment & Plan  Flu A +   No evidence of pneumonia  - Oseltamivir day 3    Seizure (HCC)  Assessment & Plan  Clinical seizure x1 2/11  cEEG without epileptiform activity  Doing well, awake, non focal  - Continue vimpat BID  - Will need outpt neuro f/u     Hypokalemia  Assessment & Plan  - Replacing, goal >4  - IV Mg as needed as well     Lactic acidosis  Assessment & Plan  Suspect poor PO intake - hypovolemia and sepsis   - Blood cultures drawn   - IVF PRN - hemodynamics monitoring  - Monitor BMP and Lactic   Resolved    Acute respiratory failure with hypoxia (HCC)- (present on admission)  Assessment & Plan  Intubated for airway protection and hypoxia  CXR no consolidations/efffusions  -Extubated 2/13 on Low flow O2  -RT/O2 protocols    Acute encephalopathy- (present on admission)  Assessment & Plan  Admitted with agitation  ICH/SAH and UTI  Resolved   - See SAH    Urinary tract infection without hematuria- (present on admission)  Assessment & Plan  UA abnormal  -Neg cultures  -Completed ceftriaxone     Hypertensive urgency- (present on admission)  Assessment & Plan  Resolved, monitor closely, use oral antihypertensives to optimize further-See hypertension and SAH    SAH (subarachnoid hemorrhage) (HCC)- (present on  admission)  Assessment & Plan  No clear cause after complete imaging   CTA H&N without obvious aneurism, MRI/MRA without vascular abnormalities   Given epileptogenic location, started on AED    1 clinical seizure 2/10, cEEG without epileptiform activity  Neuro exam improved, extubated, non focal  -Neuro IR angiogram limited due to hematoma, will need repeat in 6 w per neuro  -SBP goals 100 and <140  -Resume home lisinopril   -Vimpat 100mg twice daily  -Will need outpt neuro and neuro IR f/u with angiogram  -PT/OT    Type 2 diabetes mellitus without complication, without long-term current use of insulin (HCC)- (present on admission)  Assessment & Plan  A1c 5.6% BG at goal  -Medium ISS    Recurrent breast cancer (HCC)- (present on admission)  Assessment & Plan  Continue anastrazole    Hypothyroidism from resorcinol- (present on admission)  Assessment & Plan  Cont home synthroid regimen    Mixed hyperlipidemia- (present on admission)  Assessment & Plan  Statin    Plan  2/14  Complete treatment for influenza A  Resume the patient's home medical management is much as possible  Ongoing glycemic control, titrate down as indicated  Maintain antiepileptic prophylaxis  Blood pressure management  Close neuromonitoring  Will likely need additional PT OT services and outpatient treatment, level to be determined  Close laboratory monitoring, electrolyte monitoring  See orders  A.m. labs  Patient is has a high medical complexity, complex decision making and is at high risk for complication, morbidity, and mortality.  I spent 63 minutes, reviewing the chart, obtaining and/or reviewing separately obtained history. Performing a medically appropriate examination and evaluation.  Counseling and educating the patient. Ordering and reviewing medications, tests, or procedures.   Documenting clinical information in EPIC. Independently interpreting results and communicating results to patient. Discussing future disposition of care with  patient, RN and case management.  Thank you for consulting with us, we will follow closely while the patient is hospitalized      Please note that this dictation was created using voice recognition software. I have made every reasonable attempt to correct obvious errors, but I expect that there are errors of grammar and possibly context that I did not discover before finalizing the note.

## 2025-02-15 NOTE — CARE PLAN
The patient is Stable - Low risk of patient condition declining or worsening    Shift Goals  Clinical Goals: increase mobility, monitor BP  Patient Goals: pain control  Family Goals: patient improvement in healh status    Progress made toward(s) clinical / shift goals:    Problem: Pain - Standard  Goal: Alleviation of pain or a reduction in pain to the patient’s comfort goal  Description: Target End Date:  Prior to discharge or change in level of care    Document on Vitals flowsheet    1.  Document pain using the appropriate pain scale per order or unit policy  2.  Educate and implement non-pharmacologic comfort measures (i.e. relaxation, distraction, massage, cold/heat therapy, etc.)  3.  Pain management medications as ordered  4.  Reassess pain after pain med administration per policy  5.  If opiods administered assess patient's response to pain medication is appropriate per POSS sedation scale  6.  Follow pain management plan developed in collaboration with patient and interdisciplinary team (including palliative care or pain specialists if applicable)  Outcome: Progressing     Problem: Neuro Status  Goal: Neuro status will remain stable or improve  Description: Target End Date:  Prior to discharge or change in level of care    Document on Neuro assessment in the Assessment flowsheet    1.  Assess and monitor neurologic status per provider order/protocol/unit policy  2.  Assess level of consciousness and orientation  3.  Assess for speech, dysarthria, dysphagia, facial symmetry  4.  Assess visual field, eye movements, gaze preference, pupil reaction and size  5.  Assess muscle strength and motor response in all four extremities  6.  Assess for sensation (numbness and tingling)  7.  Assess basic neuro reflexes (cough, gag, corneal)  8.  Identify changes in neuro status and report to provider for testing/treatment orders  Outcome: Progressing     Problem: Respiratory  Goal: Patient will achieve/maintain optimum  respiratory ventilation and gas exchange  Description: Target End Date:  Prior to discharge or change in level of care    Document on Assessment flowsheet    1.  Assess and monitor rate, rhythm, depth and effort of respiration  2.  Breath sounds assessed qshift and/or as needed  3.  Assess O2 saturation, administer/titrate oxygen as ordered  4.  Position patient for maximum ventilatory efficiency  5.  Turn, cough, and deep breath with splinting to improve effectiveness  6.  Collaborate with RT to administer medication/treatments per order  7.  Encourage use of incentive spirometer and encourage patient to cough after use and utilize splinting techniques if applicable  8.  Airway suctioning  9.  Monitor sputum production for changes in color, consistency and frequency  10. Perform frequent oral hygiene  11. Alternate physical activity with rest periods  Outcome: Progressing     Problem: Mobility  Goal: Patient's capacity to carry out activities will improve  Description: Target End Date:  Prior to discharge or change in level of care    1.  Assess for barriers to mobility/activity  2.  Implement activity per interdisciplinary team recommendations  3.  Target activity level identified and patient/family/caregiver aware of goal  4.  Provide assistive devices  5.  Instruct patient/caregiver on proper use of assistive/adaptive devices  6.  Schedule activities and rest periods to decrease effects of fatigue  7.  Encourage mobilization to extent of ability  8.  Maintain proper body alignment  9.  Provide adequate pain management to allow progressive mobilization  10. Implement pace maker precautions as needed  Outcome: Progressing       Patient is not progressing towards the following goals:

## 2025-02-15 NOTE — CARE PLAN
Problem: Knowledge Deficit - Standard  Goal: Patient and family/care givers will demonstrate understanding of plan of care, disease process/condition, diagnostic tests and medications  Outcome: Progressing     Problem: Pain - Standard  Goal: Alleviation of pain or a reduction in pain to the patient’s comfort goal  Outcome: Progressing     Problem: Skin Integrity  Goal: Skin integrity is maintained or improved  Outcome: Progressing     Problem: Fall Risk  Goal: Patient will remain free from falls  Outcome: Progressing     Problem: Neuro Status  Goal: Neuro status will remain stable or improve  Outcome: Progressing     Problem: Respiratory  Goal: Patient will achieve/maintain optimum respiratory ventilation and gas exchange  Outcome: Progressing   The patient is Stable - Low risk of patient condition declining or worsening    Shift Goals  Clinical Goals: q1 neuro, MRI, pulm hygiene  Patient Goals: unable to assess  Family Goals: patient improvement in healh status    Progress made toward(s) clinical / shift goals:  Pt remains free from falls at this time. Safety precautions in place. Pt educated on calling for assistance when needed.  Proper hand hygiene before and after patient care to ensure patient will remain free from infection.  Pt is able to verbalize pain on a scale of 1-10 and is able to verbalize comfort goal. Pain management plan followed and pt educated on nonpharmacologic and pharmacological comfort measures.       Patient is not progressing towards the following goals:

## 2025-02-15 NOTE — ASSESSMENT & PLAN NOTE
Clinical seizure x1 2/11  Secondary to ICH  cEEG without epileptiform activity  Doing well, awake, non focal  - Continue vimpat BID  - Will need outpt neuro f/u

## 2025-02-15 NOTE — ASSESSMENT & PLAN NOTE
Etiology unknown  MRA without vascular abnormalities  Witnessed seizure 2/10  - Repeat MRI 6 weeks  - Continue Vimpat 100 mg twice daily  - PT OT pending  - Continue neurochecks every 4 hours for now  - SBP goal 100-140

## 2025-02-15 NOTE — THERAPY
"Speech Language Pathology   Daily Treatment     Patient Name: Keyur Burt  AGE:  74 y.o., SEX:  female  Medical Record #: [id number]  Date of Service: 2/15/2025    Precautions:  Precautions: Fall Risk, Swallow Precautions     Subjective  Pt seen A&Ox4 saturating on 3L NC, eating her breakfast.  The pt was eager for diet advancement \"this food sucks\".     Assessment  The pt self-fed 3 oz of RG7 trials w/ appropriate rate & volume control. Bolus stripping and containment was WNL. Oral stage c/b timely mastication and no gross oral residue post swallow. No upper airway wetness, throat clearing or coughing was noted. Pt denied globus sensation or any other symptoms of dysphagia. Per FEES 2/14 pt only w/ trace residue for RG7 trials.     Clinical Impressions  Per FEES completed on 2/14 pt p/w mild oropharyngeal dysphagia. Pt was eager for diet advancement and appeared to tolerate RG7 trials w/ no difficulty on this date. Recommend diet advancement. ST to f/u 1-2x for tolerance.     Recommendations  Treatment Completed: Dysphagia Treatment     Dysphagia Treatment  Diet Consistency: Thin/all Liquids, Regular Solids  Instrumentation: None indicated at this time  Medication: As tolerated  Supervision: Assist with meal tray set up  Positioning: Fully upright and midline during oral intake  Risk Management : Small bites/sips, Slow rate of intake  Oral Care: BID    SLP Treatment Plan  Treatment Plan: Dysphagia Treatment  SLP Frequency: 2x Per Week  Estimated Duration: Until Therapy Goals Met    Anticipated Discharge Needs  Discharge Recommendations: Anticipate that the patient will have no further speech therapy needs after discharge from the hospital  Therapy Recommendations Upon DC: Not Indicated    Patient / Family Goals  Patient / Family Goal #1: To get better  Goal #1 Outcome: Progressing as expected  Short Term Goals  Short Term Goal # 1: Patient will complete a diagnostic swallow study to further assess swallow " function and inform POC  Goal Outcome # 1: Goal met  Short Term Goal # 1 B : Patient will consume least restrictive solids/TN0 without s/sx of airway invasion.  Goal Outcome # 2 : Progressing as expected    Candice Rosado, SLP

## 2025-02-16 LAB
ALBUMIN SERPL BCP-MCNC: 3.2 G/DL (ref 3.2–4.9)
ALBUMIN/GLOB SERPL: 1.3 G/DL
ALP SERPL-CCNC: 72 U/L (ref 30–99)
ALT SERPL-CCNC: 33 U/L (ref 2–50)
ANION GAP SERPL CALC-SCNC: 11 MMOL/L (ref 7–16)
AST SERPL-CCNC: 40 U/L (ref 12–45)
BACTERIA BLD CULT: NORMAL
BILIRUB SERPL-MCNC: 0.6 MG/DL (ref 0.1–1.5)
BUN SERPL-MCNC: 19 MG/DL (ref 8–22)
CALCIUM ALBUM COR SERPL-MCNC: 9.1 MG/DL (ref 8.5–10.5)
CALCIUM SERPL-MCNC: 8.5 MG/DL (ref 8.5–10.5)
CHLORIDE SERPL-SCNC: 102 MMOL/L (ref 96–112)
CO2 SERPL-SCNC: 24 MMOL/L (ref 20–33)
CREAT SERPL-MCNC: 0.83 MG/DL (ref 0.5–1.4)
EKG IMPRESSION: NORMAL
ERYTHROCYTE [DISTWIDTH] IN BLOOD BY AUTOMATED COUNT: 48.8 FL (ref 35.9–50)
GFR SERPLBLD CREATININE-BSD FMLA CKD-EPI: 74 ML/MIN/1.73 M 2
GLOBULIN SER CALC-MCNC: 2.5 G/DL (ref 1.9–3.5)
GLUCOSE SERPL-MCNC: 117 MG/DL (ref 65–99)
HCT VFR BLD AUTO: 38.9 % (ref 37–47)
HGB BLD-MCNC: 12.9 G/DL (ref 12–16)
MAGNESIUM SERPL-MCNC: 1.9 MG/DL (ref 1.5–2.5)
MCH RBC QN AUTO: 31.8 PG (ref 27–33)
MCHC RBC AUTO-ENTMCNC: 33.2 G/DL (ref 32.2–35.5)
MCV RBC AUTO: 95.8 FL (ref 81.4–97.8)
PHOSPHATE SERPL-MCNC: 3.2 MG/DL (ref 2.5–4.5)
PLATELET # BLD AUTO: 281 K/UL (ref 164–446)
PMV BLD AUTO: 8.7 FL (ref 9–12.9)
POTASSIUM SERPL-SCNC: 3.8 MMOL/L (ref 3.6–5.5)
PROT SERPL-MCNC: 5.7 G/DL (ref 6–8.2)
RBC # BLD AUTO: 4.06 M/UL (ref 4.2–5.4)
SIGNIFICANT IND 70042: NORMAL
SITE SITE: NORMAL
SODIUM SERPL-SCNC: 137 MMOL/L (ref 135–145)
SOURCE SOURCE: NORMAL
WBC # BLD AUTO: 10.1 K/UL (ref 4.8–10.8)

## 2025-02-16 PROCEDURE — 93005 ELECTROCARDIOGRAM TRACING: CPT | Mod: TC | Performed by: STUDENT IN AN ORGANIZED HEALTH CARE EDUCATION/TRAINING PROGRAM

## 2025-02-16 PROCEDURE — 99233 SBSQ HOSP IP/OBS HIGH 50: CPT | Performed by: STUDENT IN AN ORGANIZED HEALTH CARE EDUCATION/TRAINING PROGRAM

## 2025-02-16 PROCEDURE — 700111 HCHG RX REV CODE 636 W/ 250 OVERRIDE (IP): Mod: JZ | Performed by: INTERNAL MEDICINE

## 2025-02-16 PROCEDURE — 700101 HCHG RX REV CODE 250: Performed by: NURSE PRACTITIONER

## 2025-02-16 PROCEDURE — 700111 HCHG RX REV CODE 636 W/ 250 OVERRIDE (IP): Mod: JZ | Performed by: STUDENT IN AN ORGANIZED HEALTH CARE EDUCATION/TRAINING PROGRAM

## 2025-02-16 PROCEDURE — A9270 NON-COVERED ITEM OR SERVICE: HCPCS | Performed by: HOSPITALIST

## 2025-02-16 PROCEDURE — 700102 HCHG RX REV CODE 250 W/ 637 OVERRIDE(OP): Performed by: STUDENT IN AN ORGANIZED HEALTH CARE EDUCATION/TRAINING PROGRAM

## 2025-02-16 PROCEDURE — 770020 HCHG ROOM/CARE - TELE (206)

## 2025-02-16 PROCEDURE — A9270 NON-COVERED ITEM OR SERVICE: HCPCS | Performed by: STUDENT IN AN ORGANIZED HEALTH CARE EDUCATION/TRAINING PROGRAM

## 2025-02-16 PROCEDURE — 84100 ASSAY OF PHOSPHORUS: CPT

## 2025-02-16 PROCEDURE — 36415 COLL VENOUS BLD VENIPUNCTURE: CPT

## 2025-02-16 PROCEDURE — 700102 HCHG RX REV CODE 250 W/ 637 OVERRIDE(OP): Performed by: HOSPITALIST

## 2025-02-16 PROCEDURE — 80053 COMPREHEN METABOLIC PANEL: CPT

## 2025-02-16 PROCEDURE — 93010 ELECTROCARDIOGRAM REPORT: CPT | Performed by: STUDENT IN AN ORGANIZED HEALTH CARE EDUCATION/TRAINING PROGRAM

## 2025-02-16 PROCEDURE — 85027 COMPLETE CBC AUTOMATED: CPT

## 2025-02-16 PROCEDURE — 83735 ASSAY OF MAGNESIUM: CPT

## 2025-02-16 RX ORDER — HYDROCODONE BITARTRATE AND ACETAMINOPHEN 5; 325 MG/1; MG/1
1 TABLET ORAL EVERY 4 HOURS PRN
Refills: 0 | Status: DISCONTINUED | OUTPATIENT
Start: 2025-02-16 | End: 2025-02-21 | Stop reason: HOSPADM

## 2025-02-16 RX ORDER — HYDROCODONE BITARTRATE AND ACETAMINOPHEN 10; 325 MG/1; MG/1
1 TABLET ORAL EVERY 4 HOURS PRN
Refills: 0 | Status: DISCONTINUED | OUTPATIENT
Start: 2025-02-16 | End: 2025-02-21 | Stop reason: HOSPADM

## 2025-02-16 RX ADMIN — LABETALOL HYDROCHLORIDE 10 MG: 5 INJECTION INTRAVENOUS at 16:23

## 2025-02-16 RX ADMIN — HYDROCODONE BITARTRATE AND ACETAMINOPHEN 1 TABLET: 10; 325 TABLET ORAL at 16:22

## 2025-02-16 RX ADMIN — LACOSAMIDE 100 MG: 100 TABLET, FILM COATED ORAL at 16:23

## 2025-02-16 RX ADMIN — LEVOTHYROXINE SODIUM 137 MCG: 0.11 TABLET ORAL at 05:18

## 2025-02-16 RX ADMIN — LISINOPRIL 40 MG: 20 TABLET ORAL at 05:18

## 2025-02-16 RX ADMIN — LIDOCAINE 1 PATCH: 4 PATCH TOPICAL at 21:30

## 2025-02-16 RX ADMIN — LABETALOL HYDROCHLORIDE 10 MG: 5 INJECTION INTRAVENOUS at 23:43

## 2025-02-16 RX ADMIN — HYDROCODONE BITARTRATE AND ACETAMINOPHEN 1 TABLET: 10; 325 TABLET ORAL at 05:17

## 2025-02-16 RX ADMIN — ANASTROZOLE 1 MG: 1 TABLET ORAL at 05:19

## 2025-02-16 RX ADMIN — HYDRALAZINE HYDROCHLORIDE 20 MG: 20 INJECTION, SOLUTION INTRAMUSCULAR; INTRAVENOUS at 18:50

## 2025-02-16 RX ADMIN — AMLODIPINE BESYLATE 5 MG: 5 TABLET ORAL at 05:18

## 2025-02-16 RX ADMIN — HYDROCODONE BITARTRATE AND ACETAMINOPHEN 1 TABLET: 10; 325 TABLET ORAL at 12:20

## 2025-02-16 RX ADMIN — ACETAMINOPHEN 650 MG: 325 TABLET ORAL at 08:46

## 2025-02-16 RX ADMIN — HYDROCODONE BITARTRATE AND ACETAMINOPHEN 1 TABLET: 10; 325 TABLET ORAL at 20:47

## 2025-02-16 RX ADMIN — ATORVASTATIN CALCIUM 10 MG: 10 TABLET, FILM COATED ORAL at 05:18

## 2025-02-16 RX ADMIN — LACOSAMIDE 100 MG: 100 TABLET, FILM COATED ORAL at 05:18

## 2025-02-16 RX ADMIN — ONDANSETRON 4 MG: 2 INJECTION INTRAMUSCULAR; INTRAVENOUS at 12:20

## 2025-02-16 ASSESSMENT — PAIN DESCRIPTION - PAIN TYPE
TYPE: ACUTE PAIN

## 2025-02-16 ASSESSMENT — ENCOUNTER SYMPTOMS
FEVER: 0
HEADACHES: 1
WEAKNESS: 1

## 2025-02-16 ASSESSMENT — FIBROSIS 4 INDEX: FIB4 SCORE: 2.66

## 2025-02-16 NOTE — PROGRESS NOTES
Bedside report received from NOC RN. Assumed care of pt. Pt awake, laying in bed. A/Ox4, VSS. No concerns, complaints or distress. Pt educated to call before getting out of bed. POC reviewed and white board updated. Tele box on. SR 79 on the monitor. Call light in reach. Bed locked in lowest position with 2 upper bed rails up. Bed alarm on.

## 2025-02-16 NOTE — PROGRESS NOTES
Received bedside report from RN Alejandra, pt care assumed. VSS, pt assessment complete. Pt A&Ox4, c/o headache at this time. POC discussed with pt and verbalizes no questions. Pt denies any additional needs at this time. Bed locked and in lowest position, bed alarm on. Pt educated on fall risk and verbalized understanding, call light within reach, hourly rounding initiated.

## 2025-02-16 NOTE — PROGRESS NOTES
Bedside report received and patient care assumed. Pt is resting in bed, A&O4, with pain 3/10 being treated per MAR, and is on RA. Tele box on. All fall precautions are in place, belongings at bedside table.  Pt was updated on POC, no questions or concerns. Pt educated on use of call light for assistance.

## 2025-02-16 NOTE — CARE PLAN
The patient is Stable - Low risk of patient condition declining or worsening    Shift Goals  Clinical Goals: VSS, monitor BP, pain management  Patient Goals: pain management  Family Goals: ANITA    Pt has been weaned to RA and satting in the 90s. Pt's pain is being controlled appropriately per MAR.    Progress made toward(s) clinical / shift goals:    Problem: Skin Integrity  Goal: Skin integrity is maintained or improved  Description: Target End Date:  Prior to discharge or change in level of care    Document interventions on Skin Risk/Artem flowsheet groups and corresponding LDA    1.  Assess and monitor skin integrity, appearance and/or temperature  2.  Assess risk factors for impaired skin integrity and/or pressures ulcers  3.  Implement precautions to protect skin integrity in collaboration with interdisciplinary team  4.  Implement pressure ulcer prevention protocol if at risk for skin breakdown  5.  Confirm wound care consult if at risk for skin breakdown  6.  Ensure patient use of pressure relieving devices  (Low air loss bed, waffle overlay, heel protectors, ROHO cushion, etc)  Outcome: Progressing     Problem: Fall Risk  Goal: Patient will remain free from falls  Description: Target End Date:  Prior to discharge or change in level of care    Document interventions on the [employer] Fall Risk Assessment    1.  Assess for fall risk factors  2.  Implement fall precautions  Outcome: Progressing     Problem: Neuro Status  Goal: Neuro status will remain stable or improve  Description: Target End Date:  Prior to discharge or change in level of care    Document on Neuro assessment in the Assessment flowsheet    1.  Assess and monitor neurologic status per provider order/protocol/unit policy  2.  Assess level of consciousness and orientation  3.  Assess for speech, dysarthria, dysphagia, facial symmetry  4.  Assess visual field, eye movements, gaze preference, pupil reaction and size  5.  Assess muscle strength  and motor response in all four extremities  6.  Assess for sensation (numbness and tingling)  7.  Assess basic neuro reflexes (cough, gag, corneal)  8.  Identify changes in neuro status and report to provider for testing/treatment orders  Outcome: Progressing     Problem: Respiratory  Goal: Patient will achieve/maintain optimum respiratory ventilation and gas exchange  Description: Target End Date:  Prior to discharge or change in level of care    Document on Assessment flowsheet    1.  Assess and monitor rate, rhythm, depth and effort of respiration  2.  Breath sounds assessed qshift and/or as needed  3.  Assess O2 saturation, administer/titrate oxygen as ordered  4.  Position patient for maximum ventilatory efficiency  5.  Turn, cough, and deep breath with splinting to improve effectiveness  6.  Collaborate with RT to administer medication/treatments per order  7.  Encourage use of incentive spirometer and encourage patient to cough after use and utilize splinting techniques if applicable  8.  Airway suctioning  9.  Monitor sputum production for changes in color, consistency and frequency  10. Perform frequent oral hygiene  11. Alternate physical activity with rest periods  Outcome: Progressing     Problem: Mobility  Goal: Patient's capacity to carry out activities will improve  Description: Target End Date:  Prior to discharge or change in level of care    1.  Assess for barriers to mobility/activity  2.  Implement activity per interdisciplinary team recommendations  3.  Target activity level identified and patient/family/caregiver aware of goal  4.  Provide assistive devices  5.  Instruct patient/caregiver on proper use of assistive/adaptive devices  6.  Schedule activities and rest periods to decrease effects of fatigue  7.  Encourage mobilization to extent of ability  8.  Maintain proper body alignment  9.  Provide adequate pain management to allow progressive mobilization  10. Implement pace maker precautions  as needed  Outcome: Progressing       Patient is not progressing towards the following goals:      Problem: Pain - Standard  Goal: Alleviation of pain or a reduction in pain to the patient’s comfort goal  Description: Target End Date:  Prior to discharge or change in level of care    Document on Vitals flowsheet    1.  Document pain using the appropriate pain scale per order or unit policy  2.  Educate and implement non-pharmacologic comfort measures (i.e. relaxation, distraction, massage, cold/heat therapy, etc.)  3.  Pain management medications as ordered  4.  Reassess pain after pain med administration per policy  5.  If opiods administered assess patient's response to pain medication is appropriate per POSS sedation scale  6.  Follow pain management plan developed in collaboration with patient and interdisciplinary team (including palliative care or pain specialists if applicable)  Outcome: Not Progressing

## 2025-02-16 NOTE — CARE PLAN
Problem: Knowledge Deficit - Standard  Goal: Patient and family/care givers will demonstrate understanding of plan of care, disease process/condition, diagnostic tests and medications  Outcome: Progressing     Problem: Pain - Standard  Goal: Alleviation of pain or a reduction in pain to the patient’s comfort goal  Outcome: Progressing     Problem: Skin Integrity  Goal: Skin integrity is maintained or improved  Outcome: Progressing     Problem: Fall Risk  Goal: Patient will remain free from falls  Outcome: Progressing     Problem: Neuro Status  Goal: Neuro status will remain stable or improve  Outcome: Progressing     Problem: Respiratory  Goal: Patient will achieve/maintain optimum respiratory ventilation and gas exchange  Outcome: Progressing     Problem: Mobility  Goal: Patient's capacity to carry out activities will improve  Outcome: Progressing   The patient is Stable - Low risk of patient condition declining or worsening    Shift Goals  Clinical Goals: vss; bp within parameters; pt safety  Patient Goals: pain control  Family Goals: patient improvement in healh status    Progress made toward(s) clinical / shift goals:  Pt remains free from falls at this time. Safety precautions in place. Pt educated on calling for assistance when needed.  Proper hand hygiene before and after patient care to ensure patient will remain free from infection.  Pt is able to verbalize pain on a scale of 1-10 and is able to verbalize comfort goal. Pain management plan followed and pt educated on nonpharmacologic and pharmacological comfort measures.      Patient is not progressing towards the following goals:

## 2025-02-16 NOTE — THERAPY
"Physical Therapy   Daily Treatment     Patient Name: Keyur Burt  Age:  74 y.o., Sex:  female  Medical Record #: [id number]  Today's Date: 2/15/2025     Precautions  Precautions: Fall Risk;Swallow Precautions    Assessment    Patient remains limited by apparent impaired cognition, impaired balance and coordination, weakness, and decreased activity tolerance. She was soiled in bed of stool and urine and unaware; provided education regarding notification of staff and importance of maintaining clean/dry skin. She mobilized as detailed below; had overt LOB onto EOB from standing due to BLE buckling. Will continue to follow. Recommend patient mobilize to EOB or chair 3x/day at meal times to progress strength and tolerance.    Plan    Treatment Plan Status: Continue Current Treatment Plan  Type of Treatment: Bed Mobility, Equipment, Family / Caregiver Training, Gait Training, Manual Therapy, Neuro Re-Education / Balance, Self Care / Home Evaluation, Therapeutic Activities, Therapeutic Exercise  Treatment Frequency: 5 Times per Week  Treatment Duration: Until Therapy Goals Met    DC Equipment Recommendations: Unable to determine at this time  Discharge Recommendations: Recommend post-acute placement for additional physical therapy services prior to discharge home      Subjective    Patient received in bed, required encouragement to participate. \"Can I eat first?\"     Objective       02/15/25 1530   Vitals   O2 (LPM) 3   O2 Delivery Device Silicone Nasal Cannula   Pain 0 - 10 Group   Location Generalized;Head   Therapist Pain Assessment During Activity;Nurse Notified   Cognition    Cognition / Consciousness X   Level of Consciousness Alert   New Learning Impaired   Attention Impaired   Initiation Impaired   Comments gross delay, flat affect. unclear if behavioral or due to impaired cognition   Active ROM Lower Body    Comments gross deficits; unclear if due to pain, behavior, weakness, other   Strength Lower Body "   Comments grossly 3 to 3+/5; patient with B knee buckling during transfer to chair   Balance   Sitting Balance (Static) Fair   Sitting Balance (Dynamic) Fair   Standing Balance (Static) Fair -   Standing Balance (Dynamic) Poor -   Weight Shift Sitting Fair   Weight Shift Standing Poor   Skilled Intervention Verbal Cuing;Compensatory Strategies;Sequencing   Comments no FWW in room, would benefit (placed bariatric FWW in room after session); overt LOB due to BLE buckling   Bed Mobility    Supine to Sit Contact Guard Assist   Sit to Supine   (NT, left in recliner for lunch)   Scooting Contact Guard Assist  (seated)   Skilled Intervention Verbal Cuing;Compensatory Strategies;Sequencing   Gait Analysis   Gait Level Of Assist Unable to Participate   Weight Bearing Status no restrictions   Vision Deficits Impacting Mobility denied deficits   Functional Mobility   Sit to Stand Contact Guard Assist   Bed, Chair, Wheelchair Transfer Contact Guard Assist   Transfer Method Stand Pivot   Skilled Intervention Verbal Cuing;Compensatory Strategies;Sequencing;Facilitation   6 Clicks Assessment - How much HELP from from another person do you currently need... (If the patient hasn't done an activity recently, how much help from another person do you think he/she would need if he/she tried?)   Turning from your back to your side while in a flat bed without using bedrails? 3   Moving from lying on your back to sitting on the side of a flat bed without using bedrails? 3   Moving to and from a bed to a chair (including a wheelchair)? 3   Standing up from a chair using your arms (e.g., wheelchair, or bedside chair)? 3   Walking in hospital room? 1   Climbing 3-5 steps with a railing? 1   6 clicks Mobility Score 14   Short Term Goals    Short Term Goal # 1 Pt will perform supine < > sit SPV with bed flat, no rail in 6 visits in order to set up for upright mobility   Goal Outcome # 1 goal not met   Short Term Goal # 2 Pt will perform sit <  > stand SPV in 6 visits in order to prepare for ambulation   Goal Outcome # 2 Goal not met   Short Term Goal # 3 Pt will ambulate 150' SPV /c LRAD in 6 visits in order to return home safely   Goal Outcome # 3 Goal not met

## 2025-02-16 NOTE — PROGRESS NOTES
[facility] Medicine Daily Progress Note    Date of Service  2/16/2025    Chief Complaint  Keyur Burt is a 74 y.o. female admitted 2/10/2025 with altered mental status.    Hospital Course  Karen Burt is a 74-year-old female with PMHx DMT2, recurrent breast cancer, hypothyroidism, HLD, HTN, CKD 3B.  Admitted 2/10 for intracranial hemorrhage.    Per history-patient was scheduled to meet with her friends.  When she did not arrive, patient's son checked on her.  He found that she was laying on the ground and confused.  EMS was called.  She was transferred to the ED for further evaluation.  CT head: Intracranial hemorrhage.  Due to agitation, confusion and combativeness-patient was intubated and transferred to the ICU for further evaluation.    MRI brain: Right posterior temporal hemorrhage.  MRA head without evidence of aneurysm, stenosis or vascular malformation.  Mild right cerebral subarachnoid hemorrhage.  Mild right cerebral subdural hemorrhage.  Patient noted to have witnessed seizure 2/11.  Not captured on cEEG.  Patient was started on Vimpat for seizure prophylaxis.    Additionally, patient found to be flu positive.    Patient underwent diagnostic cerebral angiogram 2/13.  Early blush and area of hematoma.  Recommending repeat angiogram in 6 to 8 weeks.    Patient was extubated on 2/13.  Nicardipine drip was discontinued.  SBP goal 100-140.  Stable for transfer to telemetry floor.  PT OT recommending postacute rehabilitation.    Interval Problem Update  2/15: Vitals notable for SBP ranging 129 through 166.  She has received multiple doses of both hydralazine and labetalol for blood pressure control overnight.  Start amlodipine.  Increase lisinopril to 40 mg.  Discontinue sliding scale insulin; patient has not required to the last 5 days since admission.  Potassium 3.6; replaced. Patient complains of headache. Increase norco, add Fioricet one-time dose.    2/16: Vitals notable for improvement in SBP.   Ranging 105 through 146 overnight.  Patient remains on 2 L/min supplemental O2.  Last dose of IV labetalol late yesterday evening.  Hb stable at 12.9.  Potassium 3.8; replaced.    I have discussed this patient's plan of care and discharge plan at IDT rounds today with Case Management, Nursing, Nursing leadership, and other members of the IDT team.    Consultants/Specialty  critical care, neurology, and IR    Code Status  Full Code    Disposition  The patient is not medically cleared for discharge to home or a post-acute facility.      I have placed the appropriate orders for post-discharge needs.    Review of Systems  Review of Systems   Constitutional:  Positive for malaise/fatigue. Negative for fever.   Cardiovascular:  Negative for chest pain and leg swelling.   Neurological:  Positive for weakness and headaches.        Physical Exam  Temp:  [36.4 °C (97.5 °F)-37 °C (98.6 °F)] 36.8 °C (98.2 °F)  Pulse:  [63-90] 65  Resp:  [15-19] 15  BP: (105-162)/(51-89) 121/63  SpO2:  [93 %-97 %] 95 %    Physical Exam  Vitals and nursing note reviewed.   Constitutional:       General: She is not in acute distress.     Appearance: Normal appearance. She is obese. She is ill-appearing.   Cardiovascular:      Rate and Rhythm: Normal rate and regular rhythm.      Heart sounds: Murmur heard.   Pulmonary:      Effort: Pulmonary effort is normal. No respiratory distress.      Breath sounds: No wheezing.   Skin:     General: Skin is warm and dry.      Coloration: Skin is pale.   Neurological:      Mental Status: She is alert and oriented to person, place, and time.      Motor: Weakness present.   Psychiatric:         Mood and Affect: Mood normal.         Behavior: Behavior normal.         Fluids    Intake/Output Summary (Last 24 hours) at 2/16/2025 0933  Last data filed at 2/15/2025 1618  Gross per 24 hour   Intake 400 ml   Output 700 ml   Net -300 ml        Laboratory  Recent Labs     02/14/25  0310 02/15/25  0143 02/16/25  0500   WBC  9.7 10.6 10.1   RBC 3.95* 4.27 4.06*   HEMOGLOBIN 12.7 13.8 12.9   HEMATOCRIT 37.6 41.1 38.9   MCV 95.2 96.3 95.8   MCH 32.2 32.3 31.8   MCHC 33.8 33.6 33.2   RDW 47.0 47.9 48.8   PLATELETCT 197 236 281   MPV 8.9* 8.9* 8.7*     Recent Labs     02/14/25  0310 02/15/25  0143 02/16/25  0500   SODIUM 142 141 137   POTASSIUM 3.3* 3.6 3.8   CHLORIDE 110 107 102   CO2 20 22 24   GLUCOSE 134* 110* 117*   BUN 18 13 19   CREATININE 0.67 0.68 0.83   CALCIUM 8.0* 8.5 8.5                     Imaging  IR-CAROTID-CEREBRAL BILATERAL   Final Result      There are a few abnormal tiny blood vessels noted in the expected location of the right temporal hemorrhage. There is slight early filling of the adjacent superficial veins compared with the other cortical veins. These findings may be secondary to the    hemorrhage itself. However, the possibility of small 'burned out' vascular malformation cannot be fully excluded. Follow-up cerebral angiogram is recommended after 6 weeks to rule out any possibility of tiny residual vascular malformation.      DX-CHEST-PORTABLE (1 VIEW)   Final Result         1.  Left basilar atelectasis, no focal infiltrate   2.  Nasogastric tube terminates just distal to the gastroesophageal junction with side-port in the distal esophagus, position is unchanged since prior study, recommend advancement      MR-MRA HEAD-W/O   Final Result      1.  There is no evidence of aneurysm, stenosis or vascular malformation in the visualized proximal vasculature or adjacent to the hematoma.   2.  Right posterior temporal parenchymal hemorrhage.   3.  Mild right cerebral diffuse subarachnoid hemorrhage.   4.  Mild right cerebral subdural hemorrhage.      MR-BRAIN-WITH & W/O   Final Result      Right posterior temporal hemorrhage. There are no abnormal flow voids or large nodular enhancement. There is tiny punctate enhancement within the hemorrhage likely representing vascular enhancement.Pre and postcontrast MR examination is  recommended after    6 weeks. Due to the atypical location of the hemorrhage, catheter angiogram may be needed to rule out the possibility of any tiny vascular malformation.      EC-ECHOCARDIOGRAM COMPLETE W/O CONT   Final Result      DX-CHEST-PORTABLE (1 VIEW)   Final Result         1.  No acute cardiopulmonary disease.   2.  Nasogastric tube terminates just distal to the gastroesophageal junction with side-port in the distal esophagus, recommend advancement      CT-CTA NECK WITH & W/O-POST PROCESSING   Final Result         1.  CT angiogram of the neck within normal limits.         CT-CTA HEAD WITH & W/O-POST PROCESS   Final Result         1.  No large vessel occlusion or aneurysm identified.   2.  Right posterior temporal parenchymal hemorrhage, stable since prior study.   3.  Scattered subarachnoid hemorrhages, overall stable since prior study.      DX-CHEST-PORTABLE (1 VIEW)   Final Result         1.  No acute cardiopulmonary disease.   2.  Nasogastric tube with tip just distal to the gastroesophageal junction, recommend advancement      DX-ABDOMEN FOR TUBE PLACEMENT   Final Result         1.  Nonspecific bowel gas pattern in the upper abdomen.   2.  Nasogastric tube with tip just distal to the gastroesophageal junction, recommend advancement      CT-HEAD W/O   Final Result         1.  Right posterior temporal parenchymal hemorrhages stranding vasogenic edema   2.  Bilateral subarachnoid hemorrhages as described.   3.  Right posterior parafalcine subdural hematoma measuring 2.4 mm.   4.  Subdural versus subarachnoid hemorrhage along the right tentorium measuring 3.1 mm.      These findings were discussed with the patient's clinician, George Wise, on 2/10/2025 8:42 PM.         DX-CHEST-PORTABLE (1 VIEW)   Final Result      Hypoinflation without a definite acute abnormality.           Assessment/Plan  * Acute intracranial hemorrhage (HCC)- (present on admission)  Assessment & Plan  Right posterior temporal  parenchymal hemorrhage noted + SAH as above  CTA H&N without obvious aneurism  Hemorrhage at this time appears small without significant mass effect or hydrocephalus to warrant surgical decompression or EVD   Brain MRI/MRA without vascular abnormalities, stable hematoma   - Statin   - See plan for SAH   - Neuro improving, continue weaning/stopping sedation     Influenza A  Assessment & Plan  Flu A +   No evidence of pneumonia  Continue Tamiflu; end date 2/15    Seizure (HCC)  Assessment & Plan  Clinical seizure x1 2/11  Secondary to ICH  cEEG without epileptiform activity  Doing well, awake, non focal  - Continue vimpat BID  - Will need outpt neuro f/u     Hypokalemia  Assessment & Plan  Potassium 3.6  Replaced  - Repeat BMP in a.m.    Lactic acidosis  Assessment & Plan  Resolved    Acute respiratory failure with hypoxia ([facility])- (present on admission)  Assessment & Plan  Intubated 2/10  Extubated 2/13  Currently requiring 2 L/min supplemental O2  - Wean oxygen as able  - Encourage incentive spirometry and ambulation as able    Acute encephalopathy- (present on admission)  Assessment & Plan  Admitted with agitation  ICH/SAH and UTI  Improving    Urinary tract infection without hematuria- (present on admission)  Assessment & Plan  UA abnormal  -Neg cultures  -Completed ceftriaxone     Hypertensive urgency- (present on admission)  Assessment & Plan   through 165 over the last 24 hours  - Continue IV antihypertensives for SBP greater than 140; has required multiple doses IV anti-hypertensives over last 24 hours  - Continue amlodipine 5 mg  - Continue lisinopril 40 mg  - Close monitoring of blood pressures  - SBP goal 100-140  - Continuous telemetry monitoring; monitoring for arrhythmia in the setting of frequent IV antihypertensives for intracranial hemorrhage    SAH (subarachnoid hemorrhage) (HCC)- (present on admission)  Assessment & Plan  Etiology unknown  MRA without vascular abnormalities  Witnessed seizure  2/10  - Repeat MRI 6 weeks  - Continue Vimpat 100 mg twice daily  - PT OT pending  - Continue neurochecks every 4 hours for now  - SBP goal 100-140    Type 2 diabetes mellitus without complication, without long-term current use of insulin (HCC)- (present on admission)  Assessment & Plan  A1c 5.6% BG at goal  Discontinue SSI; patient has not required during this hospitalization    Recurrent breast cancer (HCC)- (present on admission)  Assessment & Plan  Continue anastrazole    Hypothyroidism from resorcinol- (present on admission)  Assessment & Plan  TSH 2.19  Continue home levothyroxine    Mixed hyperlipidemia- (present on admission)  Assessment & Plan  Continue atorvastatin         VTE prophylaxis:   SCDs/TEDs   pharmacologic prophylaxis contraindicated due to ICH      I have performed a physical exam and reviewed and updated ROS and Plan today (2/16/2025). In review of yesterday's note (2/15/2025), there are no changes except as documented above.

## 2025-02-16 NOTE — PROGRESS NOTES
Monitor summary:        Rhythm: SR   Rate: 63-84  Ectopy: (r)PVC  Measurements: .17/.08/.44        12hr chart check

## 2025-02-17 LAB
ANION GAP SERPL CALC-SCNC: 11 MMOL/L (ref 7–16)
BUN SERPL-MCNC: 14 MG/DL (ref 8–22)
CALCIUM SERPL-MCNC: 8.6 MG/DL (ref 8.5–10.5)
CHLORIDE SERPL-SCNC: 104 MMOL/L (ref 96–112)
CO2 SERPL-SCNC: 22 MMOL/L (ref 20–33)
CREAT SERPL-MCNC: 0.81 MG/DL (ref 0.5–1.4)
ERYTHROCYTE [DISTWIDTH] IN BLOOD BY AUTOMATED COUNT: 47.6 FL (ref 35.9–50)
GFR SERPLBLD CREATININE-BSD FMLA CKD-EPI: 76 ML/MIN/1.73 M 2
GLUCOSE SERPL-MCNC: 118 MG/DL (ref 65–99)
HCT VFR BLD AUTO: 40.5 % (ref 37–47)
HGB BLD-MCNC: 13.4 G/DL (ref 12–16)
MAGNESIUM SERPL-MCNC: 1.8 MG/DL (ref 1.5–2.5)
MCH RBC QN AUTO: 31.6 PG (ref 27–33)
MCHC RBC AUTO-ENTMCNC: 33.1 G/DL (ref 32.2–35.5)
MCV RBC AUTO: 95.5 FL (ref 81.4–97.8)
PLATELET # BLD AUTO: 280 K/UL (ref 164–446)
PMV BLD AUTO: 8.5 FL (ref 9–12.9)
POTASSIUM SERPL-SCNC: 3.8 MMOL/L (ref 3.6–5.5)
RBC # BLD AUTO: 4.24 M/UL (ref 4.2–5.4)
SODIUM SERPL-SCNC: 137 MMOL/L (ref 135–145)
WBC # BLD AUTO: 10.5 K/UL (ref 4.8–10.8)

## 2025-02-17 PROCEDURE — 80048 BASIC METABOLIC PNL TOTAL CA: CPT

## 2025-02-17 PROCEDURE — 36415 COLL VENOUS BLD VENIPUNCTURE: CPT

## 2025-02-17 PROCEDURE — 700111 HCHG RX REV CODE 636 W/ 250 OVERRIDE (IP): Performed by: STUDENT IN AN ORGANIZED HEALTH CARE EDUCATION/TRAINING PROGRAM

## 2025-02-17 PROCEDURE — 770020 HCHG ROOM/CARE - TELE (206)

## 2025-02-17 PROCEDURE — A9270 NON-COVERED ITEM OR SERVICE: HCPCS | Performed by: HOSPITALIST

## 2025-02-17 PROCEDURE — 99233 SBSQ HOSP IP/OBS HIGH 50: CPT | Performed by: STUDENT IN AN ORGANIZED HEALTH CARE EDUCATION/TRAINING PROGRAM

## 2025-02-17 PROCEDURE — 97530 THERAPEUTIC ACTIVITIES: CPT | Mod: CQ

## 2025-02-17 PROCEDURE — 700102 HCHG RX REV CODE 250 W/ 637 OVERRIDE(OP): Performed by: STUDENT IN AN ORGANIZED HEALTH CARE EDUCATION/TRAINING PROGRAM

## 2025-02-17 PROCEDURE — 700111 HCHG RX REV CODE 636 W/ 250 OVERRIDE (IP): Mod: JZ | Performed by: INTERNAL MEDICINE

## 2025-02-17 PROCEDURE — 700102 HCHG RX REV CODE 250 W/ 637 OVERRIDE(OP): Performed by: HOSPITALIST

## 2025-02-17 PROCEDURE — 700101 HCHG RX REV CODE 250: Performed by: NURSE PRACTITIONER

## 2025-02-17 PROCEDURE — 83735 ASSAY OF MAGNESIUM: CPT

## 2025-02-17 PROCEDURE — 85027 COMPLETE CBC AUTOMATED: CPT

## 2025-02-17 PROCEDURE — A9270 NON-COVERED ITEM OR SERVICE: HCPCS | Performed by: STUDENT IN AN ORGANIZED HEALTH CARE EDUCATION/TRAINING PROGRAM

## 2025-02-17 RX ORDER — PROCHLORPERAZINE EDISYLATE 5 MG/ML
10 INJECTION INTRAMUSCULAR; INTRAVENOUS EVERY 6 HOURS PRN
Status: DISCONTINUED | OUTPATIENT
Start: 2025-02-17 | End: 2025-02-21 | Stop reason: HOSPADM

## 2025-02-17 RX ORDER — MAGNESIUM SULFATE HEPTAHYDRATE 40 MG/ML
2 INJECTION, SOLUTION INTRAVENOUS ONCE
Status: COMPLETED | OUTPATIENT
Start: 2025-02-17 | End: 2025-02-17

## 2025-02-17 RX ORDER — POTASSIUM CHLORIDE 1500 MG/1
40 TABLET, EXTENDED RELEASE ORAL ONCE
Status: COMPLETED | OUTPATIENT
Start: 2025-02-17 | End: 2025-02-17

## 2025-02-17 RX ORDER — AMLODIPINE BESYLATE 5 MG/1
5 TABLET ORAL ONCE
Status: COMPLETED | OUTPATIENT
Start: 2025-02-17 | End: 2025-02-17

## 2025-02-17 RX ORDER — AMLODIPINE BESYLATE 10 MG/1
10 TABLET ORAL
Status: DISCONTINUED | OUTPATIENT
Start: 2025-02-18 | End: 2025-02-21 | Stop reason: HOSPADM

## 2025-02-17 RX ADMIN — LEVOTHYROXINE SODIUM 125 MCG: 0.12 TABLET ORAL at 05:11

## 2025-02-17 RX ADMIN — ATORVASTATIN CALCIUM 20 MG: 20 TABLET, FILM COATED ORAL at 05:12

## 2025-02-17 RX ADMIN — ACETAMINOPHEN 650 MG: 325 TABLET ORAL at 14:50

## 2025-02-17 RX ADMIN — HYDROCODONE BITARTRATE AND ACETAMINOPHEN 1 TABLET: 10; 325 TABLET ORAL at 20:08

## 2025-02-17 RX ADMIN — SENNOSIDES AND DOCUSATE SODIUM 2 TABLET: 50; 8.6 TABLET ORAL at 05:11

## 2025-02-17 RX ADMIN — HYDROCODONE BITARTRATE AND ACETAMINOPHEN 1 TABLET: 10; 325 TABLET ORAL at 11:33

## 2025-02-17 RX ADMIN — AMLODIPINE BESYLATE 5 MG: 5 TABLET ORAL at 05:12

## 2025-02-17 RX ADMIN — LACOSAMIDE 100 MG: 100 TABLET, FILM COATED ORAL at 16:05

## 2025-02-17 RX ADMIN — LABETALOL HYDROCHLORIDE 10 MG: 5 INJECTION INTRAVENOUS at 19:42

## 2025-02-17 RX ADMIN — LISINOPRIL 40 MG: 20 TABLET ORAL at 05:11

## 2025-02-17 RX ADMIN — HYDRALAZINE HYDROCHLORIDE 20 MG: 20 INJECTION, SOLUTION INTRAMUSCULAR; INTRAVENOUS at 21:16

## 2025-02-17 RX ADMIN — POTASSIUM CHLORIDE 40 MEQ: 1500 TABLET, EXTENDED RELEASE ORAL at 14:51

## 2025-02-17 RX ADMIN — ANASTROZOLE 1 MG: 1 TABLET ORAL at 05:11

## 2025-02-17 RX ADMIN — HYDROCODONE BITARTRATE AND ACETAMINOPHEN 1 TABLET: 10; 325 TABLET ORAL at 16:04

## 2025-02-17 RX ADMIN — PROCHLORPERAZINE EDISYLATE 10 MG: 5 INJECTION INTRAMUSCULAR; INTRAVENOUS at 20:09

## 2025-02-17 RX ADMIN — LIDOCAINE 1 PATCH: 4 PATCH TOPICAL at 21:17

## 2025-02-17 RX ADMIN — LABETALOL HYDROCHLORIDE 20 MG: 5 INJECTION INTRAVENOUS at 23:38

## 2025-02-17 RX ADMIN — HYDROCODONE BITARTRATE AND ACETAMINOPHEN 1 TABLET: 10; 325 TABLET ORAL at 05:10

## 2025-02-17 RX ADMIN — AMLODIPINE BESYLATE 5 MG: 5 TABLET ORAL at 08:22

## 2025-02-17 RX ADMIN — MAGNESIUM SULFATE HEPTAHYDRATE 2 G: 2 INJECTION, SOLUTION INTRAVENOUS at 15:08

## 2025-02-17 RX ADMIN — HYDROCODONE BITARTRATE AND ACETAMINOPHEN 1 TABLET: 10; 325 TABLET ORAL at 00:44

## 2025-02-17 RX ADMIN — LACOSAMIDE 100 MG: 100 TABLET, FILM COATED ORAL at 05:12

## 2025-02-17 ASSESSMENT — COGNITIVE AND FUNCTIONAL STATUS - GENERAL
MOBILITY SCORE: 17
WALKING IN HOSPITAL ROOM: A LITTLE
MOVING FROM LYING ON BACK TO SITTING ON SIDE OF FLAT BED: A LITTLE
MOVING TO AND FROM BED TO CHAIR: A LITTLE
CLIMB 3 TO 5 STEPS WITH RAILING: A LITTLE
SUGGESTED CMS G CODE MODIFIER MOBILITY: CK
TURNING FROM BACK TO SIDE WHILE IN FLAT BAD: A LOT
STANDING UP FROM CHAIR USING ARMS: A LITTLE

## 2025-02-17 ASSESSMENT — GAIT ASSESSMENTS
GAIT LEVEL OF ASSIST: CONTACT GUARD ASSIST
DEVIATION: SHUFFLED GAIT;BRADYKINETIC
DISTANCE (FEET): 15
ASSISTIVE DEVICE: FRONT WHEEL WALKER

## 2025-02-17 ASSESSMENT — PAIN DESCRIPTION - PAIN TYPE
TYPE: ACUTE PAIN

## 2025-02-17 ASSESSMENT — ENCOUNTER SYMPTOMS
FEVER: 0
HEADACHES: 1
WEAKNESS: 1

## 2025-02-17 ASSESSMENT — FIBROSIS 4 INDEX: FIB4 SCORE: 1.81

## 2025-02-17 NOTE — CARE PLAN
The patient is Stable - Low risk of patient condition declining or worsening    Shift Goals  Clinical Goals: vss, q4h neuros, bp management  Patient Goals: pain management  Family Goals: ANITA    Progress made toward(s) clinical / shift goals:    Problem: Knowledge Deficit - Standard  Goal: Patient and family/care givers will demonstrate understanding of plan of care, disease process/condition, diagnostic tests and medications  Outcome: Progressing     Problem: Pain - Standard  Goal: Alleviation of pain or a reduction in pain to the patient’s comfort goal  Outcome: Progressing     Problem: Skin Integrity  Goal: Skin integrity is maintained or improved  Outcome: Progressing     Problem: Fall Risk  Goal: Patient will remain free from falls  Outcome: Progressing     Problem: Neuro Status  Goal: Neuro status will remain stable or improve  Outcome: Progressing       Patient is not progressing towards the following goals:

## 2025-02-17 NOTE — PROGRESS NOTES
[facility] Medicine Daily Progress Note    Date of Service  2/17/2025    Chief Complaint  Keyur Burt is a 74 y.o. female admitted 2/10/2025 with altered mental status.    Hospital Course  Karen Burt is a 74-year-old female with PMHx DMT2, recurrent breast cancer, hypothyroidism, HLD, HTN, CKD 3B.  Admitted 2/10 for intracranial hemorrhage.    Per history-patient was scheduled to meet with her friends.  When she did not arrive, patient's son checked on her.  He found that she was laying on the ground and confused.  EMS was called.  She was transferred to the ED for further evaluation.  CT head: Intracranial hemorrhage.  Due to agitation, confusion and combativeness-patient was intubated and transferred to the ICU for further evaluation.    MRI brain: Right posterior temporal hemorrhage.  MRA head without evidence of aneurysm, stenosis or vascular malformation.  Mild right cerebral subarachnoid hemorrhage.  Mild right cerebral subdural hemorrhage.  Patient noted to have witnessed seizure 2/11.  Not captured on cEEG.  Patient was started on Vimpat for seizure prophylaxis.    Additionally, patient found to be flu positive.    Patient underwent diagnostic cerebral angiogram 2/13.  Early blush and area of hematoma.  Recommending repeat angiogram in 6 to 8 weeks.    Patient was extubated on 2/13.  Nicardipine drip was discontinued.  SBP goal 100-140.  Stable for transfer to telemetry floor.  PT OT recommending postacute rehabilitation.    Interval Problem Update  2/15: Vitals notable for SBP ranging 129 through 166.  She has received multiple doses of both hydralazine and labetalol for blood pressure control overnight.  Start amlodipine.  Increase lisinopril to 40 mg.  Discontinue sliding scale insulin; patient has not required to the last 5 days since admission.  Potassium 3.6; replaced. Patient complains of headache. Increase norco, add Fioricet one-time dose.    2/16: Vitals notable for improvement in SBP.   Ranging 105 through 146 overnight.  Patient remains on 2 L/min supplemental O2.  Last dose of IV labetalol late yesterday evening.  Hb stable at 12.9.  Potassium 3.8; replaced.      2/17: SBP ranging 139 through 159.  Required 3 doses IV antihypertensives late yesterday afternoon and overnight.  Increase amlodipine to 10 mg daily.  Anticipate discharge to rehab in the next 1 to 2 days.    I have discussed this patient's plan of care and discharge plan at IDT rounds today with Case Management, Nursing, Nursing leadership, and other members of the IDT team.    Consultants/Specialty  critical care, neurology, and IR    Code Status  Full Code    Disposition  Medically Cleared  I have placed the appropriate orders for post-discharge needs.    Review of Systems  Review of Systems   Constitutional:  Positive for malaise/fatigue. Negative for fever.   Cardiovascular:  Negative for chest pain and leg swelling.   Neurological:  Positive for weakness and headaches.        Physical Exam  Temp:  [36.4 °C (97.6 °F)-37.6 °C (99.7 °F)] 37.6 °C (99.7 °F)  Pulse:  [] 108  Resp:  [15-18] 18  BP: (127-166)/(61-78) 127/72  SpO2:  [89 %-98 %] 98 %    Physical Exam  Vitals and nursing note reviewed.   Constitutional:       General: She is not in acute distress.     Appearance: Normal appearance. She is obese. She is ill-appearing.   Cardiovascular:      Rate and Rhythm: Normal rate and regular rhythm.      Heart sounds: Murmur heard.   Pulmonary:      Effort: Pulmonary effort is normal. No respiratory distress.      Breath sounds: No wheezing.   Skin:     General: Skin is warm and dry.      Coloration: Skin is pale.   Neurological:      Mental Status: She is alert and oriented to person, place, and time.      Motor: Weakness present.   Psychiatric:         Mood and Affect: Mood normal.         Behavior: Behavior normal.         Fluids    Intake/Output Summary (Last 24 hours) at 2/17/2025 1232  Last data filed at 2/17/2025  0800  Gross per 24 hour   Intake 400 ml   Output 1050 ml   Net -650 ml        Laboratory  Recent Labs     02/15/25  0143 02/16/25  0500 02/17/25  0511   WBC 10.6 10.1 10.5   RBC 4.27 4.06* 4.24   HEMOGLOBIN 13.8 12.9 13.4   HEMATOCRIT 41.1 38.9 40.5   MCV 96.3 95.8 95.5   MCH 32.3 31.8 31.6   MCHC 33.6 33.2 33.1   RDW 47.9 48.8 47.6   PLATELETCT 236 281 280   MPV 8.9* 8.7* 8.5*     Recent Labs     02/15/25  0143 02/16/25  0500 02/17/25  0511   SODIUM 141 137 137   POTASSIUM 3.6 3.8 3.8   CHLORIDE 107 102 104   CO2 22 24 22   GLUCOSE 110* 117* 118*   BUN 13 19 14   CREATININE 0.68 0.83 0.81   CALCIUM 8.5 8.5 8.6                     Imaging  IR-CAROTID-CEREBRAL BILATERAL   Final Result      There are a few abnormal tiny blood vessels noted in the expected location of the right temporal hemorrhage. There is slight early filling of the adjacent superficial veins compared with the other cortical veins. These findings may be secondary to the    hemorrhage itself. However, the possibility of small 'burned out' vascular malformation cannot be fully excluded. Follow-up cerebral angiogram is recommended after 6 weeks to rule out any possibility of tiny residual vascular malformation.      DX-CHEST-PORTABLE (1 VIEW)   Final Result         1.  Left basilar atelectasis, no focal infiltrate   2.  Nasogastric tube terminates just distal to the gastroesophageal junction with side-port in the distal esophagus, position is unchanged since prior study, recommend advancement      MR-MRA HEAD-W/O   Final Result      1.  There is no evidence of aneurysm, stenosis or vascular malformation in the visualized proximal vasculature or adjacent to the hematoma.   2.  Right posterior temporal parenchymal hemorrhage.   3.  Mild right cerebral diffuse subarachnoid hemorrhage.   4.  Mild right cerebral subdural hemorrhage.      MR-BRAIN-WITH & W/O   Final Result      Right posterior temporal hemorrhage. There are no abnormal flow voids or large  nodular enhancement. There is tiny punctate enhancement within the hemorrhage likely representing vascular enhancement.Pre and postcontrast MR examination is recommended after    6 weeks. Due to the atypical location of the hemorrhage, catheter angiogram may be needed to rule out the possibility of any tiny vascular malformation.      EC-ECHOCARDIOGRAM COMPLETE W/O CONT   Final Result      DX-CHEST-PORTABLE (1 VIEW)   Final Result         1.  No acute cardiopulmonary disease.   2.  Nasogastric tube terminates just distal to the gastroesophageal junction with side-port in the distal esophagus, recommend advancement      CT-CTA NECK WITH & W/O-POST PROCESSING   Final Result         1.  CT angiogram of the neck within normal limits.         CT-CTA HEAD WITH & W/O-POST PROCESS   Final Result         1.  No large vessel occlusion or aneurysm identified.   2.  Right posterior temporal parenchymal hemorrhage, stable since prior study.   3.  Scattered subarachnoid hemorrhages, overall stable since prior study.      DX-CHEST-PORTABLE (1 VIEW)   Final Result         1.  No acute cardiopulmonary disease.   2.  Nasogastric tube with tip just distal to the gastroesophageal junction, recommend advancement      DX-ABDOMEN FOR TUBE PLACEMENT   Final Result         1.  Nonspecific bowel gas pattern in the upper abdomen.   2.  Nasogastric tube with tip just distal to the gastroesophageal junction, recommend advancement      CT-HEAD W/O   Final Result         1.  Right posterior temporal parenchymal hemorrhages stranding vasogenic edema   2.  Bilateral subarachnoid hemorrhages as described.   3.  Right posterior parafalcine subdural hematoma measuring 2.4 mm.   4.  Subdural versus subarachnoid hemorrhage along the right tentorium measuring 3.1 mm.      These findings were discussed with the patient's clinician, George Wise, on 2/10/2025 8:42 PM.         DX-CHEST-PORTABLE (1 VIEW)   Final Result      Hypoinflation without a  definite acute abnormality.           Assessment/Plan  * Acute intracranial hemorrhage (HCC)- (present on admission)  Assessment & Plan  Right posterior temporal parenchymal hemorrhage noted + SAH as above  CTA H&N without obvious aneurism  Hemorrhage at this time appears small without significant mass effect or hydrocephalus to warrant surgical decompression or EVD   Brain MRI/MRA without vascular abnormalities, stable hematoma   - Statin   - See plan for SAH   - Neuro improving, continue weaning/stopping sedation     Influenza A  Assessment & Plan  Flu A +   No evidence of pneumonia  Tamiflu completed 2/15    Seizure (HCC)  Assessment & Plan  Clinical seizure x1 2/11  Secondary to ICH  cEEG without epileptiform activity  Doing well, awake, non focal  - Continue vimpat BID  - Will need outpt neuro f/u     Hypokalemia  Assessment & Plan  Potassium 3.8  Replace  - Repeat BMP in a.m.    Lactic acidosis  Assessment & Plan  Resolved    Acute respiratory failure with hypoxia (HCC)- (present on admission)  Assessment & Plan  Intubated 2/10  Extubated 2/13  Currently requiring 2 L/min supplemental O2  - Wean oxygen as able  - Encourage incentive spirometry and ambulation as able    Acute encephalopathy- (present on admission)  Assessment & Plan  Admitted with agitation  ICH/SAH and UTI  Improving    Urinary tract infection without hematuria- (present on admission)  Assessment & Plan  UA abnormal  -Neg cultures  -Completed ceftriaxone     Hypertensive urgency- (present on admission)  Assessment & Plan   through 165 over the last 24 hours  - SBP improving; if you touchups greater than 140.  Has required 3 doses IV antihypertensive the last 24 hours  - Increase amlodipine to 10 mg   - Continue lisinopril 40 mg  - Close monitoring of blood pressures  - SBP goal 100-140  - Continuous telemetry monitoring; monitoring for arrhythmia in the setting of frequent IV antihypertensives for intracranial hemorrhage    SAH  (subarachnoid hemorrhage) (HCC)- (present on admission)  Assessment & Plan  Etiology unknown  MRA without vascular abnormalities  Witnessed seizure 2/10  - Repeat MRI 6 weeks  - Continue Vimpat 100 mg twice daily  - PT OT pending  - Continue neurochecks every 4 hours for now  - SBP goal 100-140    Type 2 diabetes mellitus without complication, without long-term current use of insulin (HCC)- (present on admission)  Assessment & Plan  A1c 5.6% BG at goal  Discontinue SSI; patient has not required during this hospitalization    Recurrent breast cancer (HCC)- (present on admission)  Assessment & Plan  Continue anastrazole    Hypothyroidism from resorcinol- (present on admission)  Assessment & Plan  TSH 2.19  Continue home levothyroxine    Mixed hyperlipidemia- (present on admission)  Assessment & Plan  Continue atorvastatin         VTE prophylaxis: VTE Selection    I have performed a physical exam and reviewed and updated ROS and Plan today (2/17/2025). In review of yesterday's note (2/16/2025), there are no changes except as documented above.

## 2025-02-17 NOTE — DISCHARGE PLANNING
0901  Agency/Facility Name: [facility]   Outcome: DPA left VM regarding referral. DPA awaiting call back.

## 2025-02-17 NOTE — PROGRESS NOTES
Bedside report received from NOC RN. Assumed care of pt. Pt awake, laying in bed. A/Ox4, VSS. No concerns, complaints or distress. Pt educated to call before getting out of bed. POC reviewed and white board updated. Tele box on. SR 67on the monitor. Call light in reach. Bed locked in lowest position with 2 upper bed rails up. Bed alarm on.

## 2025-02-18 LAB
ANION GAP SERPL CALC-SCNC: 11 MMOL/L (ref 7–16)
BUN SERPL-MCNC: 13 MG/DL (ref 8–22)
CALCIUM SERPL-MCNC: 8.6 MG/DL (ref 8.5–10.5)
CHLORIDE SERPL-SCNC: 101 MMOL/L (ref 96–112)
CO2 SERPL-SCNC: 23 MMOL/L (ref 20–33)
CREAT SERPL-MCNC: 0.76 MG/DL (ref 0.5–1.4)
ERYTHROCYTE [DISTWIDTH] IN BLOOD BY AUTOMATED COUNT: 45.7 FL (ref 35.9–50)
GFR SERPLBLD CREATININE-BSD FMLA CKD-EPI: 82 ML/MIN/1.73 M 2
GLUCOSE SERPL-MCNC: 137 MG/DL (ref 65–99)
HCT VFR BLD AUTO: 39.9 % (ref 37–47)
HGB BLD-MCNC: 13.6 G/DL (ref 12–16)
MCH RBC QN AUTO: 31.9 PG (ref 27–33)
MCHC RBC AUTO-ENTMCNC: 34.1 G/DL (ref 32.2–35.5)
MCV RBC AUTO: 93.7 FL (ref 81.4–97.8)
PLATELET # BLD AUTO: 291 K/UL (ref 164–446)
PMV BLD AUTO: 8.7 FL (ref 9–12.9)
POTASSIUM SERPL-SCNC: 3.6 MMOL/L (ref 3.6–5.5)
RBC # BLD AUTO: 4.26 M/UL (ref 4.2–5.4)
SODIUM SERPL-SCNC: 135 MMOL/L (ref 135–145)
WBC # BLD AUTO: 10.7 K/UL (ref 4.8–10.8)

## 2025-02-18 PROCEDURE — 700102 HCHG RX REV CODE 250 W/ 637 OVERRIDE(OP): Performed by: STUDENT IN AN ORGANIZED HEALTH CARE EDUCATION/TRAINING PROGRAM

## 2025-02-18 PROCEDURE — A9270 NON-COVERED ITEM OR SERVICE: HCPCS | Performed by: STUDENT IN AN ORGANIZED HEALTH CARE EDUCATION/TRAINING PROGRAM

## 2025-02-18 PROCEDURE — 700101 HCHG RX REV CODE 250: Performed by: NURSE PRACTITIONER

## 2025-02-18 PROCEDURE — A9270 NON-COVERED ITEM OR SERVICE: HCPCS | Performed by: HOSPITALIST

## 2025-02-18 PROCEDURE — 97535 SELF CARE MNGMENT TRAINING: CPT

## 2025-02-18 PROCEDURE — 770020 HCHG ROOM/CARE - TELE (206)

## 2025-02-18 PROCEDURE — 700102 HCHG RX REV CODE 250 W/ 637 OVERRIDE(OP): Performed by: HOSPITALIST

## 2025-02-18 PROCEDURE — 700111 HCHG RX REV CODE 636 W/ 250 OVERRIDE (IP): Mod: JZ | Performed by: INTERNAL MEDICINE

## 2025-02-18 PROCEDURE — 36415 COLL VENOUS BLD VENIPUNCTURE: CPT

## 2025-02-18 PROCEDURE — 99222 1ST HOSP IP/OBS MODERATE 55: CPT | Performed by: STUDENT IN AN ORGANIZED HEALTH CARE EDUCATION/TRAINING PROGRAM

## 2025-02-18 PROCEDURE — 99232 SBSQ HOSP IP/OBS MODERATE 35: CPT | Performed by: INTERNAL MEDICINE

## 2025-02-18 PROCEDURE — 85027 COMPLETE CBC AUTOMATED: CPT

## 2025-02-18 PROCEDURE — 80048 BASIC METABOLIC PNL TOTAL CA: CPT

## 2025-02-18 RX ADMIN — HYDROCODONE BITARTRATE AND ACETAMINOPHEN 1 TABLET: 10; 325 TABLET ORAL at 00:23

## 2025-02-18 RX ADMIN — LACOSAMIDE 100 MG: 100 TABLET, FILM COATED ORAL at 17:35

## 2025-02-18 RX ADMIN — HYDROCODONE BITARTRATE AND ACETAMINOPHEN 1 TABLET: 10; 325 TABLET ORAL at 13:39

## 2025-02-18 RX ADMIN — LEVOTHYROXINE SODIUM 137 MCG: 0.11 TABLET ORAL at 05:04

## 2025-02-18 RX ADMIN — LISINOPRIL 40 MG: 20 TABLET ORAL at 04:59

## 2025-02-18 RX ADMIN — HYDROCODONE BITARTRATE AND ACETAMINOPHEN 1 TABLET: 10; 325 TABLET ORAL at 04:54

## 2025-02-18 RX ADMIN — ATORVASTATIN CALCIUM 10 MG: 10 TABLET, FILM COATED ORAL at 04:55

## 2025-02-18 RX ADMIN — HYDROCODONE BITARTRATE AND ACETAMINOPHEN 1 TABLET: 10; 325 TABLET ORAL at 09:21

## 2025-02-18 RX ADMIN — LIDOCAINE 1 PATCH: 4 PATCH TOPICAL at 22:18

## 2025-02-18 RX ADMIN — HYDROCODONE BITARTRATE AND ACETAMINOPHEN 1 TABLET: 10; 325 TABLET ORAL at 19:50

## 2025-02-18 RX ADMIN — LABETALOL HYDROCHLORIDE 10 MG: 5 INJECTION INTRAVENOUS at 20:16

## 2025-02-18 RX ADMIN — ANASTROZOLE 1 MG: 1 TABLET ORAL at 04:57

## 2025-02-18 RX ADMIN — HYDRALAZINE HYDROCHLORIDE 20 MG: 20 INJECTION, SOLUTION INTRAMUSCULAR; INTRAVENOUS at 22:21

## 2025-02-18 RX ADMIN — LACOSAMIDE 100 MG: 100 TABLET, FILM COATED ORAL at 04:58

## 2025-02-18 RX ADMIN — AMLODIPINE BESYLATE 10 MG: 10 TABLET ORAL at 05:03

## 2025-02-18 ASSESSMENT — PAIN DESCRIPTION - PAIN TYPE
TYPE: ACUTE PAIN

## 2025-02-18 ASSESSMENT — ENCOUNTER SYMPTOMS
FEVER: 0
WEAKNESS: 1
HEADACHES: 1

## 2025-02-18 ASSESSMENT — COGNITIVE AND FUNCTIONAL STATUS - GENERAL
TOILETING: A LOT
HELP NEEDED FOR BATHING: A LOT
SUGGESTED CMS G CODE MODIFIER DAILY ACTIVITY: CK
DAILY ACTIVITIY SCORE: 16
DRESSING REGULAR UPPER BODY CLOTHING: A LITTLE
PERSONAL GROOMING: A LITTLE
DRESSING REGULAR LOWER BODY CLOTHING: A LOT

## 2025-02-18 ASSESSMENT — FIBROSIS 4 INDEX: FIB4 SCORE: 1.75

## 2025-02-18 NOTE — PROGRESS NOTES
Monitor summary:        Rhythm: SR   Rate: 65-77  Ectopy: (r)PVC  Measurements: .20/.07/.39        12hr chart check

## 2025-02-18 NOTE — PROGRESS NOTES
Received report from RN. Assumed pt care. Patient on tele box and RA. Patient A&O x4. Fall precautions in place, call light and belongings within reach, bed in lowest position. No signs of distress.

## 2025-02-18 NOTE — DISCHARGE PLANNING
Case Management Discharge Planning    Admission Date: 2/10/2025  GMLOS: 4.5  ALOS: 8    6-Clicks ADL Score: 12  6-Clicks Mobility Score: 17  PT and/or OT Eval ordered: Yes  Post-acute Referrals Ordered: Yes  Post-acute Choice Obtained: No  Has referral(s) been sent to post-acute provider:  Yes    Anticipated Discharge Dispo: Discharge Disposition: D/T to SNF with Medicare cert in anticipation of skilled care (03)    DME Needed: No    Action(s) Taken: OTHER    Discussed patient during morning rounds. Per medical team, patient nearing medical clearance. Pending determination from [facility]. SW to obtain choice from patient.    SW called [facility] regarding patient's pending status. SW left a  requesting call back.    Escalations Completed: None    Medically Clear: No    Next Steps: F/U with medical team to discuss discharge needs and barriers. F/U with [facility]    Barriers to Discharge: Medical clearance, Pending Placement, and Pending Insurance Authorization    Is the patient up for discharge tomorrow: No

## 2025-02-18 NOTE — CARE PLAN
The patient is Stable - Low risk of patient condition declining or worsening    Shift Goals  Clinical Goals: Q4 neuro checks, monitor VS, pain management  Patient Goals: pain management  Family Goals: racheal    Progress made toward(s) clinical / shift goals:      Q4 neuro checks were done and patient's neuro status remained stable. Vital signs were monitored. Patient's SBP elevated to >160. IV labetalol and hydralazine was given to control elevated SBP. After IV medications, SBP was <140. Pain was managed with Q4 PO Norco.     Problem: Pain - Standard  Goal: Alleviation of pain or a reduction in pain to the patient’s comfort goal  Outcome: Not Progressing     Problem: Skin Integrity  Goal: Skin integrity is maintained or improved  Outcome: Progressing     Problem: Fall Risk  Goal: Patient will remain free from falls  Outcome: Progressing     Problem: Neuro Status  Goal: Neuro status will remain stable or improve  Outcome: Progressing     Problem: Mobility  Goal: Patient's capacity to carry out activities will improve  Outcome: Progressing

## 2025-02-18 NOTE — CONSULTS
Physical Medicine and Rehabilitation Consultation              Date of initial consultation: 2/18/2025  Requesting provider: Shagufta Wise M.D.   Consulting provider: Adair Fry D.O.  Reason for consultation: assess for acute inpatient rehab appropriateness  LOS: 8 Day(s)    Chief complaint: brain bleed    HPI: The patient is a 74 y.o.  female with a past medical history of hypothyroidism, HTN, HLD, CKD III, breast cancer, DMII who presented on 2/10/2025  5:24 PM with ALOC. Found down, non-verbal and confused. Brought in by EMS. In the ED, was hypertensive. Not able to converse or follow commands. CT head showed SAH, R temporal lobe ICH and SDH. She admitted to the hospital. She was intubated 2/10. Course complicated by influenza A, seizure x 1, hypokalemia, respiratory failure, UTI. Current labs remarkable for K 3/6, glucose 137. Vitals remarkable for intermittent hypertension.  Patient seen by PT/OT with recommendation for post-acute placement. PM&R consulted to evaluate for possible inpatient rehab admission.      On my evaluation, the patient is awake and alert. Complains of stiffness. Gets headaches intermittently, but not currently. Denies changes to vision, hearing, smell, taste. No difficulty concentrating or focusing. Patient denies fevers, chills, chest pain, shortness of breath, cough, abdominal pain, nausea, vomiting, dysuria.      Social Hx:  Patient lives in a [facility] with son, 1 KRYSTINA.  She has some friends that can help during the day time. Between her friends and her son, she should have 24/7 available at time of discharge.     At prior level of function, patient was independent.     THERAPY:  PT: Functional mobility   2/17: ambulating 15ft x 2 with FWW CGA, sit to stand CGA, bed/chair transfer CGA.    OT: ADLs  2/14: grooming min A, Upper dressing mod A, lower dressing max A.     SLP:   2/14: FEES performed. Rec soft and bite sized texture, thin liquids.   2/15: rec regular texture, thin  liquids.     IMAGIN/10/2025: CT head without  IMPRESSION:  1.  Right posterior temporal parenchymal hemorrhages stranding vasogenic edema  2.  Bilateral subarachnoid hemorrhages as described.  3.  Right posterior parafalcine subdural hematoma measuring 2.4 mm.  4.  Subdural versus subarachnoid hemorrhage along the right tentorium measuring 3.1 mm.    2025: CTA head  IMPRESSION:  1.  No large vessel occlusion or aneurysm identified.  2.  Right posterior temporal parenchymal hemorrhage, stable since prior study.  3.  Scattered subarachnoid hemorrhages, overall stable since prior study.    2025: CTA neck  IMPRESSION:  1.  CT angiogram of the neck within normal limits.    2025: Echocardiogram  CONCLUSIONS  No prior study is available for comparison.   Normal left ventricular systolic function.   No evidence of valvular abnormality based on Doppler evaluation.   Estimated right ventricular systolic pressure is 28 mmHg.  61% LVEF    2025: MRI brain   IMPRESSION:  Right posterior temporal hemorrhage. There are no abnormal flow voids or large nodular enhancement. There is tiny punctate enhancement within the hemorrhage likely representing vascular enhancement.Pre and postcontrast MR examination is recommended after 6 weeks. Due to the atypical location of the hemorrhage, catheter angiogram may be needed to rule out the possibility of any tiny vascular malformation.    2025: MRA head  IMPRESSION:  1.  There is no evidence of aneurysm, stenosis or vascular malformation in the visualized proximal vasculature or adjacent to the hematoma.  2.  Right posterior temporal parenchymal hemorrhage.  3.  Mild right cerebral diffuse subarachnoid hemorrhage.  4.  Mild right cerebral subdural hemorrhage.    2025: IR Carotid cerebral bilateral  IMPRESSION:  There are a few abnormal tiny blood vessels noted in the expected location of the right temporal hemorrhage. There is slight early filling of the  adjacent superficial veins compared with the other cortical veins. These findings may be secondary to the hemorrhage itself. However, the possibility of small 'burned out' vascular malformation cannot be fully excluded. Follow-up cerebral angiogram is recommended after 6 weeks to rule out any possibility of tiny residual vascular malformation.    PROCEDURES:  2/10/25: intubated    2/11/2025: Video EEG. Fabiola Lindsay MD  Abnormal - day #1 (initial 8 hours and 12 minutes) video EEG recording in the drowsy/sleep and sedated state(s):  Occasional brief runs of sharply contoured right fronto-central slowing. This finding is consistent with known structural abnormality and may indicate a predisposition for seizures, though alternatively may signify cortical disruption due to injury. Clinical and radiologic correlation recommended.   Mild continuous generalized slowing of the background throughout, a finding consistent with nonspecific diffuse cerebral dysfunction, with sedative/medication effects contributing.   No seizures.   Three push button events for unclear clinical symptoms. EEG at these times shows polymorphic theta > delta slowing in the wakeful/drowsy state. No epileptiform correlates.     2/12/2025: Video EEG. Fabiola Lindsay MD  Abnormal - day #2  video EEG recording in the drowsy/sleep and sedated state(s):  Occasional brief runs of sharply contoured right greater than left fronto-central slowing. This finding is consistent with known structural abnormality and may indicate a predisposition for seizures, though alternatively may signify cortical disruption due to injury. Clinical and radiologic correlation recommended.   Mild continuous generalized slowing of the background throughout, a finding consistent with nonspecific diffuse cerebral dysfunction, with sedative/medication effects contributing.   No seizures.   No push button events.    2/13/2025: Cerebral angiogram. Kyle Carrillo.     PMH:  Past  Medical History:   Diagnosis Date    Acute encephalopathy 2/10/2025    Allergic rhinitis     Allergy to pollen     Aortic atherosclerosis (HCC) 5/27/2021    Ataxia 3/26/2019    Breast cancer (HCC)     Chronic bilateral low back pain without sciatica 7/20/2017    Chronic neck pain     Chronic post-traumatic headache 3/26/2019    Chronic use of opiate for therapeutic purpose 7/20/2017    CKD (chronic kidney disease), stage III     Fatty liver     elevated lft's    Hematuria     History of concussion 3/26/2019    Hyperlipidemia LDL goal < 100     Hypertension     Hypothyroidism     Impaired fasting glucose     Obesity (BMI 30-39.9) 3/22/2017    Seizure (HCC) 2/12/2025    Tension headache     Thyroid cancer (HCC)     treated with total thyroidectomy    Tinnitus of both ears 3/26/2019    Type 2 diabetes mellitus without complication, without long-term current use of insulin ([facility]) 10/14/2021    Urinary tract infection without hematuria 2/10/2025       PSH:  Past Surgical History:   Procedure Laterality Date    MASTECTOMY Bilateral 8/15/2017    Procedure: MASTECTOMY-TOTAL ;  Surgeon: Debora Kam M.D.;  Location: SURGERY SAME DAY [facility] ORS;  Service:     AXILLARY NODE DISSECTION  8/15/2017    Procedure: AXILLARY LYMPH NODE DISSECTION-  ;  Surgeon: Debora Kam M.D.;  Location: SURGERY SAME DAY [facility] ORS;  Service:     LUMPECTOMY Left 2015    ROTATOR CUFF REPAIR  2002    ABDOMINAL HYSTERECTOMY TOTAL      uterus only DUB AFTER CHILDBIRTH    CHOLECYSTECTOMY      GASTRIC BANDING LAPAROSCOPIC      done 2x    HIATAL HERNIA REPAIR      ROTATOR CUFF REPAIR      right shoulder x 2    TONSILLECTOMY AND ADENOIDECTOMY         FHX:  Family History   Problem Relation Age of Onset    Heart Disease Mother     Cancer Mother     Diabetes Mother     Heart Disease Father     Stroke Father        Medications:  Current Facility-Administered Medications   Medication Dose    amLODIPine (Norvasc) tablet 10 mg  10 mg    prochlorperazine  "(Compazine) injection 10 mg  10 mg    HYDROcodone-acetaminophen (Norco) 5-325 MG per tablet 1 Tablet  1 Tablet    HYDROcodone/acetaminophen (Norco)  MG per tablet 1 Tablet  1 Tablet    lisinopril (Prinivil) tablet 40 mg  40 mg    senna-docusate (Pericolace Or Senokot S) 8.6-50 MG per tablet 2 Tablet  2 Tablet    And    polyethylene glycol/lytes (Miralax) Packet 1 Packet  1 Packet    And    magnesium hydroxide (Milk Of Magnesia) suspension 30 mL  30 mL    And    bisacodyl (Dulcolax) suppository 10 mg  10 mg    lacosamide (Vimpat) tablet 100 mg  100 mg    levothyroxine (Synthroid) tablet 125 mcg  125 mcg    And    levothyroxine (Synthroid) tablet 137 mcg  137 mcg    atorvastatin (Lipitor) tablet 20 mg  20 mg    And    atorvastatin (Lipitor) tablet 10 mg  10 mg    ondansetron (Zofran ODT) dispertab 4 mg  4 mg    Or    ondansetron (Zofran) syringe/vial injection 4 mg  4 mg    acetaminophen (Tylenol) tablet 650 mg  650 mg    Or    acetaminophen (Tylenol) suppository 650 mg  650 mg    lidocaine (Asperflex) 4 % patch 1 Patch  1 Patch    benzonatate (Tessalon) capsule 100 mg  100 mg    Respiratory Therapy Consult      LORazepam (Ativan) injection 2 mg  2 mg    labetalol (Normodyne/Trandate) injection 10-20 mg  10-20 mg    hydrALAZINE (Apresoline) injection 20 mg  20 mg    anastrozole (Arimidex) tablet 1 mg  1 mg       Allergies:  Allergies   Allergen Reactions    Shellfish Allergy Anaphylaxis    Iodine      Burning,Swelling    Pcn [Penicillins]      SOB, Hives   Tolerated ceftriaxone Feb 2025    Sulfa Drugs      Swelling         Physical Exam:  Vitals: /88   Pulse 88   Temp 36.9 °C (98.4 °F) (Temporal)   Resp 16   Ht 1.651 m (5' 5\")   Wt 106 kg (233 lb 11 oz)   SpO2 95%   Gen: NAD  Head: Normocephalic, atraumatic  Eyes/ Nose/ Mouth: PERRL, moist mucous membranes  Cardio: good distal perfusion, warm extremities  Pulm: normal respiratory effort, no cyanosis   Abd: Soft, Nontender, Nondistended   Ext: No " peripheral edema. No calf tenderness. No clubbing.    Mental status: Alert. Oriented to person, place, month and year.   Speech: fluent, no aphasia or dysarthria    CRANIAL NERVES:  2,3: visual acuity grossly intact, PERRL  3,4,6: EOMI bilaterally, no nystagmus or diplopia  5: sensation intact to light touch bilaterally and symmetric  7: no facial asymmetry  8: hearing grossly intact  9,10: symmetric palate elevation  11: Shoulder shrug present bilaterally  12: tongue protrudes midline    Motor:      Upper Extremity  Myotome R L   Shoulder flexion C5 4/5 4/5   Elbow flexion C5 4/5 4/5   Wrist extension C6 4/5 4/5   Elbow extension C7 4/5 4/5   Finger flexion C8 4/5 4/5   Finger abduction T1 4/5 4/5     Lower Extremity Myotome R L   Hip flexion L2 4/5 4/5   Knee extension L3 4/5 4/5   Ankle dorsiflexion L4 4/5 4/5   Toe extension L5 4/5 4/5   Ankle plantarflexion S1 4/5 4/5     Sensory:   intact to light touch through out arms and legs    DTRs: 2+ in bilateral  biceps, trace in bilateral patellar tendons  No clonus at bilateral ankles  Negative babinski b/l  Negative Dia b/l     Tone: no spasticity noted, no cogwheeling noted      Labs: Reviewed and significant for   Recent Labs     02/16/25  0500 02/17/25  0511 02/18/25  0142   RBC 4.06* 4.24 4.26   HEMOGLOBIN 12.9 13.4 13.6   HEMATOCRIT 38.9 40.5 39.9   PLATELETCT 281 280 291     Recent Labs     02/16/25  0500 02/17/25  0511 02/18/25  0142   SODIUM 137 137 135   POTASSIUM 3.8 3.8 3.6   CHLORIDE 102 104 101   CO2 24 22 23   GLUCOSE 117* 118* 137*   BUN 19 14 13   CREATININE 0.83 0.81 0.76   CALCIUM 8.5 8.6 8.6     Recent Results (from the past 24 hours)   CBC WITHOUT DIFFERENTIAL    Collection Time: 02/18/25  1:42 AM   Result Value Ref Range    WBC 10.7 4.8 - 10.8 K/uL    RBC 4.26 4.20 - 5.40 M/uL    Hemoglobin 13.6 12.0 - 16.0 g/dL    Hematocrit 39.9 37.0 - 47.0 %    MCV 93.7 81.4 - 97.8 fL    MCH 31.9 27.0 - 33.0 pg    MCHC 34.1 32.2 - 35.5 g/dL    RDW 45.7  35.9 - 50.0 fL    Platelet Count 291 164 - 446 K/uL    MPV 8.7 (L) 9.0 - 12.9 fL   Basic Metabolic Panel    Collection Time: 02/18/25  1:42 AM   Result Value Ref Range    Sodium 135 135 - 145 mmol/L    Potassium 3.6 3.6 - 5.5 mmol/L    Chloride 101 96 - 112 mmol/L    Co2 23 20 - 33 mmol/L    Glucose 137 (H) 65 - 99 mg/dL    Bun 13 8 - 22 mg/dL    Creatinine 0.76 0.50 - 1.40 mg/dL    Calcium 8.6 8.5 - 10.5 mg/dL    Anion Gap 11.0 7.0 - 16.0   ESTIMATED GFR    Collection Time: 02/18/25  1:42 AM   Result Value Ref Range    GFR (CKD-EPI) 82 >60 mL/min/1.73 m 2         ASSESSMENT:  Patient is a 74 y.o. female admitted with IPH, SAH, SDH after being found down.     [facility] Code / Diagnosis to Support: 0001.3 - Stroke: Bilateral Involvement  See DISPO details below for recommendations on appropriate level of rehab for this diagnosis.    Barriers to transfer include: Insurance authorization, TCCs to verify disposition, medical clearance and bed availability     Assessment and Plan:    DISPO:  - patient is a good candidate for IRF. She has deficits in ADLs and mobility secondary to brain bleed. Patient has good family support from son and friends and a stable discharge environment which is [facility].   - prior to acceptance to IRF, will need insurance auth    - Please reach out with questions or request for medical management     Medical Complexity:    IPH, SAH, SDH  Seizure  -found down, altered. Found to have R ICH, SAH, SDH.   -treated non-operatively  -complicated by witnessed seizure x 1. Continue vimpat BID.   -confusion resolved  -non-agitated  -sleeping okay, except for hospital staff interruptions.   -PT and OT while in-house     Pain management  -lidocaine patch  -prn: tylenol, norco 5, norco 10    Bladder management, UTI?  -UA positive on 2/10. Negative cultures. Now completed ceftriaxone.  -Monitor for retention. Bladder scans per orders.     Neurogenic bowel   -Senna-docusate. Prns available.   -last BM 2/18.      Influenza A  -positive 2/10  -tamiflu completed  -isolation precuations    Respiratory failure  -intubated 2/10. Extubated 2/13.   -2/18: recently weaned down to RA. Monitor.     HTN  -amlodipine 10mg daily  -prns    DM T2  -A1c 5.6 on 8/30/24  -SSI discontinued. Monitor.     Recurrent breast cancer  -anastrazole    Hypothyroidism  -synthroid    HLD  -atorvastatin    DVT PPX: SCDs      Thank you for allowing us to participate in the care of this patient.     Total time spent: >80 minutes.     Adair Fry D.O.   Physical Medicine and Rehabilitation     Please note that this dictation was created using voice recognition software. I have made every reasonable attempt to correct obvious errors, but there may be errors of grammar and possibly content that I did not discover before finalizing the note.

## 2025-02-18 NOTE — THERAPY
Physical Therapy   Daily Treatment     Patient Name: Keyur Burt  Age:  74 y.o., Sex:  female  Medical Record #: [id number]  Today's Date: 2/17/2025     Precautions  Precautions: (P) Fall Risk;Swallow Precautions  Comments: (P) Droplet. Rt temp ICH and SAH. -140    Assessment    The pt willing to participate, wants to go home vs rehab setting. Encouragement/education provided during PT session on the need for post acute rehab before home to be able to manage home alone when son working. Functionally the pt has made improvements w/bed mobility, STS/transfers, and today was able to amb short distances (15' x 2) w/FWW. The pt needing encouragement to stay seated in the chair for 30 mins working on her IS. BP within parameters during therapy session.   PT will cont to follow, nrsg notified that the pt would like a shower.     Plan    Treatment Plan Status: (P) Continue Current Treatment Plan  Type of Treatment: Bed Mobility, Equipment, Family / Caregiver Training, Gait Training, Manual Therapy, Neuro Re-Education / Balance, Self Care / Home Evaluation, Therapeutic Activities, Therapeutic Exercise  Treatment Frequency: 5 Times per Week  Treatment Duration: Until Therapy Goals Met    DC Equipment Recommendations: (P) Unable to determine at this time  Discharge Recommendations: (P) Recommend post-acute placement for additional physical therapy services prior to discharge home    Objective       02/17/25 1635   Time In/Time Out   Therapy Start Time 1553   Therapy End Time 1635   Total Therapy Time 42   Charge Group   PT Therapeutic Activities (Units) 3   Total Time Spent   PT Therapeutic Activities Time Spent (Mins) 42   Precautions   Precautions Fall Risk;Swallow Precautions   Comments Droplet. Rt temp ICH and SAH. -140   Pain 0 - 10 Group   Location Head   Pain Rating Scale (NPRS) 5   Therapist Pain Assessment During Activity  (w/coughing)   Other Treatments   Other Treatments Provided BP taken  throughout tx session ranging from 130/70->140/74->128/71 following her last walking effort. Encouragement provided during tx session, educating the pt on the need for a short stay @ rehab before being able to manage a home environment alone while her son works.   Balance   Sitting Balance (Static) Good   Sitting Balance (Dynamic) Fair +   Standing Balance (Static) Fair -   Standing Balance (Dynamic) Poor +   Weight Shift Sitting Fair   Weight Shift Standing Fair   Skilled Intervention Verbal Cuing   Comments standing w/FWW   Bed Mobility    Supine to Sit Standby Assist  (HOB partially elevated and use of railing)   Sit to Supine   (pt left seated in the chair)   Scooting Supervised  (seated)   Skilled Intervention Verbal Cuing   Gait Analysis   Gait Level Of Assist Contact Guard Assist   Assistive Device Front Wheel Walker   Distance (Feet) 15   # of Times Distance was Traveled 2   Deviation Shuffled Gait;Bradykinetic   Skilled Intervention Verbal Cuing   Comments Initiated amb w/FWW, managed 15' x 2 w/FWW. Distance limited by c/o fatigue.   Functional Mobility   Sit to Stand Contact Guard Assist  (from EOB->FWW)   Bed, Chair, Wheelchair Transfer Contact Guard Assist  (w/FWW)   Skilled Intervention Verbal Cuing   6 Clicks Assessment - How much HELP from from another person do you currently need... (If the patient hasn't done an activity recently, how much help from another person do you think he/she would need if he/she tried?)   Turning from your back to your side while in a flat bed without using bedrails? 2   Moving from lying on your back to sitting on the side of a flat bed without using bedrails? 3   Moving to and from a bed to a chair (including a wheelchair)? 3   Standing up from a chair using your arms (e.g., wheelchair, or bedside chair)? 3   Walking in hospital room? 3   Climbing 3-5 steps with a railing? 3   6 clicks Mobility Score 17   Short Term Goals    Short Term Goal # 1 Pt will perform supine < >  sit SPV with bed flat, no rail in 6 visits in order to set up for upright mobility   Goal Outcome # 1 goal not met   Short Term Goal # 2 Pt will perform sit < > stand SPV in 6 visits in order to prepare for ambulation   Goal Outcome # 2 Goal not met   Short Term Goal # 3 Pt will ambulate 150' SPV /c LRAD in 6 visits in order to return home safely   Goal Outcome # 3 Goal not met   Education Group   Role of Physical Therapist Patient Response Patient;Acceptance;Explanation;Action Demonstration   Physical Therapy Treatment Plan   Physical Therapy Treatment Plan Continue Current Treatment Plan   Anticipated Discharge Equipment and Recommendations   DC Equipment Recommendations Unable to determine at this time   Discharge Recommendations Recommend post-acute placement for additional physical therapy services prior to discharge home   Interdisciplinary Plan of Care Collaboration   IDT Collaboration with  Nursing   Patient Position at End of Therapy Seated;Chair Alarm On;Call Light within Reach;Tray Table within Reach;Phone within Reach   Collaboration Comments Nrsg notified of pts tx efforts   Session Information   Date / Session Number  2/17--3 (3/5, 2/20) PTA/1   Supervising Physical Therapist (PTA Treatments Only)   Supervising Physical Therapist [address] [facility]

## 2025-02-18 NOTE — CARE PLAN
The patient is Stable - Low risk of patient condition declining or worsening    Shift Goals  Clinical Goals: Q4 neuro checks, monitor VS, labs, pain management  Patient Goals: pain management  Family Goals: ANITA    Progress made toward(s) clinical / shift goals:    Problem: Pain - Standard  Goal: Alleviation of pain or a reduction in pain to the patient’s comfort goal  Description: Target End Date:  Prior to discharge or change in level of care    Document on Vitals flowsheet    1.  Document pain using the appropriate pain scale per order or unit policy  2.  Educate and implement non-pharmacologic comfort measures (i.e. relaxation, distraction, massage, cold/heat therapy, etc.)  3.  Pain management medications as ordered  4.  Reassess pain after pain med administration per policy  5.  If opiods administered assess patient's response to pain medication is appropriate per POSS sedation scale  6.  Follow pain management plan developed in collaboration with patient and interdisciplinary team (including palliative care or pain specialists if applicable)  Outcome: Progressing     Problem: Neuro Status  Goal: Neuro status will remain stable or improve  Description: Target End Date:  Prior to discharge or change in level of care    Document on Neuro assessment in the Assessment flowsheet    1.  Assess and monitor neurologic status per provider order/protocol/unit policy  2.  Assess level of consciousness and orientation  3.  Assess for speech, dysarthria, dysphagia, facial symmetry  4.  Assess visual field, eye movements, gaze preference, pupil reaction and size  5.  Assess muscle strength and motor response in all four extremities  6.  Assess for sensation (numbness and tingling)  7.  Assess basic neuro reflexes (cough, gag, corneal)  8.  Identify changes in neuro status and report to provider for testing/treatment orders  Outcome: Progressing     Problem: Respiratory  Goal: Patient will achieve/maintain optimum respiratory  ventilation and gas exchange  Description: Target End Date:  Prior to discharge or change in level of care    Document on Assessment flowsheet    1.  Assess and monitor rate, rhythm, depth and effort of respiration  2.  Breath sounds assessed qshift and/or as needed  3.  Assess O2 saturation, administer/titrate oxygen as ordered  4.  Position patient for maximum ventilatory efficiency  5.  Turn, cough, and deep breath with splinting to improve effectiveness  6.  Collaborate with RT to administer medication/treatments per order  7.  Encourage use of incentive spirometer and encourage patient to cough after use and utilize splinting techniques if applicable  8.  Airway suctioning  9.  Monitor sputum production for changes in color, consistency and frequency  10. Perform frequent oral hygiene  11. Alternate physical activity with rest periods  Outcome: Progressing     Problem: Mobility  Goal: Patient's capacity to carry out activities will improve  Description: Target End Date:  Prior to discharge or change in level of care    1.  Assess for barriers to mobility/activity  2.  Implement activity per interdisciplinary team recommendations  3.  Target activity level identified and patient/family/caregiver aware of goal  4.  Provide assistive devices  5.  Instruct patient/caregiver on proper use of assistive/adaptive devices  6.  Schedule activities and rest periods to decrease effects of fatigue  7.  Encourage mobilization to extent of ability  8.  Maintain proper body alignment  9.  Provide adequate pain management to allow progressive mobilization  10. Implement pace maker precautions as needed  Outcome: Progressing     Problem: Skin Integrity  Goal: Skin integrity is maintained or improved  Description: Target End Date:  Prior to discharge or change in level of care    Document interventions on Skin Risk/Artem flowsheet groups and corresponding LDA    1.  Assess and monitor skin integrity, appearance and/or  temperature  2.  Assess risk factors for impaired skin integrity and/or pressures ulcers  3.  Implement precautions to protect skin integrity in collaboration with interdisciplinary team  4.  Implement pressure ulcer prevention protocol if at risk for skin breakdown  5.  Confirm wound care consult if at risk for skin breakdown  6.  Ensure patient use of pressure relieving devices  (Low air loss bed, waffle overlay, heel protectors, ROHO cushion, etc)  Outcome: Not Progressing       Patient is not progressing towards the following goals:      Problem: Skin Integrity  Goal: Skin integrity is maintained or improved  Description: Target End Date:  Prior to discharge or change in level of care    Document interventions on Skin Risk/Artem flowsheet groups and corresponding LDA    1.  Assess and monitor skin integrity, appearance and/or temperature  2.  Assess risk factors for impaired skin integrity and/or pressures ulcers  3.  Implement precautions to protect skin integrity in collaboration with interdisciplinary team  4.  Implement pressure ulcer prevention protocol if at risk for skin breakdown  5.  Confirm wound care consult if at risk for skin breakdown  6.  Ensure patient use of pressure relieving devices  (Low air loss bed, waffle overlay, heel protectors, ROHO cushion, etc)  Outcome: Not Progressing

## 2025-02-18 NOTE — DISCHARGE PLANNING
Case Management Discharge Planning    Admission Date: 2/10/2025  GMLOS: 4.5  ALOS: 8    6-Clicks ADL Score: 12  6-Clicks Mobility Score: 17  PT and/or OT Eval ordered: Yes  Post-acute Referrals Ordered: Yes  Post-acute Choice Obtained: No  Has referral(s) been sent to post-acute provider:  Yes    Anticipated Discharge Dispo: Discharge Disposition: D/T to SNF with Medicare cert in anticipation of skilled care (03)    DME Needed: No    Action(s) Taken: OTHER    Met with patient at bedside to discuss discharge plans and recommended SNF placement. Per patient, hesitant to go to [facility] as it's too far. SW explained patient would remain at the facility until it's time to discharge home. Per patient, agreeable to placement but would like son's input.    SW spoke with son via PC. Per son, will review choices and get back to this SW. MANNY also emailed choice forms to son at [email or web address] Pending response.     MANNY spoke with patient's son via PC. Updated that [facility] would like to start insurance auth for patient. Per son, agreeable. MANNY notified PT/OT that [facility] is requesting new PT/OT evals for insurance auth. Per OT, will see her this afternoon.     Escalations Completed: None    Medically Clear: No    Next Steps: F/U with medical team to discuss discharge needs and barriers.    Barriers to Discharge: Medical clearance, Pending Placement, and Pending Insurance Authorization    Is the patient up for discharge tomorrow: No

## 2025-02-18 NOTE — DISCHARGE PLANNING
0946  Agency/Facility Name: [facility]   Outcome: DPA left VM regarding referral. DPA awaiting call back.     0950  Agency/Facility Name: [facility]   Spoke To: Renée   Outcome: DPA called to inform referral was resent, per Renée referral is currently being reviewed.     1004  Agency/Facility Name: [facility]   Spoke To: Renée   Outcome: Renée called DPA to inform that pt has been improving and has been accepted.     1156  Agency/Facility Name: [facility]   Spoke To: Theresa   Outcome: DPA left name and call back number for admissions to call DPA back regarding referrals.     1221  Agency/Facility Name: [facility]   Spoke To: Nataliia   Outcome: Nataliia called back to notify that referral has been forwarded to the physician to review. Nataliia will notify DPA if physician accepts. ANDRE Wolfe notified.     1530  Agency/Facility Name: [facility]   Spoke To: Nataliia   Outcome: Nataliia called DPA back to inform that pt has been accepted and will now submit insurance auth. Nataliia also requested updated PT and OT notes for the insurance auth. ANDRE Wolfe notified.

## 2025-02-18 NOTE — DISCHARGE PLANNING
[facility] Transitional [facility] Coordination    Physiatry to consult per protocol.  [employer] HMO not provider for [facility].

## 2025-02-18 NOTE — PROGRESS NOTES
Pt did not eat lunch due to it being dry. Pt provided with sandwich boxed lunch. Pt family at bedside. Call light and personal belongings within reach of pt. Pt denies any additional needs at this time.

## 2025-02-18 NOTE — PROGRESS NOTES
Pt incontinent of urine and stool. Pt cleansed. Pt bedding changed. Pt up to shower with LISA Alford.

## 2025-02-19 LAB
ANION GAP SERPL CALC-SCNC: 13 MMOL/L (ref 7–16)
BUN SERPL-MCNC: 13 MG/DL (ref 8–22)
CALCIUM SERPL-MCNC: 8.6 MG/DL (ref 8.5–10.5)
CHLORIDE SERPL-SCNC: 101 MMOL/L (ref 96–112)
CO2 SERPL-SCNC: 21 MMOL/L (ref 20–33)
CREAT SERPL-MCNC: 0.79 MG/DL (ref 0.5–1.4)
ERYTHROCYTE [DISTWIDTH] IN BLOOD BY AUTOMATED COUNT: 46.2 FL (ref 35.9–50)
GFR SERPLBLD CREATININE-BSD FMLA CKD-EPI: 79 ML/MIN/1.73 M 2
GLUCOSE SERPL-MCNC: 118 MG/DL (ref 65–99)
HCT VFR BLD AUTO: 40.4 % (ref 37–47)
HGB BLD-MCNC: 14 G/DL (ref 12–16)
MCH RBC QN AUTO: 32.5 PG (ref 27–33)
MCHC RBC AUTO-ENTMCNC: 34.7 G/DL (ref 32.2–35.5)
MCV RBC AUTO: 93.7 FL (ref 81.4–97.8)
PLATELET # BLD AUTO: 289 K/UL (ref 164–446)
PMV BLD AUTO: 8.9 FL (ref 9–12.9)
POTASSIUM SERPL-SCNC: 3.5 MMOL/L (ref 3.6–5.5)
RBC # BLD AUTO: 4.31 M/UL (ref 4.2–5.4)
SODIUM SERPL-SCNC: 135 MMOL/L (ref 135–145)
WBC # BLD AUTO: 8.4 K/UL (ref 4.8–10.8)

## 2025-02-19 PROCEDURE — 700102 HCHG RX REV CODE 250 W/ 637 OVERRIDE(OP): Performed by: HOSPITALIST

## 2025-02-19 PROCEDURE — 700102 HCHG RX REV CODE 250 W/ 637 OVERRIDE(OP): Performed by: STUDENT IN AN ORGANIZED HEALTH CARE EDUCATION/TRAINING PROGRAM

## 2025-02-19 PROCEDURE — 85027 COMPLETE CBC AUTOMATED: CPT

## 2025-02-19 PROCEDURE — 700102 HCHG RX REV CODE 250 W/ 637 OVERRIDE(OP): Performed by: INTERNAL MEDICINE

## 2025-02-19 PROCEDURE — 80048 BASIC METABOLIC PNL TOTAL CA: CPT

## 2025-02-19 PROCEDURE — A9270 NON-COVERED ITEM OR SERVICE: HCPCS | Performed by: HOSPITALIST

## 2025-02-19 PROCEDURE — A9270 NON-COVERED ITEM OR SERVICE: HCPCS | Performed by: STUDENT IN AN ORGANIZED HEALTH CARE EDUCATION/TRAINING PROGRAM

## 2025-02-19 PROCEDURE — A9270 NON-COVERED ITEM OR SERVICE: HCPCS | Performed by: INTERNAL MEDICINE

## 2025-02-19 PROCEDURE — 99233 SBSQ HOSP IP/OBS HIGH 50: CPT | Performed by: INTERNAL MEDICINE

## 2025-02-19 PROCEDURE — 36415 COLL VENOUS BLD VENIPUNCTURE: CPT

## 2025-02-19 PROCEDURE — 700101 HCHG RX REV CODE 250: Performed by: NURSE PRACTITIONER

## 2025-02-19 PROCEDURE — 770020 HCHG ROOM/CARE - TELE (206)

## 2025-02-19 RX ORDER — HYDRALAZINE HYDROCHLORIDE 25 MG/1
25 TABLET, FILM COATED ORAL EVERY 8 HOURS
Status: DISCONTINUED | OUTPATIENT
Start: 2025-02-19 | End: 2025-02-21 | Stop reason: HOSPADM

## 2025-02-19 RX ADMIN — HYDRALAZINE HYDROCHLORIDE 25 MG: 25 TABLET ORAL at 12:52

## 2025-02-19 RX ADMIN — LABETALOL HYDROCHLORIDE 10 MG: 5 INJECTION INTRAVENOUS at 18:00

## 2025-02-19 RX ADMIN — HYDROCODONE BITARTRATE AND ACETAMINOPHEN 1 TABLET: 10; 325 TABLET ORAL at 22:10

## 2025-02-19 RX ADMIN — ATORVASTATIN CALCIUM 20 MG: 20 TABLET, FILM COATED ORAL at 05:13

## 2025-02-19 RX ADMIN — HYDROCODONE BITARTRATE AND ACETAMINOPHEN 1 TABLET: 10; 325 TABLET ORAL at 05:07

## 2025-02-19 RX ADMIN — LIDOCAINE 1 PATCH: 4 PATCH TOPICAL at 22:11

## 2025-02-19 RX ADMIN — LISINOPRIL 40 MG: 20 TABLET ORAL at 05:09

## 2025-02-19 RX ADMIN — HYDRALAZINE HYDROCHLORIDE 25 MG: 25 TABLET ORAL at 22:10

## 2025-02-19 RX ADMIN — ANASTROZOLE 1 MG: 1 TABLET ORAL at 05:10

## 2025-02-19 RX ADMIN — AMLODIPINE BESYLATE 10 MG: 10 TABLET ORAL at 05:12

## 2025-02-19 RX ADMIN — HYDROCODONE BITARTRATE AND ACETAMINOPHEN 1 TABLET: 10; 325 TABLET ORAL at 17:58

## 2025-02-19 RX ADMIN — LACOSAMIDE 100 MG: 100 TABLET, FILM COATED ORAL at 05:14

## 2025-02-19 RX ADMIN — HYDROCODONE BITARTRATE AND ACETAMINOPHEN 1 TABLET: 10; 325 TABLET ORAL at 12:51

## 2025-02-19 RX ADMIN — HYDROCODONE BITARTRATE AND ACETAMINOPHEN 1 TABLET: 10; 325 TABLET ORAL at 00:41

## 2025-02-19 RX ADMIN — LACOSAMIDE 100 MG: 100 TABLET, FILM COATED ORAL at 17:59

## 2025-02-19 RX ADMIN — LABETALOL HYDROCHLORIDE 10 MG: 5 INJECTION INTRAVENOUS at 00:52

## 2025-02-19 RX ADMIN — LEVOTHYROXINE SODIUM 125 MCG: 0.12 TABLET ORAL at 05:11

## 2025-02-19 ASSESSMENT — FIBROSIS 4 INDEX: FIB4 SCORE: 1.75

## 2025-02-19 ASSESSMENT — PAIN DESCRIPTION - PAIN TYPE
TYPE: ACUTE PAIN

## 2025-02-19 ASSESSMENT — ENCOUNTER SYMPTOMS
HEADACHES: 1
FEVER: 0
WEAKNESS: 1

## 2025-02-19 NOTE — PROGRESS NOTES
[facility] Medicine Daily Progress Note    Date of Service  2/18/2025    Chief Complaint  Keyur Burt is a 74 y.o. female admitted 2/10/2025 with altered mental status.    Hospital Course  Karen Burt is a 74-year-old female with PMHx DMT2, recurrent breast cancer, hypothyroidism, HLD, HTN, CKD 3B.  Admitted 2/10 for intracranial hemorrhage.    Per history-patient was scheduled to meet with her friends.  When she did not arrive, patient's son checked on her.  He found that she was laying on the ground and confused.  EMS was called.  She was transferred to the ED for further evaluation.  CT head: Intracranial hemorrhage.  Due to agitation, confusion and combativeness-patient was intubated and transferred to the ICU for further evaluation.    MRI brain: Right posterior temporal hemorrhage.  MRA head without evidence of aneurysm, stenosis or vascular malformation.  Mild right cerebral subarachnoid hemorrhage.  Mild right cerebral subdural hemorrhage.  Patient noted to have witnessed seizure 2/11.  Not captured on cEEG.  Patient was started on Vimpat for seizure prophylaxis.    Additionally, patient found to be flu positive.    Patient underwent diagnostic cerebral angiogram 2/13.  Early blush and area of hematoma.  Recommending repeat angiogram in 6 to 8 weeks.    Patient was extubated on 2/13.  Nicardipine drip was discontinued.  SBP goal 100-140.  Stable for transfer to telemetry floor.  PT OT recommending postacute rehabilitation.    Interval Problem Update  2/15: Vitals notable for SBP ranging 129 through 166.  She has received multiple doses of both hydralazine and labetalol for blood pressure control overnight.  Start amlodipine.  Increase lisinopril to 40 mg.  Discontinue sliding scale insulin; patient has not required to the last 5 days since admission.  Potassium 3.6; replaced. Patient complains of headache. Increase norco, add Fioricet one-time dose.    2/16: Vitals notable for improvement in SBP.   Ranging 105 through 146 overnight.  Patient remains on 2 L/min supplemental O2.  Last dose of IV labetalol late yesterday evening.  Hb stable at 12.9.  Potassium 3.8; replaced.      2/17: SBP ranging 139 through 159.  Required 3 doses IV antihypertensives late yesterday afternoon and overnight.  Increase amlodipine to 10 mg daily.  Anticipate discharge to rehab in the next 1 to 2 days.  2/18: Patient seen and examined resting in bed, afebrile no HA, HTN cont on her po and IV prn medication keep SBP between 100-140   Will need rehabilitation   Close monitor for decompensation   I have discussed this patient's plan of care and discharge plan at IDT rounds today with Case Management, Nursing, Nursing leadership, and other members of the IDT team.    Consultants/Specialty  critical care, neurology, and IR    Code Status  Full Code    Disposition  The patient is not medically cleared for discharge to home or a post-acute facility.      I have placed the appropriate orders for post-discharge needs.    Review of Systems  Review of Systems   Constitutional:  Positive for malaise/fatigue. Negative for fever.   Cardiovascular:  Negative for chest pain and leg swelling.   Neurological:  Positive for weakness and headaches.        Physical Exam  Temp:  [36.6 °C (97.9 °F)-37 °C (98.6 °F)] 37 °C (98.6 °F)  Pulse:  [65-88] 75  Resp:  [16-18] 17  BP: (105-160)/(58-88) 136/69  SpO2:  [90 %-95 %] 95 %    Physical Exam  Vitals and nursing note reviewed.   Constitutional:       General: She is not in acute distress.     Appearance: Normal appearance. She is obese. She is ill-appearing.   Cardiovascular:      Rate and Rhythm: Normal rate and regular rhythm.      Heart sounds: Murmur heard.   Pulmonary:      Effort: Pulmonary effort is normal. No respiratory distress.      Breath sounds: No wheezing.   Skin:     General: Skin is warm and dry.      Coloration: Skin is pale.   Neurological:      Mental Status: She is alert and oriented to  person, place, and time.      Motor: Weakness present.   Psychiatric:         Mood and Affect: Mood normal.         Behavior: Behavior normal.         Fluids    Intake/Output Summary (Last 24 hours) at 2/18/2025 1616  Last data filed at 2/17/2025 2000  Gross per 24 hour   Intake 60 ml   Output 600 ml   Net -540 ml        Laboratory  Recent Labs     02/16/25  0500 02/17/25  0511 02/18/25  0142   WBC 10.1 10.5 10.7   RBC 4.06* 4.24 4.26   HEMOGLOBIN 12.9 13.4 13.6   HEMATOCRIT 38.9 40.5 39.9   MCV 95.8 95.5 93.7   MCH 31.8 31.6 31.9   MCHC 33.2 33.1 34.1   RDW 48.8 47.6 45.7   PLATELETCT 281 280 291   MPV 8.7* 8.5* 8.7*     Recent Labs     02/16/25  0500 02/17/25  0511 02/18/25  0142   SODIUM 137 137 135   POTASSIUM 3.8 3.8 3.6   CHLORIDE 102 104 101   CO2 24 22 23   GLUCOSE 117* 118* 137*   BUN 19 14 13   CREATININE 0.83 0.81 0.76   CALCIUM 8.5 8.6 8.6                     Imaging  IR-CAROTID-CEREBRAL BILATERAL   Final Result      There are a few abnormal tiny blood vessels noted in the expected location of the right temporal hemorrhage. There is slight early filling of the adjacent superficial veins compared with the other cortical veins. These findings may be secondary to the    hemorrhage itself. However, the possibility of small 'burned out' vascular malformation cannot be fully excluded. Follow-up cerebral angiogram is recommended after 6 weeks to rule out any possibility of tiny residual vascular malformation.      DX-CHEST-PORTABLE (1 VIEW)   Final Result         1.  Left basilar atelectasis, no focal infiltrate   2.  Nasogastric tube terminates just distal to the gastroesophageal junction with side-port in the distal esophagus, position is unchanged since prior study, recommend advancement      MR-MRA HEAD-W/O   Final Result      1.  There is no evidence of aneurysm, stenosis or vascular malformation in the visualized proximal vasculature or adjacent to the hematoma.   2.  Right posterior temporal parenchymal  hemorrhage.   3.  Mild right cerebral diffuse subarachnoid hemorrhage.   4.  Mild right cerebral subdural hemorrhage.      MR-BRAIN-WITH & W/O   Final Result      Right posterior temporal hemorrhage. There are no abnormal flow voids or large nodular enhancement. There is tiny punctate enhancement within the hemorrhage likely representing vascular enhancement.Pre and postcontrast MR examination is recommended after    6 weeks. Due to the atypical location of the hemorrhage, catheter angiogram may be needed to rule out the possibility of any tiny vascular malformation.      EC-ECHOCARDIOGRAM COMPLETE W/O CONT   Final Result      DX-CHEST-PORTABLE (1 VIEW)   Final Result         1.  No acute cardiopulmonary disease.   2.  Nasogastric tube terminates just distal to the gastroesophageal junction with side-port in the distal esophagus, recommend advancement      CT-CTA NECK WITH & W/O-POST PROCESSING   Final Result         1.  CT angiogram of the neck within normal limits.         CT-CTA HEAD WITH & W/O-POST PROCESS   Final Result         1.  No large vessel occlusion or aneurysm identified.   2.  Right posterior temporal parenchymal hemorrhage, stable since prior study.   3.  Scattered subarachnoid hemorrhages, overall stable since prior study.      DX-CHEST-PORTABLE (1 VIEW)   Final Result         1.  No acute cardiopulmonary disease.   2.  Nasogastric tube with tip just distal to the gastroesophageal junction, recommend advancement      DX-ABDOMEN FOR TUBE PLACEMENT   Final Result         1.  Nonspecific bowel gas pattern in the upper abdomen.   2.  Nasogastric tube with tip just distal to the gastroesophageal junction, recommend advancement      CT-HEAD W/O   Final Result         1.  Right posterior temporal parenchymal hemorrhages stranding vasogenic edema   2.  Bilateral subarachnoid hemorrhages as described.   3.  Right posterior parafalcine subdural hematoma measuring 2.4 mm.   4.  Subdural versus subarachnoid  hemorrhage along the right tentorium measuring 3.1 mm.      These findings were discussed with the patient's clinician, George Wise, on 2/10/2025 8:42 PM.         DX-CHEST-PORTABLE (1 VIEW)   Final Result      Hypoinflation without a definite acute abnormality.           Assessment/Plan  * Acute intracranial hemorrhage (HCC)- (present on admission)  Assessment & Plan  Right posterior temporal parenchymal hemorrhage noted + SAH as above  CTA H&N without obvious aneurism  Hemorrhage at this time appears small without significant mass effect or hydrocephalus to warrant surgical decompression or EVD   Brain MRI/MRA without vascular abnormalities, stable hematoma   - Statin   - See plan for SAH   - Neuro improving, continue weaning/stopping sedation     Influenza A  Assessment & Plan  Flu A +   No evidence of pneumonia  Tamiflu completed 2/15    Seizure (HCC)  Assessment & Plan  Clinical seizure x1 2/11  Secondary to ICH  cEEG without epileptiform activity  Doing well, awake, non focal  - Continue vimpat BID  - Will need outpt neuro f/u     Hypokalemia  Assessment & Plan  Potassium 3.8  Replace  - Repeat BMP in a.m.    Lactic acidosis  Assessment & Plan  Resolved    Acute respiratory failure with hypoxia (HCC)- (present on admission)  Assessment & Plan  Intubated 2/10  Extubated 2/13  Currently requiring 2 L/min supplemental O2  - Wean oxygen as able  - Encourage incentive spirometry and ambulation as able    Acute encephalopathy- (present on admission)  Assessment & Plan  Admitted with agitation  ICH/SAH and UTI  Improving    Urinary tract infection without hematuria- (present on admission)  Assessment & Plan  UA abnormal  -Neg cultures  -Completed ceftriaxone     Hypertensive urgency- (present on admission)  Assessment & Plan   through 165 over the last 24 hours  - SBP improving; if you touchups greater than 140.  Has required 3 doses IV antihypertensive the last 24 hours  - Increase amlodipine to 10 mg   -  Continue lisinopril 40 mg  - Close monitoring of blood pressures  - SBP goal 100-140  - Continuous telemetry monitoring; monitoring for arrhythmia in the setting of frequent IV antihypertensives for intracranial hemorrhage    SAH (subarachnoid hemorrhage) (HCC)- (present on admission)  Assessment & Plan  Etiology unknown  MRA without vascular abnormalities  Witnessed seizure 2/10  - Repeat MRI 6 weeks  - Continue Vimpat 100 mg twice daily  - PT OT pending  - Continue neurochecks every 4 hours for now  - SBP goal 100-140    Type 2 diabetes mellitus without complication, without long-term current use of insulin (HCC)- (present on admission)  Assessment & Plan  A1c 5.6% BG at goal  Discontinue SSI; patient has not required during this hospitalization    Recurrent breast cancer (HCC)- (present on admission)  Assessment & Plan  Continue anastrazole    Hypothyroidism from resorcinol- (present on admission)  Assessment & Plan  TSH 2.19  Continue home levothyroxine    Mixed hyperlipidemia- (present on admission)  Assessment & Plan  Continue atorvastatin         VTE prophylaxis: scd     I have performed a physical exam and reviewed and updated ROS and Plan today (2/18/2025). In review of yesterday's note (2/17/2025), there are no changes except as documented above.

## 2025-02-19 NOTE — THERAPY
Occupational Therapy  Daily Treatment     Patient Name: Keyur Burt  Age:  74 y.o., Sex:  female  Medical Record #: [id number]  Today's Date: 2/18/2025     Precautions: Fall Risk    Assessment    Pt seen for OT tx with focus on self-care independence. Pt reports feeling extra fatigued following a shower with nursing staff earlier today. Pt participated in seated dressing and grooming ADLs, discussed DME for home to increase ADL safety and independence. Pt reports she is normally completely independent and is motivated to regain her strength and tolerance for activity. Pt is a good rehabilitation candidate, has DC support from her son. Acute OT to continue to follow while admitted.    Plan    Treatment Plan Status: Continue Current Treatment Plan  Type of Treatment: Self Care / Activities of Daily Living, Therapeutic Activity  Treatment Frequency: 3 Times per Week  Treatment Duration: Until Therapy Goals Met    DC Equipment Recommendations: Tub Transfer Bench  Discharge Recommendations: Recommend post-acute placement for additional occupational therapy services prior to discharge home    Objective     02/18/25 1701   Precautions   Precautions Fall Risk   Pain 0 - 10 Group   Therapist Pain Assessment Post Activity Pain Same as Prior to Activity;0;Nurse Notified  (no c/o pain)   Cognition    Cognition / Consciousness WDL   Level of Consciousness Alert   Comments low motivation 2/2 report of exhaustion, agreeable with encouragment, flat affect   Strength Upper Body   Upper Body Strength  X   Gross Strength Generalized Weakness, Equal Bilaterally.    Balance   Sitting Balance (Static) Fair +   Sitting Balance (Dynamic) Fair   Standing Balance (Static) Fair   Standing Balance (Dynamic) Fair -   Weight Shift Sitting Fair   Weight Shift Standing Fair   Skilled Intervention Verbal Cuing;Tactile Cuing   Comments standing with charito FWW   Bed Mobility    Supine to Sit Standby Assist   Sit to Supine Minimal Assist    Scooting Supervised   Rolling Supervised   Skilled Intervention Verbal Cuing;Tactile Cuing   Activities of Daily Living   Eating Modified Independent   Grooming Seated;Minimal Assist  (standing is baseline)   Upper Body Dressing Minimal Assist   Lower Body Dressing Moderate Assist  (able to don RLE independently but not LLE)   Toileting   (declined)   Skilled Intervention Verbal Cuing;Tactile Cuing;Sequencing;Compensatory Strategies   How much help from another person does the patient currently need...   Putting on and taking off regular lower body clothing? 2   Bathing (including washing, rinsing, and drying)? 2   Toileting, which includes using a toilet, bedpan, or urinal? 2   Putting on and taking off regular upper body clothing? 3   Taking care of personal grooming such as brushing teeth? 3   Eating meals? 4   6 Clicks Daily Activity Score 16   Functional Mobility   Sit to Stand Minimal Assist   Bed, Chair, Wheelchair Transfer Contact Guard Assist   Transfer Method Stand Step   Mobility side steps   Activity Tolerance   Comments limited by fatigue, pt had a shower with nursing earlier today and reports very tired and wanting a nap now   Patient / Family Goals   Patient / Family Goal #1 to return home   Goal #1 Outcome Goal not met   Short Term Goals   Short Term Goal # 1 supervised with UB dressing   Goal Outcome # 1 Progressing as expected   Short Term Goal # 2 min A with LB dressing   Goal Outcome # 2 Progressing as expected   Short Term Goal # 3 min A with ADL txfs   Goal Outcome # 3 Progressing as expected   Education Group   Education Provided Activities of Daily Living;Role of Occupational Therapist;Transfers   Role of Occupational Therapist Patient Response Patient;Acceptance;Explanation;Verbal Demonstration   Transfers Patient Response Patient;Acceptance;Explanation;Verbal Demonstration   ADL Patient Response Patient;Acceptance;Explanation;Verbal Demonstration   Occupational Therapy Treatment Plan     O.T. Treatment Plan Continue Current Treatment Plan   Anticipated Discharge Equipment and Recommendations   DC Equipment Recommendations Tub Transfer Bench   Discharge Recommendations Recommend post-acute placement for additional occupational therapy services prior to discharge home

## 2025-02-19 NOTE — PROGRESS NOTES
[facility] Medicine Daily Progress Note    Date of Service  2/19/2025    Chief Complaint  Keyur Burt is a 74 y.o. female admitted 2/10/2025 with altered mental status.    Hospital Course  Karen Burt is a 74-year-old female with PMHx DMT2, recurrent breast cancer, hypothyroidism, HLD, HTN, CKD 3B.  Admitted 2/10 for intracranial hemorrhage.    Per history-patient was scheduled to meet with her friends.  When she did not arrive, patient's son checked on her.  He found that she was laying on the ground and confused.  EMS was called.  She was transferred to the ED for further evaluation.  CT head: Intracranial hemorrhage.  Due to agitation, confusion and combativeness-patient was intubated and transferred to the ICU for further evaluation.    MRI brain: Right posterior temporal hemorrhage.  MRA head without evidence of aneurysm, stenosis or vascular malformation.  Mild right cerebral subarachnoid hemorrhage.  Mild right cerebral subdural hemorrhage.  Patient noted to have witnessed seizure 2/11.  Not captured on cEEG.  Patient was started on Vimpat for seizure prophylaxis.    Additionally, patient found to be flu positive.    Patient underwent diagnostic cerebral angiogram 2/13.  Early blush and area of hematoma.  Recommending repeat angiogram in 6 to 8 weeks.    Patient was extubated on 2/13.  Nicardipine drip was discontinued.  SBP goal 100-140.  Stable for transfer to telemetry floor.  PT OT recommending postacute rehabilitation.    Interval Problem Update  2/15: Vitals notable for SBP ranging 129 through 166.  She has received multiple doses of both hydralazine and labetalol for blood pressure control overnight.  Start amlodipine.  Increase lisinopril to 40 mg.  Discontinue sliding scale insulin; patient has not required to the last 5 days since admission.  Potassium 3.6; replaced. Patient complains of headache. Increase norco, add Fioricet one-time dose.    2/16: Vitals notable for improvement in SBP.   Ranging 105 through 146 overnight.  Patient remains on 2 L/min supplemental O2.  Last dose of IV labetalol late yesterday evening.  Hb stable at 12.9.  Potassium 3.8; replaced.      2/17: SBP ranging 139 through 159.  Required 3 doses IV antihypertensives late yesterday afternoon and overnight.  Increase amlodipine to 10 mg daily.  Anticipate discharge to rehab in the next 1 to 2 days.  2/18: Patient seen and examined resting in bed, afebrile no HA, HTN cont on her po and IV prn medication keep SBP between 100-140   Will need rehabilitation   Close monitor for decompensation     2/19: Patient seen and examined, afebrile since for controlling her HTN she is still requiring some IV HTN meds prn will add hydralazin to her regimen today   Close monitor for decompensation    I have discussed this patient's plan of care and discharge plan at IDT rounds today with Case Management, Nursing, Nursing leadership, and other members of the IDT team.    Consultants/Specialty  critical care, neurology, and IR    Code Status  Full Code    Disposition  The patient is not medically cleared for discharge to home or a post-acute facility.      I have placed the appropriate orders for post-discharge needs.    Review of Systems  Review of Systems   Constitutional:  Positive for malaise/fatigue. Negative for fever.   Cardiovascular:  Negative for chest pain and leg swelling.   Neurological:  Positive for weakness and headaches.        Physical Exam  Temp:  [36.4 °C (97.5 °F)-36.9 °C (98.4 °F)] 36.8 °C (98.2 °F)  Pulse:  [64-74] 72  Resp:  [16-18] 18  BP: (128-155)/(62-80) 155/77  SpO2:  [92 %-96 %] 96 %    Physical Exam  Vitals and nursing note reviewed.   Constitutional:       General: She is not in acute distress.     Appearance: Normal appearance. She is obese. She is ill-appearing.   Cardiovascular:      Rate and Rhythm: Normal rate and regular rhythm.      Heart sounds: Murmur heard.   Pulmonary:      Effort: Pulmonary effort is  normal. No respiratory distress.      Breath sounds: No wheezing.   Skin:     General: Skin is warm and dry.      Coloration: Skin is pale.   Neurological:      Mental Status: She is alert and oriented to person, place, and time.      Motor: Weakness present.   Psychiatric:         Mood and Affect: Mood normal.         Behavior: Behavior normal.         Fluids    Intake/Output Summary (Last 24 hours) at 2/19/2025 1554  Last data filed at 2/19/2025 0344  Gross per 24 hour   Intake 140 ml   Output 0 ml   Net 140 ml        Laboratory  Recent Labs     02/17/25  0511 02/18/25  0142 02/19/25  0722   WBC 10.5 10.7 8.4   RBC 4.24 4.26 4.31   HEMOGLOBIN 13.4 13.6 14.0   HEMATOCRIT 40.5 39.9 40.4   MCV 95.5 93.7 93.7   MCH 31.6 31.9 32.5   MCHC 33.1 34.1 34.7   RDW 47.6 45.7 46.2   PLATELETCT 280 291 289   MPV 8.5* 8.7* 8.9*     Recent Labs     02/17/25  0511 02/18/25  0142 02/19/25  0722   SODIUM 137 135 135   POTASSIUM 3.8 3.6 3.5*   CHLORIDE 104 101 101   CO2 22 23 21   GLUCOSE 118* 137* 118*   BUN 14 13 13   CREATININE 0.81 0.76 0.79   CALCIUM 8.6 8.6 8.6                     Imaging  IR-CAROTID-CEREBRAL BILATERAL   Final Result      There are a few abnormal tiny blood vessels noted in the expected location of the right temporal hemorrhage. There is slight early filling of the adjacent superficial veins compared with the other cortical veins. These findings may be secondary to the    hemorrhage itself. However, the possibility of small 'burned out' vascular malformation cannot be fully excluded. Follow-up cerebral angiogram is recommended after 6 weeks to rule out any possibility of tiny residual vascular malformation.      DX-CHEST-PORTABLE (1 VIEW)   Final Result         1.  Left basilar atelectasis, no focal infiltrate   2.  Nasogastric tube terminates just distal to the gastroesophageal junction with side-port in the distal esophagus, position is unchanged since prior study, recommend advancement      MR-MRA HEAD-W/O    Final Result      1.  There is no evidence of aneurysm, stenosis or vascular malformation in the visualized proximal vasculature or adjacent to the hematoma.   2.  Right posterior temporal parenchymal hemorrhage.   3.  Mild right cerebral diffuse subarachnoid hemorrhage.   4.  Mild right cerebral subdural hemorrhage.      MR-BRAIN-WITH & W/O   Final Result      Right posterior temporal hemorrhage. There are no abnormal flow voids or large nodular enhancement. There is tiny punctate enhancement within the hemorrhage likely representing vascular enhancement.Pre and postcontrast MR examination is recommended after    6 weeks. Due to the atypical location of the hemorrhage, catheter angiogram may be needed to rule out the possibility of any tiny vascular malformation.      EC-ECHOCARDIOGRAM COMPLETE W/O CONT   Final Result      DX-CHEST-PORTABLE (1 VIEW)   Final Result         1.  No acute cardiopulmonary disease.   2.  Nasogastric tube terminates just distal to the gastroesophageal junction with side-port in the distal esophagus, recommend advancement      CT-CTA NECK WITH & W/O-POST PROCESSING   Final Result         1.  CT angiogram of the neck within normal limits.         CT-CTA HEAD WITH & W/O-POST PROCESS   Final Result         1.  No large vessel occlusion or aneurysm identified.   2.  Right posterior temporal parenchymal hemorrhage, stable since prior study.   3.  Scattered subarachnoid hemorrhages, overall stable since prior study.      DX-CHEST-PORTABLE (1 VIEW)   Final Result         1.  No acute cardiopulmonary disease.   2.  Nasogastric tube with tip just distal to the gastroesophageal junction, recommend advancement      DX-ABDOMEN FOR TUBE PLACEMENT   Final Result         1.  Nonspecific bowel gas pattern in the upper abdomen.   2.  Nasogastric tube with tip just distal to the gastroesophageal junction, recommend advancement      CT-HEAD W/O   Final Result         1.  Right posterior temporal  parenchymal hemorrhages stranding vasogenic edema   2.  Bilateral subarachnoid hemorrhages as described.   3.  Right posterior parafalcine subdural hematoma measuring 2.4 mm.   4.  Subdural versus subarachnoid hemorrhage along the right tentorium measuring 3.1 mm.      These findings were discussed with the patient's clinician, George Wise, on 2/10/2025 8:42 PM.         DX-CHEST-PORTABLE (1 VIEW)   Final Result      Hypoinflation without a definite acute abnormality.           Assessment/Plan  * Acute intracranial hemorrhage (HCC)- (present on admission)  Assessment & Plan  Right posterior temporal parenchymal hemorrhage noted + SAH as above  CTA H&N without obvious aneurism  Hemorrhage at this time appears small without significant mass effect or hydrocephalus to warrant surgical decompression or EVD   Brain MRI/MRA without vascular abnormalities, stable hematoma   - Statin   - See plan for SAH   - Neuro improving, continue weaning/stopping sedation     Influenza A  Assessment & Plan  Flu A +   No evidence of pneumonia  Tamiflu completed 2/15    Seizure (HCC)  Assessment & Plan  Clinical seizure x1 2/11  Secondary to ICH  cEEG without epileptiform activity  Doing well, awake, non focal  - Continue vimpat BID  - Will need outpt neuro f/u     Hypokalemia  Assessment & Plan  Potassium 3.8  Replace  - Repeat BMP in a.m.    Lactic acidosis  Assessment & Plan  Resolved    Acute respiratory failure with hypoxia (HCC)- (present on admission)  Assessment & Plan  Intubated 2/10  Extubated 2/13  Currently requiring 2 L/min supplemental O2  - Wean oxygen as able  - Encourage incentive spirometry and ambulation as able    Acute encephalopathy- (present on admission)  Assessment & Plan  Admitted with agitation  ICH/SAH and UTI  Improving    Urinary tract infection without hematuria- (present on admission)  Assessment & Plan  UA abnormal  -Neg cultures  -Completed ceftriaxone     Hypertensive urgency- (present on  admission)  Assessment & Plan   through 165 over the last 24 hours  - SBP improving; if you touchups greater than 140.  Has required 3 doses IV antihypertensive the last 24 hours  - Increase amlodipine to 10 mg   - Continue lisinopril 40 mg  - Close monitoring of blood pressures  - SBP goal 100-140  - Continuous telemetry monitoring; monitoring for arrhythmia in the setting of frequent IV antihypertensives for intracranial hemorrhage    SAH (subarachnoid hemorrhage) (HCC)- (present on admission)  Assessment & Plan  Etiology unknown  MRA without vascular abnormalities  Witnessed seizure 2/10  - Repeat MRI 6 weeks  - Continue Vimpat 100 mg twice daily  - PT OT pending  - Continue neurochecks every 4 hours for now  - SBP goal 100-140    Type 2 diabetes mellitus without complication, without long-term current use of insulin (HCC)- (present on admission)  Assessment & Plan  A1c 5.6% BG at goal  Discontinue SSI; patient has not required during this hospitalization    Recurrent breast cancer (HCC)- (present on admission)  Assessment & Plan  Continue anastrazole    Hypothyroidism from resorcinol- (present on admission)  Assessment & Plan  TSH 2.19  Continue home levothyroxine    Mixed hyperlipidemia- (present on admission)  Assessment & Plan  Continue atorvastatin         VTE prophylaxis: scd     Greater than 51 minutes spent prepping to see patient (e.g. review of tests) obtaining and/or reviewing separately obtained history. Performing a medically appropriate examination and/ evaluation.  Counseling and educating the patient/family/caregiver.  Ordering medications, tests, or procedures.  Referring and communicating with other health care professionals.  Documenting clinical information in EPIC.  Independently interpreting results and communicating results to patient/family/caregiver.  Care coordination.      I have performed a physical exam and reviewed and updated ROS and Plan today (2/19/2025). In review of  yesterday's note (2/18/2025), there are no changes except as documented above.

## 2025-02-19 NOTE — CARE PLAN
The patient is Stable - Low risk of patient condition declining or worsening    Shift Goals  Clinical Goals: Q4 neuro checks, monitor VS, pain management  Patient Goals: pain management  Family Goals: racheal    Progress made toward(s) clinical / shift goals:     Q4 neuro checks were completed, VS were monitored and pain was managed with Q4 Norco. SBP <140  was maintained with IV labetalol and hydralazine.      Problem: Pain - Standard  Goal: Alleviation of pain or a reduction in pain to the patient’s comfort goal  Outcome: Not Progressing     Problem: Knowledge Deficit - Standard  Goal: Patient and family/care givers will demonstrate understanding of plan of care, disease process/condition, diagnostic tests and medications  Outcome: Progressing     Problem: Skin Integrity  Goal: Skin integrity is maintained or improved  Outcome: Progressing     Problem: Fall Risk  Goal: Patient will remain free from falls  Outcome: Progressing     Problem: Neuro Status  Goal: Neuro status will remain stable or improve  Outcome: Progressing     Problem: Respiratory  Goal: Patient will achieve/maintain optimum respiratory ventilation and gas exchange  Outcome: Progressing     Problem: Mobility  Goal: Patient's capacity to carry out activities will improve  Outcome: Progressing

## 2025-02-19 NOTE — DISCHARGE PLANNING
Case Management Discharge Planning    Admission Date: 2/10/2025  GMLOS: 4.5  ALOS: 9    6-Clicks ADL Score: 16  6-Clicks Mobility Score: 17  PT and/or OT Eval ordered: Yes  Post-acute Referrals Ordered: Yes  Post-acute Choice Obtained: Yes  Has referral(s) been sent to post-acute provider:  Yes    Anticipated Discharge Dispo: Discharge Disposition: Disch to  rehab facility or distinct part unit (62)    DME Needed: No    Action(s) Taken: OTHER    Discussed patient during morning rounds. Per MD, patient is medically cleared to transfer to post-acute placement. MANNY notified medical team that insurance auth had begun yesterday.     MANNY called RANDOLPH Zheng CM for [employer], [phone]. Per Marce, will verify auth status and call this SW back with updates.    Addendum: Per Marce with [employer], auth is still currently pending. Was submitted yesterday afternoon. Will need doctor to review prior to giving auth to [facility].    MANNY called Nataliia with [facility] [phone] to ensure all necessary PT/OT notes are in and valid. Per Nataliia, they are ok with current notes. Only needed the updated OT notes which were updated yesterday.     Escalations Completed: None    Medically Clear: Yes    Next Steps: F/U with medical team to discuss discharge needs and barriers. F/U with [facility] regarding pending auth.    Barriers to Discharge: Pending Insurance Authorization    Is the patient up for discharge tomorrow: No

## 2025-02-20 PROCEDURE — 770001 HCHG ROOM/CARE - MED/SURG/GYN PRIV*

## 2025-02-20 PROCEDURE — A9270 NON-COVERED ITEM OR SERVICE: HCPCS | Performed by: STUDENT IN AN ORGANIZED HEALTH CARE EDUCATION/TRAINING PROGRAM

## 2025-02-20 PROCEDURE — 700102 HCHG RX REV CODE 250 W/ 637 OVERRIDE(OP): Performed by: STUDENT IN AN ORGANIZED HEALTH CARE EDUCATION/TRAINING PROGRAM

## 2025-02-20 PROCEDURE — 99232 SBSQ HOSP IP/OBS MODERATE 35: CPT | Performed by: INTERNAL MEDICINE

## 2025-02-20 PROCEDURE — A9270 NON-COVERED ITEM OR SERVICE: HCPCS | Performed by: INTERNAL MEDICINE

## 2025-02-20 PROCEDURE — 700102 HCHG RX REV CODE 250 W/ 637 OVERRIDE(OP): Performed by: INTERNAL MEDICINE

## 2025-02-20 PROCEDURE — 700102 HCHG RX REV CODE 250 W/ 637 OVERRIDE(OP): Performed by: HOSPITALIST

## 2025-02-20 PROCEDURE — 97116 GAIT TRAINING THERAPY: CPT | Mod: CQ

## 2025-02-20 PROCEDURE — 97530 THERAPEUTIC ACTIVITIES: CPT | Mod: CQ

## 2025-02-20 PROCEDURE — 92526 ORAL FUNCTION THERAPY: CPT

## 2025-02-20 PROCEDURE — A9270 NON-COVERED ITEM OR SERVICE: HCPCS | Performed by: HOSPITALIST

## 2025-02-20 RX ADMIN — LACOSAMIDE 100 MG: 100 TABLET, FILM COATED ORAL at 16:53

## 2025-02-20 RX ADMIN — AMLODIPINE BESYLATE 10 MG: 10 TABLET ORAL at 05:37

## 2025-02-20 RX ADMIN — ANASTROZOLE 1 MG: 1 TABLET ORAL at 05:43

## 2025-02-20 RX ADMIN — HYDROCODONE BITARTRATE AND ACETAMINOPHEN 1 TABLET: 10; 325 TABLET ORAL at 03:00

## 2025-02-20 RX ADMIN — LEVOTHYROXINE SODIUM 137 MCG: 0.11 TABLET ORAL at 05:35

## 2025-02-20 RX ADMIN — HYDROCODONE BITARTRATE AND ACETAMINOPHEN 1 TABLET: 10; 325 TABLET ORAL at 13:48

## 2025-02-20 RX ADMIN — HYDRALAZINE HYDROCHLORIDE 25 MG: 25 TABLET ORAL at 05:36

## 2025-02-20 RX ADMIN — LACOSAMIDE 100 MG: 100 TABLET, FILM COATED ORAL at 05:35

## 2025-02-20 RX ADMIN — HYDROCODONE BITARTRATE AND ACETAMINOPHEN 1 TABLET: 10; 325 TABLET ORAL at 08:51

## 2025-02-20 RX ADMIN — HYDRALAZINE HYDROCHLORIDE 25 MG: 25 TABLET ORAL at 13:43

## 2025-02-20 RX ADMIN — ACETAMINOPHEN 650 MG: 325 TABLET ORAL at 11:19

## 2025-02-20 RX ADMIN — ATORVASTATIN CALCIUM 10 MG: 10 TABLET, FILM COATED ORAL at 05:37

## 2025-02-20 RX ADMIN — ACETAMINOPHEN 650 MG: 325 TABLET ORAL at 16:53

## 2025-02-20 RX ADMIN — HYDROCODONE BITARTRATE AND ACETAMINOPHEN 1 TABLET: 10; 325 TABLET ORAL at 22:13

## 2025-02-20 RX ADMIN — HYDROCODONE BITARTRATE AND ACETAMINOPHEN 1 TABLET: 10; 325 TABLET ORAL at 18:06

## 2025-02-20 RX ADMIN — HYDRALAZINE HYDROCHLORIDE 25 MG: 25 TABLET ORAL at 20:57

## 2025-02-20 RX ADMIN — LISINOPRIL 40 MG: 20 TABLET ORAL at 05:38

## 2025-02-20 ASSESSMENT — COGNITIVE AND FUNCTIONAL STATUS - GENERAL
SUGGESTED CMS G CODE MODIFIER MOBILITY: CK
MOBILITY SCORE: 17
MOVING TO AND FROM BED TO CHAIR: A LITTLE
CLIMB 3 TO 5 STEPS WITH RAILING: A LITTLE
WALKING IN HOSPITAL ROOM: A LITTLE
MOVING FROM LYING ON BACK TO SITTING ON SIDE OF FLAT BED: A LITTLE
TURNING FROM BACK TO SIDE WHILE IN FLAT BAD: A LOT
STANDING UP FROM CHAIR USING ARMS: A LITTLE

## 2025-02-20 ASSESSMENT — GAIT ASSESSMENTS
ASSISTIVE DEVICE: FRONT WHEEL WALKER
DISTANCE (FEET): 35
GAIT LEVEL OF ASSIST: CONTACT GUARD ASSIST

## 2025-02-20 ASSESSMENT — PAIN DESCRIPTION - PAIN TYPE
TYPE: ACUTE PAIN

## 2025-02-20 ASSESSMENT — ENCOUNTER SYMPTOMS
WEAKNESS: 1
FEVER: 0
HEADACHES: 1

## 2025-02-20 NOTE — DISCHARGE PLANNING
DC Transport Scheduled    Transport Company Scheduled:  [facility]    Scheduled Date: 2/21/2025  Scheduled Time: 1000    Transport Type: Wheelchair  Destination:   [facility]   Destination address: [address] [address]    Notified care team of scheduled transport via Voalte.     If there are any changes needed to the DC transportation scheduled, please contact [facility] at ext. [phone] between the hours of 7624-4866. If outside those hours, contact the ED Case Manager at ext. [phone].

## 2025-02-20 NOTE — DOCUMENTATION QUERY
"                                                                         [facility]                                                                       Query Response Note      PATIENT:               NETTIE SOUSA  ACCT #:                  [id number]  MRN:                     [id number]  :                      1950  ADMIT DATE:       2/10/2025 5:24 PM  DISCH DATE:          RESPONDING  PROVIDER #:        [id number]           QUERY TEXT:    Documentation in the medical record indicates that this patient is being treated for pressure injury. Can the diagnosis of ulcer be further clarified?    The patient's Clinical Indicators include:  75yo with dx of acute respiratory failure with hypoxia, UTI, acidosis, SAH, HTN, CKD3b     Wound Team note \"Pressure injury ankle, heel, leg medial, lateral bilateral POA sDTI\"    Risk factors: advanced age, acute respiratory failure with hypoxia, UTI, acidosis, SAH, HTN, CKD3b, DM2  Treatments: Wound Team consult, offloading, dressing to wounds, monitoring    If you agree with the above dx, please document in the medical record.     Contact me with questions.     ALBERTO Heath, CDI  [email or web address]  Options provided:   -- Pressure injury ankle, heel, leg medial lateral bilateral POA sDTI   -- Other explanation, (please specify other explanation)   -- Unable to determine      Query created by: Chikis Campa on 2025 3:48 PM    RESPONSE TEXT:    Pressure injury ankle, heel, leg medial lateral bilateral POA sDTI          Electronically signed by:  LIN JONES MD 2025 4:53 PM              "

## 2025-02-20 NOTE — DISCHARGE PLANNING
Received call from Nataliia at [facility]. Their physiatrist is requesting updated PT/OT notes. Have messaged therapy.  Transport confirmed for 1000 tomorrow. Pt aware of plan and agrees and she will notify family.

## 2025-02-20 NOTE — PROGRESS NOTES
Received report from RN. Assumed pt care. Patient on tele box and on RA. Patient A&O x4. Fall precautions in place, call light and belongings within reach, bed in lowest position. No signs of distress.

## 2025-02-20 NOTE — DISCHARGE PLANNING
Received call from [facility] has auth'd [facility]. Called [facility], spoke to Nataliia. She said they need to check on staffing and will call me back.

## 2025-02-20 NOTE — CARE PLAN
The patient is Stable - Low risk of patient condition declining or worsening    Shift Goals  Clinical Goals: monitor BP, pain management  Patient Goals: pain management  Family Goals: racheal    Progress made toward(s) clinical / shift goals:      Patient's BP was managed with PO hydralazine. Pain was managed with Q4 PO Norco.     Problem: Pain - Standard  Goal: Alleviation of pain or a reduction in pain to the patient’s comfort goal  Outcome: Progressing     Problem: Skin Integrity  Goal: Skin integrity is maintained or improved  Outcome: Progressing     Problem: Fall Risk  Goal: Patient will remain free from falls  Outcome: Progressing     Problem: Neuro Status  Goal: Neuro status will remain stable or improve  Outcome: Progressing     Problem: Respiratory  Goal: Patient will achieve/maintain optimum respiratory ventilation and gas exchange  Outcome: Progressing     Problem: Mobility  Goal: Patient's capacity to carry out activities will improve  Outcome: Progressing

## 2025-02-20 NOTE — THERAPY
Speech Language Pathology   Daily Treatment     Patient Name: Keyur Burt  AGE:  74 y.o., SEX:  female  Medical Record #: [id number]  Date of Service: 2/20/2025    Precautions:  Precautions: Fall Risk     Subjective  Pt seen A&Ox4 saturating on room air.     Assessment  The pt self-fed w/ appropriate rate & volume control. Bolus stripping and containment was WNL. Oral stage c/b timely mastication and no gross oral residue post swallow. No upper airway wetness, throat clearing or coughing was noted. Pt denied globus sensation or any other symptoms of dysphagia.     Clinical Impressions  Pt's oropharyngeal swallow function appears improved from prior FEES completed on 2/14 (pt p/w mild oropharyngeal dysphagia). Pt has been tolerating a TN0/RG7 diet without any sx of dysphagia. All short term goals have been met- ST to sign off.      Recommendations  Treatment Completed: Dysphagia Treatment  Consult Referral(s): Gastroenterologist (OP appropriate)    Dysphagia Treatment  Diet Consistency: Thin/all Liquids, Regular Solids  Instrumentation: None indicated at this time  Medication: As tolerated  Supervision: Assist with meal tray set up  Positioning: Fully upright and midline during oral intake  Risk Management : Small bites/sips, Slow rate of intake  Oral Care: BID    Cognitive Treatment  Supervision Needs Upons Discharge: None    SLP Treatment Plan  Treatment Plan: None Indicated  SLP Frequency: N/A - Evaluation Only  Estimated Duration: Until Therapy Goals Met    Anticipated Discharge Needs  Discharge Recommendations: Anticipate that the patient will have no further speech therapy needs after discharge from the hospital  Therapy Recommendations Upon DC: Not Indicated      Patient / Family Goals  Patient / Family Goal #1: To get better  Goal #1 Outcome: Goal met  Short Term Goals  Short Term Goal # 1: Patient will complete a diagnostic swallow study to further assess swallow function and inform POC  Goal Outcome #  1: Goal met  Short Term Goal # 1 B : Patient will consume least restrictive solids/TN0 without s/sx of airway invasion.  Goal Outcome # 2 : Goal met    Candice Rosado, SLP

## 2025-02-20 NOTE — DISCHARGE PLANNING
Case Management Discharge Planning    Admission Date: 2/10/2025  GMLOS: 4.5  ALOS: 10    6-Clicks ADL Score: 16  6-Clicks Mobility Score: 17  PT and/or OT Eval ordered: Yes  Post-acute Referrals Ordered: Yes  Post-acute Choice Obtained: Yes  Has referral(s) been sent to post-acute provider:  Yes      Anticipated Discharge Dispo: Discharge Disposition: Disch to  rehab facility or distinct part unit (62)    DME Needed: No    Action(s) Taken: Updated Provider/Nurse on Discharge Plan, Acceptance Received, and Transport Arranged     Escalations Completed: None    Medically Clear: Yes    Next Steps: Received call from Nataliia at [facility]. They are unable to accept pt today but can take tommorow am and requesting 1000. Confirmation with Ride Line pending.     Barriers to Discharge: Transportation    Is the patient up for discharge tomorrow: Yes    Is transport arranged for discharge disposition: Yes

## 2025-02-20 NOTE — PROGRESS NOTES
Monitor summary: SR 60-74, ID 0.18, QRS 0.09, QT 0.56, with rare PVCs/triplets per strip from monitor room.

## 2025-02-20 NOTE — CARE PLAN
The patient is Stable - Low risk of patient condition declining or worsening    Shift Goals  Clinical Goals: Pain control, Monitor VS  Patient Goals: Comfort, Rest  Family Goals: ANITA    Progress made toward(s) clinical / shift goals:      Problem: Knowledge Deficit - Standard  Goal: Patient and family/care givers will demonstrate understanding of plan of care, disease process/condition, diagnostic tests and medications  Description: Target End Date:  1-3 days or as soon as patient condition allows    Document in Patient Education    1.  Patient and family/caregiver oriented to unit, equipment, visitation policy and means for communicating concern  2.  Complete/review Learning Assessment  3.  Assess knowledge level of disease process/condition, treatment plan, diagnostic tests and medications  4.  Explain disease process/condition, treatment plan, diagnostic tests and medications  Outcome: Progressing     Problem: Fall Risk  Goal: Patient will remain free from falls  Description: Target End Date:  Prior to discharge or change in level of care    Document interventions on the [facility] Fall Risk Assessment    1.  Assess for fall risk factors  2.  Implement fall precautions  Outcome: Progressing     Problem: Neuro Status  Goal: Neuro status will remain stable or improve  Description: Target End Date:  Prior to discharge or change in level of care    Document on Neuro assessment in the Assessment flowsheet    1.  Assess and monitor neurologic status per provider order/protocol/unit policy  2.  Assess level of consciousness and orientation  3.  Assess for speech, dysarthria, dysphagia, facial symmetry  4.  Assess visual field, eye movements, gaze preference, pupil reaction and size  5.  Assess muscle strength and motor response in all four extremities  6.  Assess for sensation (numbness and tingling)  7.  Assess basic neuro reflexes (cough, gag, corneal)  8.  Identify changes in neuro status and report to provider for  testing/treatment orders  Outcome: Progressing     Problem: Mobility  Goal: Patient's capacity to carry out activities will improve  Description: Target End Date:  Prior to discharge or change in level of care    1.  Assess for barriers to mobility/activity  2.  Implement activity per interdisciplinary team recommendations  3.  Target activity level identified and patient/family/caregiver aware of goal  4.  Provide assistive devices  5.  Instruct patient/caregiver on proper use of assistive/adaptive devices  6.  Schedule activities and rest periods to decrease effects of fatigue  7.  Encourage mobilization to extent of ability  8.  Maintain proper body alignment  9.  Provide adequate pain management to allow progressive mobilization  10. Implement pace maker precautions as needed  Outcome: Progressing       Patient is not progressing towards the following goals:

## 2025-02-21 VITALS
TEMPERATURE: 97.9 F | WEIGHT: 230.38 LBS | DIASTOLIC BLOOD PRESSURE: 67 MMHG | HEART RATE: 71 BPM | SYSTOLIC BLOOD PRESSURE: 128 MMHG | OXYGEN SATURATION: 94 % | RESPIRATION RATE: 18 BRPM | BODY MASS INDEX: 38.38 KG/M2 | HEIGHT: 65 IN

## 2025-02-21 PROCEDURE — A9270 NON-COVERED ITEM OR SERVICE: HCPCS | Performed by: STUDENT IN AN ORGANIZED HEALTH CARE EDUCATION/TRAINING PROGRAM

## 2025-02-21 PROCEDURE — 700102 HCHG RX REV CODE 250 W/ 637 OVERRIDE(OP): Performed by: STUDENT IN AN ORGANIZED HEALTH CARE EDUCATION/TRAINING PROGRAM

## 2025-02-21 PROCEDURE — 99239 HOSP IP/OBS DSCHRG MGMT >30: CPT | Performed by: INTERNAL MEDICINE

## 2025-02-21 PROCEDURE — A9270 NON-COVERED ITEM OR SERVICE: HCPCS | Performed by: INTERNAL MEDICINE

## 2025-02-21 PROCEDURE — 700102 HCHG RX REV CODE 250 W/ 637 OVERRIDE(OP): Performed by: HOSPITALIST

## 2025-02-21 PROCEDURE — 97535 SELF CARE MNGMENT TRAINING: CPT

## 2025-02-21 PROCEDURE — A9270 NON-COVERED ITEM OR SERVICE: HCPCS | Performed by: HOSPITALIST

## 2025-02-21 PROCEDURE — 700102 HCHG RX REV CODE 250 W/ 637 OVERRIDE(OP): Performed by: INTERNAL MEDICINE

## 2025-02-21 RX ORDER — AMLODIPINE BESYLATE 10 MG/1
10 TABLET ORAL DAILY
Qty: 100 TABLET | Refills: 3 | Status: SHIPPED | OUTPATIENT
Start: 2025-02-22 | End: 2026-03-29

## 2025-02-21 RX ORDER — HYDRALAZINE HYDROCHLORIDE 25 MG/1
25 TABLET, FILM COATED ORAL EVERY 8 HOURS
Status: SHIPPED
Start: 2025-02-21

## 2025-02-21 RX ORDER — LACOSAMIDE 100 MG/1
100 TABLET ORAL 2 TIMES DAILY
Status: SHIPPED
Start: 2025-02-21 | End: 2025-03-23

## 2025-02-21 RX ADMIN — ATORVASTATIN CALCIUM 20 MG: 20 TABLET, FILM COATED ORAL at 05:26

## 2025-02-21 RX ADMIN — AMLODIPINE BESYLATE 10 MG: 10 TABLET ORAL at 05:27

## 2025-02-21 RX ADMIN — LACOSAMIDE 100 MG: 100 TABLET, FILM COATED ORAL at 05:26

## 2025-02-21 RX ADMIN — HYDRALAZINE HYDROCHLORIDE 25 MG: 25 TABLET ORAL at 05:27

## 2025-02-21 RX ADMIN — LEVOTHYROXINE SODIUM 125 MCG: 0.12 TABLET ORAL at 05:29

## 2025-02-21 RX ADMIN — ANASTROZOLE 1 MG: 1 TABLET ORAL at 05:28

## 2025-02-21 RX ADMIN — HYDROCODONE BITARTRATE AND ACETAMINOPHEN 1 TABLET: 10; 325 TABLET ORAL at 09:46

## 2025-02-21 RX ADMIN — HYDROCODONE BITARTRATE AND ACETAMINOPHEN 1 TABLET: 10; 325 TABLET ORAL at 03:44

## 2025-02-21 RX ADMIN — LISINOPRIL 40 MG: 20 TABLET ORAL at 05:27

## 2025-02-21 RX ADMIN — SENNOSIDES AND DOCUSATE SODIUM 2 TABLET: 50; 8.6 TABLET ORAL at 05:26

## 2025-02-21 ASSESSMENT — COGNITIVE AND FUNCTIONAL STATUS - GENERAL
DRESSING REGULAR UPPER BODY CLOTHING: A LITTLE
PERSONAL GROOMING: A LITTLE
DRESSING REGULAR LOWER BODY CLOTHING: A LITTLE
SUGGESTED CMS G CODE MODIFIER DAILY ACTIVITY: CK
DAILY ACTIVITIY SCORE: 19
TOILETING: A LITTLE
HELP NEEDED FOR BATHING: A LITTLE

## 2025-02-21 ASSESSMENT — PAIN DESCRIPTION - PAIN TYPE
TYPE: ACUTE PAIN
TYPE: ACUTE PAIN
TYPE: ACUTE PAIN;CHRONIC PAIN

## 2025-02-21 ASSESSMENT — FIBROSIS 4 INDEX: FIB4 SCORE: 1.76

## 2025-02-21 NOTE — PROGRESS NOTES
Bedside report received from NOC RN. Assumed care of pt. Pt awake, laying in bed. A/Ox4, VSS. No concerns, complaints or distress. Pt educated to call before getting out of bed. POC reviewed and white board updated. Pt is medical.  Call light in reach. Bed locked in lowest position with 2 upper bed rails up. Bed alarm on.

## 2025-02-21 NOTE — THERAPY
"Occupational Therapy   Treatment Session     Patient Name: Keyur Burt  Age:  74 y.o., Sex:  female  Medical Record #: [id number]  Today's Date: 2/21/2025     Precautions  Precautions: (P) Fall Risk, Swallow Precautions  Comments: -140    Assessment  Pt seen for OT tx session, pt required min A for standing grooming and LB dressing, CGA for UB dressing. Pt demo'd impaired balance, functional mobility and activity tolerance. Will continue to follow while in house.     Plan    Occupational Therapy Initial Treatment Plan   Treatment Interventions: Self Care / Activities of Daily Living, Therapeutic Activity  Treatment Frequency: 3 Times per Week  Duration: Until Therapy Goals Met    DC Equipment Recommendations: (P) Tub Transfer Bench  Discharge Recommendations: (P) Recommend post-acute placement for additional occupational therapy services prior to discharge home     Subjective    \"I hope I can take a hot shower at Rehab\"     Objective      Precautions   Precautions Fall Risk;Swallow Precautions   Vitals   O2 (LPM) 0   O2 Delivery Device None - Room Air   Pain 0 - 10 Group   Therapist Pain Assessment Post Activity Pain Same as Prior to Activity;Nurse Notified  (no c/o pain during session)   Cognition    Cognition / Consciousness WDL   Level of Consciousness Alert   Comments pleasent and cooperative   Balance   Sitting Balance (Static) Fair +   Sitting Balance (Dynamic) Fair   Standing Balance (Static) Fair   Standing Balance (Dynamic) Fair -   Weight Shift Sitting Fair   Weight Shift Standing Fair   Skilled Intervention Tactile Cuing;Verbal Cuing   Comments w/ charito FWW   Bed Mobility    Supine to Sit Supervised   Sit to Supine Supervised   Scooting Supervised   Skilled Intervention Verbal Cuing   Activities of Daily Living   Grooming Standing;Minimal Assist  (oral care at sink)   Upper Body Dressing   (CGA to don sweater)   Lower Body Dressing Minimal Assist  (donned underpants and pants)   Skilled " Intervention Verbal Cuing;Tactile Cuing;Facilitation   How much help from another person does the patient currently need...   Putting on and taking off regular lower body clothing? 3   Bathing (including washing, rinsing, and drying)? 3   Toileting, which includes using a toilet, bedpan, or urinal? 3   Putting on and taking off regular upper body clothing? 3   Taking care of personal grooming such as brushing teeth? 3   Eating meals? 4   6 Clicks Daily Activity Score 19   Functional Mobility   Sit to Stand Contact Guard Assist   Bed, Chair, Wheelchair Transfer Minimal Assist   Mobility within room and bathroom w/ FWW   Skilled Intervention Verbal Cuing   Activity Tolerance   Sitting Edge of Bed 5 min   Standing 7 min   Patient / Family Goals   Patient / Family Goal #1 to return home   Goal #1 Outcome Progressing as expected   Short Term Goals   Short Term Goal # 1 supervised with UB dressing   Goal Outcome # 1 Progressing as expected   Short Term Goal # 2 min A with LB dressing   Goal Outcome # 2 Goal met, new goal added   Short Term Goal # 2 B  Pt will demo LB dressing w/ SPV   Short Term Goal # 3 min A with ADL txfs   Goal Outcome # 3 Goal met, new goal added   Short Term Goal # 3 B Pt will demo ADL txfs w/ SPV   Education Group   Role of Occupational Therapist Patient Response Patient;Acceptance;Explanation;Verbal Demonstration   ADL Patient Response Patient;Acceptance;Explanation;Verbal Demonstration   Occupational Therapy Treatment Plan    O.T. Treatment Plan Continue Current Treatment Plan   Anticipated Discharge Equipment and Recommendations   DC Equipment Recommendations Tub Transfer Bench   Discharge Recommendations Recommend post-acute placement for additional occupational therapy services prior to discharge home   Interdisciplinary Plan of Care Collaboration   IDT Collaboration with  Nursing;   Patient Position at End of Therapy In Bed;Call Light within Reach;Tray Table within Reach;Phone  within Reach   Collaboration Comments report given   Session Information   Date / Session Number  2/21, 3 (1/3, 2/27)

## 2025-02-21 NOTE — PROGRESS NOTES
Assumed care of patient at bedside report from day RN. Updated on POC. Patient currently A & O x 4; on room air L ; up stand by assist with no current complaints of acute pain. Assessment completed.  Call light within reach. Whiteboard updated. Fall precautions in place. Bed locked and in lowest position. All questions answered. No other needs indicated at this time.

## 2025-02-21 NOTE — DOCUMENTATION QUERY
[facility]                                                                       Query Response Note      PATIENT:               NETTIE SOUSA  ACCT #:                  [id number]  MRN:                     [id number]  :                      1950  ADMIT DATE:       2/10/2025 5:24 PM  DISCH DATE:          RESPONDING  PROVIDER #:        [id number]           QUERY TEXT:    Congestive Heart Failure is documented in the Medical Record. Please document the type and acuity (includes probable or suspected).    The patient's Clinical Indicators include:  75yo with dx of acute respiratory failure with hypoxia, UTI, acidosis, SAH, HTN, CKD3b    02/10 Consults Past medical history CHF (congestive heart failure)  02/10 CXR impression No acute cardiopulmonary disease   Echocardiogram findings 61% LVEF, normal diastolic function  02/15     Risk factors: advanced age, acute respiratory failure with hypoxia, HTN, CKD3b, DM2, CHF  Treatments: imaging, monitoring, IV lasix, echocardiogram     If you agree with the above dx, please document in the medical record.     Contact me with questions.     ALBERTO Heath, CDI  [email or web address]  Options provided:   -- Chronic Diastolic heart failure   -- Acute Diastolic heart failure   -- Acute on Chronic Diastolic heart failure   -- Other explanation   -- Unable to determine      Query created by: Chikis Campa on 2025 7:31 AM    RESPONSE TEXT:    Chronic Diastolic heart failure          Electronically signed by:  LIN JONES MD 2025 7:26 AM

## 2025-02-21 NOTE — CARE PLAN
The patient is Stable - Low risk of patient condition declining or worsening    Shift Goals  Clinical Goals: stable VS, pain control  Patient Goals: rest  Family Goals: NA    Progress made toward(s) clinical / shift goals:    Problem: Pain - Standard  Goal: Alleviation of pain or a reduction in pain to the patient’s comfort goal  Description: Target End Date:  Prior to discharge or change in level of care    Document on Vitals flowsheet    1.  Document pain using the appropriate pain scale per order or unit policy  2.  Educate and implement non-pharmacologic comfort measures (i.e. relaxation, distraction, massage, cold/heat therapy, etc.)  3.  Pain management medications as ordered  4.  Reassess pain after pain med administration per policy  5.  If opiods administered assess patient's response to pain medication is appropriate per POSS sedation scale  6.  Follow pain management plan developed in collaboration with patient and interdisciplinary team (including palliative care or pain specialists if applicable)  Outcome: Progressing     Problem: Fall Risk  Goal: Patient will remain free from falls  Description: Target End Date:  Prior to discharge or change in level of care    Document interventions on the [facility] Fall Risk Assessment    1.  Assess for fall risk factors  2.  Implement fall precautions  Outcome: Progressing     Problem: Neuro Status  Goal: Neuro status will remain stable or improve  Description: Target End Date:  Prior to discharge or change in level of care    Document on Neuro assessment in the Assessment flowsheet    1.  Assess and monitor neurologic status per provider order/protocol/unit policy  2.  Assess level of consciousness and orientation  3.  Assess for speech, dysarthria, dysphagia, facial symmetry  4.  Assess visual field, eye movements, gaze preference, pupil reaction and size  5.  Assess muscle strength and motor response in all four extremities  6.  Assess for sensation (numbness  and tingling)  7.  Assess basic neuro reflexes (cough, gag, corneal)  8.  Identify changes in neuro status and report to provider for testing/treatment orders  Outcome: Progressing       Patient is not progressing towards the following goals:

## 2025-02-21 NOTE — PROGRESS NOTES
[facility] Medicine Daily Progress Note    Date of Service  2/20/2025    Chief Complaint  Keyur Burt is a 74 y.o. female admitted 2/10/2025 with altered mental status.    Hospital Course  Karen Burt is a 74-year-old female with PMHx DMT2, recurrent breast cancer, hypothyroidism, HLD, HTN, CKD 3B.  Admitted 2/10 for intracranial hemorrhage.    Per history-patient was scheduled to meet with her friends.  When she did not arrive, patient's son checked on her.  He found that she was laying on the ground and confused.  EMS was called.  She was transferred to the ED for further evaluation.  CT head: Intracranial hemorrhage.  Due to agitation, confusion and combativeness-patient was intubated and transferred to the ICU for further evaluation.    MRI brain: Right posterior temporal hemorrhage.  MRA head without evidence of aneurysm, stenosis or vascular malformation.  Mild right cerebral subarachnoid hemorrhage.  Mild right cerebral subdural hemorrhage.  Patient noted to have witnessed seizure 2/11.  Not captured on cEEG.  Patient was started on Vimpat for seizure prophylaxis.    Additionally, patient found to be flu positive.    Patient underwent diagnostic cerebral angiogram 2/13.  Early blush and area of hematoma.  Recommending repeat angiogram in 6 to 8 weeks.    Patient was extubated on 2/13.  Nicardipine drip was discontinued.  SBP goal 100-140.  Stable for transfer to telemetry floor.  PT OT recommending postacute rehabilitation.    Interval Problem Update  2/15: Vitals notable for SBP ranging 129 through 166.  She has received multiple doses of both hydralazine and labetalol for blood pressure control overnight.  Start amlodipine.  Increase lisinopril to 40 mg.  Discontinue sliding scale insulin; patient has not required to the last 5 days since admission.  Potassium 3.6; replaced. Patient complains of headache. Increase norco, add Fioricet one-time dose.    2/16: Vitals notable for improvement in SBP.   Ranging 105 through 146 overnight.  Patient remains on 2 L/min supplemental O2.  Last dose of IV labetalol late yesterday evening.  Hb stable at 12.9.  Potassium 3.8; replaced.      2/17: SBP ranging 139 through 159.  Required 3 doses IV antihypertensives late yesterday afternoon and overnight.  Increase amlodipine to 10 mg daily.  Anticipate discharge to rehab in the next 1 to 2 days.  2/18: Patient seen and examined resting in bed, afebrile no HA, HTN cont on her po and IV prn medication keep SBP between 100-140   Will need rehabilitation   Close monitor for decompensation     2/19: Patient seen and examined, afebrile since for controlling her HTN she is still requiring some IV HTN meds prn will add hydralazin to her regimen today   Close monitor for decompensation    2/20: Patient seen and examined, afebrile no nausea or vomiting her HTN is now better controlled with the add of hydralazin   Will need placement   I have discussed this patient's plan of care and discharge plan at IDT rounds today with Case Management, Nursing, Nursing leadership, and other members of the IDT team.    Consultants/Specialty  critical care, neurology, and IR    Code Status  Full Code    Disposition  The patient is medically cleared for discharge to home or a post-acute facility.      I have placed the appropriate orders for post-discharge needs.    Review of Systems  Review of Systems   Constitutional:  Positive for malaise/fatigue. Negative for fever.   Cardiovascular:  Negative for chest pain and leg swelling.   Neurological:  Positive for weakness and headaches.        Physical Exam  Temp:  [36.4 °C (97.5 °F)-36.7 °C (98.1 °F)] 36.5 °C (97.7 °F)  Pulse:  [64-92] 77  Resp:  [16-18] 17  BP: (106-139)/(59-69) 123/67  SpO2:  [93 %-96 %] 93 %    Physical Exam  Vitals and nursing note reviewed.   Constitutional:       General: She is not in acute distress.     Appearance: Normal appearance. She is obese. She is ill-appearing.    Cardiovascular:      Rate and Rhythm: Normal rate and regular rhythm.      Heart sounds: Murmur heard.   Pulmonary:      Effort: Pulmonary effort is normal. No respiratory distress.      Breath sounds: No wheezing.   Skin:     General: Skin is warm and dry.      Coloration: Skin is pale.   Neurological:      Mental Status: She is alert and oriented to person, place, and time.      Motor: Weakness present.   Psychiatric:         Mood and Affect: Mood normal.         Behavior: Behavior normal.         Fluids    Intake/Output Summary (Last 24 hours) at 2/20/2025 1610  Last data filed at 2/20/2025 0200  Gross per 24 hour   Intake 120 ml   Output 200 ml   Net -80 ml        Laboratory  Recent Labs     02/18/25  0142 02/19/25  0722   WBC 10.7 8.4   RBC 4.26 4.31   HEMOGLOBIN 13.6 14.0   HEMATOCRIT 39.9 40.4   MCV 93.7 93.7   MCH 31.9 32.5   MCHC 34.1 34.7   RDW 45.7 46.2   PLATELETCT 291 289   MPV 8.7* 8.9*     Recent Labs     02/18/25  0142 02/19/25  0722   SODIUM 135 135   POTASSIUM 3.6 3.5*   CHLORIDE 101 101   CO2 23 21   GLUCOSE 137* 118*   BUN 13 13   CREATININE 0.76 0.79   CALCIUM 8.6 8.6                     Imaging  IR-CAROTID-CEREBRAL BILATERAL   Final Result      There are a few abnormal tiny blood vessels noted in the expected location of the right temporal hemorrhage. There is slight early filling of the adjacent superficial veins compared with the other cortical veins. These findings may be secondary to the    hemorrhage itself. However, the possibility of small 'burned out' vascular malformation cannot be fully excluded. Follow-up cerebral angiogram is recommended after 6 weeks to rule out any possibility of tiny residual vascular malformation.      DX-CHEST-PORTABLE (1 VIEW)   Final Result         1.  Left basilar atelectasis, no focal infiltrate   2.  Nasogastric tube terminates just distal to the gastroesophageal junction with side-port in the distal esophagus, position is unchanged since prior study,  recommend advancement      MR-MRA HEAD-W/O   Final Result      1.  There is no evidence of aneurysm, stenosis or vascular malformation in the visualized proximal vasculature or adjacent to the hematoma.   2.  Right posterior temporal parenchymal hemorrhage.   3.  Mild right cerebral diffuse subarachnoid hemorrhage.   4.  Mild right cerebral subdural hemorrhage.      MR-BRAIN-WITH & W/O   Final Result      Right posterior temporal hemorrhage. There are no abnormal flow voids or large nodular enhancement. There is tiny punctate enhancement within the hemorrhage likely representing vascular enhancement.Pre and postcontrast MR examination is recommended after    6 weeks. Due to the atypical location of the hemorrhage, catheter angiogram may be needed to rule out the possibility of any tiny vascular malformation.      EC-ECHOCARDIOGRAM COMPLETE W/O CONT   Final Result      DX-CHEST-PORTABLE (1 VIEW)   Final Result         1.  No acute cardiopulmonary disease.   2.  Nasogastric tube terminates just distal to the gastroesophageal junction with side-port in the distal esophagus, recommend advancement      CT-CTA NECK WITH & W/O-POST PROCESSING   Final Result         1.  CT angiogram of the neck within normal limits.         CT-CTA HEAD WITH & W/O-POST PROCESS   Final Result         1.  No large vessel occlusion or aneurysm identified.   2.  Right posterior temporal parenchymal hemorrhage, stable since prior study.   3.  Scattered subarachnoid hemorrhages, overall stable since prior study.      DX-CHEST-PORTABLE (1 VIEW)   Final Result         1.  No acute cardiopulmonary disease.   2.  Nasogastric tube with tip just distal to the gastroesophageal junction, recommend advancement      DX-ABDOMEN FOR TUBE PLACEMENT   Final Result         1.  Nonspecific bowel gas pattern in the upper abdomen.   2.  Nasogastric tube with tip just distal to the gastroesophageal junction, recommend advancement      CT-HEAD W/O   Final Result          1.  Right posterior temporal parenchymal hemorrhages stranding vasogenic edema   2.  Bilateral subarachnoid hemorrhages as described.   3.  Right posterior parafalcine subdural hematoma measuring 2.4 mm.   4.  Subdural versus subarachnoid hemorrhage along the right tentorium measuring 3.1 mm.      These findings were discussed with the patient's clinician, George Wise, on 2/10/2025 8:42 PM.         DX-CHEST-PORTABLE (1 VIEW)   Final Result      Hypoinflation without a definite acute abnormality.           Assessment/Plan  * Acute intracranial hemorrhage (HCC)- (present on admission)  Assessment & Plan  Right posterior temporal parenchymal hemorrhage noted + SAH as above  CTA H&N without obvious aneurism  Hemorrhage at this time appears small without significant mass effect or hydrocephalus to warrant surgical decompression or EVD   Brain MRI/MRA without vascular abnormalities, stable hematoma   - Statin   - See plan for SAH   - Neuro improving, continue weaning/stopping sedation     Influenza A  Assessment & Plan  Flu A +   No evidence of pneumonia  Tamiflu completed 2/15    Seizure (HCC)  Assessment & Plan  Clinical seizure x1 2/11  Secondary to ICH  cEEG without epileptiform activity  Doing well, awake, non focal  - Continue vimpat BID  - Will need outpt neuro f/u     Hypokalemia  Assessment & Plan  Potassium 3.8  Replace  - Repeat BMP in a.m.    Lactic acidosis  Assessment & Plan  Resolved    Acute respiratory failure with hypoxia (HCC)- (present on admission)  Assessment & Plan  Intubated 2/10  Extubated 2/13  Currently requiring 2 L/min supplemental O2  - Wean oxygen as able  - Encourage incentive spirometry and ambulation as able    Acute encephalopathy- (present on admission)  Assessment & Plan  Admitted with agitation  ICH/SAH and UTI  Improving    Urinary tract infection without hematuria- (present on admission)  Assessment & Plan  UA abnormal  -Neg cultures  -Completed ceftriaxone     Hypertensive  urgency- (present on admission)  Assessment & Plan   through 165 over the last 24 hours  - SBP improving; if you touchups greater than 140.  Has required 3 doses IV antihypertensive the last 24 hours  - Increase amlodipine to 10 mg   - Continue lisinopril 40 mg  - Close monitoring of blood pressures  - SBP goal 100-140  - Continuous telemetry monitoring; monitoring for arrhythmia in the setting of frequent IV antihypertensives for intracranial hemorrhage    SAH (subarachnoid hemorrhage) (HCC)- (present on admission)  Assessment & Plan  Etiology unknown  MRA without vascular abnormalities  Witnessed seizure 2/10  - Repeat MRI 6 weeks  - Continue Vimpat 100 mg twice daily  - PT OT pending  - Continue neurochecks every 4 hours for now  - SBP goal 100-140    Type 2 diabetes mellitus without complication, without long-term current use of insulin (HCC)- (present on admission)  Assessment & Plan  A1c 5.6% BG at goal  Discontinue SSI; patient has not required during this hospitalization    Recurrent breast cancer (HCC)- (present on admission)  Assessment & Plan  Continue anastrazole    Hypothyroidism from resorcinol- (present on admission)  Assessment & Plan  TSH 2.19  Continue home levothyroxine    Mixed hyperlipidemia- (present on admission)  Assessment & Plan  Continue atorvastatin         VTE prophylaxis: scd     Greater than 51 minutes spent prepping to see patient (e.g. review of tests) obtaining and/or reviewing separately obtained history. Performing a medically appropriate examination and/ evaluation.  Counseling and educating the patient/family/caregiver.  Ordering medications, tests, or procedures.  Referring and communicating with other health care professionals.  Documenting clinical information in EPIC.  Independently interpreting results and communicating results to patient/family/caregiver.  Care coordination.      I have performed a physical exam and reviewed and updated ROS and Plan today  (2/20/2025). In review of yesterday's note (2/19/2025), there are no changes except as documented above.

## 2025-02-21 NOTE — DISCHARGE PLANNING
Case Management Discharge Planning    Admission Date: 2/10/2025  GMLOS: 4.5  ALOS: 11    6-Clicks ADL Score: 19  6-Clicks Mobility Score: 17  PT and/or OT Eval ordered: Yes  Post-acute Referrals Ordered: Yes  Post-acute Choice Obtained: Yes  Has referral(s) been sent to post-acute provider:  Yes    Anticipated Discharge Dispo: Discharge Disposition: Disch to  rehab facility or distinct part unit ()    DME Needed: No    Action(s) Taken: OTHER    MANNY contacted Nataliia with  Rehab to notify of OT notes being updated. MANNY confirmed 1000 transport. Per Nataliia, was not notified of transport being set up. Per Nataliia will update her staff to prepare for patient's arrival. Asking for DC summary soon or will need to push back transport. MANNY notified MD of pending dc summary.    MANNY handed DC packet to DC RN.    Escalations Completed: None    Medically Clear: Yes    Next Steps: Transport at 1000 to [facility]    Barriers to Discharge: None    Is the patient up for discharge tomorrow: No

## 2025-02-21 NOTE — THERAPY
Physical Therapy   Daily Treatment     Patient Name: Keyur Burt  Age:  74 y.o., Sex:  female  Medical Record #: [id number]  Today's Date: 2/20/2025     Precautions  Precautions: (P) Fall Risk  Comments: (P) -140    Assessment    The pt visiting w/son, willing to participate w/PT. Functionally the pt is making improvements seen mostly w/bed mobility and STS/transfers. The pt did manage 35' x 1, distance limited by c/o fatigue and HA.  The pt remains below her PLOF, would benefit from IPR.  PT will cont to follow.     Plan    Treatment Plan Status: (P) Continue Current Treatment Plan  Type of Treatment: Bed Mobility, Equipment, Family / Caregiver Training, Gait Training, Manual Therapy, Neuro Re-Education / Balance, Self Care / Home Evaluation, Therapeutic Activities, Therapeutic Exercise  Treatment Frequency: 5 Times per Week  Treatment Duration: Until Therapy Goals Met    DC Equipment Recommendations: (P) Unable to determine at this time  Discharge Recommendations: (P) Recommend post-acute placement for additional physical therapy services prior to discharge home     Objective       02/20/25 1558   Time In/Time Out   Therapy Start Time 1535   Therapy End Time 1558   Total Therapy Time 23   Charge Group   PT Gait Training (Units) 1   PT Therapeutic Activities (Units) 1   Total Time Spent   PT Gait Training Time Spent (Mins) 8   PT Therapeutic Activities Time Spent (Mins) 15   PT Total Time Spent (Calculated) 23   Precautions   Precautions Fall Risk   Comments -140   Pain 0 - 10 Group   Location Head   Pain Rating Scale (NPRS) 5   Therapist Pain Assessment Post Activity Pain Same as Prior to Activity   Balance   Sitting Balance (Static) Good   Sitting Balance (Dynamic) Fair +   Standing Balance (Static) Fair   Standing Balance (Dynamic) Fair -   Weight Shift Sitting Fair   Weight Shift Standing Fair   Bed Mobility    Supine to Sit Standby Assist   Scooting Supervised   Rolling Supervised   Gait  Analysis   Gait Level Of Assist Contact Guard Assist   Assistive Device Front Wheel Walker   Distance (Feet) 35   # of Times Distance was Traveled 1   Functional Mobility   Sit to Stand Contact Guard Assist   Bed, Chair, Wheelchair Transfer Contact Guard Assist   6 Clicks Assessment - How much HELP from from another person do you currently need... (If the patient hasn't done an activity recently, how much help from another person do you think he/she would need if he/she tried?)   Turning from your back to your side while in a flat bed without using bedrails? 2   Moving from lying on your back to sitting on the side of a flat bed without using bedrails? 3   Moving to and from a bed to a chair (including a wheelchair)? 3   Standing up from a chair using your arms (e.g., wheelchair, or bedside chair)? 3   Walking in hospital room? 3   Climbing 3-5 steps with a railing? 3   6 clicks Mobility Score 17   Short Term Goals    Short Term Goal # 1 Pt will perform supine < > sit SPV with bed flat, no rail in 6 visits in order to set up for upright mobility   Goal Outcome # 1 goal not met   Short Term Goal # 2 Pt will perform sit < > stand SPV in 6 visits in order to prepare for ambulation   Goal Outcome # 2 Goal not met   Short Term Goal # 3 Pt will ambulate 150' SPV /c LRAD in 6 visits in order to return home safely   Goal Outcome # 3 Goal not met   Education Group   Role of Physical Therapist Patient Response Patient;Acceptance;Explanation;Action Demonstration   Physical Therapy Treatment Plan   Physical Therapy Treatment Plan Continue Current Treatment Plan   Anticipated Discharge Equipment and Recommendations   DC Equipment Recommendations Unable to determine at this time   Discharge Recommendations Recommend post-acute placement for additional physical therapy services prior to discharge home   Interdisciplinary Plan of Care Collaboration   IDT Collaboration with  Nursing   Patient Position at End of Therapy Seated;Chair  Alarm On;Call Light within Reach;Tray Table within Reach;Phone within Reach   Collaboration Comments Nrsg notified of pts tx efforts   Session Information   Date / Session Number  2/20--4 (4/5, 2/20) PTA/2   Supervising Physical Therapist (PTA Treatments Only)   Supervising Physical Therapist Joan Ormonde

## 2025-02-21 NOTE — PROGRESS NOTES
Report given to RANDOLPH Elena from [facility]

## 2025-02-21 NOTE — DISCHARGE SUMMARY
Discharge Summary    CHIEF COMPLAINT ON ADMISSION  Chief Complaint   Patient presents with    ALOC       Reason for Admission  EMS    Admission Date  2/10/2025     CODE STATUS  Full Code    HPI & HOSPITAL COURSE  This is a 74 y.o. female  with PMHx DMT2, recurrent breast cancer, hypothyroidism, HLD, HTN, CKD 3B.  Admitted 2/10 for intracranial hemorrhage.    Per history-patient was scheduled to meet with her friends.  When she did not arrive, patient's son checked on her.  He found that she was laying on the ground and confused.  EMS was called.  She was transferred to the ED for further evaluation.  CT head: Intracranial hemorrhage.  Due to agitation, confusion and combativeness-patient was intubated and transferred to the ICU for further evaluation.  MRI brain: Right posterior temporal hemorrhage.  MRA head without evidence of aneurysm, stenosis or vascular malformation.  Mild right cerebral subarachnoid hemorrhage.  Mild right cerebral subdural hemorrhage.  Patient noted to have witnessed seizure 2/11.  Not captured on cEEG.  Patient was started on Vimpat for seizure prophylaxis.  Additionally, patient found to be flu positive.  Patient underwent diagnostic cerebral angiogram 2/13.  Early blush and area of hematoma.  Recommending repeat angiogram in 6 to 8 weeks.  Patient was extubated on 2/13.  Nicardipine drip was discontinued.  SBP goal 100-140.  She is now on amlodipine, lisinopril and also hydralazin and her HTN has been controlled  She was also seen by neurology during her stay and she has been started on Vimpat   Patient now doing better she was seen by physical therapy and rehab has been recommended and patient will be discharged to rehab today   She will need to follow up with neurology and IR as outpatient     The patient met 2-midnight criteria for an inpatient stay at the time of discharge.      FOLLOW UP ITEMS POST DISCHARGE  Neurology   IR for repeat angiogram in 6-8 weeks     DISCHARGE  DIAGNOSES  Principal Problem:    Acute intracranial hemorrhage (HCC) (POA: Yes)  Active Problems:    Mixed hyperlipidemia (POA: Yes)      Overview:  Latest Reference Range & Units 06/07/23 09:40       Cholesterol,Tot 100 - 199 mg/dL 167       Triglycerides 0 - 149 mg/dL 151 (H)       HDL >=40 mg/dL 58       LDL <100 mg/dL 79             He has history of elevated cholesterol and has been taking statins for a       number of years.  She did have negative reaction in the past causing       hematuria.  Current atorvastatin does not seem to be causing any issues.            Current regimen: Atorvastatin 20 mg daily    Hypothyroidism from resorcinol (POA: Yes)      Overview:  Latest Reference Range & Units 06/07/23 09:40       TSH 0.380 - 5.330 uIU/mL 3.860             He reports history of thyroid cancer treated with thyroidectomy in 2006.        Unclear what subtype.  She has been on replacement since that time.        Reports her levothyroxine dosage has been stable.  She reports increased       tremulousness, palpitations, and tachycardia worsening over the recent       months, resolved with dose reduction of levothyroxine in Sept 2022.            Current regimen: Levothyroxine 125 mcg daily       Previous regimen: Levothyroxine 137 mcg daily                      Recurrent breast cancer (HCC) (POA: Yes)      Overview: 1. Stage II A left breast, ER positive, AL negative, HER-2/geoffrey positive       invasive ductal carcinoma, grade 3  ( 2015)      2. Stage I (T1c,N0) ER/AL negative, HER-2/geoffrey equivocal  carcinoma of the       left breast            Keyur was diagnosed with stage II a HER-2/geoffrey positive breast carcinoma,       which was found in her screening mammogram back in June 2015. She       underwent  wire localization lumpectomy and sentinel lymph node biopsy on       9/15/2015 at [facility].              According to available records, the pathology showed on 0.3 cm, ER       positive, AL negative, HER-2/geoffrey  positive (by FISH) invasive carcinoma       with one out of 3 lymph nodes positive. She was seen by Dr. Camarena and was       started on adjuvant [facility] chemotherapy. After the first dose, she developed       severe adverse effects, and she declined further chemotherapy. She was       started on anastrozole and Herceptin was continued for a total of one       year. According to the patient, she was not referred for radiation       initially due to unclear reason and finally was referred for breast       radiation, which was completed in March 2016.             Screening mammogram from April 2017 showed breast asymmetry. Follow-up       ultrasound and biopsy was consistent with grade 3 invasive ductal       carcinoma ER/VA negative, HER-2/geoffrey equivocal(by IHC and FISH - CAP17       ratio 1.8 , Her2 count 4.6).             She underwent bilateral mastectomy which showed a 1.9 cm grade 3 invasive       ductal carcinoma. Axillary dissection showed 10 benign lymph nodes            She has been taking anastrozole 1 mg daily since 2017, unclear when the       optimal stop date would be.  Referred her back to cancer care specialist       and she met with Dr. Espinoza.  After their discussion they agreed it would       be reasonable to continue on the anastrozole since she is not having       significant side effects and to continue this through 2025 with monitoring       for development of osteopenia and continuing bone support with calcium and       vitamin D.    Type 2 diabetes mellitus without complication, without long-term current use of insulin (HCC) (POA: Yes)      Overview:  Latest Reference Range & Units 03/14/24 11:27       Glycohemoglobin 5.8 % 5.6             New diagnosis of controlled type 2 diabetes in October 2021.  She is       agreeable to starting with blood sugar checks and Trulicity to assist with       her diabetes and weight loss.  She is appropriately on statin and ACE       inhibitor.            Current  regimen: Dulaglutide 0.75 mg weekly on Saturdays    SAH (subarachnoid hemorrhage) (HCC) (POA: Yes)    Hypertensive urgency (POA: Yes)    Urinary tract infection without hematuria (POA: Yes)    Acute encephalopathy (POA: Yes)    Acute respiratory failure with hypoxia (HCC) (POA: Yes)    Lactic acidosis (POA: Unknown)    Hypokalemia (POA: Unknown)    Seizure (HCC) (POA: Unknown)    Influenza A (POA: Unknown)  Resolved Problems:    * No resolved hospital problems. *      FOLLOW UP  No future appointments.  [facility]  [address] [address]  [phone]          MEDICATIONS ON DISCHARGE     Medication List        START taking these medications        Instructions   amLODIPine 10 MG Tabs  Start taking on: February 22, 2025  Commonly known as: Norvasc   Take 1 Tablet by mouth every day.  Dose: 10 mg     hydrALAZINE 25 MG Tabs  Commonly known as: Apresoline   Take 1 Tablet by mouth every 8 hours.  Dose: 25 mg     lacosamide 100 MG Tabs tablet  Commonly known as: Vimpat   Take 1 Tablet by mouth 2 times a day for 30 days.  Dose: 100 mg            CHANGE how you take these medications        Instructions   HYDROcodone-acetaminophen 7.5-325 MG tab  Start taking on: March 1, 2025  What changed: Another medication with the same name was removed. Continue taking this medication, and follow the directions you see here.  Commonly known as: Norco   Doctor's comments: Clinic visit: 12/18/2024 #3 of 3; dose increase from three times daily as needed to three times daily so increase quantity from #75 to #90.  Take 1 Tablet by mouth every 8 hours as needed for Severe Pain for up to 30 days.  Dose: 1 Tablet            CONTINUE taking these medications        Instructions   anastrozole 1 MG Tabs  Commonly known as: Arimidex   Take 1 Tablet by mouth every day.  Dose: 1 mg     atorvastatin 20 MG Tabs  Commonly known as: Lipitor   Take 1 Tablet by mouth see administration  instructions. Take 20 mg on Mon, Wed, Fri and 10 mg on Tues,Thurs, Sat, Sun  Dose: 20 mg     cyclobenzaprine 5 mg tablet  Commonly known as: Flexeril   Take 1-2 Tablets by mouth 3 times a day as needed for Muscle Spasms or Moderate Pain.  Dose: 5-10 mg     fluticasone 50 MCG/ACT nasal spray  Commonly known as: Flonase   USE 2 SPRAY(S) IN EACH NOSTRIL TWICE DAILY  Dose: 2 Spray     * levothyroxine 137 MCG Tabs  Commonly known as: Synthroid   Take 1 Tablet by mouth every 48 hours. Alternate every other day with 125 mcg  Dose: 137 mcg     * levothyroxine 125 MCG Tabs  Commonly known as: Synthroid   Take 1 Tablet by mouth every 48 hours. Alternate every other day with 137 mcg  Dose: 125 mcg     lisinopril 40 MG tablet  Commonly known as: Prinivil   Take 1 Tablet by mouth every day.  Dose: 40 mg     ondansetron 4 MG Tbdp  Commonly known as: Zofran ODT   Take 1 Tablet by mouth every 6 hours as needed for Nausea/Vomiting.  Dose: 4 mg     Trulicity 0.75 MG/0.5ML Sopn  Generic drug: Dulaglutide   Inject 1 Pen under the skin every 7 days.  Dose: 1 Pen           * This list has 2 medication(s) that are the same as other medications prescribed for you. Read the directions carefully, and ask your doctor or other care provider to review them with you.                STOP taking these medications      metoprolol SR 25 MG Tb24  Commonly known as: Toprol XL              Allergies  Allergies   Allergen Reactions    Shellfish Allergy Anaphylaxis    Iodine      Burning,Swelling    Pcn [Penicillins]      SOB, Hives   Tolerated ceftriaxone Feb 2025    Sulfa Drugs      Swelling       DIET  Orders Placed This Encounter   Procedures    Diet Order Diet: Regular (assist with set up)     Standing Status:   Standing     Number of Occurrences:   1     Diet::   Regular [1]              assist with set up       ACTIVITY  As tolerated.  Weight bearing as tolerated    LINES, DRAINS, AND WOUNDS  This is an automated list. Peripheral IVs will be  removed prior to discharge.       Wound 02/11/25 Pressure Injury Ankle;Heel;Leg Medial;Lateral Bilateral POA sDTI (Active)   Wound Image      02/11/25 1700   Site Assessment Pink;Purple 02/20/25 2100   Periwound Assessment Clean;Dry;Intact 02/20/25 2100   Margins Attached edges;Defined edges 02/20/25 2100   Closure Secondary intention 02/20/25 0800   Drainage Amount None 02/20/25 0800   Treatments Site care 02/20/25 0800   Wound Cleansing Not Applicable 02/20/25 2100   Dressing Status Clean;Dry;Intact 02/20/25 0800   NEXT Weekly Photo (Inpatient Only) 02/18/25 02/11/25 1700   Wound Team Following Not following 02/11/25 1700   WOUND NURSE ONLY - Pressure Injury Stage DTPI 02/11/25 1700   Shape Irregular 02/11/25 1700   Wound Odor None 02/11/25 1700                  MENTAL STATUS ON TRANSFER             CONSULTATIONS  Neurology  Critical care   IR    PROCEDURES  Angiogram     LABORATORY  Lab Results   Component Value Date    SODIUM 135 02/19/2025    POTASSIUM 3.5 (L) 02/19/2025    CHLORIDE 101 02/19/2025    CO2 21 02/19/2025    GLUCOSE 118 (H) 02/19/2025    BUN 13 02/19/2025    CREATININE 0.79 02/19/2025        Lab Results   Component Value Date    WBC 8.4 02/19/2025    HEMOGLOBIN 14.0 02/19/2025    HEMATOCRIT 40.4 02/19/2025    PLATELETCT 289 02/19/2025        Total time of the discharge process exceeds 35 minutes.

## 2025-08-15 RX ORDER — PREDNISONE 50 MG/1
50 TABLET ORAL EVERY 6 HOURS
Qty: 2 TABLET | Refills: 0 | Status: SHIPPED | OUTPATIENT
Start: 2025-08-15 | End: 2025-08-16

## (undated) DEVICE — TELFA 8 X 10 BIOSEAL - (50EA/CA)

## (undated) DEVICE — SODIUM CHL IRRIGATION 0.9% 1000ML (12EA/CA)

## (undated) DEVICE — SHEAR HS FOCUS 9CM CVD - (6/BX)

## (undated) DEVICE — MASK ANESTHESIA ADULT  - (100/CA)

## (undated) DEVICE — KIT ANESTHESIA W/CIRCUIT & 3/LT BAG W/FILTER (20EA/CA)

## (undated) DEVICE — PAD LAP STERILE 18 X 18 - (5/PK 40PK/CA)

## (undated) DEVICE — SLEEVE, VASO, THIGH, MED

## (undated) DEVICE — SPONGE GAUZESTER 4 X 4 4PLY - (128PK/CA)

## (undated) DEVICE — GLOVE SZ 7.5 BIOGEL PI MICRO - PF LF (50PR/BX)

## (undated) DEVICE — ELECTRODE DUAL RETURN W/ CORD - (50/PK)

## (undated) DEVICE — BOVIE BLADE COATED &INSULATED - 25/PK

## (undated) DEVICE — CANISTER SUCTION 3000ML MECHANICAL FILTER AUTO SHUTOFF MEDI-VAC NONSTERILE LF DISP  (40EA/CA)

## (undated) DEVICE — SUCTION INSTRUMENT YANKAUER BULBOUS TIP W/O VENT (50EA/CA)

## (undated) DEVICE — NEPTUNE 4 PORT MANIFOLD - (20/PK)

## (undated) DEVICE — GLOVE BIOGEL INDICATOR SZ 6.5 SURGICAL PF LTX - (50PR/BX 4BX/CA)

## (undated) DEVICE — KIT  I.V. START (100EA/CA)

## (undated) DEVICE — HEAD HOLDER JUNIOR/ADULT

## (undated) DEVICE — SHEET TRANSVERSE LAP - (12EA/CA)

## (undated) DEVICE — SUTURE 4-0 MONOCRYL PLUS PS-2 - 27 INCH (36/BX)

## (undated) DEVICE — DRAIN FLAT SUCTION 10MMX20CM - LATEX FREE (10EA/CA)

## (undated) DEVICE — GLOVE BIOGEL SZ 6 PF LATEX - (50EA/BX 4BX/CA)

## (undated) DEVICE — WATER IRRIGATION STERILE 1000ML (12EA/CA)

## (undated) DEVICE — ELECTRODE 850 FOAM ADHESIVE - HYDROGEL RADIOTRNSPRNT (50/PK)

## (undated) DEVICE — CLIP SM INTNL HRZN TI ESCP LGT - (24EA/PK 25PK/BX)

## (undated) DEVICE — SUTURE 3-0 VICRYL PLUS SH - 8X 18 INCH (12/BX)

## (undated) DEVICE — GOWN WARMING STANDARD FLEX - (30/CA)

## (undated) DEVICE — GLOVE BIOGEL PI INDICATOR SZ 6.5 SURGICAL PF LF - (50/BX 4BX/CA)

## (undated) DEVICE — GLOVE SZ 6 BIOGEL PI MICRO - PF LF (50PR/BX 4BX/CA)

## (undated) DEVICE — DRAIN J-VAC 10MM FLAT - (10/CA)

## (undated) DEVICE — TUBING CLEARLINK DUO-VENT - C-FLO (48EA/CA)

## (undated) DEVICE — SENSOR SPO2 NEO LNCS ADHESIVE (20/BX) SEE USER NOTES

## (undated) DEVICE — TRAY SKIN SCRUB PVP WET (20EA/CA) PART #DYND70356 DISCONTINUED

## (undated) DEVICE — GOWN SURGEONS LARGE - (32/CA)

## (undated) DEVICE — COVER CIV-FLEX TRANSDUCER - (24/BX)

## (undated) DEVICE — SPONGE DRAIN 4 X 4IN 6-PLY - (2/PK25PK/BX12BX/CS)

## (undated) DEVICE — CANISTER SUCTION RIGID RED 1500CC (40EA/CA)

## (undated) DEVICE — SPONGE GAUZESTER. 2X2 4-PL - (2/PK 50PK/BX 30BX/CS)

## (undated) DEVICE — STAPLER SKIN DISP - (6/BX 10BX/CA) VISISTAT

## (undated) DEVICE — BLADE SURGICAL #15 - (50/BX 3BX/CA)

## (undated) DEVICE — CLOSURE SKIN STRIP 1/2 X 4 IN - (STERI STRIP) (50/BX 4BX/CA)

## (undated) DEVICE — BANDAGE ELASTIC STERILE VELCRO 6 X 5 YDS (25EA/CA)

## (undated) DEVICE — SET LEADWIRE 5 LEAD BEDSIDE DISPOSABLE ECG (1SET OF 5/EA)

## (undated) DEVICE — LACTATED RINGERS INJ 1000 ML - (14EA/CA 60CA/PF)

## (undated) DEVICE — TUBE CONNECTING SUCTION - CLEAR PLASTIC STERILE 72 IN (50EA/CA)

## (undated) DEVICE — SUTURE 2-0 SILK FS 18 (36PK/BX) DISCONTINUED USE #34812, LOWER UOM SAME PART # WITH G AT THE END"

## (undated) DEVICE — GAUZE FLUFF STERILE 2-PLY 36 X 36 (100EA/CA)

## (undated) DEVICE — GLOVE BIOGEL PI INDICATOR SZ 8.0 SURGICAL PF LF -(50/BX 4BX/CA)

## (undated) DEVICE — PROTECTOR ULNA NERVE - (36PR/CA)

## (undated) DEVICE — BLADE SURGICAL #10 - (50/BX)

## (undated) DEVICE — DRAIN J-VAC 7MM FLAT - (10EA/CA)

## (undated) DEVICE — CATHETER IV 20 GA X 1-1/4 ---SURG.& SDS ONLY--- (50EA/BX)

## (undated) DEVICE — GOWN SURGEONS X-LARGE - DISP. (30/CA)

## (undated) DEVICE — RESERVOIR SUCTION 100 CC - SILICONE (20EA/CA)

## (undated) DEVICE — BLOCK

## (undated) DEVICE — SUTURE GENERAL

## (undated) DEVICE — SUTURE 3-0 ETHILON FS-1 - (36/BX) 30 INCH

## (undated) DEVICE — PACK MINOR BASIN - (2EA/CA)

## (undated) DEVICE — COVER PROBE STERILE CONE (12EA/CA)

## (undated) DEVICE — DRAPE LARGE 3 QUARTER - (20/CA)